# Patient Record
Sex: FEMALE | Race: WHITE | NOT HISPANIC OR LATINO | Employment: OTHER | URBAN - METROPOLITAN AREA
[De-identification: names, ages, dates, MRNs, and addresses within clinical notes are randomized per-mention and may not be internally consistent; named-entity substitution may affect disease eponyms.]

---

## 2017-02-01 ENCOUNTER — GENERIC CONVERSION - ENCOUNTER (OUTPATIENT)
Dept: OTHER | Facility: OTHER | Age: 72
End: 2017-02-01

## 2017-03-22 ENCOUNTER — ALLSCRIPTS OFFICE VISIT (OUTPATIENT)
Dept: OTHER | Facility: OTHER | Age: 72
End: 2017-03-22

## 2017-03-22 DIAGNOSIS — E11.29 TYPE 2 DIABETES MELLITUS WITH OTHER DIABETIC KIDNEY COMPLICATION (HCC): ICD-10-CM

## 2017-03-22 DIAGNOSIS — D50.9 IRON DEFICIENCY ANEMIA: ICD-10-CM

## 2017-03-22 DIAGNOSIS — I10 ESSENTIAL (PRIMARY) HYPERTENSION: ICD-10-CM

## 2017-03-22 DIAGNOSIS — Z11.59 ENCOUNTER FOR SCREENING FOR OTHER VIRAL DISEASES: ICD-10-CM

## 2017-04-27 ENCOUNTER — GENERIC CONVERSION - ENCOUNTER (OUTPATIENT)
Dept: OTHER | Facility: OTHER | Age: 72
End: 2017-04-27

## 2017-06-26 ENCOUNTER — ALLSCRIPTS OFFICE VISIT (OUTPATIENT)
Dept: OTHER | Facility: OTHER | Age: 72
End: 2017-06-26

## 2017-06-26 DIAGNOSIS — Z11.59 ENCOUNTER FOR SCREENING FOR OTHER VIRAL DISEASES: ICD-10-CM

## 2017-06-26 DIAGNOSIS — E11.29 TYPE 2 DIABETES MELLITUS WITH OTHER DIABETIC KIDNEY COMPLICATION (HCC): ICD-10-CM

## 2017-07-17 ENCOUNTER — GENERIC CONVERSION - ENCOUNTER (OUTPATIENT)
Dept: OTHER | Facility: OTHER | Age: 72
End: 2017-07-17

## 2017-09-29 ENCOUNTER — ALLSCRIPTS OFFICE VISIT (OUTPATIENT)
Dept: OTHER | Facility: OTHER | Age: 72
End: 2017-09-29

## 2017-09-29 DIAGNOSIS — D50.9 IRON DEFICIENCY ANEMIA: ICD-10-CM

## 2017-09-29 DIAGNOSIS — E11.29 TYPE 2 DIABETES MELLITUS WITH OTHER DIABETIC KIDNEY COMPLICATION (HCC): ICD-10-CM

## 2017-09-29 DIAGNOSIS — I10 ESSENTIAL (PRIMARY) HYPERTENSION: ICD-10-CM

## 2017-10-31 ENCOUNTER — GENERIC CONVERSION - ENCOUNTER (OUTPATIENT)
Dept: OTHER | Facility: OTHER | Age: 72
End: 2017-10-31

## 2018-01-03 ENCOUNTER — ALLSCRIPTS OFFICE VISIT (OUTPATIENT)
Dept: OTHER | Facility: OTHER | Age: 73
End: 2018-01-03

## 2018-01-03 DIAGNOSIS — E78.5 HYPERLIPIDEMIA: ICD-10-CM

## 2018-01-03 DIAGNOSIS — Z13.820 ENCOUNTER FOR SCREENING FOR OSTEOPOROSIS: ICD-10-CM

## 2018-01-03 DIAGNOSIS — I25.10 ATHEROSCLEROTIC HEART DISEASE OF NATIVE CORONARY ARTERY WITHOUT ANGINA PECTORIS: ICD-10-CM

## 2018-01-03 DIAGNOSIS — Z12.31 ENCOUNTER FOR SCREENING MAMMOGRAM FOR MALIGNANT NEOPLASM OF BREAST: ICD-10-CM

## 2018-01-03 DIAGNOSIS — D50.9 IRON DEFICIENCY ANEMIA: ICD-10-CM

## 2018-01-03 DIAGNOSIS — E55.9 VITAMIN D DEFICIENCY: ICD-10-CM

## 2018-01-03 DIAGNOSIS — Z12.11 ENCOUNTER FOR SCREENING FOR MALIGNANT NEOPLASM OF COLON: ICD-10-CM

## 2018-01-03 DIAGNOSIS — E11.29 TYPE 2 DIABETES MELLITUS WITH OTHER DIABETIC KIDNEY COMPLICATION (CODE): ICD-10-CM

## 2018-01-10 ENCOUNTER — APPOINTMENT (OUTPATIENT)
Dept: LAB | Facility: CLINIC | Age: 73
End: 2018-01-10
Payer: MEDICARE

## 2018-01-10 ENCOUNTER — TRANSCRIBE ORDERS (OUTPATIENT)
Dept: LAB | Facility: CLINIC | Age: 73
End: 2018-01-10

## 2018-01-10 DIAGNOSIS — E55.9 VITAMIN D DEFICIENCY: ICD-10-CM

## 2018-01-10 DIAGNOSIS — D50.9 IRON DEFICIENCY ANEMIA: ICD-10-CM

## 2018-01-10 DIAGNOSIS — I25.10 ATHEROSCLEROTIC HEART DISEASE OF NATIVE CORONARY ARTERY WITHOUT ANGINA PECTORIS: ICD-10-CM

## 2018-01-10 DIAGNOSIS — E78.5 HYPERLIPIDEMIA: ICD-10-CM

## 2018-01-10 DIAGNOSIS — E11.29 TYPE 2 DIABETES MELLITUS WITH OTHER DIABETIC KIDNEY COMPLICATION (CODE): ICD-10-CM

## 2018-01-10 LAB
25(OH)D3 SERPL-MCNC: 63.2 NG/ML (ref 30–100)
ALBUMIN SERPL BCP-MCNC: 3.3 G/DL (ref 3.5–5)
ALP SERPL-CCNC: 81 U/L (ref 46–116)
ALT SERPL W P-5'-P-CCNC: 15 U/L (ref 12–78)
ANION GAP SERPL CALCULATED.3IONS-SCNC: 5 MMOL/L (ref 4–13)
AST SERPL W P-5'-P-CCNC: 16 U/L (ref 5–45)
BASOPHILS # BLD AUTO: 0.03 THOUSANDS/ΜL (ref 0–0.1)
BASOPHILS NFR BLD AUTO: 0 % (ref 0–1)
BILIRUB SERPL-MCNC: 0.67 MG/DL (ref 0.2–1)
BUN SERPL-MCNC: 29 MG/DL (ref 5–25)
CALCIUM SERPL-MCNC: 9.9 MG/DL (ref 8.3–10.1)
CHLORIDE SERPL-SCNC: 100 MMOL/L (ref 100–108)
CO2 SERPL-SCNC: 34 MMOL/L (ref 21–32)
CREAT SERPL-MCNC: 3.54 MG/DL (ref 0.6–1.3)
EOSINOPHIL # BLD AUTO: 0.36 THOUSAND/ΜL (ref 0–0.61)
EOSINOPHIL NFR BLD AUTO: 5 % (ref 0–6)
ERYTHROCYTE [DISTWIDTH] IN BLOOD BY AUTOMATED COUNT: 15 % (ref 11.6–15.1)
EST. AVERAGE GLUCOSE BLD GHB EST-MCNC: 163 MG/DL
FERRITIN SERPL-MCNC: 1385 NG/ML (ref 8–388)
GFR SERPL CREATININE-BSD FRML MDRD: 12 ML/MIN/1.73SQ M
GLUCOSE P FAST SERPL-MCNC: 131 MG/DL (ref 65–99)
HBA1C MFR BLD: 7.3 % (ref 4.2–6.3)
HCT VFR BLD AUTO: 34.9 % (ref 34.8–46.1)
HGB BLD-MCNC: 11.1 G/DL (ref 11.5–15.4)
IRON SERPL-MCNC: 58 UG/DL (ref 50–170)
LDLC SERPL DIRECT ASSAY-MCNC: 90 MG/DL (ref 0–100)
LYMPHOCYTES # BLD AUTO: 1.51 THOUSANDS/ΜL (ref 0.6–4.47)
LYMPHOCYTES NFR BLD AUTO: 22 % (ref 14–44)
MAGNESIUM SERPL-MCNC: 2.4 MG/DL (ref 1.6–2.6)
MCH RBC QN AUTO: 32.7 PG (ref 26.8–34.3)
MCHC RBC AUTO-ENTMCNC: 31.8 G/DL (ref 31.4–37.4)
MCV RBC AUTO: 103 FL (ref 82–98)
MONOCYTES # BLD AUTO: 0.49 THOUSAND/ΜL (ref 0.17–1.22)
MONOCYTES NFR BLD AUTO: 7 % (ref 4–12)
NEUTROPHILS # BLD AUTO: 4.41 THOUSANDS/ΜL (ref 1.85–7.62)
NEUTS SEG NFR BLD AUTO: 66 % (ref 43–75)
NRBC BLD AUTO-RTO: 0 /100 WBCS
PLATELET # BLD AUTO: 185 THOUSANDS/UL (ref 149–390)
PMV BLD AUTO: 10.7 FL (ref 8.9–12.7)
POTASSIUM SERPL-SCNC: 4.1 MMOL/L (ref 3.5–5.3)
PROT SERPL-MCNC: 7.5 G/DL (ref 6.4–8.2)
RBC # BLD AUTO: 3.39 MILLION/UL (ref 3.81–5.12)
SODIUM SERPL-SCNC: 139 MMOL/L (ref 136–145)
TIBC SERPL-MCNC: 179 UG/DL (ref 250–450)
TRIGL SERPL-MCNC: 244 MG/DL
WBC # BLD AUTO: 6.81 THOUSAND/UL (ref 4.31–10.16)

## 2018-01-10 PROCEDURE — 83540 ASSAY OF IRON: CPT

## 2018-01-10 PROCEDURE — 83721 ASSAY OF BLOOD LIPOPROTEIN: CPT

## 2018-01-10 PROCEDURE — 85025 COMPLETE CBC W/AUTO DIFF WBC: CPT

## 2018-01-10 PROCEDURE — 83550 IRON BINDING TEST: CPT

## 2018-01-10 PROCEDURE — 83036 HEMOGLOBIN GLYCOSYLATED A1C: CPT

## 2018-01-10 PROCEDURE — 82728 ASSAY OF FERRITIN: CPT

## 2018-01-10 PROCEDURE — 83735 ASSAY OF MAGNESIUM: CPT

## 2018-01-10 PROCEDURE — 36415 COLL VENOUS BLD VENIPUNCTURE: CPT

## 2018-01-10 PROCEDURE — 80053 COMPREHEN METABOLIC PANEL: CPT

## 2018-01-10 PROCEDURE — 82306 VITAMIN D 25 HYDROXY: CPT

## 2018-01-10 PROCEDURE — 84478 ASSAY OF TRIGLYCERIDES: CPT

## 2018-01-10 NOTE — RESULT NOTES
Verified Results  (1) HEMOGLOBIN A1C 25Jan2016 11:42AM Viky Dolanmatt Order Number: [id number]      5 7-6 4% impaired fasting glucose  >=6 5% diagnosis of diabetes    Falsely low levels are seen in conditions linked to short RBC life span-  hemolytic anemia, and splenomegaly  Falsely elevated levels are seen in situations where there is an increased production of RBC- receipt of erythropoietin or blood transfusions  Adopted from ADA-Clinical Practice Recommendations     Test Name Result Flag Reference   HEMOGLOBIN A1C 5 9 % H 4 0-5 6   EST  AVG   GLUCOSE 123 mg/dl

## 2018-01-11 ENCOUNTER — GENERIC CONVERSION - ENCOUNTER (OUTPATIENT)
Dept: OTHER | Facility: OTHER | Age: 73
End: 2018-01-11

## 2018-01-12 ENCOUNTER — TRANSCRIBE ORDERS (OUTPATIENT)
Dept: LAB | Facility: CLINIC | Age: 73
End: 2018-01-12

## 2018-01-12 ENCOUNTER — APPOINTMENT (OUTPATIENT)
Dept: LAB | Facility: CLINIC | Age: 73
End: 2018-01-12
Payer: MEDICARE

## 2018-01-12 DIAGNOSIS — Z12.11 SCREEN FOR COLON CANCER: Primary | ICD-10-CM

## 2018-01-12 DIAGNOSIS — Z12.11 SCREEN FOR COLON CANCER: ICD-10-CM

## 2018-01-12 LAB — HEMOCCULT STL QL IA: NEGATIVE

## 2018-01-12 PROCEDURE — G0328 FECAL BLOOD SCRN IMMUNOASSAY: HCPCS

## 2018-01-12 NOTE — RESULT NOTES
Verified Results  * MAMMO SCREENING BILATERAL W CAD 41Eoq2422 02:44PM Thao Altamirano Order Number: [id number]    - Patient Instructions: To schedule this appointment, please contact Central Scheduling at [phone]  Do not wear any perfume, powder, lotion or deodorant on breast or underarm area  Please bring your doctors order, referral (if needed) and insurance information with you on the day of the test  Failure to bring this information may result in this test being rescheduled  Arrive 15 minutes prior to your appointment time to register  On the day of your test, please bring any prior mammogram or breast studies with you that were not performed at a [facility]  Failure to bring prior exams may result in your test needing to be rescheduled  Test Name Result Flag Reference   MAMMO SCREENING BILATERAL W CAD (Report)     Patient History:   Patient is postmenopausal    Family history of breast cancer in mother  Reason for exam: screening (asymptomatic)  Mammo Screening Bilateral W CAD: December 2, 2016 - Check In #:    [de-identified]   Bilateral MLO, CC, and XCCL view(s) were taken  Technologist: CANELO Washington (R)(M)   Prior study comparison: April 2, 2014, bilateral ultrasound    performed at [address] [place] [address]  April 2, 2014,    bilateral digital diagnostic mammogram performed at [address] [place] [address]  April 2, 2014, bilateral digital    screening mammogram performed at [address] [place] [address]  There are scattered fibroglandular densities  No new dominant    soft tissue mass, architectural distortion or suspicious    calcifications are noted  The skin and nipple structures are    within normal limits  Benign appearing calcifications are noted  Stable nodular densities present bilaterally  No mammographic evidence of malignancy  No    significant changes when compared with prior studies       ASSESSMENT: BiRad:2 - Benign     Recommendation:   Routine screening mammogram of both breasts in 1 year  A    reminder letter will be scheduled  Analyzed by CAD     8-10% of cancers will be missed on mammography  Management of a    palpable abnormality must be based on clinical grounds  Patients   will be notified of their results via letter from our facility  Accredited by [employer] of [employer] and FDA       Transcription Location: CANELO Costa 98: [id number]     Risk Value(s):   Tyrer-Cuzick 10 Year: 6 110%, Tyrer-Cuzick Lifetime: 9 587%,    Myriad Table: 1 5%, GUILLE 5 Year: 3 0%, NCI Lifetime: 8 7%   Signed by:   Eddi Damon MD   12/2/16

## 2018-01-12 NOTE — RESULT NOTES
Verified Results  (1) THIN PREP PAP WITH IMAGING 51BDS1343 11:10AM Amaya Allen     Test Name Result Flag Reference   LAB AP CASE REPORT (Report)     Gynecologic Cytology Report            Case: [id number]                  Authorizing Provider: Irving Schaefer PA-C   Collected:      01/28/2016 1110        First Screen:     DELMA Jean    Received:      02/01/2016 1110        Specimen:  LIQUID-BASED PAP, SCREENING, Cervix   LAB AP GYN PRIMARY INTERPRETATION      Negative for intraepithelial lesion or malignancy   LAB AP GYN SPECIMEN ADEQUACY      Satisfactory for evaluation  Partially obscuring inflammation  LAB AP GYN NOTE      This specimen was analyzed by the Thin Prep Imaging System  Due to   technical Imaging issues or the physical properties of the slide specimen,   comprehensive manual rescreening by a Cytotechnologist, and/or Pathologist   was indicated  LAB AP GYN ADDITIONAL INFORMATION (Report)     [employer]'s FDA approved ,  and ThinPrep Imaging System are   utilized with strict adherence to the 's instruction manual to   prepare gynecologic and non-gynecologic cytology specimens for the   production of ThinPrep slides as well as for gynecologic ThinPrep imaging  These processes have been validated by our laboratory and/or by the     The Pap test is not a diagnostic procedure and should not be used as the   sole means to detect cervical cancer  It is only a screening procedure to   aid in the detection of cervical cancer and its precursors  Both   false-negative and false-positive results have been experienced  Your   patient's test result should be interpreted in this context together with   the history and clinical findings     LAB AP LMP 12/1/2005

## 2018-01-13 ENCOUNTER — GENERIC CONVERSION - ENCOUNTER (OUTPATIENT)
Dept: OTHER | Facility: OTHER | Age: 73
End: 2018-01-13

## 2018-01-13 VITALS
WEIGHT: 172 LBS | RESPIRATION RATE: 16 BRPM | HEIGHT: 64 IN | DIASTOLIC BLOOD PRESSURE: 62 MMHG | SYSTOLIC BLOOD PRESSURE: 126 MMHG | TEMPERATURE: 98 F | BODY MASS INDEX: 29.37 KG/M2 | HEART RATE: 64 BPM

## 2018-01-13 VITALS
DIASTOLIC BLOOD PRESSURE: 58 MMHG | RESPIRATION RATE: 18 BRPM | SYSTOLIC BLOOD PRESSURE: 142 MMHG | HEART RATE: 70 BPM | TEMPERATURE: 98.1 F

## 2018-01-14 VITALS
HEIGHT: 64 IN | DIASTOLIC BLOOD PRESSURE: 62 MMHG | HEART RATE: 66 BPM | BODY MASS INDEX: 30.22 KG/M2 | RESPIRATION RATE: 18 BRPM | TEMPERATURE: 97.4 F | SYSTOLIC BLOOD PRESSURE: 138 MMHG | WEIGHT: 177 LBS

## 2018-01-23 VITALS
HEART RATE: 68 BPM | RESPIRATION RATE: 18 BRPM | DIASTOLIC BLOOD PRESSURE: 80 MMHG | TEMPERATURE: 97.2 F | WEIGHT: 176 LBS | SYSTOLIC BLOOD PRESSURE: 150 MMHG | HEIGHT: 64 IN | BODY MASS INDEX: 30.05 KG/M2

## 2018-01-23 NOTE — MISCELLANEOUS
Assessment   1  Acute renal failure (584 9) (N17 9)1   2  Congestive heart failure (428 0) (I50 9)1   3  Iron deficiency anemia (280 9) (D50 9)1   4  Fracture, tibial plateau (627 31) (K32 070U)4   5  Gait disturbance (781 2) (R26 9)1   6  Type 2 diabetes mellitus with renal manifestations, controlled (250 40) (E11 29)1   7  Secondary hyperparathyroidism (588 81) (N25 81)1   8  Chronic kidney disease, stage 4 (severe) (585 4) (N18 4)1      1 Amended By: Kathi Stallings; Mar 09 2016 9:21 AM EST    Discussion/Summary  Discussion Summary:   Stable, but unable to move even less due to anasarca and now off the diuretics  Sugars ok  Suspect pt is moving towards dialysis near future  Will speak with pt's nephrologist 1  Spoke with nephro and will see her next week, but wanted her to restart the demadex 20mg bid and check labs next week  Will continue current sugar med until with get the acute renal issues addressed  RTC six weeks for RTN  2        1 Amended By: Kathi Stallings; Mar 09 2016 9:25 AM EST   2 Amended By: Kathi Stallings; Mar 09 2016 9:31 AM EST    Chief Complaint  Chief Complaint Free Text Note Form: [facility] FU - Kidney Failure  Christo Smith Pt is feeling better, but has increased fluid retention in abdomen and legs  [facility] d/c torsemide  1        1 Amended By: Ventura Khanna; Mar 09 2016 8:53 AM EST    History of Present Illness  TCM Communication [facility]: The patient is being contacted for follow-up after hospitalization and PT [place] F/U ON 03/09/2016  Hospital records were not available  [place] [facility] [place] date of admission: 03/02/2016, date of discharge: 03/07/2016  Diagnosis: KIDNEY FAILURE  She was discharged to home  She scheduled a follow up appointment     Communication performed and completed by   HPI: WF seen wth her  for f/u admission to [facility] for fluid overload due to worsening CKD4 complicated by ongoing sever anemia requiring transfusions and UTI  Minimal records available and most have not been transcribed per [facility]  Pt's meds significantly changed and pt d/c'd to home  Since d/c, only minimal improvement  Continues with increase fluid in her entire body and SOB persists, but no worse  Activity level is deminished  Sugars doing ok despite stopping most meds  No CP or SOB  1        1 Amended By: [facility]; Mar 09 2016 9:12 AM EST    Review of Systems  Complete-Female:   Constitutional:1  feeling poorly1  and feeling tired1 , but no fever1  and no chills1   Cardiovascular:1  lower extremity edema1 , but no chest pain1 , no intermittent leg claudication1  and no palpitations1   Respiratory:1  shortness of breath1  and shortness of breath during exertion1 , but no cough1 , no orthopnea1 , no wheezing1  and no PND1   Gastrointestinal:1  bloated and uncomfortable  1 , but no abdominal pain1 , no nausea1 , no constipation1  and no diarrhea1   Genitourinary:1  no dysuria1   Musculoskeletal:1  limb pain1   Psychiatric:1  no anxiety1  and no depression1         1 Amended By: [facility]; Mar 09 2016 9:15 AM EST    Active Problems   1  Abnormal weight loss (783 21) (R63 4)  2  Acute renal failure (584 9) (N17 9)  3  Allergic rhinitis (477 9) (J30 9)  4  Asymptomatic menopausal state (V49 81) (Z78 0)  5  Cardiovascular arteriosclerosis (429 2,440 9) (I25 10)  6  Carotid artery stenosis (433 10) (I65 29)  7  Cataract (366 9) (H26 9)  8  Clostridium difficile colitis (008 45) (A04 7)  9  Congestive heart failure (428 0) (I50 9)  10  Dehydration (276 51) (E86 0)  11  Diabetes mellitus with foot ulcer (250 80,707 15) (E11 621,L97 509)  12  Diarrhea (787 91) (R19 7)  13  Diastolic dysfunction (100 9) (I51 9)  14  DM type 2, not at goal (250 00) (E11 9)  15  Edema (782 3) (R60 9)  16  Encounter for screening colonoscopy (V76 51) (Z12 11)  17  Encounter for screening mammogram for malignant neoplasm of breast (V76 12)    (Z12 31)  18   Esophageal reflux (530 81) (K21 9)  19  Fracture, tibial plateau (130 65) (T60 310J)  20  Gait disturbance (781 2) (R26 9)  21  Hyperlipidemia (272 4) (E78 5)  22  Hypertension (401 9) (I10)  23  Hypokalemia (276 8) (E87 6)  24  Iron deficiency anemia (280 9) (D50 9)  25  Malnutrition (263 9) (E46)  26  Nephritis and nephropathy, not specified as acute or chronic, with other specified    pathological lesion in kidney, in diseases classified elsewhere (583 81) (N05 8)  27  Osteopetrosis (756 52) (Q78 2)  28  Overactive bladder (596 51) (N32 81)  29  Pain in ankle joint (719 47) (M25 579)  30  Pap smear for cervical cancer screening (V76 2) (Z12 4)  31  Peripheral arterial disease (443 9) (I73 9)  32  Postmenopausal osteoporosis (733 01) (M81 0)  33  Pre-operative examination for internal medicine (V72 83) (Z01 818)  34  Right knee pain (719 46) (M25 561)  35  Screening for depression (V79 0) (Z13 89)  36  Screening for genitourinary condition (V81 6) (Z13 89)  37  Screening for malignant neoplasm of cervix (V76 2) (Z12 4)  38  Screening for neurological condition (V80 09) (Z13 89)  39  Screening for osteoporosis (V82 81) (Z13 820)  40  Toenail avulsion (893 0) (S91 209A)  41  Type 2 diabetes mellitus with renal manifestations, controlled (250 40) (E11 29)  42  Vitamin D deficiency (268 9) (E55 9)    Past Medical History   1  History of Cellulitis of left leg (682 6) (L03 116)  2  History of contact dermatitis (V13 3) (Z87 2)  3  History of hypoglycemia (V12 29) (Z86 39)  4  History of visual disturbance (V12 49) (Z86 69)  5  History of Hypothermia (991 6) (T68 XXXA)  6  History of Metatarsalgia (726 70) (M77 40)  7  History of Near syncope (780 2) (R55)  8  Old myocardial infarction (412) (I25 2)    Surgical History   1  History of Bypass Graft (Non-Vein)  2  History of CABG  3  History of Cataract Surgery  4  History of Cholecystectomy  5  History of Hernia Repair Inguinal Sliding  6   History of Neurological Surgery Carotid Endarterectomy  7  History of Surgery Foot Amputation Metatarsal And Toe  8  History of Surgery Right Foot Amputation PIP Fifth Toe  9  History of Tonsillectomy With Adenoidectomy  Surgical History Reviewed: The surgical history was reviewed and updated today  1        1 Amended By: Juancarlos Patel; Mar 09 2016 9:21 AM EST    Family History   1  Family history of Breast Cancer (V16 3)  2  Family history of Cancer  3  Family history of Heart Disease (V17 49)   4  Family history of Coronary Artery Disease (V17 49)   5  Family history of Cerebral Artery Aneurysm  6  Family history of Coronary Artery Disease (V17 49)   7  Family history of Liver Cancer  Family History Reviewed: The family history was reviewed and updated today  1        1 Amended By: Juancarlos Patel; Mar 09 2016 9:21 AM EST    Social History    · Denied: History of Alcohol   · Former smoker (V15 82) (L51 928)   · Former smoker (V15 82) (S82 689)   · Marital History - Currently    · Occupation: Retired    Social History Reviewed: The social history was reviewed and updated today  1  The social history was reviewed and is unchanged  1        1 Amended By: Juancarlos Patel; Mar 09 2016 9:21 AM EST    Current Meds  1  1   2  AmLODIPine Besylate 5 MG Oral Tablet Recorded1   3  Aspirin 81 MG Oral Tablet; TAKE 1 TABLET DAILY; Therapy: 76EFE2985 to (Evaluate:51Brn1940) Recorded  4  Calcitriol 0 25 MCG Oral Capsule Recorded1   5  Carvedilol 12 5 MG Oral Tablet; TAKE 1 TABLET TWICE DAILY,  WITH MORNING AND   EVENING MEAL; Therapy: 25OME9681 to (Evaluate:63Mcp3459)  Requested for: 11Aug2015; Last   Rx:11Aug2015 Ordered  6  Ferrous Sulfate 325 (65 Fe) MG Oral Tablet; TAKE 1 TABLET TWICE DAILY; Therapy: 17IGF5298 to (Izaiah Landaverde)  Requested for: 09Apr2014; Last   Rx:09Apr2014 Ordered  7  1   8  GNP Magnesium Oxide 250 MG Oral Tablet; USE AS DIRECTED Recorded  9  Isosorbide Dinitrate 20 MG Oral Tablet; 1 tab po bid;    Therapy: 19QKQ2845 to Recorded  10  Nateglinide 60 MG Oral Tablet Recorded1   11  1   12  1   13  Ocuvite Oral Tablet; TAKE 1 TABLET DAILY; Therapy: 85EKM4036 to Recorded  14  Pantoprazole Sodium 40 MG Oral Tablet Delayed Release; take 1 tablet by mouth once    daily; Therapy: 04UKS0759 to (Evaluate:26Mar2016)  Requested for: 72ZNA3539; Last    Rx:27Nog3764 Ordered  15  Pravastatin Sodium 40 MG Oral Tablet Recorded1   16  Torsemide 20 MG Oral Tablet; TAKE 1 TABLET BID AS DIRECTED; Therapy: 48SKE7607 to Recorded;2 1   17  1   18  TraMADol HCl - 50 MG Oral Tablet; TAKE 1 TO 2 TABLETS EVERY 6 HOURS AS NEEDED    FOR PAIN;    Therapy: 23QKI6488 to (Last Rx:19Jan2016) Ordered  19  Vitamin D (Ergocalciferol) 80255 UNIT Oral Capsule; TAKE 1 CAPSULE BY MOUTH    ONCE WEEKLY   ;    Therapy: 60HKL1990 to (Last Rx:10Ggg2503)  Requested for: 88OXQ0357 Ordered  20  ZyrTEC Allergy 10 MG Oral Tablet; TAKE 1 TABLET DAILY AS NEEDED; Therapy: 42MXD0088 to Recorded  Medication List Reviewed: The medication list was reviewed and updated today  1        1 Amended By: Merry Watson; Mar 09 2016 9:16 AM EST   2 Amended By: Merry Watson; Mar 09 2016 9:29 AM EST    Allergies   1  No Known Drug Allergies   2  Seasonal    Physical Exam    Constitutional1    General appearance: No acute distress, well appearing and well nourished  1    Eyes1    Conjunctiva and lids: No swelling, erythema or discharge  1    Pupils and irises: Equal, round and reactive to light  1    Ears, Nose, Mouth, and Throat1    Oropharynx: Normal with no erythema, edema, exudate or lesions  1    Pulmonary1    Respiratory effort: No increased work of breathing or signs of respiratory distress  1    Auscultation of lungs: Clear to auscultation  1    Cardiovascular1    Auscultation of heart: Normal rate and rhythm, normal S1 and S2, without murmurs  1    Examination of extremities for edema and/or varicosities: Abnormal  1  B/l edema left greater than the right 2/41      Carotid pulses: Normal 1    Abdomen1    Abdomen: Abnormal  1  Soft, but protuberant with ?ascites and dullness  BS wnl  1   Musculoskeletal1    Gait and station: Abnormal  1  Unable to wt bear on right leg and is WC bound  1   Neurologic1    Cranial nerves: Cranial nerves 2-12 intact  1    Psychiatric1    Orientation to person, place, and time: Normal 1    Mood and affect: Normal 1          1 Amended By: Carla George; Mar 09 2016 9:18 AM EST    Health Management  Encounter for screening mammogram for malignant neoplasm of breast   Digital Bilateral Screening Mammogram With CAD; every 1 year; Last 02Apr2014; Next Due:  19ZXE8027; Overdue  Screening for osteoporosis   Dexa Scan; every 2 years; Last 02Apr2014; Next Due: 89Hbr2272; Near Due  Health Maintenance   Dexa Scan; every 2 years; Last 02Apr2014; Next Due: 74Jyx2934; Near Due  Digital Bilateral Screening Mammogram With CAD; every 1 year; Last 02Apr2014; Next Due:  39USP3846; Overdue    Future Appointments    Date/Time Provider Specialty Site   03/09/2016 08:30 AM DENISE Baig  Internal Medicine [address] [place] [address]   05/16/2016 01:00 PM Aime Wen, Nurse Schedule  [facility]     Signatures   Electronically signed by :  Colletta Moselle, ; Mar  7 2016  3:42PM EST                       (Author)    Electronically signed by : DENISE Erickson ; Mar  7 2016  3:50PM EST                       (Author)    Electronically signed by : DENISE Erickson ; Mar  9 2016  9:31AM EST                       (Author)

## 2018-02-14 DIAGNOSIS — K21.9 GASTROESOPHAGEAL REFLUX DISEASE WITHOUT ESOPHAGITIS: Primary | ICD-10-CM

## 2018-02-14 RX ORDER — PANTOPRAZOLE SODIUM 40 MG/1
TABLET, DELAYED RELEASE ORAL
Qty: 30 TABLET | Refills: 0 | Status: SHIPPED | OUTPATIENT
Start: 2018-02-14 | End: 2018-03-16 | Stop reason: SDUPTHER

## 2018-02-26 ENCOUNTER — TELEPHONE (OUTPATIENT)
Dept: INTERNAL MEDICINE CLINIC | Facility: CLINIC | Age: 73
End: 2018-02-26

## 2018-02-26 NOTE — TELEPHONE ENCOUNTER
Pt needs refill on her pravastatin 40 mg takes 1 Qd    [facility] [address]    [phone];  Please advise

## 2018-02-26 NOTE — RESULT NOTES
Verified Results  (1) OCCULT BLOOD, FECAL IMMUNOCHEMICAL TEST 12Jan2018 12:11PM Fletcher Duong     Test Name Result Flag Reference   OCCULT BLD, FECAL IMMUNOLOGICAL Negative  Negative   Performed by Fecal Immunochemical Test

## 2018-02-26 NOTE — RESULT NOTES
Verified Results  (1) CBC/PLT/DIFF 67PVQ1173 09:59AM [facility] Order Number: [id number]     Test Name Result Flag Reference   WBC COUNT 6 81 Thousand/uL  4 31-10 16   RBC COUNT 3 39 Million/uL L 3 81-5 12   HEMOGLOBIN 11 1 g/dL L 11 5-15 4   HEMATOCRIT 34 9 %  34 8-46  1    fL H 82-98   MCH 32 7 pg  26 8-34 3   MCHC 31 8 g/dL  31 4-37 4   RDW 15 0 %  11 6-15 1   MPV 10 7 fL  8 9-12 7   PLATELET COUNT 608 Thousands/uL  149-390   nRBC AUTOMATED 0 /100 WBCs     NEUTROPHILS RELATIVE PERCENT 66 %  43-75   LYMPHOCYTES RELATIVE PERCENT 22 %  14-44   MONOCYTES RELATIVE PERCENT 7 %  4-12   EOSINOPHILS RELATIVE PERCENT 5 %  0-6   BASOPHILS RELATIVE PERCENT 0 %  0-1   NEUTROPHILS ABSOLUTE COUNT 4 41 Thousands/? ??L  1 85-7 62   LYMPHOCYTES ABSOLUTE COUNT 1 51 Thousands/? ??L  0 60-4 47   MONOCYTES ABSOLUTE COUNT 0 49 Thousand/? ??L  0 17-1 22   EOSINOPHILS ABSOLUTE COUNT 0 36 Thousand/? ??L  0 00-0 61   BASOPHILS ABSOLUTE COUNT 0 03 Thousands/? ??L  0 00-0 10     (1) FERRITIN 95YVZ7242 09:59AM [facility] Order Number: [id number]     Test Name Result Flag Reference   FERRITIN 9454 ng/mL H 8-388     (1) IRON [id number] 09:59AM [facility] Order Number: [id number]     Test Name Result Flag Reference   IRON 58 ug/dL     Patients treated with metal-binding drugs (ie  Deferoxamine) may have depressed iron values       (1) TIBC 45PLC8940 09:59AM [facility] Order Number: [id number]     Test Name Result Flag Reference   TOTAL IRON BINDING CAPACITY 179 ug/dL L 250-450     (1) COMPREHENSIVE METABOLIC PANEL 05PIV1455 36:11QC [facility] Order Number: [id number]     Test Name Result Flag Reference   SODIUM 139 mmol/L  136-145   POTASSIUM 4 1 mmol/L  3 5-5 3   CHLORIDE 100 mmol/L  100-108   CARBON DIOXIDE 34 mmol/L H 21-32   ANION GAP (CALC) 5 mmol/L  4-13   BLOOD UREA NITROGEN 29 mg/dL H 5-25   CREATININE 3 54 mg/dL H 0 60-1 30 Standardized to IDMS reference method   CALCIUM 9 9 mg/dL  8 3-10 1   BILI, TOTAL 0 67 mg/dL  0 20-1 00   ALK PHOSPHATAS 81 U/L     ALT (SGPT) 15 U/L  12-78   Specimen collection should occur prior to Sulfasalazine and/or Sulfapyridine administration due to the potential for falsely depressed results  AST(SGOT) 16 U/L  5-45   Specimen collection should occur prior to Sulfasalazine administration due to the potential for falsely depressed results  ALBUMIN 3 3 g/dL L 3 5-5 0   TOTAL PROTEIN 7 5 g/dL  6 4-8 2   eGFR 12 ml/min/1 73sq m     [facility] Disease Education Program recommendations are as follows:  GFR calculation is accurate only with a steady state creatinine  Chronic Kidney disease less than 60 ml/min/1 73 sq  meters  Kidney failure less than 15 ml/min/1 73 sq  meters  GLUCOSE FASTING 131 mg/dL H 65-99   Specimen collection should occur prior to Sulfasalazine administration due to the potential for falsely depressed results  Specimen collection should occur prior to Sulfapyridine administration due to the potential for falsely elevated results  (1) HEMOGLOBIN A1C 92HMK9510 09:59AM [facility] Order Number: [id number]     Test Name Result Flag Reference   HEMOGLOBIN A1C 7 3 % H 4 2-6 3   EST  AVG   GLUCOSE 163 mg/dl       (1) LDL,DIRECT 24OUR4294 09:59AM Shayna Arn Order Number: [id number]     Test Name Result Flag Reference   LDL, DIRECT 90 mg/dl  0-100   LDL Cholesterol:        Optimal          <100 mg/dl        Near Optimal     100-129 mg/dl        Above Optimal          Borderline High   130-159 mg/dl          High              160-189 mg/dl          Very High        >189 mg/dl     (1) TRIGLYCERIDE [id number] 09:59AM Shayna Arn Order Number: [id number]     Test Name Result Flag Reference   TRIGLYCERIDES 244 mg/dL H <=150   Specimen collection should occur prior to N-Acetylcysteine or Metamizole administration due to the potential for falsely depressed results  Triglyceride:        Normal <150 mg/dl   Borderline High 150-199 mg/dl   High 200-499 mg/dl   Very High >499 mg/dl     (1) VITAMIN D 25-HYDROXY 85LXA2448 09:59AM [facility] Order Number: [id number]     Test Name Result Flag Reference   VIT D 25-HYDROX 63 2 ng/mL  30 0-100 0   This assay is a certified procedure of the CDC Vitamin D Standardization Certification Program (VDSCP)     Deficiency <20ng/ml   Insufficiency 20-30ng/ml   Sufficient  ng/ml     *Patients undergoing fluorescein dye angiography may retain small amounts of fluorescein in the body for 48-72 hours post procedure  Samples containing fluorescein can produce falsely elevated Vitamin D values  If the patient had this procedure, a specimen should be resubmitted post fluorescein clearance       (1) MAGNESIUM [id number] 09:59AM [facility] Order Number: [id number]     Test Name Result Flag Reference   MAGNESIUM 2 4 mg/dL  1 6-2 6

## 2018-02-27 DIAGNOSIS — E78.5 HYPERLIPIDEMIA, UNSPECIFIED HYPERLIPIDEMIA TYPE: Primary | ICD-10-CM

## 2018-02-27 RX ORDER — PRAVASTATIN SODIUM 40 MG
1 TABLET ORAL DAILY
COMMUNITY
End: 2018-02-27 | Stop reason: SDUPTHER

## 2018-02-27 RX ORDER — PRAVASTATIN SODIUM 40 MG
40 TABLET ORAL DAILY
Qty: 30 TABLET | Refills: 5 | Status: SHIPPED | OUTPATIENT
Start: 2018-02-27 | End: 2018-04-03

## 2018-03-07 NOTE — PROGRESS NOTES
History of Present Illness    Revaccination    Spoke with regarding vaccine out of temperature range  Pt called (attempt 1): [phone] dw  left message to call office  Active Problems    1  Allergic rhinitis (477 9) (J30 9)   2  Arteriosclerosis of carotid artery (433 10) (I65 29)   3  Asymptomatic menopausal state (V49 81) (Z78 0)   4  Cardiovascular arteriosclerosis (429 2,440 9) (I25 10)   5  Cataract (366 9) (H26 9)   6  Congestive heart failure (428 0) (I50 9)   7  Dermatitis (692 9) (L30 9)   8  Diabetes mellitus with foot ulcer (250 80,707 15) (E11 621,L97 509)   9  Diastolic dysfunction (178 6) (I51 9)   10  DM type 2, not at goal (250 00) (E11 9)   11  Edema (782 3) (R60 9)   12  Encounter for screening colonoscopy (V76 51) (Z12 11)   13  Encounter for screening mammogram for malignant neoplasm of breast (V76 12)    (Z12 31)   14  End-stage renal disease on hemodialysis (585 6,V45 11) (N18 6,Z99 2)   15  Fracture, tibial plateau (533 05) (V21 973Y)   16  Gait disturbance (781 2) (R26 9)   17  GERD without esophagitis (530 81) (K21 9)   18  Hyperkalemia (276 7) (E87 5)   19  Hyperlipidemia (272 4) (E78 5)   20  Hypertension (401 9) (I10)   21  Hypokalemia (276 8) (E87 6)   22  Hypoxia (799 02) (R09 02)   23  Iron deficiency anemia (280 9) (D50 9)   24  Need for hepatitis C screening test (V73 89) (Z11 59)   25  Need for pneumococcal vaccine (V03 82) (Z23)   26  Nephritis and nephropathy, not specified as acute or chronic, with other specified    pathological lesion in kidney, in diseases classified elsewhere (583 81) (N05 8)   27  Osteopetrosis (756 52) (Q78 2)   28  Overactive bladder (596 51) (N32 81)   29  Pap smear for cervical cancer screening (V76 2) (Z12 4)   30  Peripheral arterial disease (443 9) (I73 9)   31  Postmenopausal osteoporosis (733 01) (M81 0)   32  Pre-operative examination for internal medicine (V72 83) (Z01 818)   33  Right knee pain (719 46) (M25 561)   34   Screening for depression (V79 0) (Z13 89)   35  Screening for genitourinary condition (V81 6) (Z13 89)   36  Screening for malignant neoplasm of cervix (V76 2) (Z12 4)   37  Screening for neurological condition (V80 09) (Z13 89)   38  Screening for osteoporosis (V82 81) (Z13 820)   39  Secondary hyperparathyroidism (588 81) (N25 81)   40  Syncope (780 2) (R55)   41  Type 2 diabetes mellitus with renal manifestations, controlled (250 40) (E11 29)   42  Vitamin D deficiency (268 9) (E55 9)    Immunizations  Influenza --- Naeem Francover: [id number] High: 26-Oct-2015; Series3: Oct 2016   PCV --- Series1: 22-Mar-2017   Tdap --- Series1: 26-Mar-2014     Current Meds   1  Aspirin 81 MG TABS; TAKE 1 TABLET DAILY   2  Carvedilol 12 5 MG Oral Tablet; swallow 1 tablet twice a day FOR CHF   3  Diphenoxylate-Atropine 2 5-0 025 MG Oral Tablet; take 1 tablet by mouth twice a day if   needed   4  Fluticasone Propionate 50 MCG/ACT Nasal Suspension; use 2 sprays in each nostril   once daily   5  Nateglinide 120 MG Oral Tablet; TAKE (1) TABLET 2 TIMES DAILY BEFORE MEALS   6  Ocuvite Oral Tablet; TAKE 1 TABLET DAILY   7  OneTouch Ultra Blue In Vitro Strip; test twice daily   8  Pantoprazole Sodium 40 MG Oral Tablet Delayed Release; take 1 tablet by mouth once   daily   9  Pravastatin Sodium 40 MG Oral Tablet   10  Renvela 800 MG Oral Tablet; 1 po tid   11  TraMADol HCl - 50 MG Oral Tablet; take 1 to 2 tablets by mouth every 6 hours if needed    for pain   12  Vitamin D (Ergocalciferol) 83119 UNIT Oral Capsule; TAKE 1 CAPSULE BY MOUTH    EVERY WEEK   13  ZyrTEC Allergy 10 MG Oral Tablet; TAKE 1 TABLET DAILY AS NEEDED    Allergies    1  No Known Drug Allergies    2  Seasonal    Future Appointments    Date/Time Provider Specialty Site   06/26/2017 01:00 PM DENISE Dang  Internal Medicine [address] [facility]     Signatures   Electronically signed by : Mil Montanez OM;  Apr 27 2017 11:35AM EST                       (Author)

## 2018-03-16 DIAGNOSIS — K21.9 GASTROESOPHAGEAL REFLUX DISEASE WITHOUT ESOPHAGITIS: ICD-10-CM

## 2018-03-16 RX ORDER — PANTOPRAZOLE SODIUM 40 MG/1
TABLET, DELAYED RELEASE ORAL
Qty: 30 TABLET | Refills: 0 | Status: SHIPPED | OUTPATIENT
Start: 2018-03-16 | End: 2018-04-15 | Stop reason: SDUPTHER

## 2018-03-30 DIAGNOSIS — E55.9 VITAMIN D DEFICIENCY: ICD-10-CM

## 2018-03-30 DIAGNOSIS — Z88.9 HISTORY OF SEASONAL ALLERGIES: ICD-10-CM

## 2018-03-30 DIAGNOSIS — I25.10 CORONARY ARTERY DISEASE WITHOUT ANGINA PECTORIS, UNSPECIFIED VESSEL OR LESION TYPE, UNSPECIFIED WHETHER NATIVE OR TRANSPLANTED HEART: Primary | ICD-10-CM

## 2018-03-31 RX ORDER — PRAVASTATIN SODIUM 80 MG/1
TABLET ORAL
Qty: 90 TABLET | Refills: 3 | Status: SHIPPED | OUTPATIENT
Start: 2018-03-31 | End: 2019-03-26 | Stop reason: SDUPTHER

## 2018-03-31 RX ORDER — ERGOCALCIFEROL 1.25 MG/1
CAPSULE ORAL
Qty: 4 CAPSULE | Refills: 5 | Status: SHIPPED | OUTPATIENT
Start: 2018-03-31 | End: 2018-04-03

## 2018-03-31 RX ORDER — CARVEDILOL 12.5 MG/1
TABLET ORAL
Qty: 60 TABLET | Refills: 0 | Status: SHIPPED | OUTPATIENT
Start: 2018-03-31 | End: 2018-04-15 | Stop reason: SDUPTHER

## 2018-03-31 RX ORDER — FLUTICASONE PROPIONATE 50 MCG
SPRAY, SUSPENSION (ML) NASAL
Qty: 16 G | Refills: 5 | Status: SHIPPED | OUTPATIENT
Start: 2018-03-31 | End: 2018-08-28 | Stop reason: SDUPTHER

## 2018-04-03 PROBLEM — E11.9 ENCOUNTER FOR DIABETIC FOOT EXAM (HCC): Status: ACTIVE | Noted: 2017-06-26

## 2018-04-03 RX ORDER — FERRIC CITRATE 210 MG/1
TABLET, COATED ORAL
COMMUNITY
Start: 2017-09-29 | End: 2019-04-08

## 2018-04-03 RX ORDER — ERGOCALCIFEROL 1.25 MG/1
1 CAPSULE ORAL WEEKLY
COMMUNITY
Start: 2016-02-01 | End: 2018-07-06 | Stop reason: SDUPTHER

## 2018-04-03 RX ORDER — AMLODIPINE BESYLATE 5 MG/1
TABLET ORAL
COMMUNITY
End: 2018-07-06

## 2018-04-03 RX ORDER — TRAMADOL HYDROCHLORIDE 50 MG/1
50 TABLET ORAL
COMMUNITY
Start: 2015-07-23 | End: 2018-04-04 | Stop reason: ALTCHOICE

## 2018-04-03 RX ORDER — AMINO ACIDS/MV,IRON,MIN
1 TABLET ORAL DAILY
COMMUNITY
Start: 2014-03-26 | End: 2019-04-08

## 2018-04-03 RX ORDER — NATEGLINIDE 120 MG/1
TABLET ORAL
COMMUNITY
Start: 2015-07-22 | End: 2018-05-02 | Stop reason: SDUPTHER

## 2018-04-04 ENCOUNTER — OFFICE VISIT (OUTPATIENT)
Dept: INTERNAL MEDICINE CLINIC | Facility: CLINIC | Age: 73
End: 2018-04-04
Payer: MEDICARE

## 2018-04-04 VITALS
TEMPERATURE: 97.6 F | HEART RATE: 67 BPM | OXYGEN SATURATION: 96 % | WEIGHT: 180 LBS | DIASTOLIC BLOOD PRESSURE: 62 MMHG | SYSTOLIC BLOOD PRESSURE: 120 MMHG | BODY MASS INDEX: 30.73 KG/M2 | RESPIRATION RATE: 18 BRPM | HEIGHT: 64 IN

## 2018-04-04 DIAGNOSIS — Z99.2 CONTROLLED TYPE 2 DIABETES MELLITUS WITH CHRONIC KIDNEY DISEASE ON CHRONIC DIALYSIS, WITH LONG-TERM CURRENT USE OF INSULIN (HCC): Primary | ICD-10-CM

## 2018-04-04 DIAGNOSIS — Z99.2 END-STAGE RENAL DISEASE ON HEMODIALYSIS (HCC): ICD-10-CM

## 2018-04-04 DIAGNOSIS — E78.2 MIXED HYPERLIPIDEMIA: ICD-10-CM

## 2018-04-04 DIAGNOSIS — I25.10 CARDIOVASCULAR ARTERIOSCLEROSIS: ICD-10-CM

## 2018-04-04 DIAGNOSIS — N18.6 END-STAGE RENAL DISEASE ON HEMODIALYSIS (HCC): ICD-10-CM

## 2018-04-04 DIAGNOSIS — N25.81 SECONDARY HYPERPARATHYROIDISM (HCC): ICD-10-CM

## 2018-04-04 DIAGNOSIS — E11.22 CONTROLLED TYPE 2 DIABETES MELLITUS WITH CHRONIC KIDNEY DISEASE ON CHRONIC DIALYSIS, WITH LONG-TERM CURRENT USE OF INSULIN (HCC): Primary | ICD-10-CM

## 2018-04-04 DIAGNOSIS — I10 ESSENTIAL HYPERTENSION: ICD-10-CM

## 2018-04-04 DIAGNOSIS — M81.0 POSTMENOPAUSAL OSTEOPOROSIS: ICD-10-CM

## 2018-04-04 DIAGNOSIS — K21.9 GERD WITHOUT ESOPHAGITIS: ICD-10-CM

## 2018-04-04 DIAGNOSIS — Z13.820 SCREENING FOR OSTEOPOROSIS: ICD-10-CM

## 2018-04-04 DIAGNOSIS — D63.1 ANEMIA OF CHRONIC RENAL FAILURE, UNSPECIFIED CKD STAGE: ICD-10-CM

## 2018-04-04 DIAGNOSIS — Z79.4 CONTROLLED TYPE 2 DIABETES MELLITUS WITH CHRONIC KIDNEY DISEASE ON CHRONIC DIALYSIS, WITH LONG-TERM CURRENT USE OF INSULIN (HCC): Primary | ICD-10-CM

## 2018-04-04 DIAGNOSIS — N18.6 CONTROLLED TYPE 2 DIABETES MELLITUS WITH CHRONIC KIDNEY DISEASE ON CHRONIC DIALYSIS, WITH LONG-TERM CURRENT USE OF INSULIN (HCC): Primary | ICD-10-CM

## 2018-04-04 DIAGNOSIS — Z12.39 SCREENING FOR BREAST CANCER: ICD-10-CM

## 2018-04-04 DIAGNOSIS — N18.9 ANEMIA OF CHRONIC RENAL FAILURE, UNSPECIFIED CKD STAGE: ICD-10-CM

## 2018-04-04 DIAGNOSIS — Z00.00 MEDICARE ANNUAL WELLNESS VISIT, SUBSEQUENT: ICD-10-CM

## 2018-04-04 DIAGNOSIS — E55.9 VITAMIN D DEFICIENCY: ICD-10-CM

## 2018-04-04 PROCEDURE — G0439 PPPS, SUBSEQ VISIT: HCPCS | Performed by: INTERNAL MEDICINE

## 2018-04-04 PROCEDURE — 99214 OFFICE O/P EST MOD 30 MIN: CPT | Performed by: INTERNAL MEDICINE

## 2018-04-04 NOTE — PROGRESS NOTES
HPI:  Solis Guzmán is a 71 y o  female here for her Subsequent Wellness Visit  Patient Active Problem List   Diagnosis    Allergic rhinitis    Anemia of chronic kidney failure    Arteriosclerosis of carotid artery    Cardiovascular arteriosclerosis    Cataract    Congestive heart failure ([place] [address] )    Diabetes mellitus with foot ulcer ([place] [address] )    Diastolic dysfunction    Encounter for diabetic foot exam ([place] [address] )    End-stage renal disease on hemodialysis ([place] [address] )    GERD without esophagitis    Hyperlipidemia    Hypertension    Iron deficiency anemia    Leg fracture, right    Overactive bladder    Peripheral arterial disease (HCC)    Postmenopausal osteoporosis    Secondary hyperparathyroidism ([place] [address] )    Type 2 diabetes mellitus with renal manifestations, controlled ([place] [address] )    Vitamin D deficiency     Past Medical History:   Diagnosis Date    Abnormal weight loss     last assessed [id number]    Anemia     Chronic kidney disease     Clostridium difficile colitis     last assessed 23EPV5768    Depression     resolved [id number]    Diabetes mellitus ([place] [address] )     Fracture, tibial plateau     last assessed 98FVQ6324    GERD (gastroesophageal reflux disease)     Hyperkalemia     last assessed 19Fpk3990    Hypoglycemia     Sugars now up   Will increase the metformin;  last assessed 16Lwe9390    Hypokalemia     last assessed 73Ary6292    Hypothermia     last assessed 35LIR2185    Hypoxia     last assessed 04Oii2936    Metatarsalgia     last assessed 02FNW5133    Old myocardial infarction     Osteoporosis     Syncope     last assessed 69Vtf3483    Visual disturbance     last assessed 45Slf7780     Past Surgical History:   Procedure Laterality Date    BYPASS GRAFT Right     H/O bypass graft (non-vein), Right Leg    CAROTID ENDARTERECTOMY      Neurological Surgery Cartoid Endartectomy    CATARACT EXTRACTION      CHOLECYSTECTOMY      questionable    CORONARY ARTERY BYPASS GRAFT      FOOT AMPUTATION Bilateral     H/O surgery foot amputation Metatarsal and toe;  Bilat    HERNIA REPAIR      Inguinal sliding    TONSILLECTOMY AND ADENOIDECTOMY      VENOUS THROMBECTOMY      H/O Arteriobenous surgery Thrombectomy of AV Fistula Percantaneous;  last assessed [id number]     Family History   Problem Relation Age of Onset    Breast cancer Mother     Cancer Mother     Heart disease Mother     Coronary artery disease Father     Aneurysm Sister      Cerebral Artery    Coronary artery disease Sister     Liver cancer Brother      History   Smoking Status    Former Smoker   Smokeless Tobacco    Never Used     History   Alcohol Use No      History   Drug Use No     /62 (BP Location: Right arm, Patient Position: Sitting, Cuff Size: Adult)   Pulse 67   Temp 97 6 °F (36 4 °C) (Tympanic)   Resp 18   Ht 5' 4" (1 626 m)   Wt 81 6 kg (180 lb)   SpO2 96%   BMI 30 90 kg/m²       Current Outpatient Prescriptions   Medication Sig Dispense Refill    aspirin 81 MG tablet Take 1 tablet by mouth daily      carvedilol (COREG) 12 5 mg tablet take 1 tablet by mouth twice a day for CHF 60 tablet 0    ergocalciferol (VITAMIN D2) 50,000 units Take 1 capsule by mouth once a week      Ferric Citrate (AURYXIA) 1  MG(Fe) TABS Take by mouth      fluticasone (FLONASE) 50 mcg/act nasal spray USE 2 SPRAYS IN EACH NOSTRIL ONCE DAILY 16 g 5    glucose blood (ONE TOUCH ULTRA TEST) test strip by In Vitro route 2 (two) times a day      Multiple Vitamins-Minerals (OCUVITE EXTRA) TABS Take 1 tablet by mouth daily      nateglinide (STARLIX) 120 mg tablet Take by mouth      pantoprazole (PROTONIX) 40 mg tablet swallow 1 tablet once daily 30 tablet 0    pravastatin (PRAVACHOL) 80 mg tablet take 1 tablet by mouth daily 90 tablet 3    amLODIPine (NORVASC) 5 mg tablet daily  No current facility-administered medications for this visit        Allergies   Allergen Reactions    Pollen Extract      Immunization History Administered Date(s) Administered    Influenza 10/26/2015, 10/01/2016, 09/16/2017    Influenza Split High Dose Preservative Free IM 10/08/2014, 10/26/2015, 10/01/2016, 09/16/2017    Pneumococcal Conjugate 13-Valent 03/22/2017    Tdap 03/26/2014       Patient Care Team:  Brandy Thrasher, DO as PCP - General      Medicare Screening Tests and Risk Assessments:  AWV Clinical     ISAR:   Previous hospitalizations?:  No       Once in a Lifetime Medicare Screening:   EKG performed?:  No    AAA screening performed? (if performed, please add date to Health Maintenance):  No       Medicare Screening Tests and Risk Assessment:   AAA Risk Assessment    None Indicated:  Yes    Osteoporosis Risk Assessment     Female:  Yes   :  Yes    HIV Risk Assessment    None indicated:  Yes        Drug and Alcohol Use:   Tobacco use    Cigarettes:  former smoker    Smokeless:  never used smokeless tobacco    Tobacco use duration    Tobacco Cessation Readiness    Alcohol use    Alcohol use:  never    Alcohol Treatment Readiness   Illicit Drug Use    Drug use:  never        Diet & Exercise:   Diet   What is your diet?:  Low Cholesterol, Regular, Limited junk food   How many servings a day of the following:   Fruits and Vegetables:  1-2 Meat:  1-2   Whole Grains:  1 Simple Carbs:  1   Dairy:  1 Soda:  0, 1   Coffee:  0 Tea:  1   Exercise    Do you currently exercise?:  unable to exercise       Cognitive Impairment Screening:   Depression screening preformed:  No    Cognitive Impairment Screening    Do you have difficulty learning or retaining new information?:  No Do you have difficulty handling new tasks?:  No   Do you have difficulty with reasoning?:  No Do you have difficulty with spatial ability and orientation?:  No   Do you have difficulty with language?:  No Do you have difficulty with behavior?:  No       Functional Ability/Level of Safety:   Hearing    Hearing difficulties:  No Bilateral:  normal   Left:  normal Right: normal   Hearing aid:  No    Hearing Impairment Assessment    Hearing status:  No impairment   Do your family members ever complain that you turn on the radio or T V  too loudly?:  No Do you find that other people have to repeat themselves when talking to you?:  No   Do you have difficulty hearing while talking on the phone?:  No Has anyone ever told you that you are speaking too loudly when talking with them?:  No   Do you have trouble hearing the doorbell or phone ringing?:  No Do you have difficulty hearing such that you feel frustrated talking to people?:  No   Do you feel sad because you cannot hear well?:  No Do you feel inconvienced due to your hearing problem?:  No   Do you think you would be a happier person if you could hear better?:  No Would you be willing to go for a hearing aid fitting if suggested?:  No   Current Activities    Status:  limited ADL's   Help needed with the folllowing:    Using the phone:  No Transportation:  Yes   Shopping:  Yes Preparing Meals:  No   Doing Housework:  Yes Doing Laundry:  No   Managing Medications:  No Managing Money:  No   ADL    Feeding:  Independant   Oral hygiene and Facial grooming:  Independant   Bathing:  Independant   Upper Body Dressing:  Independant   Lower Body Dressing:  Independant   Toileting:  Independant   Bed Mobility:  Additional time   Fall Risk   Have you fallen in the last 12 months?:  No Are you unsteady on your feet?:  Yes    Are you taking any medications that may cause fatigue or dizziness?:  No    Do you rush to the bathroom potentially risking a fall?:  No   Injury History       Home Safety:   Are there hazards in your environment?:  No   If you fell, would you need help to get back up from the ground?:  No Do you have problems or concerns getting in/out of a bed, chair, tub, or toilet?:  No   Do you feel unsteady when walking?:  Yes Is your activity limited by pain?:  No   Do you have handrails and grab-bars in the home?:  Yes Are emergency numbers kept by the phone and regularly updated?:  Yes   Are you and/or family members aware of the dangers of smoking in bed?:  Yes Are firearms stored securely?:  Yes   Do you have working smoke alarms and fire extinguisher?:  Yes Do all household members know how to use them?:  Yes   Have you left the stove on unsupervised?:  No    Home Safety Risk Factors   Unfamilar with surroundings:  No Uneven floors:  No   Stairs or handrail saftey risk:  No Loose rugs:  No   Household clutter:  No Poor household lighting:  No   No grab bars in bathroom:  No Further evaluation needed:  No       Advanced Directives:   Advanced Directives    Living Will:  No Durable POA for healthcare:  No   Advanced directive:  No    Patient's End of Life Decisions        Urinary Incontinence:   Do you have urinary incontinence?:  No        Glaucoma:            Provider Screening    No data filed        No exam data present  Reviewed Updated [facility] Prior Wellness Visits:   Last Medicare wellness visit information was reviewed, patient interviewed , no change since last AWV N/A  Last Medicare wellness visit information was reviewed, patient interviewed and updates made to the record today yes    Assessment and Plan:  1  Controlled type 2 diabetes mellitus with chronic kidney disease on chronic dialysis, with long-term current use of insulin ([facility])  HEMOGLOBIN A1C W/ EAG ESTIMATION   2  Secondary hyperparathyroidism ([facility] [address] )     3  GERD without esophagitis     4  Cardiovascular arteriosclerosis     5  Essential hypertension     6  Postmenopausal osteoporosis     7  Anemia of chronic renal failure, unspecified CKD stage     8  Mixed hyperlipidemia     9  Vitamin D deficiency     10  End-stage renal disease on hemodialysis ([facility] [address] )     11  Medicare annual wellness visit, subsequent     12  Screening for breast cancer  Mammo screening bilateral w 3d & cad   13   Screening for osteoporosis  DXA bone density spine hip and pelvis       Health [facility] Due   Topic Date Due    COLONOSCOPY  1945    OPHTHALMOLOGY EXAM  06/25/1956    Depression Screening PHQ-9  06/25/1958    PNEUMOCOCCAL POLYSACCHARIDE VACCINE AGE 72 AND OVER  06/25/2011    GLAUCOMA SCREENING 67+ YR  06/25/2013    DXA SCAN  04/02/2016    URINE MICROALBUMIN  11/03/2016    MAMMOGRAM  12/02/2017     A/P: Doing ok  Denies depression and feels safe at home  Has ramps and rails  No falls  Doesn't drive, but only uses her seatbelt periodically  Diverse diet  No living will and was given information  No referrals or DME needed at this time  RTC one year for medicare wellness

## 2018-04-04 NOTE — PATIENT INSTRUCTIONS
Type 2 Diabetes in Adults   AMBULATORY CARE:   Type 2 diabetes  is a disease that affects how your body uses glucose (sugar)  Normally, when the blood sugar level increases, the pancreas makes more insulin  Insulin helps move sugar out of the blood so it can be used for energy  Type 2 diabetes develops because either the body cannot make enough insulin, or it cannot use the insulin correctly  After many years, your pancreas may stop making insulin  Common symptoms include the following:   · More hunger or thirst than usual     · Frequent urination     · Weight loss without trying     · Blurred vision  Call 911 if you have any of the following:   · You have any of the following signs of a stroke:      ¨ Numbness or drooping on one side of your face     ¨ Weakness in an arm or leg    ¨ Confusion or difficulty speaking    ¨ Dizziness, a severe headache, or vision loss    · You have any of the following signs of a heart attack:      ¨ Squeezing, pressure, or pain in your chest that lasts longer than 5 minutes or returns    ¨ Discomfort or pain in your back, neck, jaw, stomach, or arm     ¨ Trouble breathing    ¨ Nausea or vomiting    ¨ Lightheadedness or a sudden cold sweat, especially with chest pain or trouble breathing  Seek care immediately if:   · You have severe abdominal pain, or the pain spreads to your back  You may also be vomiting  · You have trouble staying awake or focusing  · You are shaking or sweating  · You have blurred or double vision  · Your breath has a fruity, sweet smell  · Your breathing is deep and labored, or rapid and shallow  · Your heartbeat is fast and weak  Contact your healthcare provider if:   · You are vomiting or have diarrhea  · You have an upset stomach and cannot eat the foods on your meal plan  · You feel weak or more tired than usual      · You feel dizzy, have headaches, or are easily irritated  · Your skin is red, warm, dry, or swollen  · You have a wound that does not heal      · You have numbness in your arms or legs  · You have trouble coping with your illness, or you feel anxious or depressed  · You have questions or concerns about your condition or care  Treatment for type 2 diabetes  includes keeping your blood sugar at a normal level  You must eat the right foods, and exercise regularly  You may need medicine if you cannot control your blood sugar level with nutrition and exercise  You may also need medicine to prevent heart disease, a complication of type 2 diabetes  You may  need any of the following:  · Hypoglycemic medicines or insulin  may be given to decrease the amount of sugar in your blood  · Blood pressure medicine  may be given to lower your blood pressure  Your blood pressure should be less than 140/90  · Cholesterol lowering medicine  may be given to prevent heart disease  · Antiplatelets , such as aspirin, help prevent blood clots  Take your antiplatelet medicine exactly as directed  These medicines make it more likely for you to bleed or bruise  If you are told to take aspirin, do not take acetaminophen or ibuprofen instead  · Take your medicine as directed  Contact your healthcare provider if you think your medicine is not helping or if you have side effects  Tell him or her if you are allergic to any medicine  Keep a list of the medicines, vitamins, and herbs you take  Include the amounts, and when and why you take them  Bring the list or the pill bottles to follow-up visits  Carry your medicine list with you in case of an emergency  Check your blood sugar level: You will be taught how to check a small drop of blood in a glucose monitor  You will need to check your blood sugar level at least 3 times each day if you are on insulin  Ask your healthcare provider when and how often to check during the day  If you check your blood sugar level before a meal , it should be between 80 and 130 mg/dL   If you check your blood sugar level 1 to 2 hours after a meal , it should be less than 180 mg/dL  Ask your healthcare provider if these are good goals for you  Write down your results, and show them to your healthcare provider  He may use the results to make changes to your medicine, food, and exercise schedules  If your blood sugar level is too low: Your blood sugar level is too low if it goes below 70 mg/dL  If the level is too low, eat or drink 15 grams of fast-acting carbohydrate  These are found naturally in fruits  Fast-acting carbohydrates will raise your blood sugar level quickly  Examples of 15 grams of fast-acting carbohydrate are 4 ounces (½ cup) of fruit juice or 4 ounces of regular soda  Other examples are 2 tablespoons of raisins or 3 to 4 glucose tablets  Check your blood sugar level 15 minutes later  If the level is still low (less than 100 mg/dL), eat another 15 grams of carbohydrate  When the level returns to 100 mg/dL, eat a snack or meal that contains carbohydrates  This will help prevent another drop in blood sugar  Always carefully follow your healthcare provider's instructions on how to treat low blood sugar levels  Check your feet each day for sores:  Wear shoes and socks that fit correctly  Do not trim your toenails  Ask your healthcare provider for more information about foot care  Follow your meal plan:  A dietitian will help you make a meal plan to keep your blood sugar level steady  Do not skip meals  Your blood sugar level may drop too low if you have taken diabetes medicine and do not eat  · Keep track of carbohydrates (sugar and starchy foods)  Your blood sugar level can get too high if you eat too many carbohydrates  Your dietitian will help you plan meals and snacks that have the right amount of carbohydrates  · Eat low-fat foods , such as skinless chicken and low-fat milk  · Eat less sodium (salt)    Limit high-sodium foods, such as soy sauce, potato chips, and soup  Do not add salt to food you cook  Limit your use of table salt  You should have less than 2,300 mg of sodium per day  · Eat high-fiber foods , such as vegetables, whole grain breads, and beans  · Limit alcohol  Alcohol affects your blood sugar level and can make it harder to manage your diabetes  Limit alcohol to 1 drink a day if you are a woman  Limit alcohol to 2 drinks a day if you are a man  A drink of alcohol is 12 ounces of beer, 5 ounces of wine, or 1½ ounces of liquor  Maintain a healthy weight:  Ask your healthcare provider how much you should weigh  A healthy weight can help you control your diabetes  Ask your provider to help you create a weight loss plan if you are overweight  Together you can set manageable weight loss goals  Exercise as directed:  Exercise can help keep your blood sugar level steady, decrease your risk of heart disease, and help you lose weight  Stretch before and after you exercise  Exercise for at least 150 minutes every week  Spread this amount of exercise over at least 3 days a week  Do not skip exercise more than 2 days in a row  Include muscle strengthening activities 2 to 3 days each week  Older adults should include balance training 2 to 3 times each week  Activities that help increase balance include yoga and kevin chi  Work with your healthcare provider to create an exercise plan  · Check your blood sugar level before and after exercise  Healthcare providers may tell you to change the amount of insulin you take or food you eat  If your blood sugar level is high, check your blood or urine for ketones before you exercise  Do not exercise if your blood sugar level is high and you have ketones  · If your blood sugar level is less than 100 mg/dL, have a carbohydrate snack before you exercise  Examples are 4 to 6 crackers, ½ banana, 8 ounces (1 cup) of milk, or 4 ounces (½ cup) of juice   Drink water or liquids that do not contain sugar before, during, and after exercise  Ask your dietitian or healthcare provider which liquids you should drink when you exercise  · Do not sit for longer than 30 minutes  If you cannot walk around, at least stand up  This will help you stay active and keep your blood circulating  Do not smoke:  Nicotine and other chemicals in cigarettes and cigars can cause lung damage and make it more difficult to manage your diabetes  Ask your healthcare provider for information if you currently smoke and need help to quit  Do not use e-cigarettes or smokeless tobacco in place of cigarettes or to help you quit  They still contain nicotine  Check your blood pressure as directed:  Ask your healthcare provider what your blood pressure should be  Most adults with diabetes and high blood pressure should have a systolic blood pressure (first number) less than 140  Your diastolic blood pressure (second number) should be less than 90  Wear medical alert identification:  Wear medical alert jewelry or carry a card that says you have diabetes  Ask your healthcare provider where to get these items  Ask about vaccines: You have a higher risk for serious illness if you get the flu, pneumonia, or hepatitis  Ask your healthcare provider if you should get a flu, pneumonia, or hepatitis B vaccine, and when to get the vaccine  Follow up with your healthcare provider as directed: You may need to return to have your A1c checked every 3 months  You will need to return at least once each year to have your feet checked  You will need an eye exam once a year to check for retinopathy  You will also need urine tests every year to check for kidney problems  You may need tests to monitor for heart disease such as an EKG, stress test, blood pressure monitoring, and blood tests  Write down your questions so you remember to ask them during your visits     © 2017 [facility] [employer] Information is for End User's use only and may not be sold, redistributed or otherwise used for commercial purposes  All illustrations and images included in CareNotes® are the copyrighted property of [employer]  or [address]  The above information is an  only  It is not intended as medical advice for individual conditions or treatments  Talk to your doctor, nurse or pharmacist before following any medical regimen to see if it is safe and effective for you
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
## 2018-04-15 DIAGNOSIS — I25.10 CORONARY ARTERY DISEASE WITHOUT ANGINA PECTORIS, UNSPECIFIED VESSEL OR LESION TYPE, UNSPECIFIED WHETHER NATIVE OR TRANSPLANTED HEART: ICD-10-CM

## 2018-04-15 DIAGNOSIS — K21.9 GASTROESOPHAGEAL REFLUX DISEASE WITHOUT ESOPHAGITIS: ICD-10-CM

## 2018-04-16 RX ORDER — PANTOPRAZOLE SODIUM 40 MG/1
TABLET, DELAYED RELEASE ORAL
Qty: 30 TABLET | Refills: 0 | Status: SHIPPED | OUTPATIENT
Start: 2018-04-16 | End: 2018-05-15 | Stop reason: SDUPTHER

## 2018-04-16 RX ORDER — CARVEDILOL 12.5 MG/1
TABLET ORAL
Qty: 60 TABLET | Refills: 0 | Status: SHIPPED | OUTPATIENT
Start: 2018-04-16 | End: 2018-05-15 | Stop reason: SDUPTHER

## 2018-04-18 LAB
EST. AVERAGE GLUCOSE BLD GHB EST-MCNC: 156 MG/DL
HBA1C MFR BLD HPLC: 7.1 % (ref 4–6.2)

## 2018-04-19 LAB — HBA1C MFR BLD HPLC: 7.1 %

## 2018-05-02 DIAGNOSIS — E11.8 TYPE 2 DIABETES MELLITUS WITH COMPLICATION, UNSPECIFIED WHETHER LONG TERM INSULIN USE: Primary | ICD-10-CM

## 2018-05-02 RX ORDER — NATEGLINIDE 120 MG/1
TABLET ORAL
Qty: 120 TABLET | Refills: 5 | Status: SHIPPED | OUTPATIENT
Start: 2018-05-02 | End: 2018-12-05 | Stop reason: SDUPTHER

## 2018-05-08 LAB — HBA1C MFR BLD HPLC: 7.3 %

## 2018-05-15 DIAGNOSIS — K21.9 GASTROESOPHAGEAL REFLUX DISEASE WITHOUT ESOPHAGITIS: ICD-10-CM

## 2018-05-15 DIAGNOSIS — I25.10 CORONARY ARTERY DISEASE WITHOUT ANGINA PECTORIS, UNSPECIFIED VESSEL OR LESION TYPE, UNSPECIFIED WHETHER NATIVE OR TRANSPLANTED HEART: ICD-10-CM

## 2018-05-15 RX ORDER — CARVEDILOL 12.5 MG/1
TABLET ORAL
Qty: 60 TABLET | Refills: 0 | Status: SHIPPED | OUTPATIENT
Start: 2018-05-15 | End: 2018-06-14 | Stop reason: SDUPTHER

## 2018-05-15 RX ORDER — PANTOPRAZOLE SODIUM 40 MG/1
TABLET, DELAYED RELEASE ORAL
Qty: 30 TABLET | Refills: 0 | Status: SHIPPED | OUTPATIENT
Start: 2018-05-15 | End: 2018-06-14 | Stop reason: SDUPTHER

## 2018-06-14 DIAGNOSIS — I25.10 CORONARY ARTERY DISEASE WITHOUT ANGINA PECTORIS, UNSPECIFIED VESSEL OR LESION TYPE, UNSPECIFIED WHETHER NATIVE OR TRANSPLANTED HEART: ICD-10-CM

## 2018-06-14 DIAGNOSIS — K21.9 GASTROESOPHAGEAL REFLUX DISEASE WITHOUT ESOPHAGITIS: ICD-10-CM

## 2018-06-14 RX ORDER — PANTOPRAZOLE SODIUM 40 MG/1
TABLET, DELAYED RELEASE ORAL
Qty: 30 TABLET | Refills: 0 | Status: SHIPPED | OUTPATIENT
Start: 2018-06-14 | End: 2018-07-14 | Stop reason: SDUPTHER

## 2018-06-14 RX ORDER — CARVEDILOL 12.5 MG/1
TABLET ORAL
Qty: 60 TABLET | Refills: 0 | Status: SHIPPED | OUTPATIENT
Start: 2018-06-14 | End: 2018-07-14 | Stop reason: SDUPTHER

## 2018-06-28 LAB — HBA1C MFR BLD HPLC: 7.3 %

## 2018-07-06 ENCOUNTER — OFFICE VISIT (OUTPATIENT)
Dept: INTERNAL MEDICINE CLINIC | Facility: CLINIC | Age: 73
End: 2018-07-06
Payer: MEDICARE

## 2018-07-06 VITALS
HEART RATE: 71 BPM | RESPIRATION RATE: 18 BRPM | BODY MASS INDEX: 30.05 KG/M2 | SYSTOLIC BLOOD PRESSURE: 128 MMHG | WEIGHT: 176 LBS | HEIGHT: 64 IN | OXYGEN SATURATION: 99 % | DIASTOLIC BLOOD PRESSURE: 62 MMHG | TEMPERATURE: 98.3 F

## 2018-07-06 DIAGNOSIS — Z99.2 CONTROLLED TYPE 2 DIABETES MELLITUS WITH CHRONIC KIDNEY DISEASE ON CHRONIC DIALYSIS, WITH LONG-TERM CURRENT USE OF INSULIN (HCC): ICD-10-CM

## 2018-07-06 DIAGNOSIS — Z12.39 SCREENING FOR BREAST CANCER: ICD-10-CM

## 2018-07-06 DIAGNOSIS — Z13.820 SCREENING FOR OSTEOPOROSIS: ICD-10-CM

## 2018-07-06 DIAGNOSIS — Z12.11 SCREEN FOR COLON CANCER: ICD-10-CM

## 2018-07-06 DIAGNOSIS — E55.9 VITAMIN D DEFICIENCY: ICD-10-CM

## 2018-07-06 DIAGNOSIS — Z79.4 CONTROLLED TYPE 2 DIABETES MELLITUS WITH CHRONIC KIDNEY DISEASE ON CHRONIC DIALYSIS, WITH LONG-TERM CURRENT USE OF INSULIN (HCC): ICD-10-CM

## 2018-07-06 DIAGNOSIS — D50.9 IRON DEFICIENCY ANEMIA, UNSPECIFIED IRON DEFICIENCY ANEMIA TYPE: ICD-10-CM

## 2018-07-06 DIAGNOSIS — N18.9 ANEMIA OF CHRONIC RENAL FAILURE, UNSPECIFIED CKD STAGE: ICD-10-CM

## 2018-07-06 DIAGNOSIS — N18.6 CONTROLLED TYPE 2 DIABETES MELLITUS WITH CHRONIC KIDNEY DISEASE ON CHRONIC DIALYSIS, WITH LONG-TERM CURRENT USE OF INSULIN (HCC): ICD-10-CM

## 2018-07-06 DIAGNOSIS — K59.00 CONSTIPATION, UNSPECIFIED CONSTIPATION TYPE: Primary | ICD-10-CM

## 2018-07-06 DIAGNOSIS — N18.6 END-STAGE RENAL DISEASE ON HEMODIALYSIS (HCC): ICD-10-CM

## 2018-07-06 DIAGNOSIS — I25.10 CARDIOVASCULAR ARTERIOSCLEROSIS: ICD-10-CM

## 2018-07-06 DIAGNOSIS — Z99.2 END-STAGE RENAL DISEASE ON HEMODIALYSIS (HCC): ICD-10-CM

## 2018-07-06 DIAGNOSIS — Z23 ENCOUNTER FOR VACCINATION: ICD-10-CM

## 2018-07-06 DIAGNOSIS — D63.1 ANEMIA OF CHRONIC RENAL FAILURE, UNSPECIFIED CKD STAGE: ICD-10-CM

## 2018-07-06 DIAGNOSIS — E78.2 MIXED HYPERLIPIDEMIA: ICD-10-CM

## 2018-07-06 DIAGNOSIS — I10 ESSENTIAL HYPERTENSION: ICD-10-CM

## 2018-07-06 DIAGNOSIS — E11.22 CONTROLLED TYPE 2 DIABETES MELLITUS WITH CHRONIC KIDNEY DISEASE ON CHRONIC DIALYSIS, WITH LONG-TERM CURRENT USE OF INSULIN (HCC): ICD-10-CM

## 2018-07-06 DIAGNOSIS — R26.9 GAIT ABNORMALITY: ICD-10-CM

## 2018-07-06 PROBLEM — E11.9 ENCOUNTER FOR DIABETIC FOOT EXAM (HCC): Status: RESOLVED | Noted: 2017-06-26 | Resolved: 2018-07-06

## 2018-07-06 PROCEDURE — 99214 OFFICE O/P EST MOD 30 MIN: CPT | Performed by: INTERNAL MEDICINE

## 2018-07-06 PROCEDURE — 90732 PPSV23 VACC 2 YRS+ SUBQ/IM: CPT

## 2018-07-06 PROCEDURE — G0009 ADMIN PNEUMOCOCCAL VACCINE: HCPCS

## 2018-07-06 RX ORDER — DIPHENOXYLATE HYDROCHLORIDE AND ATROPINE SULFATE 2.5; .025 MG/1; MG/1
1 TABLET ORAL 4 TIMES DAILY PRN
COMMUNITY
End: 2018-07-06 | Stop reason: SDUPTHER

## 2018-07-06 RX ORDER — ERGOCALCIFEROL 1.25 MG/1
50000 CAPSULE ORAL WEEKLY
Qty: 12 CAPSULE | Refills: 3 | Status: SHIPPED | OUTPATIENT
Start: 2018-07-06 | End: 2019-05-30 | Stop reason: SDUPTHER

## 2018-07-06 RX ORDER — WHEELCHAIR
EACH MISCELLANEOUS DAILY
Qty: 1 EACH | Refills: 0 | Status: SHIPPED | OUTPATIENT
Start: 2018-07-06

## 2018-07-06 RX ORDER — DIPHENOXYLATE HYDROCHLORIDE AND ATROPINE SULFATE 2.5; .025 MG/1; MG/1
1 TABLET ORAL 4 TIMES DAILY PRN
Qty: 120 TABLET | Refills: 0 | Status: SHIPPED | OUTPATIENT
Start: 2018-07-06 | End: 2019-01-15 | Stop reason: SDUPTHER

## 2018-07-06 NOTE — PROGRESS NOTES
Assessment/Plan:    No problem-specific Assessment & Plan notes found for this encounter  Diagnoses and all orders for this visit:    Constipation, unspecified constipation type  -     diphenoxylate-atropine (LOMOTIL) 2 5-0 025 mg per tablet; Take 1 tablet by mouth 4 (four) times a day as needed for diarrhea    Controlled type 2 diabetes mellitus with chronic kidney disease on chronic dialysis, with long-term current use of insulin (HCC)  -     Hemoglobin A1C; Future    Vitamin D deficiency  -     ergocalciferol (VITAMIN D2) 50,000 units; Take 1 capsule (50,000 Units total) by mouth once a week    Cardiovascular arteriosclerosis    Essential hypertension    Anemia of chronic renal failure, unspecified CKD stage  -     Hemoglobin and hematocrit, blood; Future    End-stage renal disease on hemodialysis (HCC)    Mixed hyperlipidemia  -     Lipid Panel with Direct LDL reflex; Future    Iron deficiency anemia, unspecified iron deficiency anemia type  -     Ferritin; Future  -     Iron; Future  -     TIBC; Future    Gait abnormality  -     Misc  Devices [facility]) MISC; by Does not apply route daily    Screen for colon cancer  -     Occult Bloood,Fecal Immunochemical; Future    Screening for breast cancer  -     Mammo screening bilateral w 3d & cad; Future    Screening for osteoporosis  -     DXA bone density spine hip and pelvis; Future    Encounter for vaccination  -     PNEUMOCOCCAL POLYSACCHARIDE VACCINE 23-VALENT =>1YO SQ IM    Other orders  -     Discontinue: diphenoxylate-atropine (LOMOTIL) 2 5-0 025 mg per tablet; Take 1 tablet by mouth 4 (four) times a day as needed for diarrhea      A/P: Doing well and sugars are actually good for her  Will order labs  Order mammo and dexa  Declines colonoscopy, but will do the FIT  Update her second pneumonia vaccine  Keep f/u with nephro and DPM  Continue current treatment including HD  Small skin lesion on the right forearm is improving and will monitor for now   Will call for an update in two weeks  Will see if pt can get her wheelchair fixed or replaced  RTC three months for routine  Subjective:      Patient ID: Bart White is a 68 y o  female  WF RTC with her  for f/u dm, htn, etc  Doing well and no new issues  Remains as active as possible and no falls, but wheelchair brakes have been malfunctioning  Sugars less then 140 in the AM and in the PM, around 175-200  Attending HD TIW w/o any recent issues  Just seen by the DPM and no foot ulcers or other concerns  Due for labs, mammo, dexa, vaccines, and colon cancer screen  The following portions of the patient's history were reviewed and updated as appropriate:   She  has a past medical history of Abnormal weight loss; Anemia; Chronic kidney disease; Clostridium difficile colitis; Depression; Diabetes mellitus ([address] ); Fracture, tibial plateau; GERD (gastroesophageal reflux disease); Hyperkalemia; Hypertension; Hypoglycemia; Hypokalemia; Hypothermia; Hypoxia; Metatarsalgia; Old myocardial infarction; Osteoporosis; Syncope; and Visual disturbance    She   Patient Active Problem List    Diagnosis Date Noted    End-stage renal disease on hemodialysis ([facility] [address] ) 04/06/2016    Secondary hyperparathyroidism ([facility] [address] ) 03/09/2016    Vitamin D deficiency 02/01/2016    Anemia of chronic kidney failure 04/08/2015    Leg fracture, right 60/16/2544    Diastolic dysfunction 14/98/9137    Postmenopausal osteoporosis 04/09/2014    Type 2 diabetes mellitus with renal manifestations, controlled ([address] ) 04/09/2014    Allergic rhinitis 03/26/2014    Arteriosclerosis of carotid artery 03/26/2014    Cardiovascular arteriosclerosis 03/26/2014    Cataract 03/26/2014    Congestive heart failure ([address] ) 03/26/2014    Diabetes mellitus with foot ulcer ([facility] [address] ) 03/26/2014    GERD without esophagitis 03/26/2014    Hyperlipidemia 03/26/2014    Hypertension 03/26/2014    Iron deficiency anemia 03/26/2014    Overactive bladder 03/26/2014    Peripheral arterial disease ([facility] [address] ) 03/26/2014     She  has a past surgical history that includes Venous thrombectomy; Bypass Graft (Right); Coronary artery bypass graft; Cataract extraction; Cholecystectomy; Hernia repair; Carotid endarterectomy; Foot Amputation (Bilateral); and Tonsillectomy and adenoidectomy  Her family history includes Aneurysm in her sister; Breast cancer in her mother; Cancer in her mother; Coronary artery disease in her father and sister; Heart disease in her mother; Liver cancer in her brother  She  reports that she has quit smoking  She has never used smokeless tobacco  She reports that she does not drink alcohol or use drugs  Current Outpatient Prescriptions   Medication Sig Dispense Refill    aspirin 81 MG tablet Take 1 tablet by mouth daily      carvedilol (COREG) 12 5 mg tablet swallow 1 tablet twice a day for CHF 60 tablet 0    diphenoxylate-atropine (LOMOTIL) 2 5-0 025 mg per tablet Take 1 tablet by mouth 4 (four) times a day as needed for diarrhea 120 tablet 0    ergocalciferol (VITAMIN D2) 50,000 units Take 1 capsule (50,000 Units total) by mouth once a week 12 capsule 3    Ferric Citrate (AURYXIA) 1  MG(Fe) TABS Take by mouth      fluticasone (FLONASE) 50 mcg/act nasal spray USE 2 SPRAYS IN EACH NOSTRIL ONCE DAILY 16 g 5    glucose blood (ONE TOUCH ULTRA TEST) test strip by In Vitro route 2 (two) times a day      Multiple Vitamins-Minerals (OCUVITE EXTRA) TABS Take 1 tablet by mouth daily      nateglinide (STARLIX) 120 mg tablet TAKE 1 TABLET BY MOUTH DAILY AT BREAKFAST, 1 TABLET AT LUNCH AND 2 TABLETS AT SUPPER 120 tablet 5    pantoprazole (PROTONIX) 40 mg tablet take 1 tablet by mouth once daily 30 tablet 0    pravastatin (PRAVACHOL) 80 mg tablet take 1 tablet by mouth daily 90 tablet 3    Misc  Devices [facility]) MISC by Does not apply route daily 1 each 0     No current facility-administered medications for this visit        Current Outpatient Prescriptions on File Prior to Visit   Medication Sig    aspirin 81 MG tablet Take 1 tablet by mouth daily    carvedilol (COREG) 12 5 mg tablet swallow 1 tablet twice a day for CHF    Ferric Citrate (AURYXIA) 1  MG(Fe) TABS Take by mouth    fluticasone (FLONASE) 50 mcg/act nasal spray USE 2 SPRAYS IN EACH NOSTRIL ONCE DAILY    glucose blood (ONE TOUCH ULTRA TEST) test strip by In Vitro route 2 (two) times a day    Multiple Vitamins-Minerals (OCUVITE EXTRA) TABS Take 1 tablet by mouth daily    nateglinide (STARLIX) 120 mg tablet TAKE 1 TABLET BY MOUTH DAILY AT BREAKFAST, 1 TABLET AT LUNCH AND 2 TABLETS AT SUPPER    pantoprazole (PROTONIX) 40 mg tablet take 1 tablet by mouth once daily    pravastatin (PRAVACHOL) 80 mg tablet take 1 tablet by mouth daily    [DISCONTINUED] ergocalciferol (VITAMIN D2) 50,000 units Take 1 capsule by mouth once a week    [DISCONTINUED] amLODIPine (NORVASC) 5 mg tablet daily  No current facility-administered medications on file prior to visit  She is allergic to pollen extract       Review of Systems   Constitutional: Negative for activity change, chills, diaphoresis, fatigue and fever  HENT: Negative  Eyes: Negative for visual disturbance  Respiratory: Negative for cough, chest tightness, shortness of breath and wheezing  Cardiovascular: Negative for chest pain, palpitations and leg swelling  Gastrointestinal: Negative for abdominal pain, constipation, diarrhea, nausea and vomiting  Endocrine: Negative for cold intolerance and heat intolerance  Genitourinary: Negative for difficulty urinating, dysuria and frequency  Musculoskeletal: Positive for arthralgias and gait problem  Negative for myalgias  Neurological: Negative for dizziness, tremors, syncope, light-headedness, numbness and headaches  Psychiatric/Behavioral: Negative for confusion and dysphoric mood  The patient is not nervous/anxious            Objective:      /62 (BP Location: Right arm, Patient Position: Sitting, Cuff Size: Adult)   Pulse 71   Temp 98 3 °F (36 8 °C) (Tympanic)   Resp 18   Ht 5' 4" (1 626 m)   Wt 79 8 kg (176 lb)   SpO2 99%   BMI 30 21 kg/m²          Physical Exam   Constitutional: She is oriented to person, place, and time  She appears well-developed and well-nourished  No distress  HENT:   Head: Normocephalic and atraumatic  Mouth/Throat: Oropharynx is clear and moist    Eyes: Conjunctivae and EOM are normal  Pupils are equal, round, and reactive to light  Neck: Normal range of motion  Neck supple  No JVD present  Cardiovascular: Normal rate, regular rhythm and normal heart sounds  No murmur heard  Pulmonary/Chest: Effort normal  No respiratory distress  She has no wheezes  Musculoskeletal: She exhibits deformity (right leg with chronic non healed fracture  )  She exhibits no edema  Neurological: She is alert and oriented to person, place, and time  Psychiatric: She has a normal mood and affect  Her behavior is normal  Judgment and thought content normal    Nursing note and vitals reviewed

## 2018-07-06 NOTE — PATIENT INSTRUCTIONS
Type 2 Diabetes in Adults   AMBULATORY CARE:   Type 2 diabetes  is a disease that affects how your body uses glucose (sugar)  Normally, when the blood sugar level increases, the pancreas makes more insulin  Insulin helps move sugar out of the blood so it can be used for energy  Type 2 diabetes develops because either the body cannot make enough insulin, or it cannot use the insulin correctly  After many years, your pancreas may stop making insulin  Common symptoms include the following:   · More hunger or thirst than usual     · Frequent urination     · Weight loss without trying     · Blurred vision  Call 911 if you have any of the following:   · You have any of the following signs of a stroke:      ¨ Numbness or drooping on one side of your face     ¨ Weakness in an arm or leg    ¨ Confusion or difficulty speaking    ¨ Dizziness, a severe headache, or vision loss    · You have any of the following signs of a heart attack:      ¨ Squeezing, pressure, or pain in your chest that lasts longer than 5 minutes or returns    ¨ Discomfort or pain in your back, neck, jaw, stomach, or arm     ¨ Trouble breathing    ¨ Nausea or vomiting    ¨ Lightheadedness or a sudden cold sweat, especially with chest pain or trouble breathing  Seek care immediately if:   · You have severe abdominal pain, or the pain spreads to your back  You may also be vomiting  · You have trouble staying awake or focusing  · You are shaking or sweating  · You have blurred or double vision  · Your breath has a fruity, sweet smell  · Your breathing is deep and labored, or rapid and shallow  · Your heartbeat is fast and weak  Contact your healthcare provider if:   · You are vomiting or have diarrhea  · You have an upset stomach and cannot eat the foods on your meal plan  · You feel weak or more tired than usual      · You feel dizzy, have headaches, or are easily irritated  · Your skin is red, warm, dry, or swollen  · You have a wound that does not heal      · You have numbness in your arms or legs  · You have trouble coping with your illness, or you feel anxious or depressed  · You have questions or concerns about your condition or care  Treatment for type 2 diabetes  includes keeping your blood sugar at a normal level  You must eat the right foods, and exercise regularly  You may need medicine if you cannot control your blood sugar level with nutrition and exercise  You may also need medicine to prevent heart disease, a complication of type 2 diabetes  You may  need any of the following:  · Hypoglycemic medicines or insulin  may be given to decrease the amount of sugar in your blood  · Blood pressure medicine  may be given to lower your blood pressure  Your blood pressure should be less than 140/90  · Cholesterol lowering medicine  may be given to prevent heart disease  · Antiplatelets , such as aspirin, help prevent blood clots  Take your antiplatelet medicine exactly as directed  These medicines make it more likely for you to bleed or bruise  If you are told to take aspirin, do not take acetaminophen or ibuprofen instead  · Take your medicine as directed  Contact your healthcare provider if you think your medicine is not helping or if you have side effects  Tell him or her if you are allergic to any medicine  Keep a list of the medicines, vitamins, and herbs you take  Include the amounts, and when and why you take them  Bring the list or the pill bottles to follow-up visits  Carry your medicine list with you in case of an emergency  Check your blood sugar level: You will be taught how to check a small drop of blood in a glucose monitor  You will need to check your blood sugar level at least 3 times each day if you are on insulin  Ask your healthcare provider when and how often to check during the day  If you check your blood sugar level before a meal , it should be between 80 and 130 mg/dL   If you check your blood sugar level 1 to 2 hours after a meal , it should be less than 180 mg/dL  Ask your healthcare provider if these are good goals for you  Write down your results, and show them to your healthcare provider  He may use the results to make changes to your medicine, food, and exercise schedules  If your blood sugar level is too low: Your blood sugar level is too low if it goes below 70 mg/dL  If the level is too low, eat or drink 15 grams of fast-acting carbohydrate  These are found naturally in fruits  Fast-acting carbohydrates will raise your blood sugar level quickly  Examples of 15 grams of fast-acting carbohydrate are 4 ounces (½ cup) of fruit juice or 4 ounces of regular soda  Other examples are 2 tablespoons of raisins or 3 to 4 glucose tablets  Check your blood sugar level 15 minutes later  If the level is still low (less than 100 mg/dL), eat another 15 grams of carbohydrate  When the level returns to 100 mg/dL, eat a snack or meal that contains carbohydrates  This will help prevent another drop in blood sugar  Always carefully follow your healthcare provider's instructions on how to treat low blood sugar levels  Check your feet each day for sores:  Wear shoes and socks that fit correctly  Do not trim your toenails  Ask your healthcare provider for more information about foot care  Follow your meal plan:  A dietitian will help you make a meal plan to keep your blood sugar level steady  Do not skip meals  Your blood sugar level may drop too low if you have taken diabetes medicine and do not eat  · Keep track of carbohydrates (sugar and starchy foods)  Your blood sugar level can get too high if you eat too many carbohydrates  Your dietitian will help you plan meals and snacks that have the right amount of carbohydrates  · Eat low-fat foods , such as skinless chicken and low-fat milk  · Eat less sodium (salt)    Limit high-sodium foods, such as soy sauce, potato chips, and soup  Do not add salt to food you cook  Limit your use of table salt  You should have less than 2,300 mg of sodium per day  · Eat high-fiber foods , such as vegetables, whole grain breads, and beans  · Limit alcohol  Alcohol affects your blood sugar level and can make it harder to manage your diabetes  Limit alcohol to 1 drink a day if you are a woman  Limit alcohol to 2 drinks a day if you are a man  A drink of alcohol is 12 ounces of beer, 5 ounces of wine, or 1½ ounces of liquor  Maintain a healthy weight:  Ask your healthcare provider how much you should weigh  A healthy weight can help you control your diabetes  Ask your provider to help you create a weight loss plan if you are overweight  Together you can set manageable weight loss goals  Exercise as directed:  Exercise can help keep your blood sugar level steady, decrease your risk of heart disease, and help you lose weight  Stretch before and after you exercise  Exercise for at least 150 minutes every week  Spread this amount of exercise over at least 3 days a week  Do not skip exercise more than 2 days in a row  Include muscle strengthening activities 2 to 3 days each week  Older adults should include balance training 2 to 3 times each week  Activities that help increase balance include yoga and kevin chi  Work with your healthcare provider to create an exercise plan  · Check your blood sugar level before and after exercise  Healthcare providers may tell you to change the amount of insulin you take or food you eat  If your blood sugar level is high, check your blood or urine for ketones before you exercise  Do not exercise if your blood sugar level is high and you have ketones  · If your blood sugar level is less than 100 mg/dL, have a carbohydrate snack before you exercise  Examples are 4 to 6 crackers, ½ banana, 8 ounces (1 cup) of milk, or 4 ounces (½ cup) of juice   Drink water or liquids that do not contain sugar before, during, and after exercise  Ask your dietitian or healthcare provider which liquids you should drink when you exercise  · Do not sit for longer than 30 minutes  If you cannot walk around, at least stand up  This will help you stay active and keep your blood circulating  Do not smoke:  Nicotine and other chemicals in cigarettes and cigars can cause lung damage and make it more difficult to manage your diabetes  Ask your healthcare provider for information if you currently smoke and need help to quit  Do not use e-cigarettes or smokeless tobacco in place of cigarettes or to help you quit  They still contain nicotine  Check your blood pressure as directed:  Ask your healthcare provider what your blood pressure should be  Most adults with diabetes and high blood pressure should have a systolic blood pressure (first number) less than 140  Your diastolic blood pressure (second number) should be less than 90  Wear medical alert identification:  Wear medical alert jewelry or carry a card that says you have diabetes  Ask your healthcare provider where to get these items  Ask about vaccines: You have a higher risk for serious illness if you get the flu, pneumonia, or hepatitis  Ask your healthcare provider if you should get a flu, pneumonia, or hepatitis B vaccine, and when to get the vaccine  Follow up with your healthcare provider as directed: You may need to return to have your A1c checked every 3 months  You will need to return at least once each year to have your feet checked  You will need an eye exam once a year to check for retinopathy  You will also need urine tests every year to check for kidney problems  You may need tests to monitor for heart disease such as an EKG, stress test, blood pressure monitoring, and blood tests  Write down your questions so you remember to ask them during your visits     © 2017 [employer] Information is for End User's use only and may not be sold, redistributed or otherwise used for commercial purposes  All illustrations and images included in CareNotes® are the copyrighted property of [employer]  or [employer]  The above information is an  only  It is not intended as medical advice for individual conditions or treatments  Talk to your doctor, nurse or pharmacist before following any medical regimen to see if it is safe and effective for you

## 2018-07-11 LAB — HBA1C MFR BLD HPLC: 6.7 %

## 2018-07-13 ENCOUNTER — APPOINTMENT (OUTPATIENT)
Dept: LAB | Facility: CLINIC | Age: 73
End: 2018-07-13
Payer: MEDICARE

## 2018-07-13 DIAGNOSIS — Z12.11 SCREEN FOR COLON CANCER: ICD-10-CM

## 2018-07-13 LAB — HEMOCCULT STL QL IA: NEGATIVE

## 2018-07-13 PROCEDURE — G0328 FECAL BLOOD SCRN IMMUNOASSAY: HCPCS

## 2018-07-14 DIAGNOSIS — I25.10 CORONARY ARTERY DISEASE WITHOUT ANGINA PECTORIS, UNSPECIFIED VESSEL OR LESION TYPE, UNSPECIFIED WHETHER NATIVE OR TRANSPLANTED HEART: ICD-10-CM

## 2018-07-14 DIAGNOSIS — K21.9 GASTROESOPHAGEAL REFLUX DISEASE WITHOUT ESOPHAGITIS: ICD-10-CM

## 2018-07-15 RX ORDER — CARVEDILOL 12.5 MG/1
TABLET ORAL
Qty: 60 TABLET | Refills: 0 | Status: SHIPPED | OUTPATIENT
Start: 2018-07-15 | End: 2018-08-13 | Stop reason: SDUPTHER

## 2018-07-15 RX ORDER — PANTOPRAZOLE SODIUM 40 MG/1
TABLET, DELAYED RELEASE ORAL
Qty: 30 TABLET | Refills: 0 | Status: SHIPPED | OUTPATIENT
Start: 2018-07-15 | End: 2018-08-13 | Stop reason: SDUPTHER

## 2018-07-20 ENCOUNTER — TELEPHONE (OUTPATIENT)
Dept: INTERNAL MEDICINE CLINIC | Facility: CLINIC | Age: 73
End: 2018-07-20

## 2018-07-20 NOTE — TELEPHONE ENCOUNTER
----- Message from Jerome Martinez DO sent at 7/6/2018  1:25 PM EDT -----  Call pt in two weeks and get update on her right forearm lesion

## 2018-07-23 ENCOUNTER — HOSPITAL ENCOUNTER (OUTPATIENT)
Dept: BONE DENSITY | Facility: HOSPITAL | Age: 73
Discharge: HOME | End: 2018-07-23
Payer: MEDICARE

## 2018-07-23 ENCOUNTER — HOSPITAL ENCOUNTER (OUTPATIENT)
Dept: MAMMOGRAPHY | Facility: HOSPITAL | Age: 73
Discharge: HOME | End: 2018-07-23
Payer: MEDICARE

## 2018-07-23 DIAGNOSIS — Z12.39 SCREENING FOR BREAST CANCER: ICD-10-CM

## 2018-07-23 DIAGNOSIS — Z13.820 SCREENING FOR OSTEOPOROSIS: ICD-10-CM

## 2018-07-23 PROCEDURE — 77067 SCR MAMMO BI INCL CAD: CPT

## 2018-07-23 PROCEDURE — 77080 DXA BONE DENSITY AXIAL: CPT

## 2018-07-23 PROCEDURE — 77063 BREAST TOMOSYNTHESIS BI: CPT

## 2018-08-01 DIAGNOSIS — D64.9 ANEMIA, UNSPECIFIED TYPE: Primary | ICD-10-CM

## 2018-08-13 DIAGNOSIS — I25.10 CORONARY ARTERY DISEASE WITHOUT ANGINA PECTORIS, UNSPECIFIED VESSEL OR LESION TYPE, UNSPECIFIED WHETHER NATIVE OR TRANSPLANTED HEART: ICD-10-CM

## 2018-08-13 DIAGNOSIS — K21.9 GASTROESOPHAGEAL REFLUX DISEASE WITHOUT ESOPHAGITIS: ICD-10-CM

## 2018-08-13 RX ORDER — PANTOPRAZOLE SODIUM 40 MG/1
TABLET, DELAYED RELEASE ORAL
Qty: 30 TABLET | Refills: 0 | Status: SHIPPED | OUTPATIENT
Start: 2018-08-13 | End: 2018-09-19 | Stop reason: SDUPTHER

## 2018-08-13 RX ORDER — CARVEDILOL 12.5 MG/1
TABLET ORAL
Qty: 60 TABLET | Refills: 0 | Status: SHIPPED | OUTPATIENT
Start: 2018-08-13 | End: 2018-09-12 | Stop reason: SDUPTHER

## 2018-08-28 DIAGNOSIS — Z88.9 HISTORY OF SEASONAL ALLERGIES: ICD-10-CM

## 2018-08-28 RX ORDER — FLUTICASONE PROPIONATE 50 MCG
SPRAY, SUSPENSION (ML) NASAL
Qty: 16 G | Refills: 5 | Status: SHIPPED | OUTPATIENT
Start: 2018-08-28 | End: 2019-03-26 | Stop reason: SDUPTHER

## 2018-09-12 DIAGNOSIS — I25.10 CORONARY ARTERY DISEASE WITHOUT ANGINA PECTORIS, UNSPECIFIED VESSEL OR LESION TYPE, UNSPECIFIED WHETHER NATIVE OR TRANSPLANTED HEART: ICD-10-CM

## 2018-09-12 RX ORDER — CARVEDILOL 12.5 MG/1
TABLET ORAL
Qty: 60 TABLET | Refills: 0 | Status: SHIPPED | OUTPATIENT
Start: 2018-09-12 | End: 2018-10-12 | Stop reason: SDUPTHER

## 2018-09-19 DIAGNOSIS — K21.9 GASTROESOPHAGEAL REFLUX DISEASE WITHOUT ESOPHAGITIS: ICD-10-CM

## 2018-09-20 RX ORDER — PANTOPRAZOLE SODIUM 40 MG/1
TABLET, DELAYED RELEASE ORAL
Qty: 30 TABLET | Refills: 0 | Status: SHIPPED | OUTPATIENT
Start: 2018-09-20 | End: 2018-10-18 | Stop reason: SDUPTHER

## 2018-09-21 ENCOUNTER — CLINICAL SUPPORT (OUTPATIENT)
Dept: INTERNAL MEDICINE CLINIC | Facility: CLINIC | Age: 73
End: 2018-09-21
Payer: MEDICARE

## 2018-09-21 DIAGNOSIS — M81.0 OSTEOPOROSIS, UNSPECIFIED OSTEOPOROSIS TYPE, UNSPECIFIED PATHOLOGICAL FRACTURE PRESENCE: Primary | ICD-10-CM

## 2018-09-21 PROCEDURE — 96372 THER/PROPH/DIAG INJ SC/IM: CPT | Performed by: INTERNAL MEDICINE

## 2018-10-08 ENCOUNTER — OFFICE VISIT (OUTPATIENT)
Dept: INTERNAL MEDICINE CLINIC | Facility: CLINIC | Age: 73
End: 2018-10-08
Payer: MEDICARE

## 2018-10-08 VITALS
HEIGHT: 64 IN | DIASTOLIC BLOOD PRESSURE: 68 MMHG | HEART RATE: 78 BPM | BODY MASS INDEX: 28.51 KG/M2 | TEMPERATURE: 98 F | WEIGHT: 167 LBS | SYSTOLIC BLOOD PRESSURE: 122 MMHG | RESPIRATION RATE: 18 BRPM

## 2018-10-08 DIAGNOSIS — Z99.2 END-STAGE RENAL DISEASE ON HEMODIALYSIS (HCC): ICD-10-CM

## 2018-10-08 DIAGNOSIS — N18.9 ANEMIA OF CHRONIC RENAL FAILURE, UNSPECIFIED CKD STAGE: ICD-10-CM

## 2018-10-08 DIAGNOSIS — M81.0 AGE-RELATED OSTEOPOROSIS WITHOUT CURRENT PATHOLOGICAL FRACTURE: ICD-10-CM

## 2018-10-08 DIAGNOSIS — K21.9 GERD WITHOUT ESOPHAGITIS: ICD-10-CM

## 2018-10-08 DIAGNOSIS — N25.81 SECONDARY HYPERPARATHYROIDISM (HCC): ICD-10-CM

## 2018-10-08 DIAGNOSIS — E11.9 ENCOUNTER FOR DIABETIC FOOT EXAM (HCC): ICD-10-CM

## 2018-10-08 DIAGNOSIS — E11.621 TYPE 2 DIABETES MELLITUS WITH FOOT ULCER, UNSPECIFIED WHETHER LONG TERM INSULIN USE (HCC): ICD-10-CM

## 2018-10-08 DIAGNOSIS — E11.22 CONTROLLED TYPE 2 DIABETES MELLITUS WITH CHRONIC KIDNEY DISEASE ON CHRONIC DIALYSIS, WITH LONG-TERM CURRENT USE OF INSULIN (HCC): Primary | ICD-10-CM

## 2018-10-08 DIAGNOSIS — Z99.2 CONTROLLED TYPE 2 DIABETES MELLITUS WITH CHRONIC KIDNEY DISEASE ON CHRONIC DIALYSIS, WITH LONG-TERM CURRENT USE OF INSULIN (HCC): Primary | ICD-10-CM

## 2018-10-08 DIAGNOSIS — E78.2 MIXED HYPERLIPIDEMIA: ICD-10-CM

## 2018-10-08 DIAGNOSIS — E55.9 VITAMIN D DEFICIENCY: ICD-10-CM

## 2018-10-08 DIAGNOSIS — L97.509 TYPE 2 DIABETES MELLITUS WITH FOOT ULCER, UNSPECIFIED WHETHER LONG TERM INSULIN USE (HCC): ICD-10-CM

## 2018-10-08 DIAGNOSIS — N18.6 CONTROLLED TYPE 2 DIABETES MELLITUS WITH CHRONIC KIDNEY DISEASE ON CHRONIC DIALYSIS, WITH LONG-TERM CURRENT USE OF INSULIN (HCC): Primary | ICD-10-CM

## 2018-10-08 DIAGNOSIS — Z79.4 CONTROLLED TYPE 2 DIABETES MELLITUS WITH CHRONIC KIDNEY DISEASE ON CHRONIC DIALYSIS, WITH LONG-TERM CURRENT USE OF INSULIN (HCC): Primary | ICD-10-CM

## 2018-10-08 DIAGNOSIS — I25.10 CARDIOVASCULAR ARTERIOSCLEROSIS: ICD-10-CM

## 2018-10-08 DIAGNOSIS — N18.6 END-STAGE RENAL DISEASE ON HEMODIALYSIS (HCC): ICD-10-CM

## 2018-10-08 DIAGNOSIS — I10 ESSENTIAL HYPERTENSION: ICD-10-CM

## 2018-10-08 DIAGNOSIS — D63.1 ANEMIA OF CHRONIC RENAL FAILURE, UNSPECIFIED CKD STAGE: ICD-10-CM

## 2018-10-08 PROCEDURE — 99214 OFFICE O/P EST MOD 30 MIN: CPT | Performed by: INTERNAL MEDICINE

## 2018-10-08 NOTE — PROGRESS NOTES
Assessment/Plan:    No problem-specific Assessment & Plan notes found for this encounter  Diagnoses and all orders for this visit:    Controlled type 2 diabetes mellitus with chronic kidney disease on chronic dialysis, with long-term current use of insulin ([facility] )  -     Comprehensive metabolic panel; Future  -     Hemoglobin A1C; Future    Secondary hyperparathyroidism (HCC)    GERD without esophagitis    Cardiovascular arteriosclerosis    Essential hypertension    Age-related osteoporosis without current pathological fracture    Anemia of chronic renal failure, unspecified CKD stage  -     Hemoglobin and hematocrit, blood; Future    End-stage renal disease on hemodialysis (HCC)    Mixed hyperlipidemia  -     Lipid Panel with Direct LDL reflex; Future    Vitamin D deficiency    Encounter for diabetic foot exam ([place] [address] )    Type 2 diabetes mellitus with foot ulcer, unspecified whether long term insulin use ([place] [address] )      A/P: Doing ok  Overall decline, noted  Toe wound w/o s/s of infection and deferred wound clinic or DPM  Will monitor closely  Check labs  Refusing more aggressive diabetic treatment due to past hypoglycemic events, especially at night  Continue current treatment and keep up with HD TIW  Will call in several days for up date on the toe  RTC three months otherwise  Subjective:      Patient ID: Chase Client is a 68 y o  female  WF RTC with her  for f/u dm, ESRD on HD, etc  Doing ok and no new issues  Remains as active as possible and mainly confined to the chair due to the right leg deformity  No falls  ROS positive for right great toe lesion after stubbing it several weeks ago  Reports a large scab just fell off it  No pain  NO d/c  No fever or chills  Sugars less than 150 and HS around 200  No angina, edema, palpitations, SOB, orthopnea, or PND  Due for labs and had her flu vaccine at [facility]           The following portions of the patient's history were reviewed and updated as appropriate:   She  has a past medical history of Abnormal weight loss; Anemia; Chronic kidney disease; Clostridium difficile colitis; Depression; Diabetes mellitus ([place] [address] ); Fracture, tibial plateau; GERD (gastroesophageal reflux disease); Hyperkalemia; Hypertension; Hypoglycemia; Hypokalemia; Hypothermia; Hypoxia; Metatarsalgia; Old myocardial infarction; Osteoporosis; Syncope; and Visual disturbance  She   Patient Active Problem List    Diagnosis Date Noted    End-stage renal disease on hemodialysis ([place] [address] ) 04/06/2016    Secondary hyperparathyroidism ([place] [address] ) 03/09/2016    Vitamin D deficiency 02/01/2016    Anemia of chronic kidney failure 04/08/2015    Leg fracture, right 77/30/2232    Diastolic dysfunction 86/36/7032    Age-related osteoporosis without current pathological fracture 04/09/2014    Type 2 diabetes mellitus with renal manifestations, controlled ([place] [address] ) 04/09/2014    Allergic rhinitis 03/26/2014    Arteriosclerosis of carotid artery 03/26/2014    Cardiovascular arteriosclerosis 03/26/2014    Cataract 03/26/2014    Congestive heart failure ([place] [address] ) 03/26/2014    Diabetes mellitus with foot ulcer ([place] [address] ) 03/26/2014    GERD without esophagitis 03/26/2014    Hyperlipidemia 03/26/2014    Hypertension 03/26/2014    Iron deficiency anemia 03/26/2014    Overactive bladder 03/26/2014    Peripheral arterial disease ([place] [address] ) 03/26/2014     She  has a past surgical history that includes Venous thrombectomy; Bypass Graft (Right); Coronary artery bypass graft; Cataract extraction; Cholecystectomy; Hernia repair; Carotid endarterectomy; Foot Amputation (Bilateral); and Tonsillectomy and adenoidectomy  Her family history includes Aneurysm in her sister; Breast cancer in her mother; Cancer in her mother; Coronary artery disease in her father and sister; Heart disease in her mother; Liver cancer in her brother  She  reports that she has quit smoking   She has never used smokeless tobacco  She reports that she does not drink alcohol or use drugs  Current Outpatient Prescriptions   Medication Sig Dispense Refill    aspirin 81 MG tablet Take 1 tablet by mouth daily      carvedilol (COREG) 12 5 mg tablet take 1 tablet by mouth twice a day 60 tablet 0    diphenoxylate-atropine (LOMOTIL) 2 5-0 025 mg per tablet Take 1 tablet by mouth 4 (four) times a day as needed for diarrhea 120 tablet 0    ergocalciferol (VITAMIN D2) 50,000 units Take 1 capsule (50,000 Units total) by mouth once a week 12 capsule 3    Ferric Citrate (AURYXIA) 1  MG(Fe) TABS Take by mouth      fluticasone (FLONASE) 50 mcg/act nasal spray instill 2 sprays into each nostril once daily 16 g 5    glucose blood (ONE TOUCH ULTRA TEST) test strip by In Vitro route 2 (two) times a day      Misc  Devices [facility]) MISC by Does not apply route daily 1 each 0    Multiple Vitamins-Minerals (OCUVITE EXTRA) TABS Take 1 tablet by mouth daily      nateglinide (STARLIX) 120 mg tablet TAKE 1 TABLET BY MOUTH DAILY AT BREAKFAST, 1 TABLET AT LUNCH AND 2 TABLETS AT SUPPER 120 tablet 5    pantoprazole (PROTONIX) 40 mg tablet take 1 tablet by mouth once daily 30 tablet 0    pravastatin (PRAVACHOL) 80 mg tablet take 1 tablet by mouth daily 90 tablet 3     No current facility-administered medications for this visit        Current Outpatient Prescriptions on File Prior to Visit   Medication Sig    aspirin 81 MG tablet Take 1 tablet by mouth daily    carvedilol (COREG) 12 5 mg tablet take 1 tablet by mouth twice a day    diphenoxylate-atropine (LOMOTIL) 2 5-0 025 mg per tablet Take 1 tablet by mouth 4 (four) times a day as needed for diarrhea    ergocalciferol (VITAMIN D2) 50,000 units Take 1 capsule (50,000 Units total) by mouth once a week    Ferric Citrate (AURYXIA) 1  MG(Fe) TABS Take by mouth    fluticasone (FLONASE) 50 mcg/act nasal spray instill 2 sprays into each nostril once daily    glucose blood (ONE TOUCH ULTRA TEST) test strip by In Vitro route 2 (two) times a day    Misc  Devices [facility]) MISC by Does not apply route daily    Multiple Vitamins-Minerals (OCUVITE EXTRA) TABS Take 1 tablet by mouth daily    nateglinide (STARLIX) 120 mg tablet TAKE 1 TABLET BY MOUTH DAILY AT BREAKFAST, 1 TABLET AT LUNCH AND 2 TABLETS AT SUPPER    pantoprazole (PROTONIX) 40 mg tablet take 1 tablet by mouth once daily    pravastatin (PRAVACHOL) 80 mg tablet take 1 tablet by mouth daily     No current facility-administered medications on file prior to visit  She is allergic to pollen extract       Review of Systems   Constitutional: Negative for activity change, chills, diaphoresis, fatigue and fever  HENT: Negative  Eyes: Negative for visual disturbance  Respiratory: Negative for cough, chest tightness, shortness of breath and wheezing  Cardiovascular: Negative for chest pain, palpitations and leg swelling  Gastrointestinal: Negative for abdominal pain, constipation, diarrhea, nausea and vomiting  Endocrine: Negative for cold intolerance and heat intolerance  Genitourinary: Negative for difficulty urinating, dysuria and frequency  Musculoskeletal: Negative for arthralgias, gait problem and myalgias  Skin: Positive for wound  Neurological: Negative for dizziness, tremors, seizures, syncope, weakness, light-headedness and headaches  Psychiatric/Behavioral: Negative for confusion and dysphoric mood  The patient is not nervous/anxious  Objective:      /68   Pulse 78   Temp 98 °F (36 7 °C) (Tympanic)   Resp 18   Ht 5' 4" (1 626 m)   Wt 75 8 kg (167 lb)   BMI 28 67 kg/m²          Physical Exam   Constitutional: She is oriented to person, place, and time  She appears well-developed and well-nourished  No distress  HENT:   Head: Normocephalic and atraumatic  Mouth/Throat: Oropharynx is clear and moist    Eyes: Pupils are equal, round, and reactive to light  Conjunctivae and EOM are normal    Neck: Neck supple   No JVD present  Cardiovascular: Normal rate, regular rhythm and normal heart sounds  Pulses are no weak pulses  No murmur heard  Pulses:       Dorsalis pedis pulses are 1+ on the right side, and 1+ on the left side  Posterior tibial pulses are 1+ on the right side, and 1+ on the left side  Pulmonary/Chest: Effort normal and breath sounds normal  No respiratory distress  She has no wheezes  Abdominal: Soft  Bowel sounds are normal  She exhibits no distension  There is no tenderness  Musculoskeletal: She exhibits no edema  Feet:   Right Foot:   Skin Integrity: Negative for ulcer, skin breakdown, erythema, warmth, callus or dry skin  Left Foot:   Skin Integrity: Negative for ulcer, skin breakdown, erythema, warmth, callus or dry skin  Neurological: She is alert and oriented to person, place, and time  Skin:   Right dorsal great toe with a small scabbed lesion  No erythema, swelling, or increase temp  No d/c, induration, or fluctuation  Psychiatric: She has a normal mood and affect  Her behavior is normal  Judgment and thought content normal    Nursing note and vitals reviewed  Patient's shoes and socks removed  Right Foot/Ankle   Right Foot Inspection  Skin Exam: skin normal and skin intact no dry skin, no warmth, no callus, no erythema, no maceration, no abnormal color, no pre-ulcer, no ulcer and no callus                          Toe Exam: ROM and strength within normal limits and right toe deformity (scabbed lesion w/o infection ont the dorsum of the great toe  )no swelling, no tenderness and erythema    Vascular  Capillary refills: < 3 seconds  The right DP pulse is 1+  The right PT pulse is 1+       Left Foot/Ankle  Left Foot Inspection  Skin Exam: skin normal and skin intactno dry skin, no warmth, no erythema, no maceration, normal color, no pre-ulcer, no ulcer and no callus                         Toe Exam: ROM and strength within normal limits and left toe deformity (great toe amputation  )no swelling, no tenderness and no erythema                     Vascular  Capillary refills: < 3 seconds  The left DP pulse is 1+  The left PT pulse is 1+  Assign Risk Category:  Deformity present; Loss of protective sensation;  No weak pulses       Risk: 2

## 2018-10-12 DIAGNOSIS — I25.10 CORONARY ARTERY DISEASE WITHOUT ANGINA PECTORIS, UNSPECIFIED VESSEL OR LESION TYPE, UNSPECIFIED WHETHER NATIVE OR TRANSPLANTED HEART: ICD-10-CM

## 2018-10-12 LAB — HBA1C MFR BLD HPLC: 6.8 %

## 2018-10-12 RX ORDER — CARVEDILOL 12.5 MG/1
TABLET ORAL
Qty: 60 TABLET | Refills: 0 | Status: SHIPPED | OUTPATIENT
Start: 2018-10-12 | End: 2018-11-11 | Stop reason: SDUPTHER

## 2018-10-15 ENCOUNTER — TELEPHONE (OUTPATIENT)
Dept: INTERNAL MEDICINE CLINIC | Facility: CLINIC | Age: 73
End: 2018-10-15

## 2018-10-15 LAB
LEFT EYE DIABETIC RETINOPATHY: NORMAL
RIGHT EYE DIABETIC RETINOPATHY: NORMAL

## 2018-10-15 NOTE — TELEPHONE ENCOUNTER
----- Message from Killian Kuhn DO sent at 10/8/2018  1:07 PM EDT -----  Call pt in one week and get update on the foot ulcer

## 2018-10-15 NOTE — TELEPHONE ENCOUNTER
Spoke with her , he said she is doing ok and the ulcer is healing up pretty good, if anything changes they will call the office

## 2018-10-18 DIAGNOSIS — K21.9 GASTROESOPHAGEAL REFLUX DISEASE WITHOUT ESOPHAGITIS: ICD-10-CM

## 2018-10-19 RX ORDER — PANTOPRAZOLE SODIUM 40 MG/1
TABLET, DELAYED RELEASE ORAL
Qty: 30 TABLET | Refills: 0 | Status: SHIPPED | OUTPATIENT
Start: 2018-10-19 | End: 2018-11-17 | Stop reason: SDUPTHER

## 2018-11-11 DIAGNOSIS — I25.10 CORONARY ARTERY DISEASE WITHOUT ANGINA PECTORIS, UNSPECIFIED VESSEL OR LESION TYPE, UNSPECIFIED WHETHER NATIVE OR TRANSPLANTED HEART: ICD-10-CM

## 2018-11-11 RX ORDER — CARVEDILOL 12.5 MG/1
TABLET ORAL
Qty: 60 TABLET | Refills: 0 | Status: SHIPPED | OUTPATIENT
Start: 2018-11-11 | End: 2018-12-11 | Stop reason: SDUPTHER

## 2018-11-17 DIAGNOSIS — K21.9 GASTROESOPHAGEAL REFLUX DISEASE WITHOUT ESOPHAGITIS: ICD-10-CM

## 2018-11-18 RX ORDER — PANTOPRAZOLE SODIUM 40 MG/1
TABLET, DELAYED RELEASE ORAL
Qty: 30 TABLET | Refills: 0 | Status: SHIPPED | OUTPATIENT
Start: 2018-11-18 | End: 2018-12-18 | Stop reason: SDUPTHER

## 2018-12-05 DIAGNOSIS — E11.8 TYPE 2 DIABETES MELLITUS WITH COMPLICATION, UNSPECIFIED WHETHER LONG TERM INSULIN USE: ICD-10-CM

## 2018-12-05 RX ORDER — NATEGLINIDE 120 MG/1
TABLET ORAL
Qty: 120 TABLET | Refills: 5 | Status: SHIPPED | OUTPATIENT
Start: 2018-12-05 | End: 2019-02-05 | Stop reason: HOSPADM

## 2018-12-11 DIAGNOSIS — I25.10 CORONARY ARTERY DISEASE WITHOUT ANGINA PECTORIS, UNSPECIFIED VESSEL OR LESION TYPE, UNSPECIFIED WHETHER NATIVE OR TRANSPLANTED HEART: ICD-10-CM

## 2018-12-11 RX ORDER — CARVEDILOL 12.5 MG/1
TABLET ORAL
Qty: 60 TABLET | Refills: 3 | Status: SHIPPED | OUTPATIENT
Start: 2018-12-11 | End: 2019-03-26 | Stop reason: SDUPTHER

## 2018-12-18 DIAGNOSIS — K21.9 GASTROESOPHAGEAL REFLUX DISEASE WITHOUT ESOPHAGITIS: ICD-10-CM

## 2018-12-18 RX ORDER — PANTOPRAZOLE SODIUM 40 MG/1
TABLET, DELAYED RELEASE ORAL
Qty: 30 TABLET | Refills: 0 | Status: SHIPPED | OUTPATIENT
Start: 2018-12-18 | End: 2019-01-19 | Stop reason: SDUPTHER

## 2018-12-27 PROBLEM — Z89.412 ACQUIRED ABSENCE OF LEFT GREAT TOE (HCC): Status: ACTIVE | Noted: 2018-12-27

## 2019-01-07 ENCOUNTER — OFFICE VISIT (OUTPATIENT)
Dept: INTERNAL MEDICINE CLINIC | Facility: CLINIC | Age: 74
End: 2019-01-07
Payer: MEDICARE

## 2019-01-07 VITALS
RESPIRATION RATE: 18 BRPM | BODY MASS INDEX: 28.51 KG/M2 | SYSTOLIC BLOOD PRESSURE: 132 MMHG | TEMPERATURE: 98.4 F | DIASTOLIC BLOOD PRESSURE: 66 MMHG | HEART RATE: 80 BPM | HEIGHT: 64 IN | WEIGHT: 167 LBS

## 2019-01-07 DIAGNOSIS — N18.6 CONTROLLED TYPE 2 DIABETES MELLITUS WITH CHRONIC KIDNEY DISEASE ON CHRONIC DIALYSIS, WITH LONG-TERM CURRENT USE OF INSULIN (HCC): Primary | ICD-10-CM

## 2019-01-07 DIAGNOSIS — Z99.2 CONTROLLED TYPE 2 DIABETES MELLITUS WITH CHRONIC KIDNEY DISEASE ON CHRONIC DIALYSIS, WITH LONG-TERM CURRENT USE OF INSULIN (HCC): Primary | ICD-10-CM

## 2019-01-07 DIAGNOSIS — F03.90 DEMENTIA WITHOUT BEHAVIORAL DISTURBANCE, UNSPECIFIED DEMENTIA TYPE (HCC): ICD-10-CM

## 2019-01-07 DIAGNOSIS — N25.81 SECONDARY HYPERPARATHYROIDISM (HCC): ICD-10-CM

## 2019-01-07 DIAGNOSIS — E11.22 CONTROLLED TYPE 2 DIABETES MELLITUS WITH CHRONIC KIDNEY DISEASE ON CHRONIC DIALYSIS, WITH LONG-TERM CURRENT USE OF INSULIN (HCC): Primary | ICD-10-CM

## 2019-01-07 DIAGNOSIS — Z99.2 END-STAGE RENAL DISEASE ON HEMODIALYSIS (HCC): ICD-10-CM

## 2019-01-07 DIAGNOSIS — E78.2 MIXED HYPERLIPIDEMIA: ICD-10-CM

## 2019-01-07 DIAGNOSIS — M81.0 AGE-RELATED OSTEOPOROSIS WITHOUT CURRENT PATHOLOGICAL FRACTURE: ICD-10-CM

## 2019-01-07 DIAGNOSIS — N18.6 END-STAGE RENAL DISEASE ON HEMODIALYSIS (HCC): ICD-10-CM

## 2019-01-07 DIAGNOSIS — N18.9 ANEMIA OF CHRONIC RENAL FAILURE, UNSPECIFIED CKD STAGE: ICD-10-CM

## 2019-01-07 DIAGNOSIS — D63.1 ANEMIA OF CHRONIC RENAL FAILURE, UNSPECIFIED CKD STAGE: ICD-10-CM

## 2019-01-07 DIAGNOSIS — I10 ESSENTIAL HYPERTENSION: ICD-10-CM

## 2019-01-07 DIAGNOSIS — I25.10 CARDIOVASCULAR ARTERIOSCLEROSIS: ICD-10-CM

## 2019-01-07 DIAGNOSIS — Z79.4 CONTROLLED TYPE 2 DIABETES MELLITUS WITH CHRONIC KIDNEY DISEASE ON CHRONIC DIALYSIS, WITH LONG-TERM CURRENT USE OF INSULIN (HCC): Primary | ICD-10-CM

## 2019-01-07 DIAGNOSIS — E55.9 VITAMIN D DEFICIENCY: ICD-10-CM

## 2019-01-07 LAB — SL AMB POCT HEMOGLOBIN AIC: 6.6 (ref ?–6.5)

## 2019-01-07 PROCEDURE — 83036 HEMOGLOBIN GLYCOSYLATED A1C: CPT | Performed by: INTERNAL MEDICINE

## 2019-01-07 PROCEDURE — 99214 OFFICE O/P EST MOD 30 MIN: CPT | Performed by: INTERNAL MEDICINE

## 2019-01-07 RX ORDER — DONEPEZIL HYDROCHLORIDE 5 MG/1
5 TABLET, FILM COATED ORAL
Qty: 90 TABLET | Refills: 1 | Status: SHIPPED | OUTPATIENT
Start: 2019-01-07 | End: 2019-02-13

## 2019-01-07 RX ORDER — LOSARTAN POTASSIUM 25 MG/1
TABLET ORAL
COMMUNITY
End: 2019-02-13

## 2019-01-07 RX ORDER — AMLODIPINE BESYLATE 5 MG/1
TABLET ORAL
COMMUNITY
End: 2019-02-13

## 2019-01-07 RX ORDER — FERROUS SULFATE 325(65) MG
TABLET ORAL
COMMUNITY
End: 2019-02-13

## 2019-01-07 RX ORDER — ISOSORBIDE DINITRATE 20 MG/1
TABLET ORAL
COMMUNITY
End: 2019-02-13

## 2019-01-07 RX ORDER — TORSEMIDE 20 MG/1
20 TABLET ORAL
Status: ON HOLD | COMMUNITY
End: 2019-01-31

## 2019-01-07 RX ORDER — ACETAMINOPHEN 325 MG/1
2 TABLET ORAL
COMMUNITY
End: 2021-01-01 | Stop reason: HOSPADM

## 2019-01-15 ENCOUNTER — TELEPHONE (OUTPATIENT)
Dept: INTERNAL MEDICINE CLINIC | Facility: CLINIC | Age: 74
End: 2019-01-15

## 2019-01-15 DIAGNOSIS — K59.00 CONSTIPATION, UNSPECIFIED CONSTIPATION TYPE: ICD-10-CM

## 2019-01-15 DIAGNOSIS — K59.1 FUNCTIONAL DIARRHEA: Primary | ICD-10-CM

## 2019-01-15 RX ORDER — DIPHENOXYLATE HYDROCHLORIDE AND ATROPINE SULFATE 2.5; .025 MG/1; MG/1
1 TABLET ORAL 4 TIMES DAILY PRN
Qty: 120 TABLET | Refills: 0 | Status: SHIPPED | OUTPATIENT
Start: 2019-01-15 | End: 2019-06-10

## 2019-01-15 NOTE — TELEPHONE ENCOUNTER
Agree to d/c the med  I see lomotil in her list  Did they pick it up or need more? If so, put it in

## 2019-01-15 NOTE — TELEPHONE ENCOUNTER
Pt started on Aricept on 1/7/19, she took for 5 days and has been having diarrhea and loss of appetite,  stopped med on Saturday  He said a script for Lomotil was suppose to be called in for her, nothing noted in chart

## 2019-01-19 DIAGNOSIS — K21.9 GASTROESOPHAGEAL REFLUX DISEASE WITHOUT ESOPHAGITIS: ICD-10-CM

## 2019-01-20 RX ORDER — PANTOPRAZOLE SODIUM 40 MG/1
TABLET, DELAYED RELEASE ORAL
Qty: 30 TABLET | Refills: 0 | Status: SHIPPED | OUTPATIENT
Start: 2019-01-20 | End: 2019-03-11 | Stop reason: SDUPTHER

## 2019-01-22 LAB — HBA1C MFR BLD HPLC: 6.5 %

## 2019-01-30 ENCOUNTER — HOSPITAL ENCOUNTER (EMERGENCY)
Facility: HOSPITAL | Age: 74
End: 2019-01-31
Attending: EMERGENCY MEDICINE
Payer: MEDICARE

## 2019-01-30 ENCOUNTER — APPOINTMENT (EMERGENCY)
Dept: RADIOLOGY | Facility: HOSPITAL | Age: 74
End: 2019-01-30
Payer: MEDICARE

## 2019-01-30 DIAGNOSIS — I50.9 CONGESTIVE HEART DISEASE (HCC): Primary | ICD-10-CM

## 2019-01-30 DIAGNOSIS — E16.2 HYPOGLYCEMIA: ICD-10-CM

## 2019-01-30 LAB
ALBUMIN SERPL BCP-MCNC: 4 G/DL (ref 3.5–5.7)
ALP SERPL-CCNC: 48 U/L (ref 55–165)
ALT SERPL W P-5'-P-CCNC: 15 U/L (ref 7–52)
ANION GAP SERPL CALCULATED.3IONS-SCNC: 11 MMOL/L (ref 4–13)
APTT PPP: 34 SECONDS (ref 26–38)
AST SERPL W P-5'-P-CCNC: 26 U/L (ref 13–39)
ATRIAL RATE: 91 BPM
BASOPHILS # BLD AUTO: 0 THOUSANDS/ΜL (ref 0–0.1)
BASOPHILS NFR BLD AUTO: 1 % (ref 0–2)
BILIRUB SERPL-MCNC: 0.3 MG/DL (ref 0.2–1)
BNP SERPL-MCNC: 1635 PG/ML (ref 1–100)
BUN SERPL-MCNC: 38 MG/DL (ref 7–25)
CALCIUM SERPL-MCNC: 10.5 MG/DL (ref 8.6–10.5)
CHLORIDE SERPL-SCNC: 94 MMOL/L (ref 98–107)
CK SERPL-CCNC: 76 U/L (ref 30–192)
CO2 SERPL-SCNC: 32 MMOL/L (ref 21–31)
CREAT SERPL-MCNC: 4.9 MG/DL (ref 0.6–1.2)
EOSINOPHIL # BLD AUTO: 0.1 THOUSAND/ΜL (ref 0–0.61)
EOSINOPHIL NFR BLD AUTO: 1 % (ref 0–5)
ERYTHROCYTE [DISTWIDTH] IN BLOOD BY AUTOMATED COUNT: 15.4 % (ref 11.5–14.5)
FLUAV AG SPEC QL IA: NEGATIVE
FLUBV AG SPEC QL IA: NEGATIVE
GFR SERPL CREATININE-BSD FRML MDRD: 8 ML/MIN/1.73SQ M
GLUCOSE SERPL-MCNC: 51 MG/DL (ref 65–99)
GLUCOSE SERPL-MCNC: 93 MG/DL (ref 65–140)
HCT VFR BLD AUTO: 35.6 % (ref 34.8–46.1)
HGB BLD-MCNC: 12 G/DL (ref 12–16)
INR PPP: 0.97 (ref 0.9–1.5)
LYMPHOCYTES # BLD AUTO: 0.4 THOUSANDS/ΜL (ref 0.6–4.47)
LYMPHOCYTES NFR BLD AUTO: 7 % (ref 21–51)
MCH RBC QN AUTO: 32.6 PG (ref 26–34)
MCHC RBC AUTO-ENTMCNC: 33.7 G/DL (ref 31–37)
MCV RBC AUTO: 97 FL (ref 81–99)
MONOCYTES # BLD AUTO: 0.8 THOUSAND/ΜL (ref 0.17–1.22)
MONOCYTES NFR BLD AUTO: 14 % (ref 2–12)
NEUTROPHILS # BLD AUTO: 4.4 THOUSANDS/ΜL (ref 1.4–6.5)
NEUTS SEG NFR BLD AUTO: 77 % (ref 42–75)
NRBC BLD AUTO-RTO: 0 /100 WBCS
P AXIS: 56 DEGREES
PLATELET # BLD AUTO: 139 THOUSANDS/UL (ref 149–390)
PMV BLD AUTO: 8.9 FL (ref 8.6–11.7)
POTASSIUM SERPL-SCNC: 3.7 MMOL/L (ref 3.5–5.5)
PR INTERVAL: 212 MS
PROT SERPL-MCNC: 7.7 G/DL (ref 6.4–8.9)
PROTHROMBIN TIME: 11.2 SECONDS (ref 10.2–13)
QRS AXIS: 113 DEGREES
QRSD INTERVAL: 152 MS
QT INTERVAL: 410 MS
QTC INTERVAL: 504 MS
RBC # BLD AUTO: 3.68 MILLION/UL (ref 3.9–5.2)
SODIUM SERPL-SCNC: 137 MMOL/L (ref 134–143)
T WAVE AXIS: -52 DEGREES
TROPONIN I SERPL-MCNC: 0.08 NG/ML
VENTRICULAR RATE: 91 BPM
WBC # BLD AUTO: 5.7 THOUSAND/UL (ref 4.8–10.8)

## 2019-01-30 PROCEDURE — 36415 COLL VENOUS BLD VENIPUNCTURE: CPT | Performed by: EMERGENCY MEDICINE

## 2019-01-30 PROCEDURE — 82550 ASSAY OF CK (CPK): CPT | Performed by: EMERGENCY MEDICINE

## 2019-01-30 PROCEDURE — 85610 PROTHROMBIN TIME: CPT | Performed by: EMERGENCY MEDICINE

## 2019-01-30 PROCEDURE — 84484 ASSAY OF TROPONIN QUANT: CPT | Performed by: EMERGENCY MEDICINE

## 2019-01-30 PROCEDURE — 83880 ASSAY OF NATRIURETIC PEPTIDE: CPT | Performed by: EMERGENCY MEDICINE

## 2019-01-30 PROCEDURE — 82948 REAGENT STRIP/BLOOD GLUCOSE: CPT

## 2019-01-30 PROCEDURE — 93010 ELECTROCARDIOGRAM REPORT: CPT | Performed by: INTERNAL MEDICINE

## 2019-01-30 PROCEDURE — 87631 RESP VIRUS 3-5 TARGETS: CPT | Performed by: EMERGENCY MEDICINE

## 2019-01-30 PROCEDURE — 85730 THROMBOPLASTIN TIME PARTIAL: CPT | Performed by: EMERGENCY MEDICINE

## 2019-01-30 PROCEDURE — 80053 COMPREHEN METABOLIC PANEL: CPT | Performed by: EMERGENCY MEDICINE

## 2019-01-30 PROCEDURE — 93005 ELECTROCARDIOGRAM TRACING: CPT

## 2019-01-30 PROCEDURE — 85025 COMPLETE CBC W/AUTO DIFF WBC: CPT | Performed by: EMERGENCY MEDICINE

## 2019-01-30 PROCEDURE — 71046 X-RAY EXAM CHEST 2 VIEWS: CPT

## 2019-01-30 PROCEDURE — 96374 THER/PROPH/DIAG INJ IV PUSH: CPT

## 2019-01-30 PROCEDURE — 99285 EMERGENCY DEPT VISIT HI MDM: CPT

## 2019-01-30 RX ORDER — DEXTROSE MONOHYDRATE 25 G/50ML
25 INJECTION, SOLUTION INTRAVENOUS ONCE
Status: COMPLETED | OUTPATIENT
Start: 2019-01-30 | End: 2019-01-30

## 2019-01-30 RX ADMIN — DEXTROSE MONOHYDRATE 25 ML: 25 INJECTION, SOLUTION INTRAVENOUS at 23:08

## 2019-01-31 ENCOUNTER — APPOINTMENT (EMERGENCY)
Dept: RADIOLOGY | Facility: HOSPITAL | Age: 74
End: 2019-01-31
Attending: EMERGENCY MEDICINE
Payer: MEDICARE

## 2019-01-31 ENCOUNTER — HOSPITAL ENCOUNTER (INPATIENT)
Facility: HOSPITAL | Age: 74
LOS: 5 days | Discharge: HOME HEALTH | DRG: 682 | End: 2019-02-05
Attending: INTERNAL MEDICINE | Admitting: INTERNAL MEDICINE
Payer: MEDICARE

## 2019-01-31 ENCOUNTER — APPOINTMENT (EMERGENCY)
Dept: CT IMAGING | Facility: HOSPITAL | Age: 74
End: 2019-01-31
Payer: MEDICARE

## 2019-01-31 VITALS
RESPIRATION RATE: 20 BRPM | WEIGHT: 165 LBS | HEART RATE: 77 BPM | TEMPERATURE: 99.4 F | BODY MASS INDEX: 28.17 KG/M2 | SYSTOLIC BLOOD PRESSURE: 173 MMHG | OXYGEN SATURATION: 96 % | HEIGHT: 64 IN | DIASTOLIC BLOOD PRESSURE: 75 MMHG

## 2019-01-31 DIAGNOSIS — R77.8 ELEVATED TROPONIN I LEVEL: ICD-10-CM

## 2019-01-31 DIAGNOSIS — I25.10 CARDIOVASCULAR ARTERIOSCLEROSIS: ICD-10-CM

## 2019-01-31 DIAGNOSIS — J40 BRONCHITIS: ICD-10-CM

## 2019-01-31 DIAGNOSIS — R53.1 ASTHENIA: ICD-10-CM

## 2019-01-31 DIAGNOSIS — N18.6 END-STAGE RENAL DISEASE ON HEMODIALYSIS (HCC): Primary | ICD-10-CM

## 2019-01-31 DIAGNOSIS — Z99.2 END-STAGE RENAL DISEASE ON HEMODIALYSIS (HCC): Primary | ICD-10-CM

## 2019-01-31 DIAGNOSIS — I50.9 CONGESTIVE HEART FAILURE (HCC): ICD-10-CM

## 2019-01-31 PROBLEM — E16.2 HYPOGLYCEMIA: Status: ACTIVE | Noted: 2019-01-31

## 2019-01-31 PROBLEM — I21.4 NSTEMI (NON-ST ELEVATED MYOCARDIAL INFARCTION) (HCC): Status: ACTIVE | Noted: 2019-01-31

## 2019-01-31 LAB
ATRIAL RATE: 91 BPM
CK SERPL-CCNC: 73 U/L (ref 30–192)
FLUAV AG SPEC QL: DETECTED
FLUBV AG SPEC QL: NOT DETECTED
GLUCOSE SERPL-MCNC: 112 MG/DL (ref 65–140)
GLUCOSE SERPL-MCNC: 76 MG/DL (ref 65–140)
P AXIS: 66 DEGREES
PLATELET # BLD AUTO: 120 THOUSANDS/UL (ref 149–390)
PMV BLD AUTO: 10.5 FL (ref 8.9–12.7)
PR INTERVAL: 220 MS
PROCALCITONIN SERPL-MCNC: 0.18 NG/ML
QRS AXIS: 109 DEGREES
QRSD INTERVAL: 154 MS
QT INTERVAL: 392 MS
QTC INTERVAL: 482 MS
RSV B RNA SPEC QL NAA+PROBE: NOT DETECTED
T WAVE AXIS: -64 DEGREES
TROPONIN I SERPL-MCNC: 0.1 NG/ML
TROPONIN I SERPL-MCNC: 0.11 NG/ML
VENTRICULAR RATE: 91 BPM

## 2019-01-31 PROCEDURE — 84484 ASSAY OF TROPONIN QUANT: CPT | Performed by: EMERGENCY MEDICINE

## 2019-01-31 PROCEDURE — 94760 N-INVAS EAR/PLS OXIMETRY 1: CPT

## 2019-01-31 PROCEDURE — 87081 CULTURE SCREEN ONLY: CPT | Performed by: INTERNAL MEDICINE

## 2019-01-31 PROCEDURE — 1124F ACP DISCUSS-NO DSCNMKR DOCD: CPT | Performed by: INTERNAL MEDICINE

## 2019-01-31 PROCEDURE — 99223 1ST HOSP IP/OBS HIGH 75: CPT | Performed by: PHYSICIAN ASSISTANT

## 2019-01-31 PROCEDURE — 94640 AIRWAY INHALATION TREATMENT: CPT

## 2019-01-31 PROCEDURE — 84145 PROCALCITONIN (PCT): CPT | Performed by: PHYSICIAN ASSISTANT

## 2019-01-31 PROCEDURE — 71275 CT ANGIOGRAPHY CHEST: CPT

## 2019-01-31 PROCEDURE — 71045 X-RAY EXAM CHEST 1 VIEW: CPT

## 2019-01-31 PROCEDURE — 36415 COLL VENOUS BLD VENIPUNCTURE: CPT | Performed by: EMERGENCY MEDICINE

## 2019-01-31 PROCEDURE — 93010 ELECTROCARDIOGRAM REPORT: CPT | Performed by: INTERNAL MEDICINE

## 2019-01-31 PROCEDURE — 99221 1ST HOSP IP/OBS SF/LOW 40: CPT | Performed by: INTERNAL MEDICINE

## 2019-01-31 PROCEDURE — 82550 ASSAY OF CK (CPK): CPT | Performed by: EMERGENCY MEDICINE

## 2019-01-31 PROCEDURE — 82948 REAGENT STRIP/BLOOD GLUCOSE: CPT

## 2019-01-31 PROCEDURE — 85049 AUTOMATED PLATELET COUNT: CPT | Performed by: PHYSICIAN ASSISTANT

## 2019-01-31 RX ORDER — LOSARTAN POTASSIUM 25 MG/1
25 TABLET ORAL DAILY
Status: DISCONTINUED | OUTPATIENT
Start: 2019-02-01 | End: 2019-02-05 | Stop reason: HOSPADM

## 2019-01-31 RX ORDER — PANTOPRAZOLE SODIUM 40 MG/1
40 TABLET, DELAYED RELEASE ORAL
Status: DISCONTINUED | OUTPATIENT
Start: 2019-02-01 | End: 2019-02-05 | Stop reason: HOSPADM

## 2019-01-31 RX ORDER — GUAIFENESIN 600 MG
600 TABLET, EXTENDED RELEASE 12 HR ORAL EVERY 12 HOURS
Status: DISCONTINUED | OUTPATIENT
Start: 2019-01-31 | End: 2019-02-05 | Stop reason: HOSPADM

## 2019-01-31 RX ORDER — ASPIRIN 81 MG/1
81 TABLET, CHEWABLE ORAL DAILY
Status: DISCONTINUED | OUTPATIENT
Start: 2019-02-01 | End: 2019-02-05 | Stop reason: HOSPADM

## 2019-01-31 RX ORDER — LEVALBUTEROL 1.25 MG/.5ML
1.25 SOLUTION, CONCENTRATE RESPIRATORY (INHALATION)
Status: DISCONTINUED | OUTPATIENT
Start: 2019-02-01 | End: 2019-02-03

## 2019-01-31 RX ORDER — AMLODIPINE BESYLATE 5 MG/1
5 TABLET ORAL DAILY
Status: DISCONTINUED | OUTPATIENT
Start: 2019-02-01 | End: 2019-02-05 | Stop reason: HOSPADM

## 2019-01-31 RX ORDER — LEVALBUTEROL INHALATION SOLUTION 0.63 MG/3ML
0.63 SOLUTION RESPIRATORY (INHALATION) EVERY 6 HOURS
Status: DISCONTINUED | OUTPATIENT
Start: 2019-01-31 | End: 2019-01-31

## 2019-01-31 RX ORDER — PRAVASTATIN SODIUM 80 MG/1
80 TABLET ORAL
Status: DISCONTINUED | OUTPATIENT
Start: 2019-01-31 | End: 2019-02-05 | Stop reason: HOSPADM

## 2019-01-31 RX ORDER — HEPARIN SODIUM 5000 [USP'U]/ML
5000 INJECTION, SOLUTION INTRAVENOUS; SUBCUTANEOUS EVERY 8 HOURS SCHEDULED
Status: DISCONTINUED | OUTPATIENT
Start: 2019-01-31 | End: 2019-02-05 | Stop reason: HOSPADM

## 2019-01-31 RX ORDER — FERROUS SULFATE 325(65) MG
325 TABLET ORAL 2 TIMES DAILY WITH MEALS
Status: DISCONTINUED | OUTPATIENT
Start: 2019-01-31 | End: 2019-02-05 | Stop reason: HOSPADM

## 2019-01-31 RX ORDER — DONEPEZIL HYDROCHLORIDE 5 MG/1
5 TABLET, FILM COATED ORAL
Status: DISCONTINUED | OUTPATIENT
Start: 2019-01-31 | End: 2019-02-05 | Stop reason: HOSPADM

## 2019-01-31 RX ORDER — CARVEDILOL 12.5 MG/1
12.5 TABLET ORAL 2 TIMES DAILY
Status: DISCONTINUED | OUTPATIENT
Start: 2019-01-31 | End: 2019-02-05 | Stop reason: HOSPADM

## 2019-01-31 RX ADMIN — DONEPEZIL HYDROCHLORIDE 5 MG: 5 TABLET ORAL at 21:40

## 2019-01-31 RX ADMIN — CARVEDILOL 12.5 MG: 12.5 TABLET, FILM COATED ORAL at 17:37

## 2019-01-31 RX ADMIN — FERROUS SULFATE TAB 325 MG (65 MG ELEMENTAL FE) 325 MG: 325 (65 FE) TAB at 17:37

## 2019-01-31 RX ADMIN — IODIXANOL 85 ML: 320 INJECTION, SOLUTION INTRAVASCULAR at 01:42

## 2019-01-31 RX ADMIN — PRAVASTATIN SODIUM 80 MG: 80 TABLET ORAL at 17:37

## 2019-01-31 RX ADMIN — HEPARIN SODIUM 5000 UNITS: 5000 INJECTION INTRAVENOUS; SUBCUTANEOUS at 21:41

## 2019-01-31 RX ADMIN — CALCIUM ACETATE 1334 MG: 667 CAPSULE ORAL at 17:37

## 2019-01-31 RX ADMIN — LEVALBUTEROL HYDROCHLORIDE 0.63 MG: 0.63 SOLUTION RESPIRATORY (INHALATION) at 18:47

## 2019-01-31 RX ADMIN — GUAIFENESIN 600 MG: 600 TABLET, EXTENDED RELEASE ORAL at 21:40

## 2019-01-31 RX ADMIN — IPRATROPIUM BROMIDE 0.5 MG: 0.5 SOLUTION RESPIRATORY (INHALATION) at 18:44

## 2019-01-31 NOTE — EMTALA/ACUTE CARE TRANSFER
[address] [facility] [place]  [address] [id number]  [phone]  Dept: [phone]      [address] TRANSFER CONSENT    NAME Usha Singh                                         1945                              MRN [id number]    I have been informed of my rights regarding examination, treatment, and transfer   by Dr Diane Peterson DO    Benefits: Specialized equipment and/or services available at the receiving facility (Include comment)________________________    Risks: Potential for delay in receiving treatment, Potential deterioration of medical condition, Loss of IV, Increased discomfort during transfer      Transfer Request   I acknowledge that my medical condition has been evaluated and explained to me by the emergency department physician or other qualified medical person and/or my attending physician who has recommended and offered to me further medical examination and treatment  I understand the Hospital's obligation with respect to the treatment and stabilization of my emergency medical condition  I nevertheless request to be transferred  I release the Hospital, the doctor, and any other persons caring for me from all responsibility or liability for any injury or ill effects that may result from my transfer and agree to accept all responsibility for the consequences of my choice to transfer, rather than receive stabilizing treatment at the Hospital  I understand that because the transfer is my request, my insurance may not provide reimbursement for the services  The Hospital will assist and direct me and my family in how to make arrangements for transfer, but the hospital is not liable for any fees charged by the transport service  In spite of this understanding, I refuse to consent to further medical examination and treatment which has been offered to me, and request transfer to     I authorize the performance of emergency medical procedures and treatments upon me in both transit and upon arrival at the receiving facility  Additionally, I authorize the release of any and all medical records to the receiving facility and request they be transported with me, if possible  I authorize the performance of emergency medical procedures and treatments upon me in both transit and upon arrival at the receiving facility  Additionally, I authorize the release of any and all medical records to the receiving facility and request they be transported with me, if possible  I understand that the safest mode of transportation during a medical emergency is an ambulance and that the Hospital advocates the use of this mode of transport  Risks of traveling to the receiving facility by car, including absence of medical control, life sustaining equipment, such as oxygen, and medical personnel has been explained to me and I fully understand them  (JUANCHO CORRECT BOX BELOW)  [  ]  I consent to the stated transfer and to be transported by ambulance/helicopter  [  ]  I consent to the stated transfer, but refuse transportation by ambulance and accept full responsibility for my transportation by car  I understand the risks of non-ambulance transfers and I exonerate the Hospital and its staff from any deterioration in my condition that results from this refusal     X___________________________________________    DATE  19  TIME________  Signature of patient or legally responsible individual signing on patient behalf           RELATIONSHIP TO PATIENT_________________________          Provider [facility] 1945                              MRN [id number]    A medical screening exam was performed on the above named patient  Based on the examination:    Condition Necessitating Transfer The primary encounter diagnosis was Congestive heart disease ([facility] [address] )   A diagnosis of Hypoglycemia was also pertinent to this visit  Patient Condition: The patient has been stabilized such that within reasonable medical probability, no material deterioration of the patient condition or the condition of the unborn child(gumaro) is likely to result from the transfer    Reason for Transfer: No bed available at level of patient's needs    Transfer Requirements: Facility     · Space available and qualified personnel available for treatment as acknowledged by    · Agreed to accept transfer and to provide appropriate medical treatment as acknowledged by       Megan Cleveland  · Appropriate medical records of the examination and treatment of the patient are provided at the time of transfer   [address] [facility], [address] _______  · Transfer will be performed by qualified personnel from    and appropriate transfer equipment as required, including the use of necessary and appropriate life support measures  Provider Certification: I have examined the patient and explained the following risks and benefits of being transferred/refusing transfer to the patient/family:  General risk, such as traffic hazards, adverse weather conditions, rough terrain or turbulence, possible failure of equipment (including vehicle or aircraft), or consequences of actions of persons outside the control of the transport personnel      Based on these reasonable risks and benefits to the patient and/or the unborn child(gumaro), and based upon the information available at the time of the patients examination, I certify that the medical benefits reasonably to be expected from the provision of appropriate medical treatments at another medical facility outweigh the increasing risks, if any, to the individuals medical condition, and in the case of labor to the unborn child, from effecting the transfer      X____________________________________________ DATE 01/31/19        TIME_______      ORIGINAL - SEND TO MEDICAL RECORDS   COPY - SEND WITH PATIENT DURING TRANSFER

## 2019-01-31 NOTE — ASSESSMENT & PLAN NOTE
Lab Results   Component Value Date    HGBA1C 6 5 01/22/2019       Recent Labs      01/30/19   2340  01/31/19   1607   POCGLU  93  76       Blood Sugar Average: Last 72 hrs:  (P) 76     Suspect brittle dm  Hold oral agent start ssi and poc bs

## 2019-01-31 NOTE — ASSESSMENT & PLAN NOTE
Lab Results   Component Value Date    HGBA1C 6 5 01/22/2019       Recent Labs      01/30/19   2340   POCGLU  93       Blood Sugar Average: Last 72 hrs:

## 2019-01-31 NOTE — ED NOTES
1341: Attempted to give nurse to nurse report to [phone]  Was then transferred to an alternate extension  No answer received when call was transferred        Dheeraj Jim, RN  01/31/19 9596

## 2019-01-31 NOTE — ED PROVIDER NOTES
History  Chief Complaint   Patient presents with    Weakness - Generalized    Cough     67-year-old female presents to the ED with generalized weakness, cough x2 days  Patient also had a few episodes of diarrhea as per   History of end-stage renal disease last dialyzed on Monday  As per  patient could not get out of bed today due to weakness  Patient denies any chest pain, fevers, chills, vomiting, abdominal pain, extremity edema, urinary symptoms  History provided by:  Spouse and patient   used: No    Cough   Cough characteristics:  Dry  Sputum characteristics:  Nondescript  Duration:  2 days  Progression:  Worsening  Chronicity:  New  Context: sick contacts (At dialysis center)    Relieved by:  Nothing  Associated symptoms: shortness of breath    Associated symptoms: no chest pain, no chills, no diaphoresis, no fever, no headaches, no myalgias, no rhinorrhea, no sinus congestion and no wheezing        Prior to Admission Medications   Prescriptions Last Dose Informant Patient Reported? Taking? Ferric Citrate (AURYXIA) 1  MG(Fe) TABS   Yes Yes   Sig: Take by mouth   Misc  Devices [facility]) MISC   No Yes   Sig: by Does not apply route daily   Multiple Vitamins-Minerals (OCUVITE EXTRA) TABS   Yes Yes   Sig: Take 1 tablet by mouth daily   acetaminophen (TYLENOL) 325 mg tablet   Yes Yes   Si tablets   amLODIPine (NORVASC) 5 mg tablet Not Taking at Unknown time  Yes No   Sig: daily     aspirin 81 MG tablet   Yes Yes   Sig: Take 1 tablet by mouth daily   calcium acetate (PHOSLO) 667 mg capsule   Yes Yes   Sig: Take 1,334 mg by mouth 3 (three) times a day with meals   carvedilol (COREG) 12 5 mg tablet   No Yes   Sig: take 1 tablet by mouth twice a day   diphenoxylate-atropine (LOMOTIL) 2 5-0 025 mg per tablet Not Taking at Unknown time  No No   Sig: Take 1 tablet by mouth 4 (four) times a day as needed for diarrhea   Patient not taking: Reported on 2019 donepezil (ARICEPT) 5 mg tablet Not Taking at Unknown time  No No   Sig: Take 1 tablet (5 mg total) by mouth daily at bedtime   Patient not taking: Reported on 2019    ergocalciferol (VITAMIN D2) 50,000 units   No Yes   Sig: Take 1 capsule (50,000 Units total) by mouth once a week   ferrous sulfate 325 (65 Fe) mg tablet   Yes Yes   Si times a day  fluticasone (FLONASE) 50 mcg/act nasal spray   No Yes   Sig: instill 2 sprays into each nostril once daily   glucose blood (ONE TOUCH ULTRA TEST) test strip   Yes Yes   Sig: by In Vitro route 2 (two) times a day   isosorbide dinitrate (ISORDIL) 20 mg tablet   Yes Yes   Si times a day  losartan (COZAAR) 25 mg tablet Not Taking at Unknown time  Yes No   Sig: Daily   nateglinide (STARLIX) 120 mg tablet   No Yes   Sig:  TAKE 1 TABLET BY MOUTH DAILY AT BREAKFAST, THEN 1 TABLET AT LUNCH, AND 2 TABLETS AT SUPPER   pantoprazole (PROTONIX) 40 mg tablet   No Yes   Sig: take 1 tablet by mouth once daily   pravastatin (PRAVACHOL) 80 mg tablet   No Yes   Sig: take 1 tablet by mouth daily   torsemide (DEMADEX) 20 mg tablet Not Taking at Unknown time  Yes No   Sig: Take 20 mg by mouth      Facility-Administered Medications: None       Past Medical History:   Diagnosis Date    Abnormal weight loss     last assessed [id number]    Anemia     Chronic kidney disease     Clostridium difficile colitis     last assessed [id number]    Depression     resolved [id number]    Diabetes mellitus ([facility] [address] )     Fracture, tibial plateau     last assessed [id number]    GERD (gastroesophageal reflux disease)     Hyperkalemia     last assessed [id number]    Hypertension     Hypoglycemia     Sugars now up   Will increase the metformin;  last assessed [id number]    Hypokalemia     last assessed [id number]    Hypothermia     last assessed [id number]    Hypoxia     last assessed [id number]    Metatarsalgia     last assessed [id number]    Old myocardial infarction     Osteoporosis     Syncope     last assessed [id number]    Visual disturbance     last assessed 26Mar2014       Past Surgical History:   Procedure Laterality Date    BYPASS GRAFT Right     H/O bypass graft (non-vein), Right Leg    CAROTID ENDARTERECTOMY      Neurological Surgery Cartoid Endartectomy    CATARACT EXTRACTION      CHOLECYSTECTOMY      questionable    CORONARY ARTERY BYPASS GRAFT      FOOT AMPUTATION Bilateral     H/O surgery foot amputation Metatarsal and toe;  Bilat    HERNIA REPAIR      Inguinal sliding    TONSILLECTOMY AND ADENOIDECTOMY      VENOUS THROMBECTOMY      H/O Arteriobenous surgery Thrombectomy of AV Fistula Percantaneous;  last assessed [id number]       Family History   Problem Relation Age of Onset    Breast cancer Mother     Cancer Mother     Heart disease Mother     Coronary artery disease Father     Aneurysm Sister         Cerebral Artery    Coronary artery disease Sister     Liver cancer Brother      I have reviewed and agree with the history as documented  Social History   Substance Use Topics    Smoking status: Former Smoker    Smokeless tobacco: Never Used    Alcohol use No        Review of Systems   Constitutional: Negative for chills, diaphoresis and fever  HENT: Negative for rhinorrhea  Eyes: Negative for visual disturbance  Respiratory: Positive for cough and shortness of breath  Negative for wheezing  Cardiovascular: Negative for chest pain and leg swelling  Gastrointestinal: Positive for diarrhea  Negative for abdominal pain and vomiting  Genitourinary: Negative for dysuria  Musculoskeletal: Negative for myalgias  Neurological: Negative for dizziness, syncope and headaches  Psychiatric/Behavioral: Negative for confusion  Physical Exam  Physical Exam   Constitutional: She is oriented to person, place, and time  She appears well-developed and well-nourished  No distress  HENT:   Head: Normocephalic and atraumatic     Right Ear: External ear normal  Left Ear: External ear normal    Mouth/Throat: Oropharynx is clear and moist    Eyes: Pupils are equal, round, and reactive to light  Conjunctivae and EOM are normal    Neck: Normal range of motion  No JVD present  No tracheal deviation present  Cardiovascular: Normal rate, regular rhythm, normal heart sounds and intact distal pulses  No murmur heard  Left upper extremity AV fistula with thrill   Pulmonary/Chest: Effort normal  No stridor  She has rales (at the bases)  Abdominal: Soft  She exhibits no distension  There is no tenderness  There is no guarding  Musculoskeletal:   No obvious deformities on exposed limbs identified  Patient is ambulatory  Neurological: She is alert and oriented to person, place, and time  No cranial nerve deficit  Strength is 5/5 x4 extremities  Sensation light touch intact in all 4 extremities  No dysdiadochokinesis bilaterally   Skin: Skin is warm  Capillary refill takes less than 2 seconds  No erythema  No pallor  Psychiatric: She has a normal mood and affect  Nursing note and vitals reviewed        Vital Signs  ED Triage Vitals   Temperature Pulse Respirations Blood Pressure SpO2   01/30/19 1931 01/30/19 1931 01/30/19 2145 01/30/19 1931 01/30/19 1931   99 4 °F (37 4 °C) 87 21 (!) 201/81 95 %      Temp Source Heart Rate Source Patient Position - Orthostatic VS BP Location FiO2 (%)   01/30/19 1931 01/30/19 1931 01/30/19 1931 01/30/19 1931 --   Temporal Monitor Lying Left arm       Pain Score       01/30/19 1931       No Pain           Vitals:    01/30/19 2115 01/30/19 2145 01/30/19 2204 01/31/19 1300   BP:  (!) 184/79 (!) 171/73 (!) 173/75   Pulse: 88 90 86 77   Patient Position - Orthostatic VS:    Lying       Visual Acuity  Visual Acuity      Most Recent Value   L Pupil Size (mm)  4   R Pupil Size (mm)  4          ED Medications  Medications   dextrose 50 % IV solution 25 mL (25 mL Intravenous Given 1/30/19 2308)   iodixanol (VISIPAQUE) 320 MG/ML injection 100 mL (85 mL Intravenous Given 1/31/19 0142)       Diagnostic Studies  Results Reviewed     Procedure Component Value Units Date/Time    INFLUENZA A/B AND RSV, PCR [[id number]]  (Abnormal) Collected:  01/30/19 2123    Lab Status:  Final result Specimen:  Nasopharyngeal from Nasopharyngeal Swab Updated:  01/31/19 1600     INFLU A PCR Detected (A)     INFLU B PCR Not Detected     RSV PCR Not Detected    Troponin I [[id number]]  (Abnormal) Collected:  01/31/19 0958    Lab Status:  Final result Specimen:  Blood from Arm, Right Updated:  01/31/19 1031     Troponin I 0 11 (H) ng/mL     Troponin I [[id number]]  (Abnormal) Collected:  01/31/19 0039    Lab Status:  Final result Specimen:  Blood from Arm, Right Updated:  01/31/19 0104     Troponin I 0 10 (H) ng/mL     CK (with reflex to MB) [[id number]]  (Normal) Collected:  01/31/19 0039    Lab Status:  Final result Specimen:  Blood from Arm, Right Updated:  01/31/19 0103     Total CK 73 U/L     Fingerstick Glucose (POCT) [[id number]]  (Normal) Collected:  01/30/19 2340    Lab Status:  Final result Updated:  01/30/19 2341     POC Glucose 93 mg/dl     B-Type Natriuretic Peptide [facility] ONLY) [[id number]]  (Abnormal) Collected:  01/30/19 2142    Lab Status:  Final result Specimen:  Blood from Arm, Right Updated:  01/30/19 2221     BNP 1,635 (H) pg/mL     Troponin I [[id number]]  (Abnormal) Collected:  01/30/19 2106    Lab Status:  Final result Specimen:  Blood from Arm, Right Updated:  01/30/19 2142     Troponin I 0 08 (H) ng/mL     Comprehensive metabolic panel [[id number]]  (Abnormal) Collected:  01/30/19 2106    Lab Status:  Final result Specimen:  Blood from Arm, Right Updated:  01/30/19 2142     Sodium 137 mmol/L      Potassium 3 7 mmol/L      Chloride 94 (L) mmol/L      CO2 32 (H) mmol/L      ANION GAP 11 mmol/L      BUN 38 (H) mg/dL      Creatinine 4 90 (H) mg/dL      Glucose 51 (L) mg/dL      Calcium 10 5 mg/dL      AST 26 U/L      ALT 15 U/L      Alkaline Phosphatase 48 (L) U/L      Total Protein 7 7 g/dL      Albumin 4 0 g/dL      Total Bilirubin 0 30 mg/dL      eGFR 8 ml/min/1 73sq m     Narrative:         National Kidney Disease Education Program recommendations are as follows:  GFR calculation is accurate only with a steady state creatinine  Chronic Kidney disease less than 60 ml/min/1 73 sq  meters  Kidney failure less than 15 ml/min/1 73 sq  meters      CK Total with Reflex CKMB [[id number]]  (Normal) Collected:  01/30/19 2106    Lab Status:  Final result Specimen:  Blood from Arm, Right Updated:  01/30/19 2142     Total CK 76 U/L     Rapid Influenza Screen with Reflex PCR [[id number]]  (Normal) Collected:  01/30/19 2123    Lab Status:  Final result Specimen:  Nasopharyngeal from Nasopharyngeal Swab Updated:  01/30/19 2135     Rapid Influenza A Ag Negative     Rapid Influenza B Ag Negative    Protime-INR [[id number]]  (Normal) Collected:  01/30/19 2106    Lab Status:  Final result Specimen:  Blood from Arm, Right Updated:  01/30/19 2135     Protime 11 2 seconds      INR 0 97    APTT [[id number]]  (Normal) Collected:  01/30/19 2106    Lab Status:  Final result Specimen:  Blood from Arm, Right Updated:  01/30/19 2135     PTT 34 seconds     CBC and differential [[id number]]  (Abnormal) Collected:  01/30/19 2106    Lab Status:  Final result Specimen:  Blood from Arm, Right Updated:  01/30/19 2121     WBC 5 70 Thousand/uL      RBC 3 68 (L) Million/uL      Hemoglobin 12 0 g/dL      Hematocrit 35 6 %      MCV 97 fL      MCH 32 6 pg      MCHC 33 7 g/dL      RDW 15 4 (H) %      MPV 8 9 fL      Platelets 525 (L) Thousands/uL      nRBC 0 /100 WBCs      Neutrophils Relative 77 (H) %      Lymphocytes Relative 7 (L) %      Monocytes Relative 14 (H) %      Eosinophils Relative 1 %      Basophils Relative 1 %      Neutrophils Absolute 4 40 Thousands/µL      Lymphocytes Absolute 0 40 (L) Thousands/µL      Monocytes Absolute 0 80 Thousand/µL      Eosinophils Absolute 0 10 Thousand/µL      Basophils Absolute 0 00 Thousands/µL                  XR chest 1 view portable   Final Result by Mirza Hernandez MD (01/31 0919)   1  No plain radiographic evidence of significant thoracic trauma  2   Mild cardiomegaly and pulmonary vascular congestion without overt interstitial pulmonary edema  Workstation performed: [id number]         CTA ED chest PE study   Final Result by Kamila Burrell MD (01/31 0918)      1  Motion limited examination  No evidence of pulmonary embolism to the proximal segmental level  2   Coronary artery calcifications  Status post median sternotomy  Enlarged left atrium  3   Patulous upper esophagus and small hiatal hernia, correlate for gastroesophageal reflux  Workstation performed: [id number]         XR chest 2 views   Final Result by Juan Pablo Suazo MD (01/30 2121)      Vascular congestion  Cardiomegaly  Workstation performed: [id number]                    Procedures  Procedures       Phone Contacts  ED Phone Contact    ED Course                               MDM  Number of Diagnoses or Management Options  Congestive heart disease ([facility] [address] ): Hypoglycemia:   Diagnosis management comments: Patient found to be hypoglycemic to 51  Patient is on a long-acting oral hypoglycemic medication   also states that the patient's p o  Intake has been diminished for the past 2 days  Suspect this combination is contributing to her hypoglycemia  Given an amp of D50  Recheck of the glucose greater than 100      10:00 p m :  Denies any chest pain  Right bundle branch block noted  New RVH compared to EKG in 2015  Troponin was elevated to 0 08  Likely secondary to renal failure, less likely ACS  Will trend troponin  BNP is elevated to 1600  Chest x-ray with pulmonary vascular congestion  Patient denies any history of CHF  CTA of the chest to rule out PE due to new RVH and shortness of breath  2:30 a m :  CTA is negative for PE  Glucose remains stable    Patient will need transfer to outside facility due to no inpatient beds at [facility]  Transfer process initiated  4:00 am: Pt accepted under Dr Jason Henderson service for elevated trop, sob, new chf, hypoglycemia, discussed with Deven PRYOR  Pt awaiting transfer  7:00 am: Signed out to Dr Babb pending transfer  Amount and/or Complexity of Data Reviewed  Clinical lab tests: ordered and reviewed  Tests in the radiology section of CPT®: ordered and reviewed  Tests in the medicine section of CPT®: reviewed and ordered  Discussion of test results with the performing providers: yes  Decide to obtain previous medical records or to obtain history from someone other than the patient: yes  Obtain history from someone other than the patient: yes  Review and summarize past medical records: yes  Discuss the patient with other providers: yes  Independent visualization of images, tracings, or specimens: yes    Risk of Complications, Morbidity, and/or Mortality  Presenting problems: moderate  Diagnostic procedures: moderate  Management options: moderate    Patient Progress  Patient progress: stable      Disposition  Final diagnoses:   Congestive heart disease ([address] )   Hypoglycemia     Time reflects when diagnosis was documented in both MDM as applicable and the Disposition within this note     Time User Action Codes Description Comment    1/31/2019  3:21 AM Lexie So Add [I50 9] Congestive heart disease ([address] )     1/31/2019  3:21 AM Lexie So Add [E16 2] Hypoglycemia       ED Disposition     ED Disposition Condition Date/Time Comment    Transfer to Another Facility  Thu Jan 31, 2019  3:22 AM Pradeep Khanna should be transferred out to [address] MD Documentation      Most Recent Value   Patient Condition  The patient has been stabilized such that within reasonable medical probability, no material deterioration of the patient condition or the condition of the unborn child(gumaro) is likely to result from the transfer   Reason for Transfer  No bed available at level of patient's needs   Benefits of Transfer  Specialized equipment and/or services available at the receiving facility (Include comment)________________________   Risks of Transfer  Potential for delay in receiving treatment, Potential deterioration of medical condition, Loss of IV, Increased discomfort during transfer   Accepting Physician  Rosie Greene   Provider Certification  General risk, such as traffic hazards, adverse weather conditions, rough terrain or turbulence, possible failure of equipment (including vehicle or aircraft), or consequences of actions of persons outside the control of the transport personnel      Follow-up Information    None         Discharge Medication List as of 1/31/2019  1:38 PM      CONTINUE these medications which have NOT CHANGED    Details   acetaminophen (TYLENOL) 325 mg tablet 2 tablets, Historical Med      amLODIPine (NORVASC) 5 mg tablet daily  , Historical Med      aspirin 81 MG tablet Take 1 tablet by mouth daily, Starting Wed 3/26/2014, Historical Med      calcium acetate (PHOSLO) 667 mg capsule Take 1,334 mg by mouth 3 (three) times a day with meals, Historical Med      carvedilol (COREG) 12 5 mg tablet take 1 tablet by mouth twice a day, Normal      diphenoxylate-atropine (LOMOTIL) 2 5-0 025 mg per tablet Take 1 tablet by mouth 4 (four) times a day as needed for diarrhea, Starting Tue 1/15/2019, Normal      donepezil (ARICEPT) 5 mg tablet Take 1 tablet (5 mg total) by mouth daily at bedtime, Starting Mon 1/7/2019, Normal      ergocalciferol (VITAMIN D2) 50,000 units Take 1 capsule (50,000 Units total) by mouth once a week, Starting Fri 7/6/2018, Normal      Ferric Citrate (AURYXIA) 1  MG(Fe) TABS Take by mouth, Starting Fri 9/29/2017, Historical Med      ferrous sulfate 325 (65 Fe) mg tablet 2 times a day , Historical Med      fluticasone (FLONASE) 50 mcg/act nasal spray instill 2 sprays into each nostril once daily, Normal      glucose blood (ONE TOUCH ULTRA TEST) test strip by In Vitro route 2 (two) times a day, Starting Mon 8/22/2016, Historical Med      isosorbide dinitrate (ISORDIL) 20 mg tablet 2 times a day , Historical Med      losartan (COZAAR) 25 mg tablet Daily, Historical Med      Misc  Devices [facility]) MISC by Does not apply route daily, Starting Fri 7/6/2018, Normal      Multiple Vitamins-Minerals (OCUVITE EXTRA) TABS Take 1 tablet by mouth daily, Starting Wed 3/26/2014, Historical Med      nateglinide (STARLIX) 120 mg tablet  TAKE 1 TABLET BY MOUTH DAILY AT BREAKFAST, THEN 1 TABLET AT LUNCH, AND 2 TABLETS AT SUPPER, Normal      pantoprazole (PROTONIX) 40 mg tablet take 1 tablet by mouth once daily, Normal      pravastatin (PRAVACHOL) 80 mg tablet take 1 tablet by mouth daily, Normal      torsemide (DEMADEX) 20 mg tablet Take 20 mg by mouth, Historical Med           No discharge procedures on file      ED Provider  Electronically Signed by           Pankaj Rashid DO  01/31/19 3173

## 2019-01-31 NOTE — ASSESSMENT & PLAN NOTE
Pt has episodic hypoglycemia and somewhat labile BS by review of emr  Hold starlix, start SSI and diabetic diet

## 2019-01-31 NOTE — CONSULTS
Consultation - Nephrology   Evan Hayes 68 y o  female MRN: [id number]  Unit/Bed#: E4 -01 Encounter: [id number]      ASSESSMENT & PLAN:    78-year-old female with past medical history of end-stage kidney disease on hemod[facility] to [address] [facility] for further evaluation and concern for congestive heart failure    1  End-stage kidney disease on hemodialysis Tuesday Thursday Saturday  2  Volume overload  3  Anemia of chronic kidney disease  4  CKD bone mineral disease  5  Mild troponin increase  6  Missed dialysis treatment    -no urgent need for renal replacement therapy  -electrolytes, acid-base status, volume status are currently acceptable  -chest x-ray shows mild call cardiomegaly no overt interstitial pulmonary edema   -concern for pulmonary embolism CT angio done which showed coronary artery calcifications but negative for PE  -troponin mildly elevated but patient is on dialysis and has not increased  -at this time further evaluation/workup per primary  -will plan on hemodialysis treatment tomorrow  -will obtain records from [facility]  -hemoglobin currently acceptable        HISTORY OF PRESENT ILLNESS:  Requesting Physician: Whit Burciaga DO  Reason for Consult:  End-stage kidney disease    Evan Hayes is a 68y o  year old female who was admitted to [facility] after presenting with transferred  A renal consultation is requested today for assistance in the management of end-stage kidney disease      Patient was transferred from [address] [facility] with generalized weakness and a cough for 2 days patient had extreme weakness and could not go to her dialysis treatment a, she had a chest x-ray that showed mild vascular congestion currently not on any oxygen troponin level 0 11 transferred here for further evaluation    Otherwise she is resting comfortably denies any chest pain no shortness of breath currently weakness is stable cough still persists    PAST MEDICAL HISTORY:  Past Medical History:   Diagnosis Date    Abnormal weight loss     last assessed [id number]    Anemia     Chronic kidney disease     Clostridium difficile colitis     last assessed [id number]    Depression     resolved [id number]    Diabetes mellitus ([address] )     Fracture, tibial plateau     last assessed 61PLP7963    GERD (gastroesophageal reflux disease)     Hyperkalemia     last assessed [id number]    Hypertension     Hypoglycemia     Sugars now up   Will increase the metformin;  last assessed 32Wdd2119    Hypokalemia     last assessed 65Sxx3550    Hypothermia     last assessed 84YFM5305    Hypoxia     last assessed 36Fwz6693    Metatarsalgia     last assessed 09Zcv5692    Old myocardial infarction     Osteoporosis     Syncope     last assessed 09Ahx5103    Visual disturbance     last assessed 58Wrf9507       PAST SURGICAL HISTORY:  Past Surgical History:   Procedure Laterality Date    BYPASS GRAFT Right     H/O bypass graft (non-vein), Right Leg    CAROTID ENDARTERECTOMY      Neurological Surgery Cartoid Endartectomy    CATARACT EXTRACTION      CHOLECYSTECTOMY      questionable    CORONARY ARTERY BYPASS GRAFT      FOOT AMPUTATION Bilateral     H/O surgery foot amputation Metatarsal and toe;  Bilat    HERNIA REPAIR      Inguinal sliding    TONSILLECTOMY AND ADENOIDECTOMY      VENOUS THROMBECTOMY      H/O Arteriobenous surgery Thrombectomy of AV Fistula Percantaneous;  last assessed [id number]       ALLERGIES:  Allergies   Allergen Reactions    Pollen Extract        SOCIAL HISTORY:  History   Alcohol Use No     History   Drug Use No     History   Smoking Status    Former Smoker   Smokeless Tobacco    Never Used       FAMILY HISTORY:  Family History   Problem Relation Age of Onset    Breast cancer Mother     Cancer Mother     Heart disease Mother     Coronary artery disease Father     Aneurysm Sister         Cerebral Artery    Coronary artery disease Sister     Liver cancer Brother        MEDICATIONS:  No current facility-administered medications for this encounter  REVIEW OF SYSTEMS:  All the systems were reviewed and were negative except as documented on the HPI  PHYSICAL EXAM:  Current Weight:    First Weight:    Vitals:    01/31/19 1448   BP: 165/65   BP Location: Right arm   Pulse: 80   Resp: 18   Temp: 98 5 °F (36 9 °C)   TempSrc: Tympanic   SpO2: 97%     No intake or output data in the 24 hours ending 01/31/19 1536    General: conscious, cooperative, in not acute distress  Eyes: conjunctivae pink, anicteric sclerae  ENT: lips and mucous membranes moist  Neck: supple, no JVD  Chest: clear breath sounds bilateral, no crackles, ronchus or wheezings  CVS: distinct S1 & S2, normal rate, regular rhythm  Abdomen: soft, non-tender, non-distended, normoactive bowel sounds  Extremities: no edema of both legs  Skin: no rash  Neuro: awake, alert, oriented        Invasive Devices:      Lab Results:     Results from last 7 days  Lab Units 01/30/19  2106   WBC Thousand/uL 5 70   HEMOGLOBIN g/dL 12 0   HEMATOCRIT % 35 6   PLATELETS Thousands/uL 139*   POTASSIUM mmol/L 3 7   CHLORIDE mmol/L 94*   CO2 mmol/L 32*   BUN mg/dL 38*   CREATININE mg/dL 4 90*   CALCIUM mg/dL 10 5   ALK PHOS U/L 48*   ALT U/L 15   AST U/L 26       Other Studies:   1  No plain radiographic evidence of significant thoracic trauma  2   Mild cardiomegaly and pulmonary vascular congestion without overt interstitial pulmonary edema

## 2019-01-31 NOTE — ASSESSMENT & PLAN NOTE
Acute infectious bronchitis w/no hx of copd/asthma  cxr concerning for mild vascular congestion however none demonstated on CTA PE study  No PE demonstrated  Start xopenex/atrovent, mucinex  Check procalcitonin  Rapid flu negative, will monitor off abx at this time
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
## 2019-01-31 NOTE — ASSESSMENT & PLAN NOTE
Mild stable elevated troponin I of 0 10 in setting of remote cabg, esrd, and bronchitis  No cp   ekgs w/ widened qrs and diffuse st depression and stable on repeat  No historic for evaluation  Will kindly ask cardiology to evaluate for further mgmt such as reinstating pharmacotherapy    Check lipid panel

## 2019-02-01 ENCOUNTER — APPOINTMENT (INPATIENT)
Dept: DIALYSIS | Facility: HOSPITAL | Age: 74
DRG: 682 | End: 2019-02-01
Payer: MEDICARE

## 2019-02-01 PROBLEM — N18.6 END STAGE KIDNEY DISEASE (HCC): Status: ACTIVE | Noted: 2019-02-01

## 2019-02-01 LAB
ANION GAP SERPL CALCULATED.3IONS-SCNC: 13 MMOL/L (ref 4–13)
BUN SERPL-MCNC: 57 MG/DL (ref 5–25)
CALCIUM SERPL-MCNC: 9.1 MG/DL (ref 8.3–10.1)
CHLORIDE SERPL-SCNC: 98 MMOL/L (ref 100–108)
CO2 SERPL-SCNC: 26 MMOL/L (ref 21–32)
CREAT SERPL-MCNC: 6.79 MG/DL (ref 0.6–1.3)
GFR SERPL CREATININE-BSD FRML MDRD: 6 ML/MIN/1.73SQ M
GLUCOSE SERPL-MCNC: 137 MG/DL (ref 65–140)
GLUCOSE SERPL-MCNC: 84 MG/DL (ref 65–140)
GLUCOSE SERPL-MCNC: 86 MG/DL (ref 65–140)
GLUCOSE SERPL-MCNC: 94 MG/DL (ref 65–140)
GLUCOSE SERPL-MCNC: 99 MG/DL (ref 65–140)
POTASSIUM SERPL-SCNC: 3.4 MMOL/L (ref 3.5–5.3)
SODIUM SERPL-SCNC: 137 MMOL/L (ref 136–145)

## 2019-02-01 PROCEDURE — 99232 SBSQ HOSP IP/OBS MODERATE 35: CPT | Performed by: INTERNAL MEDICINE

## 2019-02-01 PROCEDURE — 80048 BASIC METABOLIC PNL TOTAL CA: CPT | Performed by: PHYSICIAN ASSISTANT

## 2019-02-01 PROCEDURE — 90935 HEMODIALYSIS ONE EVALUATION: CPT | Performed by: INTERNAL MEDICINE

## 2019-02-01 PROCEDURE — 94760 N-INVAS EAR/PLS OXIMETRY 1: CPT

## 2019-02-01 PROCEDURE — 94640 AIRWAY INHALATION TREATMENT: CPT

## 2019-02-01 PROCEDURE — 82948 REAGENT STRIP/BLOOD GLUCOSE: CPT

## 2019-02-01 PROCEDURE — 99221 1ST HOSP IP/OBS SF/LOW 40: CPT | Performed by: INTERNAL MEDICINE

## 2019-02-01 RX ORDER — ONDANSETRON 2 MG/ML
4 INJECTION INTRAMUSCULAR; INTRAVENOUS EVERY 4 HOURS PRN
Status: DISCONTINUED | OUTPATIENT
Start: 2019-02-01 | End: 2019-02-05 | Stop reason: HOSPADM

## 2019-02-01 RX ORDER — FLUOXETINE 10 MG/1
10 CAPSULE ORAL DAILY
COMMUNITY
End: 2019-02-13

## 2019-02-01 RX ADMIN — CALCIUM ACETATE 1334 MG: 667 CAPSULE ORAL at 17:09

## 2019-02-01 RX ADMIN — IPRATROPIUM BROMIDE 0.5 MG: 0.5 SOLUTION RESPIRATORY (INHALATION) at 13:14

## 2019-02-01 RX ADMIN — FERROUS SULFATE TAB 325 MG (65 MG ELEMENTAL FE) 325 MG: 325 (65 FE) TAB at 08:36

## 2019-02-01 RX ADMIN — HEPARIN SODIUM 5000 UNITS: 5000 INJECTION INTRAVENOUS; SUBCUTANEOUS at 21:43

## 2019-02-01 RX ADMIN — LEVALBUTEROL 1.25 MG: 1.25 SOLUTION, CONCENTRATE RESPIRATORY (INHALATION) at 13:14

## 2019-02-01 RX ADMIN — CALCIUM ACETATE 1334 MG: 667 CAPSULE ORAL at 12:47

## 2019-02-01 RX ADMIN — IPRATROPIUM BROMIDE 0.5 MG: 0.5 SOLUTION RESPIRATORY (INHALATION) at 07:35

## 2019-02-01 RX ADMIN — DONEPEZIL HYDROCHLORIDE 5 MG: 5 TABLET ORAL at 21:43

## 2019-02-01 RX ADMIN — GUAIFENESIN 600 MG: 600 TABLET, EXTENDED RELEASE ORAL at 21:43

## 2019-02-01 RX ADMIN — GUAIFENESIN 600 MG: 600 TABLET, EXTENDED RELEASE ORAL at 08:36

## 2019-02-01 RX ADMIN — CARVEDILOL 12.5 MG: 12.5 TABLET, FILM COATED ORAL at 17:09

## 2019-02-01 RX ADMIN — FERROUS SULFATE TAB 325 MG (65 MG ELEMENTAL FE) 325 MG: 325 (65 FE) TAB at 17:09

## 2019-02-01 RX ADMIN — HEPARIN SODIUM 5000 UNITS: 5000 INJECTION INTRAVENOUS; SUBCUTANEOUS at 14:40

## 2019-02-01 RX ADMIN — LEVALBUTEROL 1.25 MG: 1.25 SOLUTION, CONCENTRATE RESPIRATORY (INHALATION) at 07:35

## 2019-02-01 RX ADMIN — CALCIUM ACETATE 1334 MG: 667 CAPSULE ORAL at 08:36

## 2019-02-01 RX ADMIN — LEVALBUTEROL 1.25 MG: 1.25 SOLUTION, CONCENTRATE RESPIRATORY (INHALATION) at 19:12

## 2019-02-01 RX ADMIN — IPRATROPIUM BROMIDE 0.5 MG: 0.5 SOLUTION RESPIRATORY (INHALATION) at 19:12

## 2019-02-01 RX ADMIN — LEVALBUTEROL 1.25 MG: 1.25 SOLUTION, CONCENTRATE RESPIRATORY (INHALATION) at 01:10

## 2019-02-01 RX ADMIN — HEPARIN SODIUM 5000 UNITS: 5000 INJECTION INTRAVENOUS; SUBCUTANEOUS at 06:16

## 2019-02-01 RX ADMIN — PRAVASTATIN SODIUM 80 MG: 80 TABLET ORAL at 17:09

## 2019-02-01 RX ADMIN — PANTOPRAZOLE SODIUM 40 MG: 40 TABLET, DELAYED RELEASE ORAL at 06:16

## 2019-02-01 RX ADMIN — ASPIRIN 81 MG 81 MG: 81 TABLET ORAL at 12:47

## 2019-02-01 RX ADMIN — IPRATROPIUM BROMIDE 0.5 MG: 0.5 SOLUTION RESPIRATORY (INHALATION) at 01:10

## 2019-02-01 NOTE — PLAN OF CARE
DISCHARGE PLANNING     Discharge to home or other facility with appropriate resources Progressing        DISCHARGE PLANNING - CARE MANAGEMENT     Discharge to post-acute care or home with appropriate resources Progressing        GENITOURINARY - ADULT     Maintains or returns to baseline urinary function Progressing        HEMATOLOGIC - ADULT     Maintains hematologic stability Progressing        INFECTION - ADULT     Absence or prevention of progression during hospitalization Progressing        Knowledge Deficit     Patient/family/caregiver demonstrates understanding of disease process, treatment plan, medications, and discharge instructions Progressing        METABOLIC, FLUID AND ELECTROLYTES - ADULT     Electrolytes maintained within normal limits Progressing     Fluid balance maintained Progressing        Nutrition/Hydration-ADULT     Nutrient/Hydration intake appropriate for improving, restoring or maintaining nutritional needs Progressing        Potential for Falls     Patient will remain free of falls Progressing        Prexisting or High Potential for Compromised Skin Integrity     Skin integrity is maintained or improved Progressing        SAFETY ADULT     Patient will remain free of falls Progressing

## 2019-02-01 NOTE — CONSULTS
Consult - Cardiology   Saranya Laird 68 y o  female MRN: [id number]  Unit/Bed#: E4 -01 Encounter: [id number]        Reason For Consult:    Cardiovascular arterial sclerosis  Elevated troponin               Assessment and Plan:     Coronary artery disease s/p remote CABG (10+ years ago likely at [facility]?)   Will attempt to locate records of cardiac testing  Previously seen at [facility] (Dr Farzaneh Do) but no notes within the last 3-4 years   Denies any chest pain today     Troponin 0 08 --> 0 11   BNP 1635   ECG with ST depression in Anterior Leads and V2       Troponin likely chronically elevated secondary to renal disease   Can consider echo to start to assess structural disease   Continue aspirin, Coreg, statin   Repeat lipid panel pending      Hypertension   Well controlled on Coreg and losartan    Hyperlipidemia   Ongoing statin therapy   Repeat lipid panel pending    End-stage renal disease on dialysis   Nephrology is involved in the case   Patient actively receiving dialysis at the time of consult    Acute bronchitis  Influenza type A  Dementia  Diabetes mellitus type 2      Will continue to follow patient      History Of Present Illness: This is a 28-year-old female with a past medical history including end-stage renal disease on dialysis (Tuesday, Thursday, Saturday), essential hypertension, dyslipidemia, MI (2002), coronary artery disease (remote CABG 10+ years ago), and dementia  The patient herself has rather poor historian and there is no family at bedside at the moment to corroborate her history  At the moment she is unsure of who her primary cardiologist is or the last time she saw him or her  She states she believes she has undergone a stress test in the past but was unable to provide details  According to prior notes from her H&P she has been experiencing a plethora of symptoms over the last few days including productive cough, subjective fever, fatigue, and weakness    She has reportedly gained a few lb (3-4) within the last few days  She had an episode at home where she had mechanical fall getting up from the commode secondary to weakness at which time EMS was notified and patient came to the hospital   She recently arrived at [facility] emergency room but was subsequently transferred to [facility] after she was found to have an elevated troponin level  She denies to me any explicit chest pain since admission or within the last week or so  She does endorse some coughing but denies any acute shortness of breath  She is reporting some chronic dizziness unchanged from baseline  Again she seems to be a poor historian  Prior records from [facility] indicate that her primary cardiologist and 2014 was Dr Mavis Jacinto  At this time she had already had her CABG surgery and was reportedly doing well and stable condition  Has Echo record from 2016 showing mild LVH with normal LV systolic function with no regional wall motion abnormalities  She had an MI and CABG in the past but at this point where unsure of the details of these incidents and procedures  She notes it was at [facility]  Past Medical History:        Past Medical History:   Diagnosis Date    Abnormal weight loss     last assessed 17Oct2014    Anemia     Chronic kidney disease     Clostridium difficile colitis     last assessed [id number]    Depression     resolved [id number]    Diabetes mellitus ([facility] [address] [address] )     Fracture, tibial plateau     last assessed [id number]    GERD (gastroesophageal reflux disease)     Hyperkalemia     last assessed [id number]    Hypertension     Hypoglycemia     Sugars now up   Will increase the metformin;  last assessed [id number]    Hypokalemia     last assessed 10Grn2110    Hypothermia     last assessed [id number]    Hypoxia     last assessed [id number]    Metatarsalgia     last assessed [id number]    Old myocardial infarction     Osteoporosis     Syncope     last assessed [id number]    Visual disturbance     last assessed 26Mar2014    Past Surgical History:   Procedure Laterality Date    BYPASS GRAFT Right     H/O bypass graft (non-vein), Right Leg    CAROTID ENDARTERECTOMY      Neurological Surgery Cartoid Endartectomy    CATARACT EXTRACTION      CHOLECYSTECTOMY      questionable    CORONARY ARTERY BYPASS GRAFT      FOOT AMPUTATION Bilateral     H/O surgery foot amputation Metatarsal and toe;  Bilat    HERNIA REPAIR      Inguinal sliding    TONSILLECTOMY AND ADENOIDECTOMY      VENOUS THROMBECTOMY      H/O Arteriobenous surgery Thrombectomy of AV Fistula Percantaneous;  last assessed [id number]        Allergy:        Allergies   Allergen Reactions    Pollen Extract        Medications:       Prior to Admission medications    Medication Sig Start Date End Date Taking? Authorizing Provider   acetaminophen (TYLENOL) 325 mg tablet 2 tablets    Historical Provider, MD   amLODIPine (NORVASC) 5 mg tablet daily  Historical Provider, MD   aspirin 81 MG tablet Take 1 tablet by mouth daily 3/26/14   Historical Provider, MD   calcium acetate (PHOSLO) 667 mg capsule Take 1,334 mg by mouth 3 (three) times a day with meals    Historical Provider, MD   carvedilol (COREG) 12 5 mg tablet take 1 tablet by mouth twice a day 12/11/18   Marlin Donnelly DO   diphenoxylate-atropine (LOMOTIL) 2 5-0 025 mg per tablet Take 1 tablet by mouth 4 (four) times a day as needed for diarrhea  Patient not taking: Reported on 1/30/2019  1/15/19   Marlin Donnelly DO   donepezil (ARICEPT) 5 mg tablet Take 1 tablet (5 mg total) by mouth daily at bedtime  Patient not taking: Reported on 1/30/2019 1/7/19   Marlin Donnelly DO   ergocalciferol (VITAMIN D2) 50,000 units Take 1 capsule (50,000 Units total) by mouth once a week 7/6/18   Marlin Donnelly DO   Ferric Citrate (AURYXIA) 1  MG(Fe) TABS Take by mouth 9/29/17   Historical Provider, MD   ferrous sulfate 325 (65 Fe) mg tablet 2 times a day  Historical Provider, MD   fluticasone Tod Cogan) 50 mcg/act nasal spray instill 2 sprays into each nostril once daily 8/28/18   Clotakshata Cj, DO   glucose blood (ONE TOUCH ULTRA TEST) test strip by In Vitro route 2 (two) times a day 8/22/16   Historical Provider, MD   isosorbide dinitrate (ISORDIL) 20 mg tablet 2 times a day  Historical Provider, MD   losartan (COZAAR) 25 mg tablet Daily    Historical Provider, MD   Misc  Devices [facility]) MISC by Does not apply route daily 7/6/18   Clotilda Corona, DO   Multiple Vitamins-Minerals (OCUVITE EXTRA) TABS Take 1 tablet by mouth daily 3/26/14   Historical Provider, MD   nateglinide (STARLIX) 120 mg tablet  TAKE 1 TABLET BY MOUTH DAILY AT BREAKFAST, THEN 1 TABLET AT LUNCH, AND 2 TABLETS AT SUPPER 12/5/18   Clotilda Corona, DO   pantoprazole (PROTONIX) 40 mg tablet take 1 tablet by mouth once daily 1/20/19   Clotilda Corona, DO   pravastatin (PRAVACHOL) 80 mg tablet take 1 tablet by mouth daily 3/31/18   Clotilda Corona, DO       Family History:     Family History   Problem Relation Age of Onset    Breast cancer Mother     Cancer Mother     Heart disease Mother     Coronary artery disease Father     Aneurysm Sister         Cerebral Artery    Coronary artery disease Sister     Liver cancer Brother         Social History:       Social History     Social History    Marital status: /Civil Union     Spouse name: N/A    Number of children: N/A    Years of education: N/A     Occupational History    retired       Social History Main Topics    Smoking status: Former Smoker    Smokeless tobacco: Never Used    Alcohol use No    Drug use: No    Sexual activity: Not on file     Other Topics Concern    Not on file     Social History Narrative    No narrative on file       ROS:  Symptoms per HPI  Remainder review of systems is negative    Exam:  General:  Oriented x1  Poor historian    Actively getting dialysis at this time    Head: Normocephalic, atraumatic  Eyes:  EOMI  Pupils - equal, round, reactive to accomodation  No icterus  Normal Conjunctiva  Heart:  RRR, No: murmer, rub or gallop   Respiratory effort / Chest Inspection: unlabored  Lungs:  Difficult exam   Mild rhonchi  Abdomen:  Soft and nondistended  Nontender to palpation  Lower Limbs:  No gross edema  Pulses[de-identified]  RLE - DP: 1-2+                 LLE - DP: 1-2+    DATA:      EC19    Sinus rhythm with 1st degree A-V block  Right bundle branch block  Rightward axis  Diffuse Nonspecific ST-t wave changes --> ST depression in anterior leads in lead 2  Abnormal ECG  When compared with ECG of 2019 20:51,  No significant change was found                           Echocardiogram:  Pending           Ischemic Testing:  Pending         Weights: Wt Readings from Last 3 Encounters:   19 74 8 kg (165 lb)   19 75 8 kg (167 lb)   10/08/18 75 8 kg (167 lb)   , There is no height or weight on file to calculate BMI           Lab Studies:      Results from last 7 days  Lab Units 19  0958 19  0039 19  2106   CK TOTAL U/L  --  73 76   TROPONIN I ng/mL 0 11* 0 10* 0 08*            Results from last 7 days  Lab Units 19  1719 19  2106   WBC Thousand/uL  --  5 70   HEMOGLOBIN g/dL  --  12 0   HEMATOCRIT %  --  35 6   PLATELETS Thousands/uL 120* 139*   ,   Results from last 7 days  Lab Units 19  2106   POTASSIUM mmol/L 3 7   CHLORIDE mmol/L 94*   CO2 mmol/L 32*   BUN mg/dL 38*   CREATININE mg/dL 4 90*   CALCIUM mg/dL 10 5   ALK PHOS U/L 48*   ALT U/L 15   AST U/L 26
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
## 2019-02-01 NOTE — HEMODIALYSIS
Pt received 3 5hr dialysis with EV fistula that worked well  Pt confused and disoriented to date and place before and after treatment but cooperative and pleasant  Dr Adin Holter spoke with patient and is aware  Target lowered during tx due to drop in sbp <100  Able to remove 0 8 liters net

## 2019-02-01 NOTE — PROGRESS NOTES
Progress note - Nephrology   Kristy Goins 68 y o  female MRN: [id number]  Unit/Bed#: E4 -01 Encounter: [id number]      ASSESSMENT & PLAN:    71-year-old female with past medical history of end-stage kidney disease [facility] for [facility] evaluation and concern for congestive heart failure    1  End-stage kidney disease on hemodialysis Tuesday Thursday Saturday  2  Volume overload  3  Anemia of chronic kidney disease  4  CKD bone mineral disease  5  Mild troponin increase  6  Missed dialysis treatment  7   Flu positive     -patient seen on dialysis today at approximately 10:00 a m   -2 kilos g under dry weight will attempt 1 kilos a total  -4 K dialysis bath  -troponin slightly elevated considering further cardiac workup  -concern for pulmonary embolism CT angio done which showed coronary artery calcifications but negative for PE  -troponin mildly elevated but patient is on dialysis and has not increased  -at this time further evaluation/workup per primary  -will plan on hemodialysis treatment tomorrow  -hemoglobin currently acceptable at 12 yesterday  -encephalopathy workup per primary      SUBJECTIVE:    Patient seen today tolerating hemodialysis well using AV fistula    MEDICATIONS:    Current Facility-Administered Medications:     amLODIPine (NORVASC) tablet 5 mg, 5 mg, Oral, Daily, Taya Travis PA-C    aspirin chewable tablet 81 mg, 81 mg, Oral, Daily, Taya Travis PA-C    calcium acetate (PHOSLO) capsule 1,334 mg, 1,334 mg, Oral, TID With Meals, Taya Travis PA-C, 1,334 mg at 02/01/19 0836    carvedilol (COREG) tablet 12 5 mg, 12 5 mg, Oral, BID, Taya Travis PA-C, 12 5 mg at 01/31/19 1737    donepezil (ARICEPT) tablet 5 mg, 5 mg, Oral, HS, Taya Travis PA-C, 5 mg at 01/31/19 2140    ferrous sulfate tablet 325 mg, 325 mg, Oral, BID With Meals, Taya Travis PA-C, 325 mg at 02/01/19 1939    guaiFENesin (MUCINEX) 12 hr tablet 600 mg, 600 mg, Oral, Q12H, Taya Travis PA-C, 600 mg at 02/01/19 0836    heparin (porcine) subcutaneous injection 5,000 Units, 5,000 Units, Subcutaneous, Q8H [address], 5,000 Units at 02/01/19 0616 **AND** Platelet count, , , Once, Taya Travis PA-C    insulin lispro (HumaLOG) 100 units/mL subcutaneous injection 1-5 Units, 1-5 Units, Subcutaneous, 4x Daily (AC & HS) **AND** Fingerstick Glucose (POCT), , , 4x Daily AC and at bedtime, Taya Travis PA-C    ipratropium (ATROVENT) 0 02 % inhalation solution 0 5 mg, 0 5 mg, Nebulization, Q6H, Hong Dutton, DO, 0 5 mg at 02/01/19 0735    levalbuterol (XOPENEX) inhalation solution 1 25 mg, 1 25 mg, Nebulization, Q6H, Hong Dutton, DO, 1 25 mg at 02/01/19 0735    losartan (COZAAR) tablet 25 mg, 25 mg, Oral, Daily, Taya Travis PA-C    ondansetron (ZOFRAN) injection 4 mg, 4 mg, Intravenous, Q4H PRN, Annett Read Spatzer, CRNP    pantoprazole (PROTONIX) EC tablet 40 mg, 40 mg, Oral, Early Morning, Taya Travis PA-C, 40 mg at 02/01/19 1546    pravastatin (PRAVACHOL) tablet 80 mg, 80 mg, Oral, After Dinner, Serina Nuñez PA-C, 80 mg at 01/31/19 1737    REVIEW OF SYSTEMS:  All the systems were reviewed and were negative except as documented on the HPI        PHYSICAL EXAM:  Current Weight:    First Weight:    Vitals:    02/01/19 0930 02/01/19 1000 02/01/19 1015 02/01/19 1030   BP: 126/59 (!) 105/46 (!) 110/43 (!) 98/47   BP Location:       Pulse: 80 76 72 75   Resp:       Temp:       TempSrc:       SpO2:           Intake/Output Summary (Last 24 hours) at 02/01/19 1059  Last data filed at 02/01/19 1030   Gross per 24 hour   Intake              490 ml   Output                0 ml   Net              490 ml       General: conscious, cooperative, in not acute distress  Eyes: conjunctivae pink, anicteric sclerae  ENT: lips and mucous membranes moist  Neck: supple, no JVD  Chest: clear breath sounds bilateral, no crackles, ronchus or wheezings  CVS: distinct S1 & S2, normal rate, regular rhythm  Abdomen: soft, non-tender, non-distended, normoactive bowel sounds  Extremities: no edema of both legs  Skin: no rash  Neuro: awake, alert, oriented        Invasive Devices:      Lab Results:     Results from last 7 days  Lab Units 02/01/19  0855 01/31/19  1719 01/30/19  2106   WBC Thousand/uL  --   --  5 70   HEMOGLOBIN g/dL  --   --  12 0   HEMATOCRIT %  --   --  35 6   PLATELETS Thousands/uL  --  120* 139*   POTASSIUM mmol/L 3 4*  --  3 7   CHLORIDE mmol/L 98*  --  94*   CO2 mmol/L 26  --  32*   BUN mg/dL 57*  --  38*   CREATININE mg/dL 6 79*  --  4 90*   CALCIUM mg/dL 9 1  --  10 5   ALK PHOS U/L  --   --  48*   ALT U/L  --   --  15   AST U/L  --   --  26       Other Studies:   1  No plain radiographic evidence of significant thoracic trauma  2   Mild cardiomegaly and pulmonary vascular congestion without overt interstitial pulmonary edema

## 2019-02-02 ENCOUNTER — APPOINTMENT (INPATIENT)
Dept: DIALYSIS | Facility: HOSPITAL | Age: 74
DRG: 682 | End: 2019-02-02
Attending: INTERNAL MEDICINE
Payer: MEDICARE

## 2019-02-02 LAB
ANION GAP SERPL CALCULATED.3IONS-SCNC: 8 MMOL/L (ref 4–13)
BUN SERPL-MCNC: 29 MG/DL (ref 5–25)
CALCIUM SERPL-MCNC: 9.5 MG/DL (ref 8.3–10.1)
CHLORIDE SERPL-SCNC: 97 MMOL/L (ref 100–108)
CO2 SERPL-SCNC: 30 MMOL/L (ref 21–32)
CREAT SERPL-MCNC: 4.55 MG/DL (ref 0.6–1.3)
ERYTHROCYTE [DISTWIDTH] IN BLOOD BY AUTOMATED COUNT: 14.3 % (ref 11.6–15.1)
GFR SERPL CREATININE-BSD FRML MDRD: 9 ML/MIN/1.73SQ M
GLUCOSE SERPL-MCNC: 130 MG/DL (ref 65–140)
GLUCOSE SERPL-MCNC: 165 MG/DL (ref 65–140)
GLUCOSE SERPL-MCNC: 168 MG/DL (ref 65–140)
GLUCOSE SERPL-MCNC: 94 MG/DL (ref 65–140)
GLUCOSE SERPL-MCNC: 99 MG/DL (ref 65–140)
HCT VFR BLD AUTO: 29.4 % (ref 34.8–46.1)
HGB BLD-MCNC: 9.7 G/DL (ref 11.5–15.4)
MCH RBC QN AUTO: 32.8 PG (ref 26.8–34.3)
MCHC RBC AUTO-ENTMCNC: 33 G/DL (ref 31.4–37.4)
MCV RBC AUTO: 99 FL (ref 82–98)
MRSA NOSE QL CULT: NORMAL
PLATELET # BLD AUTO: 119 THOUSANDS/UL (ref 149–390)
PMV BLD AUTO: 10.6 FL (ref 8.9–12.7)
POTASSIUM SERPL-SCNC: 4 MMOL/L (ref 3.5–5.3)
RBC # BLD AUTO: 2.96 MILLION/UL (ref 3.81–5.12)
SODIUM SERPL-SCNC: 135 MMOL/L (ref 136–145)
WBC # BLD AUTO: 4.97 THOUSAND/UL (ref 4.31–10.16)

## 2019-02-02 PROCEDURE — 85027 COMPLETE CBC AUTOMATED: CPT | Performed by: INTERNAL MEDICINE

## 2019-02-02 PROCEDURE — G8978 MOBILITY CURRENT STATUS: HCPCS

## 2019-02-02 PROCEDURE — 80048 BASIC METABOLIC PNL TOTAL CA: CPT | Performed by: INTERNAL MEDICINE

## 2019-02-02 PROCEDURE — 82948 REAGENT STRIP/BLOOD GLUCOSE: CPT

## 2019-02-02 PROCEDURE — 99232 SBSQ HOSP IP/OBS MODERATE 35: CPT | Performed by: INTERNAL MEDICINE

## 2019-02-02 PROCEDURE — 5A1D70Z PERFORMANCE OF URINARY FILTRATION, INTERMITTENT, LESS THAN 6 HOURS PER DAY: ICD-10-PCS | Performed by: INTERNAL MEDICINE

## 2019-02-02 PROCEDURE — 99232 SBSQ HOSP IP/OBS MODERATE 35: CPT | Performed by: PHYSICIAN ASSISTANT

## 2019-02-02 PROCEDURE — 97112 NEUROMUSCULAR REEDUCATION: CPT

## 2019-02-02 PROCEDURE — 97163 PT EVAL HIGH COMPLEX 45 MIN: CPT

## 2019-02-02 PROCEDURE — 94760 N-INVAS EAR/PLS OXIMETRY 1: CPT

## 2019-02-02 PROCEDURE — G8979 MOBILITY GOAL STATUS: HCPCS

## 2019-02-02 PROCEDURE — 94640 AIRWAY INHALATION TREATMENT: CPT

## 2019-02-02 RX ADMIN — HEPARIN SODIUM 5000 UNITS: 5000 INJECTION INTRAVENOUS; SUBCUTANEOUS at 05:50

## 2019-02-02 RX ADMIN — LOSARTAN POTASSIUM 25 MG: 25 TABLET, FILM COATED ORAL at 08:49

## 2019-02-02 RX ADMIN — LEVALBUTEROL 1.25 MG: 1.25 SOLUTION, CONCENTRATE RESPIRATORY (INHALATION) at 01:23

## 2019-02-02 RX ADMIN — LEVALBUTEROL 1.25 MG: 1.25 SOLUTION, CONCENTRATE RESPIRATORY (INHALATION) at 19:19

## 2019-02-02 RX ADMIN — CALCIUM ACETATE 1334 MG: 667 CAPSULE ORAL at 08:49

## 2019-02-02 RX ADMIN — AMLODIPINE BESYLATE 5 MG: 5 TABLET ORAL at 08:49

## 2019-02-02 RX ADMIN — IPRATROPIUM BROMIDE 0.5 MG: 0.5 SOLUTION RESPIRATORY (INHALATION) at 01:23

## 2019-02-02 RX ADMIN — GUAIFENESIN 600 MG: 600 TABLET, EXTENDED RELEASE ORAL at 21:34

## 2019-02-02 RX ADMIN — CARVEDILOL 12.5 MG: 12.5 TABLET, FILM COATED ORAL at 08:49

## 2019-02-02 RX ADMIN — CALCIUM ACETATE 1334 MG: 667 CAPSULE ORAL at 12:58

## 2019-02-02 RX ADMIN — LEVALBUTEROL 1.25 MG: 1.25 SOLUTION, CONCENTRATE RESPIRATORY (INHALATION) at 07:21

## 2019-02-02 RX ADMIN — HEPARIN SODIUM 5000 UNITS: 5000 INJECTION INTRAVENOUS; SUBCUTANEOUS at 14:04

## 2019-02-02 RX ADMIN — IPRATROPIUM BROMIDE 0.5 MG: 0.5 SOLUTION RESPIRATORY (INHALATION) at 07:21

## 2019-02-02 RX ADMIN — HEPARIN SODIUM 5000 UNITS: 5000 INJECTION INTRAVENOUS; SUBCUTANEOUS at 21:34

## 2019-02-02 RX ADMIN — LEVALBUTEROL 1.25 MG: 1.25 SOLUTION, CONCENTRATE RESPIRATORY (INHALATION) at 13:50

## 2019-02-02 RX ADMIN — FERROUS SULFATE TAB 325 MG (65 MG ELEMENTAL FE) 325 MG: 325 (65 FE) TAB at 08:49

## 2019-02-02 RX ADMIN — ASPIRIN 81 MG 81 MG: 81 TABLET ORAL at 08:49

## 2019-02-02 RX ADMIN — GUAIFENESIN 600 MG: 600 TABLET, EXTENDED RELEASE ORAL at 08:49

## 2019-02-02 RX ADMIN — DONEPEZIL HYDROCHLORIDE 5 MG: 5 TABLET ORAL at 21:34

## 2019-02-02 RX ADMIN — PANTOPRAZOLE SODIUM 40 MG: 40 TABLET, DELAYED RELEASE ORAL at 05:50

## 2019-02-02 RX ADMIN — IPRATROPIUM BROMIDE 0.5 MG: 0.5 SOLUTION RESPIRATORY (INHALATION) at 19:19

## 2019-02-02 RX ADMIN — IPRATROPIUM BROMIDE 0.5 MG: 0.5 SOLUTION RESPIRATORY (INHALATION) at 13:50

## 2019-02-02 NOTE — PROGRESS NOTES
NEPHROLOGY PROGRESS NOTE   Kristy Goins 68 y o  female MRN: [id number]  Unit/Bed#: E4 -01 Encounter: [id number]  Reason for Consult: ESRD    ASSESSMENT/PLAN:  1  End-stage renal disease: On maintenance hemodialysis every Tuesday Thursday Saturday at [address] [place] [address]  -status post dialysis yesterday secondary to volume overload after missed dialysis treatment  She was actually 2 kg under her dry weight  -patient for hemodialysis today as regularly scheduled  -will UF as blood pressure allows  -dry weight most likely will be changed prior to discharge    2  Access: The fistula with positive bruit and thrill    3  Anemia chronic kidney:  Hemoglobin stable at 12 0 and above goal  -no need for BOOGIE during this hospitalization at this time    4  Bone mineral disease of Chronic Kidney Disease:  Continues on calcium acetate for hyperphosphatemia as well as renal diet  -continue to monitor PTH and phosphorus as outpatient    5  Hypertension:  BP acceptable  -will trend with UF  -continues on amlodipine 5 mg daily, carvedilol 12 5 mg twice a day, losartan 25 mg daily  -avoid hypotension    6  Bronchitis/influenza A positive:  Patient started on Xopenex and Atrovent per primary team      SUBJECTIVE:  Patient seen and examined  Denies chest pain or shortness of Breath    Feels better overall    OBJECTIVE:  Current Weight:    Vitals:    02/02/19 0123 02/02/19 0724 02/02/19 0900 02/02/19 1353   BP:  150/70 139/75    BP Location:  Right arm Right arm    Pulse:  68 81    Resp:  18     Temp:  (!) 97 4 °F (36 3 °C)     TempSrc:  Tympanic     SpO2: 97% 92%  91%       Intake/Output Summary (Last 24 hours) at 02/02/19 1416  Last data filed at 02/02/19 1353   Gross per 24 hour   Intake              660 ml   Output                0 ml   Net              660 ml     General:  No acute distress, out of bed, cooperative  Skin:  Warm and dry without rash  HEENT:  Mucous membranes moist without x-ray  Neck:  Supple, without JVD noted  Chest:  Essentially clear on auscultation without any crackles or rhonchi, however few expiratory wheezes noted  Heart:  Regular rate and rhythm without rub  Abdomen:  Soft, nontender, nondistended, active bowel sounds  Extremities:  No significant edema bilaterally noted  Neuro:  Awake alert and oriented  Psych:  Appropriate affect    Medications:    Current Facility-Administered Medications:     amLODIPine (NORVASC) tablet 5 mg, 5 mg, Oral, Daily, Taya Travis PA-C, 5 mg at 02/02/19 0849    aspirin chewable tablet 81 mg, 81 mg, Oral, Daily, ROBERT Vallejo-PAIGE, 81 mg at 02/02/19 0849    calcium acetate (PHOSLO) capsule 1,334 mg, 1,334 mg, Oral, TID With Meals, Taya Travis PA-C, 1,334 mg at 02/02/19 1258    carvedilol (COREG) tablet 12 5 mg, 12 5 mg, Oral, BID, Taya Travis PA-C, 12 5 mg at 02/02/19 0849    donepezil (ARICEPT) tablet 5 mg, 5 mg, Oral, HS, Taya Travis PA-C, 5 mg at 02/01/19 2143    ferrous sulfate tablet 325 mg, 325 mg, Oral, BID With Meals, Taya Travis PA-C, 325 mg at 02/02/19 0849    guaiFENesin (MUCINEX) 12 hr tablet 600 mg, 600 mg, Oral, Q12H, Taya Travis PA-C, 600 mg at 02/02/19 0849    heparin (porcine) subcutaneous injection 5,000 Units, 5,000 Units, Subcutaneous, Q8H [address], 5,000 Units at 02/02/19 1404 **AND** Platelet count, , , Once, Taya Travis PA-C    insulin lispro (HumaLOG) 100 units/mL subcutaneous injection 1-5 Units, 1-5 Units, Subcutaneous, 4x Daily (AC & HS) **AND** Fingerstick Glucose (POCT), , , 4x Daily AC and at bedtime, Taya Travis PA-C    ipratropium (ATROVENT) 0 02 % inhalation solution 0 5 mg, 0 5 mg, Nebulization, Q6H, Hong Nato, DO, 0 5 mg at 02/02/19 1350    levalbuterol (XOPENEX) inhalation solution 1 25 mg, 1 25 mg, Nebulization, Q6H, Hong Dutton, DO, 1 25 mg at 02/02/19 1350    losartan (COZAAR) tablet 25 mg, 25 mg, Oral, Daily, Taya Travis PA-C, 25 mg at 02/02/19 7827   ondansetron (ZOFRAN) injection 4 mg, 4 mg, Intravenous, Q4H PRN, Colonel Robinsons Spatzer, CRNP    pantoprazole (PROTONIX) EC tablet 40 mg, 40 mg, Oral, Early Morning, Taya Travis PA-C, 40 mg at 02/02/19 0550    pravastatin (PRAVACHOL) tablet 80 mg, 80 mg, Oral, After Glendell Soulier Delabre, PA-C, 80 mg at 02/01/19 1709    Laboratory Results:    Results from last 7 days  Lab Units 02/01/19  0855 01/31/19  1719 01/30/19  2106   WBC Thousand/uL  --   --  5 70   HEMOGLOBIN g/dL  --   --  12 0   HEMATOCRIT %  --   --  35 6   PLATELETS Thousands/uL  --  120* 139*   POTASSIUM mmol/L 3 4*  --  3 7   CHLORIDE mmol/L 98*  --  94*   CO2 mmol/L 26  --  32*   BUN mg/dL 57*  --  38*   CREATININE mg/dL 6 79*  --  4 90*   CALCIUM mg/dL 9 1  --  10 5

## 2019-02-02 NOTE — PHYSICAL THERAPY NOTE
PT EVALUATION    68 y o     [id number]    CHF (congestive heart failure) ([facility]) [I50 9]  ESRD (end stage renal disease) ([facility] [address] ) [N18 6]  Hypoglycemia [E16 2]    Past Medical History:   Diagnosis Date    Abnormal weight loss     last assessed [id number]    Anemia     Chronic kidney disease     Clostridium difficile colitis     last assessed [id number]    Depression     resolved [id number]    Diabetes mellitus ([facility] [address] )     Fracture, tibial plateau     last assessed [id number]    GERD (gastroesophageal reflux disease)     Hyperkalemia     last assessed [id number]    Hypertension     Hypoglycemia     Sugars now up  Will increase the metformin;  last assessed [id number]    Hypokalemia     last assessed [id number]    Hypothermia     last assessed [id number]    Hypoxia     last assessed [id number]    Metatarsalgia     last assessed [id number]    Old myocardial infarction     Osteoporosis     Syncope     last assessed [id number]    Visual disturbance     last assessed [id number]         Past Surgical History:   Procedure Laterality Date    BYPASS GRAFT Right     H/O bypass graft (non-vein), Right Leg    CAROTID ENDARTERECTOMY      Neurological Surgery Cartoid Endartectomy    CATARACT EXTRACTION      CHOLECYSTECTOMY      questionable    CORONARY ARTERY BYPASS GRAFT      FOOT AMPUTATION Bilateral     H/O surgery foot amputation Metatarsal and toe;  Bilat    HERNIA REPAIR      Inguinal sliding    TONSILLECTOMY AND ADENOIDECTOMY      VENOUS THROMBECTOMY      H/O Arteriobenous surgery Thrombectomy of AV Fistula Percantaneous;  last assessed [id number]      02/02/19 1135   Note Type   Note type Eval/Treat   Pain Assessment   Pain Assessment No/denies pain   Home Living   Type of Home House   Home Layout Multi-level; Able to live on main level with bedroom/bathroom; Ramped entrance  (she and spouse on first, grandson and dtr 2nd floor)   Bathroom Shower/Tub (sponge bathes)   Bathroom Toilet Standard   Bathroom Equipment Commode   Bathroom Accessibility Accessible  (WC does not fit, negotiates by holding onto sink)   [address] [facility],[address]; Wheelchair-manual   Additional Comments Resides with spouse who works  Dtr and grandson on 2nd  States grandson is always around and assists prn  Prior Function   Level of [place] Modified independent with wheelchair   Lives With Family   Receives Help From [address]  (per pt report)   IADLs Needs assistance   Falls in the last 6 months 1 to 4   Comments Pt limited historian  Reports I for trnasfers with use of RW to get into WC  Uses WC for locomotion  Holds onto sink to take few steps into bathroom as WC does not fit nor does RW   + sponge bathes  Denies home services in past    Restrictions/Precautions   Weight Bearing Precautions Per Order No   Other Precautions Cognitive; Chair Alarm; Bed Alarm;Contact/isolation;Droplet precautions; Fall Risk   General   Additional Pertinent History Pt is 67 y/o female admitted with flu, bronchitis, hypoglycemia and elevated troponins  PT consulted  Up and OOB as tolerated  Family/Caregiver Present No   Cognition   Overall Cognitive Status Impaired   Arousal/Participation Alert   Orientation Level Oriented to person   Following Commands Follows one step commands with increased time or repetition   Comments Limited historian  RUE Assessment   RUE Assessment WFL  (grossly at least 3/5)   LUE Assessment   LUE Assessment WFL  (grossly at least 3/5)   RLE Assessment   RLE Assessment X  (knee valgus deformity)   Strength RLE   R Hip Flexion 3-/5   R Knee Extension 3/5   R Ankle Plantar Flexion 3+/5   R Ankle Dorsiflexion 3+/5   LLE Assessment   LLE Assessment WFL  (grossly 4-/5)   Light Touch   RLE Light Touch Grossly intact   LLE Light Touch Grossly intact   Bed Mobility   Rolling L 5  Supervision   Additional items Assist x 1; Increased time required; Bedrails;HOB elevated   Supine to Sit 4  Minimal assistance   Additional items Assist x 1; Increased time required;Verbal cues;LE management   Additional Comments increased time and cues for technique  Transfers   Sit to Stand 3  Moderate assistance   Additional items Assist x 2; Increased time required;Verbal cues   Stand to Sit 3  Moderate assistance   Additional items Assist x 2; Increased time required;Verbal cues   Additional Comments standing tolerance limited to 5 seconds  Performed x 2  Unable to progress with ambulation  Ambulation/Elevation   Gait pattern Not appropriate  (pt unable to progress with ambulation OOB to chair with RW )   Balance   Static Sitting Fair +   Dynamic Sitting Fair -   Static Standing Poor   Dynamic Standing Poor -   Endurance Deficit   Endurance Deficit Yes   Endurance Deficit Description fatigue, weakness  Activity Tolerance   Activity Tolerance Patient limited by fatigue   Medical Staff Made Aware Nurse, Won Evangelista  Nurse Made Aware yes, updated on level of mobility  Assessment   Prognosis Fair   Problem List Decreased strength;Decreased range of motion;Decreased endurance; Impaired balance;Decreased mobility; Decreased cognition; Impaired judgement;Decreased safety awareness;Orthopedic restrictions   Assessment Pt is 69 y/o female admitted with bronchitis, + flu, elevated troponin and NSTEMI type 2 2* flu and fluid overload, hypoglycemia  PT consulted  Up and OOB as tolerated  Prior to admission pt reports she was able to perform mobility unassisted with transfers OOB to [facility] via use of RW  Modified I with use of WC in home with ramp entry  Admits to being home alone while family working, later stating grandson is available prn  Pt limited historian  Hx of fall leading to admission 2* weakness  Currently presents with decline from baseline mobility in that requiring Pooja for bed mobility with increased time  ModA of 2 for transfers sit to stand with limited standing tolerance and inability to progress with transfers using RW despite 2 attempts  RLE with valgus deformity and weakness with instability noted in standing  Will continue to require ongoing PT in order to progress and achieve mobility goals for safe d/c to home  May benefit form STR at this time given impairments in strength, balance, activity tolerance, mobility and functional limitations  Barriers to Discharge Decreased caregiver support   Barriers to Discharge Comments reports alone days while family working? Goals   Patient Goals get stronger and go home   STG Expiration Date 02/12/19   Short Term Goal #1 10 days: 1)  Pt will perform bed mobility with Anand demonstrating appropriate technique 100% of the time in order to improve function  2)  Perform all transfers with Anand demonstrating safe and appropriate technique 100% of the time in order to improve ability to negotiate safely in home environment  3) modified independent with WC for distances of 50 ft in order to negotiate safely in her home  4)  Improve overall strength and balance 1/2 grade in order to optimize ability to perform functional tasks and reduce fall risk  5) Increase activity tolerance to 45 minutes in order to improve endurance to functional tasks  6) PT for ongoing patient and family/caregiver education, DME needs and d/c planning in order to promote highest level of function in least restrictive environment  Treatment Day 1   Plan   Treatment/Interventions Functional transfer training;LE strengthening/ROM; Elevations; Therapeutic exercise; Endurance training;Patient/family training;Equipment eval/education; Bed mobility;Gait training; Compensatory technique education;Continued evaluation;Spoke to nursing   PT Frequency Other (Comment)  (4-5x/wk)   Recommendation   Recommendation Short-term skilled PT   PT - OK to Discharge (yes to rhb, NO to home)   Modified Emmons Scale   Modified Emmons Scale 4   Barthel Index   Feeding 5   Bathing 0   Grooming Score 5   Dressing Score 5   Bladder Score 5   Bowels Score 5   Toilet Use Score 5   Transfers (Bed/Chair) Score 5   Mobility (Level Surface) Score 0   Stairs Score 0   Barthel Index Score 35     History: co - morbidities, fall risk, use of assistive device, assist for adl's, cognition, multiple lines  Exam: impairments in locomotion, musculoskeletal, balance,posture, joint integrity,cardiac, pulmonary, cognition   Clinical: unstable/unpredictable  Complexity:high Rao Edwards, PT     Time In: 11:20  Time Out: 11:35  Total Time:    15 minutes    S:  "I want to do it"  ' I gotta try"  O:  Performed sit to stand x 3 with modA of 2  Standing tolerance with RW support  x 15 seconds and dependent for pericare  Amb 3'x1 with RW bed to chair for transfers with modA of 2  Remained seated OOB in chair  Alarm in place  A:  Worked on progression of transfers and mobility  Able to finally progress with taking few steps OOB to chair with use of RW   R knee instability noted  Increasd risk for falls given impairments in strength, balance, activity tolerance, mobilty and cognition  Cont to rec STR in order to optimize outcomes    P:  PT per Chay Pearson, PT

## 2019-02-02 NOTE — PLAN OF CARE
Problem: PHYSICAL THERAPY ADULT  Goal: Performs mobility at highest level of function for planned discharge setting  See evaluation for individualized goals  Treatment/Interventions: Functional transfer training, LE strengthening/ROM, Elevations, Therapeutic exercise, Endurance training, Patient/family training, Equipment eval/education, Bed mobility, Gait training, Compensatory technique education, Continued evaluation, Spoke to nursing          See flowsheet documentation for full assessment, interventions and recommendations  Outcome: Progressing  Prognosis: Fair  Problem List: Decreased strength, Decreased range of motion, Decreased endurance, Impaired balance, Decreased mobility, Decreased cognition, Impaired judgement, Decreased safety awareness, Orthopedic restrictions  Assessment: Pt is 67 y/o female admitted with bronchitis, + flu, elevated troponin and NSTEMI type 2 2* flu and fluid overload, hypoglycemia  PT consulted  Up and OOB as tolerated  Prior to admission pt reports she was able to perform mobility unassisted with transfers OOB to [address] via use of RW  Modified I with use of WC in home with ramp entry  Admits to being home alone while family working, later stating grandson is available prn  Pt limited historian  Hx of fall leading to admission 2* weakness  Currently presents with decline from baseline mobility in that requiring Pooja for bed mobility with increased time  ModA of 2 for transfers sit to stand with limited standing tolerance and inability to progress with transfers using RW despite 2 attempts  RLE with valgus deformity and weakness with instability noted in standing  Will continue to require ongoing PT in order to progress and achieve mobility goals for safe d/c to home  May benefit form STR at this time given impairments in strength, balance, activity tolerance, mobility and functional limitations    Barriers to Discharge: Decreased caregiver support  Barriers to Discharge Comments: reports alone days while family working? Recommendation: Short-term skilled PT     PT - OK to Discharge:  (yes to rhb, NO to home)    See flowsheet documentation for full assessment

## 2019-02-02 NOTE — PROGRESS NOTES
Progress Note - Kristy Goins 1945, 68 y o  female MRN: [id number]    Unit/Bed#: E4 -01 Encounter: [id number]    Primary Care Provider: Mortimer Simple, DO   Date and time admitted to hospital: 1/31/2019  2:45 PM        * Bronchitis   Assessment & Plan    Acute infectious bronchitis w/no hx of copd/asthma  cxr concerning for mild vascular congestion however none demonstated on CTA PE study  No PE demonstrated  Start xopenex/atrovent, mucinex  procalcitonin wnl  Influenza A positive  Uncertain timeline for duration of s/sx  End stage kidney disease ([facility] [address] )   Assessment & Plan    Continue HD TTHS     Elevated troponin I level   Assessment & Plan    Nonspecific  Pt w/o cp  And this is likely 2* renal clearance from ESRD  Continue asa/bb/statin for hx of CAD     Type 2 diabetes mellitus with renal manifestations, controlled [facility])   Assessment & Plan    Lab Results   Component Value Date    HGBA1C 6 5 01/22/2019       Recent Labs      02/01/19   1559  02/01/19   2051  02/02/19   0658  02/02/19   1057   POCGLU  99  137  94  130       Blood Sugar Average: Last 72 hrs:  (P) 103 25     Suspect brittle dm  Pt w/borderline hypoglycemia  Hold oral agent start ssi and poc bs     Hypertension   Assessment & Plan    Continue coreg/norvasc/cozaar  No longer on demadex       VTE Pharmacologic Prophylaxis:   Pharmacologic: Heparin  Mechanical VTE Prophylaxis in Place: SCDs in place    Patient Centered Rounds: I have performed bedside rounds with nursing staff today  Discussions with Specialists or Other Care Team Provider:     Education and Discussions with Family / Patient: dispo d/w pt and     Time Spent for Care: 20 minutes  More than 50% of total time spent on counseling and coordination of care as described above      Current Length of Stay: 2 day(s)    Current Patient Status: Inpatient   Certification Statement: The patient will continue to require additional inpatient hospital stay due to bronchitis    Discharge Plan: d/c to str  Pending ot evaluation    Code Status: Level 1 - Full Code      Subjective:   Patient seen and examined  She reports that she is angry that she got influenza  She reports however that her shortness of breath and cough have not been bothering her much today  She has no chest pain and is otherwise comfortable  Objective:     Vitals:   Temp (24hrs), Av 2 °F (36 8 °C), Min:97 4 °F (36 3 °C), Max:98 7 °F (37 1 °C)    Temp:  [97 4 °F (36 3 °C)-98 7 °F (37 1 °C)] 97 4 °F (36 3 °C)  HR:  [68-86] 81  Resp:  [18] 18  BP: (133-160)/(58-75) 139/75  SpO2:  [92 %-99 %] 92 %  There is no height or weight on file to calculate BMI  Input and Output Summary (last 24 hours): Intake/Output Summary (Last 24 hours) at 19 1340  Last data filed at 19 0900   Gross per 24 hour   Intake              480 ml   Output                0 ml   Net              480 ml       Physical Exam:     Physical Exam   Constitutional: She appears well-developed  No distress  HENT:   Head: Normocephalic and atraumatic  Right Ear: External ear normal    Left Ear: External ear normal    Eyes: Conjunctivae are normal    Neck: Normal range of motion  Cardiovascular: Normal rate, regular rhythm and normal heart sounds  Exam reveals no gallop and no friction rub  No murmur heard  Pulmonary/Chest: No respiratory distress  She has wheezes (Coarse mid-end expiratory rhonchi in Lower lungs b/l)  She has no rales  Abdominal: Soft  She exhibits no distension  There is no tenderness  There is no rebound and no guarding  Musculoskeletal: She exhibits no edema  Neurological: She is alert  No cranial nerve deficit  Skin: Skin is warm and dry  She is not diaphoretic  Psychiatric: She has a normal mood and affect  Vitals reviewed      (  Be Sure to Include Physical Exam: Delete this entire line when you have entered your exam)    Additional Data:     Labs:      Results from last 7 days  Lab Units 01/31/19  1719 01/30/19  2106   WBC Thousand/uL  --  5 70   HEMOGLOBIN g/dL  --  12 0   HEMATOCRIT %  --  35 6   PLATELETS Thousands/uL 120* 139*   NEUTROS PCT %  --  77*   LYMPHS PCT %  --  7*   MONOS PCT %  --  14*   EOS PCT %  --  1       Results from last 7 days  Lab Units 02/01/19  0855 01/30/19  2106   SODIUM mmol/L 137 137   POTASSIUM mmol/L 3 4* 3 7   CHLORIDE mmol/L 98* 94*   CO2 mmol/L 26 32*   BUN mg/dL 57* 38*   CREATININE mg/dL 6 79* 4 90*   ANION GAP mmol/L 13 11   CALCIUM mg/dL 9 1 10 5   ALBUMIN g/dL  --  4 0   TOTAL BILIRUBIN mg/dL  --  0 30   ALK PHOS U/L  --  48*   ALT U/L  --  15   AST U/L  --  26   GLUCOSE RANDOM mg/dL 86 51*       Results from last 7 days  Lab Units 01/30/19  2106   INR  0 97       Results from last 7 days  Lab Units 02/02/19  1057 02/02/19  0658 02/01/19  2051 02/01/19  1559 02/01/19  1042 02/01/19  0723 01/31/19  2102 01/31/19  1607 01/30/19  2340   POC GLUCOSE mg/dl 130 94 137 99 94 84 112 76 93           Results from last 7 days  Lab Units 01/31/19  1719   PROCALCITONIN ng/ml 0 18           * I Have Reviewed All Lab Data Listed Above  * Additional Pertinent Lab Tests Reviewed:  All Labs Within Last 24 Hours Reviewed    Imaging:    Imaging Reports Reviewed Today Include:   Imaging Personally Reviewed by Myself Includes:      Recent Cultures (last 7 days):       Results from last 7 days  Lab Units 01/30/19  2123   INFLUENZA B PCR  Not Detected   RSV PCR  Not Detected       Last 24 Hours Medication List:     Current Facility-Administered Medications:  amLODIPine 5 mg Oral Daily Taya Travis PA-C   aspirin 81 mg Oral Daily Taya Travsi PA-C   calcium acetate 1,334 mg Oral TID With Meals Taya Travis PA-C   carvedilol 12 5 mg Oral BID Taya Travis PA-C   donepezil 5 mg Oral HS Taya Travis PA-C   ferrous sulfate 325 mg Oral BID With Meals Taya Travis PA-C   guaiFENesin 600 mg Oral Q12H Taya Travis PA-C   heparin (porcine) 5,000 Units Subcutaneous Q8H [facility] Taya Travis PA-C   insulin lispro 1-5 Units Subcutaneous 4x Daily (AC & HS) Taya Travis PA-C   ipratropium 0 5 mg Nebulization Q6H Hongrosamaria Dutton, DO   levalbuterol 1 25 mg Nebulization Q6H Hong Dutton, DO   losartan 25 mg Oral Daily Taya Travis PA-C   ondansetron 4 mg Intravenous Q4H PRN Modesto Splinter Spatzer, CRNP   pantoprazole 40 mg Oral Early Morning Taya Travis PA-C   pravastatin 80 mg Oral After Rajendra Moyer PA-C        Today, Patient Was Seen By: Bulmaro Healy PA-C    ** Please Note: Dictation voice to text software may have been used in the creation of this document   **

## 2019-02-02 NOTE — ASSESSMENT & PLAN NOTE
Acute infectious bronchitis w/no hx of copd/asthma  cxr concerning for mild vascular congestion however none demonstated on CTA PE study  No PE demonstrated  Start xopenex/atrovent, mucinex  procalcitonin wnl  Influenza A positive  Uncertain timeline for duration of s/sx

## 2019-02-02 NOTE — ASSESSMENT & PLAN NOTE
Nonspecific  Pt w/o cp    And this is likely 2* renal clearance from ESRD  Continue asa/bb/statin for hx of CAD

## 2019-02-02 NOTE — PHYSICIAN ADVISOR
Current patient class: Inpatient  The patient is currently on Hospital Day: 2 at [facility]      The patient was admitted to the hospital at [phone] on 1/31/19 for the following diagnosis:  CHF (congestive heart failure) ([facility]) [I50 9]  ESRD (end stage renal disease) ([facility] [address] ) [N18 6]  Hypoglycemia [E16 2]       There is documentation in the medical record of an expected length of stay of at least 2 midnights  The patient is therefore expected to satisfy the 2 midnight benchmark and given the 2 midnight presumption is appropriate for INPATIENT ADMISSION  Given this expectation of a satisfying stay, CMS instructs us that the patient is most often appropriate for inpatient admission under part A provided medical necessity is documented in the chart  After review of the relevant documentation, labs, vital signs and test results, the patient is appropriate for INPATIENT ADMISSION  Admission to the hospital as an inpatient is a complex decision making process which requires the practitioner to consider the patients presenting complaint, history and physical examination and all relevant testing  With this in mind, in this case, the patient was deemed appropriate for INPATIENT ADMISSION  After review of the documentation and testing available at the time of the admission I concur with this clinical determination of medical necessity  Rationale is as follows: The patient is a 68 yrs old Female who presented to the ED at 1/31/2019  2:45 PM with a chief complaint of bronchitis  Given the need for further hospitalization, and along with the documentation of medical necessity present in the chart, the patient is appropriate for inpatient admission  The patient is expected to satisfy the 2 midnight benchmark, and will require further acute medical care  The patient does have comorbid conditions which increases the risk for significant adverse outcome   Given this the patient is appropriate for inpatient admission  The patients vitals on arrival were ED Triage Vitals   Temperature Pulse Respirations Blood Pressure SpO2   01/31/19 1448 01/31/19 1448 01/31/19 1448 01/31/19 1448 01/31/19 1448   98 5 °F (36 9 °C) 80 18 165/65 97 %      Temp Source Heart Rate Source Patient Position - Orthostatic VS BP Location FiO2 (%)   01/31/19 1448 01/31/19 1448 01/31/19 1448 01/31/19 1448 --   Tympanic Monitor Lying Right arm       Pain Score       01/31/19 1500       No Pain           Past Medical History:   Diagnosis Date    Abnormal weight loss     last assessed [id number]    Anemia     Chronic kidney disease     Clostridium difficile colitis     last assessed [id number]    Depression     resolved [id number]    Diabetes mellitus ([facility] [address] )     Fracture, tibial plateau     last assessed [id number]    GERD (gastroesophageal reflux disease)     Hyperkalemia     last assessed [id number]    Hypertension     Hypoglycemia     Sugars now up   Will increase the metformin;  last assessed [id number]    Hypokalemia     last assessed [id number]    Hypothermia     last assessed [id number]    Hypoxia     last assessed [id number]    Metatarsalgia     last assessed [id number]    Old myocardial infarction     Osteoporosis     Syncope     last assessed [id number]    Visual disturbance     last assessed [id number]     Past Surgical History:   Procedure Laterality Date    BYPASS GRAFT Right     H/O bypass graft (non-vein), Right Leg    CAROTID ENDARTERECTOMY      Neurological Surgery Cartoid Endartectomy    CATARACT EXTRACTION      CHOLECYSTECTOMY      questionable    CORONARY ARTERY BYPASS GRAFT      FOOT AMPUTATION Bilateral     H/O surgery foot amputation Metatarsal and toe;  Bilat    HERNIA REPAIR      Inguinal sliding    TONSILLECTOMY AND ADENOIDECTOMY      VENOUS THROMBECTOMY      H/O Arteriobenous surgery Thrombectomy of AV Fistula Percantaneous;  last assessed [id number]           Consults have been placed to:   IP CONSULT TO NEPHROLOGY  IP CONSULT TO CARDIOLOGY    Vitals:    02/01/19 1316 02/01/19 1500 02/01/19 1900 02/01/19 1912   BP:  133/66 142/61    BP Location:  Right arm Right arm    Pulse:  86 74    Resp:  18 18    Temp:  98 7 °F (37 1 °C) 98 2 °F (36 8 °C)    TempSrc:  Tympanic Tympanic    SpO2: 95% 92% 94% 96%       Most recent labs:    Recent Labs      01/30/19   2106  01/31/19   0039  01/31/19   0958  01/31/19   1719  02/01/19   0855   WBC  5 70   --    --    --    --    HGB  12 0   --    --    --    --    HCT  35 6   --    --    --    --    PLT  139*   --    --   120*   --    K  3 7   --    --    --   3 4*   CALCIUM  10 5   --    --    --   9 1   BUN  38*   --    --    --   57*   CREATININE  4 90*   --    --    --   6 79*   INR  0 97   --    --    --    --    TROPONINI  0 08*  0 10*  0 11*   --    --    CKTOTAL  76  73   --    --    --    AST  26   --    --    --    --    ALT  15   --    --    --    --    ALKPHOS  48*   --    --    --    --        Scheduled Meds:  Current Facility-Administered Medications:  amLODIPine 5 mg Oral Daily Taya Travis PA-C   aspirin 81 mg Oral Daily Taya Travis PA-C   calcium acetate 1,334 mg Oral TID With Meals Taya Travis PA-C   carvedilol 12 5 mg Oral BID Taya Travis PA-C   donepezil 5 mg Oral HS Taya Travis PA-C   ferrous sulfate 325 mg Oral BID With Meals Taya Travis PA-C   guaiFENesin 600 mg Oral Q12H Taya Travis PA-C   heparin (porcine) 5,000 Units Subcutaneous Q8H [facility] Taya Travis PA-C   insulin lispro 1-5 Units Subcutaneous 4x Daily (AC & HS) Taya Travis PA-C   ipratropium 0 5 mg Nebulization Q6H Hong Dutton,    levalbuterol 1 25 mg Nebulization Q6H Hong Dutton,    losartan 25 mg Oral Daily Taya Travis PA-C   ondansetron 4 mg Intravenous Q4H PRN Eustacio Learned Spatzer, CRNP   pantoprazole 40 mg Oral Early Morning Taya Travis PA-C   pravastatin 80 mg Oral After [employer], CHRISTIANA     Continuous Infusions:   PRN Meds: ondansetron    Surgical procedures (if appropriate):

## 2019-02-02 NOTE — ASSESSMENT & PLAN NOTE
Lab Results   Component Value Date    HGBA1C 6 5 01/22/2019       Recent Labs      02/01/19   1559  02/01/19 2051  02/02/19   0658  02/02/19   1057   POCGLU  99  137  94  130       Blood Sugar Average: Last 72 hrs:  (P) 103 25     Suspect brittle dm  Pt w/borderline hypoglycemia    Hold oral agent start ssi and poc bs

## 2019-02-02 NOTE — SOCIAL WORK
Cm met with pt at bedside to introduce role and to perform the following SW assessment and discuss STR recommendation  Pt lives in a 2 story home with grandson, bedroom on the 1st floor, with a ramp to enter  Prior to admission, pt was independent with ADLs  Pt states she mostly uses and w/c but has r/w in home as well  Pt is agreeable to STR, she has hx in [facility] and does not wish to return there  Cm left STR list with pt in room  Pt gets dialyzed at [facility] T/TH/SAT, cm will confirm chair time  Cm will reach out to grandson and see what he recommends, per pt  PCP is Dr Kingsley العراقي    Preferred pharmacy is [facility]     Cm following and will reach out to grandson/spouse

## 2019-02-03 PROBLEM — D64.9 ANEMIA: Status: ACTIVE | Noted: 2019-02-03

## 2019-02-03 PROBLEM — D69.6 THROMBOCYTOPENIA (HCC): Status: ACTIVE | Noted: 2019-02-03

## 2019-02-03 LAB
GLUCOSE SERPL-MCNC: 133 MG/DL (ref 65–140)
GLUCOSE SERPL-MCNC: 169 MG/DL (ref 65–140)
GLUCOSE SERPL-MCNC: 172 MG/DL (ref 65–140)
GLUCOSE SERPL-MCNC: 79 MG/DL (ref 65–140)

## 2019-02-03 PROCEDURE — 94760 N-INVAS EAR/PLS OXIMETRY 1: CPT

## 2019-02-03 PROCEDURE — 94640 AIRWAY INHALATION TREATMENT: CPT

## 2019-02-03 PROCEDURE — 82948 REAGENT STRIP/BLOOD GLUCOSE: CPT

## 2019-02-03 PROCEDURE — 99232 SBSQ HOSP IP/OBS MODERATE 35: CPT | Performed by: INTERNAL MEDICINE

## 2019-02-03 PROCEDURE — 99232 SBSQ HOSP IP/OBS MODERATE 35: CPT | Performed by: PHYSICIAN ASSISTANT

## 2019-02-03 RX ORDER — LEVALBUTEROL 1.25 MG/.5ML
1.25 SOLUTION, CONCENTRATE RESPIRATORY (INHALATION) EVERY 8 HOURS PRN
Status: DISCONTINUED | OUTPATIENT
Start: 2019-02-03 | End: 2019-02-05 | Stop reason: HOSPADM

## 2019-02-03 RX ADMIN — CARVEDILOL 12.5 MG: 12.5 TABLET, FILM COATED ORAL at 08:56

## 2019-02-03 RX ADMIN — HEPARIN SODIUM 5000 UNITS: 5000 INJECTION INTRAVENOUS; SUBCUTANEOUS at 12:55

## 2019-02-03 RX ADMIN — IPRATROPIUM BROMIDE 0.5 MG: 0.5 SOLUTION RESPIRATORY (INHALATION) at 07:15

## 2019-02-03 RX ADMIN — ASPIRIN 81 MG 81 MG: 81 TABLET ORAL at 08:56

## 2019-02-03 RX ADMIN — GUAIFENESIN 600 MG: 600 TABLET, EXTENDED RELEASE ORAL at 21:00

## 2019-02-03 RX ADMIN — HEPARIN SODIUM 5000 UNITS: 5000 INJECTION INTRAVENOUS; SUBCUTANEOUS at 21:52

## 2019-02-03 RX ADMIN — CALCIUM ACETATE 1334 MG: 667 CAPSULE ORAL at 16:24

## 2019-02-03 RX ADMIN — GUAIFENESIN 600 MG: 600 TABLET, EXTENDED RELEASE ORAL at 08:55

## 2019-02-03 RX ADMIN — FERROUS SULFATE TAB 325 MG (65 MG ELEMENTAL FE) 325 MG: 325 (65 FE) TAB at 08:55

## 2019-02-03 RX ADMIN — LOSARTAN POTASSIUM 25 MG: 25 TABLET, FILM COATED ORAL at 08:55

## 2019-02-03 RX ADMIN — CALCIUM ACETATE 1334 MG: 667 CAPSULE ORAL at 08:55

## 2019-02-03 RX ADMIN — CALCIUM ACETATE 1334 MG: 667 CAPSULE ORAL at 12:52

## 2019-02-03 RX ADMIN — AMLODIPINE BESYLATE 5 MG: 5 TABLET ORAL at 08:56

## 2019-02-03 RX ADMIN — PRAVASTATIN SODIUM 80 MG: 80 TABLET ORAL at 18:25

## 2019-02-03 RX ADMIN — LEVALBUTEROL 1.25 MG: 1.25 SOLUTION, CONCENTRATE RESPIRATORY (INHALATION) at 07:15

## 2019-02-03 RX ADMIN — FERROUS SULFATE TAB 325 MG (65 MG ELEMENTAL FE) 325 MG: 325 (65 FE) TAB at 16:25

## 2019-02-03 RX ADMIN — HEPARIN SODIUM 5000 UNITS: 5000 INJECTION INTRAVENOUS; SUBCUTANEOUS at 05:22

## 2019-02-03 RX ADMIN — PANTOPRAZOLE SODIUM 40 MG: 40 TABLET, DELAYED RELEASE ORAL at 05:22

## 2019-02-03 RX ADMIN — CARVEDILOL 12.5 MG: 12.5 TABLET, FILM COATED ORAL at 18:25

## 2019-02-03 RX ADMIN — INSULIN LISPRO 1 UNITS: 100 INJECTION, SOLUTION INTRAVENOUS; SUBCUTANEOUS at 22:00

## 2019-02-03 RX ADMIN — DONEPEZIL HYDROCHLORIDE 5 MG: 5 TABLET ORAL at 21:00

## 2019-02-03 NOTE — ASSESSMENT & PLAN NOTE
Lab Results   Component Value Date    HGBA1C 6 5 01/22/2019       Recent Labs      02/02/19   1631  02/02/19   2101  02/03/19   0759  02/03/19   1201   POCGLU  168*  99  79  133       Blood Sugar Average: Last 72 hrs:  (P) 108 75     Suspect brittle dm  Pt w/borderline hypoglycemia    Hold oral agent start ssi and poc bs

## 2019-02-03 NOTE — ASSESSMENT & PLAN NOTE
Poa   Likely 2* viral illness    Maintain on heparin as has not dropped >25% and no rash at injection sites  Continue to monitor

## 2019-02-03 NOTE — PROGRESS NOTES
Progress Note - Lisseth Londono 1945, 68 y o  female MRN: [id number]    Unit/Bed#: E4 -01 Encounter: [id number]    Primary Care Provider: Eliza Higgins DO   Date and time admitted to hospital: 1/31/2019  2:45 PM        * Bronchitis   Assessment & Plan    Acute infectious bronchitis w/no hx of copd/asthma  cxr concerning for mild vascular congestion however none demonstated on CTA PE study  No PE demonstrated  D/c atrovent  Continue xopenex but switch to tid prn, mucinex  procalcitonin wnl  Influenza A positive  Uncertain timeline for duration of s/sx and afebrile  No need for tamiflu  Awaiting placement to rehab     Thrombocytopenia [facility])   Assessment & Plan    Poa  Likely 2* viral illness  Maintain on heparin as has not dropped >25% and no rash at injection sites  Continue to monitor     Anemia   Assessment & Plan    Mild  Baseline anemia around 11  No s/sx of acute gib  Likely 2* anemia of chronic disease  Continue to monitor     Elevated troponin I level   Assessment & Plan    Nonspecific  Pt w/o cp  And this is likely 2* renal clearance from ESRD  Continue asa/bb/statin for hx of CAD     Type 2 diabetes mellitus with renal manifestations, controlled [facility])   Assessment & Plan    Lab Results   Component Value Date    HGBA1C 6 5 01/22/2019       Recent Labs      02/02/19   1631  02/02/19   2101  02/03/19   0759  02/03/19   1201   POCGLU  168*  99  79  133       Blood Sugar Average: Last 72 hrs:  (P) 108 75     Suspect brittle dm  Pt w/borderline hypoglycemia  Hold oral agent start ssi and poc bs     Hypertension   Assessment & Plan    Continue coreg/norvasc/cozaar  No longer on demadex     Cardiovascular arteriosclerosis   Assessment & Plan    Remote hx of CAD S/p CABG x 3  Continue bb, statin, asa    Per  no longer on isordil         VTE Pharmacologic Prophylaxis:   Pharmacologic: Heparin  Mechanical VTE Prophylaxis in Place: Yes    Patient Centered Rounds: I have performed bedside rounds with nursing staff today  Discussions with Specialists or Other Care Team Provider:    Education and Discussions with Family / Patient: dispo    Time Spent for Care: 20 minutes  More than 50% of total time spent on counseling and coordination of care as described above  Current Length of Stay: 3 day(s)    Current Patient Status: Inpatient   Certification Statement: The patient will continue to require additional inpatient hospital stay due to bronchitis    Discharge Plan: awaiting rehab    Code Status: Level 1 - Full Code      Subjective:   Patient seen and examined  No events overnight per nurse  Patient reports she has no complaints of short of breath or cough  She has no chest pain  She has no abdominal pain nausea vomiting  Objective:     Vitals:   Temp (24hrs), Av 9 °F (36 6 °C), Min:97 4 °F (36 3 °C), Max:98 7 °F (37 1 °C)    Temp:  [97 4 °F (36 3 °C)-98 7 °F (37 1 °C)] 97 4 °F (36 3 °C)  HR:  [64-83] 75  Resp:  [16-18] 18  BP: ()/(45-99) 135/45  SpO2:  [90 %-96 %] 91 %  Body mass index is 26 72 kg/m²  Input and Output Summary (last 24 hours): Intake/Output Summary (Last 24 hours) at 19 1220  Last data filed at 19 2100   Gross per 24 hour   Intake              920 ml   Output             1500 ml   Net             -580 ml       Physical Exam:     Physical Exam   Constitutional: She appears well-developed  No distress  Appears disheveled, stated age, no apparent distress on room air   HENT:   Head: Normocephalic and atraumatic  Right Ear: External ear normal    Left Ear: External ear normal    Eyes: Conjunctivae are normal    Neck: Normal range of motion  Cardiovascular: Normal rate, regular rhythm and normal heart sounds  Exam reveals no gallop and no friction rub  No murmur heard  Pulmonary/Chest: No respiratory distress  She has no wheezes  She has no rales  Decreased breath sounds however no michael r/w/r   Abdominal: Soft   She exhibits no distension  There is no tenderness  There is no rebound and no guarding  Musculoskeletal: She exhibits no edema  Neurological: She is alert  Skin: Skin is warm and dry  She is not diaphoretic  Psychiatric: She has a normal mood and affect  Vitals reviewed  (  Be Sure to Include Physical Exam: Delete this entire line when you have entered your exam)    Additional Data:     Labs:      Results from last 7 days  Lab Units 02/02/19  1648  01/30/19  2106   WBC Thousand/uL 4 97  --  5 70   HEMOGLOBIN g/dL 9 7*  --  12 0   HEMATOCRIT % 29 4*  --  35 6   PLATELETS Thousands/uL 119*  < > 139*   NEUTROS PCT %  --   --  77*   LYMPHS PCT %  --   --  7*   MONOS PCT %  --   --  14*   EOS PCT %  --   --  1   < > = values in this interval not displayed  Results from last 7 days  Lab Units 02/02/19  1648  01/30/19  2106   SODIUM mmol/L 135*  < > 137   POTASSIUM mmol/L 4 0  < > 3 7   CHLORIDE mmol/L 97*  < > 94*   CO2 mmol/L 30  < > 32*   BUN mg/dL 29*  < > 38*   CREATININE mg/dL 4 55*  < > 4 90*   ANION GAP mmol/L 8  < > 11   CALCIUM mg/dL 9 5  < > 10 5   ALBUMIN g/dL  --   --  4 0   TOTAL BILIRUBIN mg/dL  --   --  0 30   ALK PHOS U/L  --   --  48*   ALT U/L  --   --  15   AST U/L  --   --  26   GLUCOSE RANDOM mg/dL 165*  < > 51*   < > = values in this interval not displayed  Results from last 7 days  Lab Units 01/30/19  2106   INR  0 97       Results from last 7 days  Lab Units 02/03/19  1201 02/03/19  0759 02/02/19  2101 02/02/19  1631 02/02/19  1057 02/02/19  0658 02/01/19  2051 02/01/19  1559 02/01/19  1042 02/01/19  0723 01/31/19  2102 01/31/19  1607   POC GLUCOSE mg/dl 133 79 99 168* 130 94 137 99 94 84 112 76           Results from last 7 days  Lab Units 01/31/19  1719   PROCALCITONIN ng/ml 0 18           * I Have Reviewed All Lab Data Listed Above  * Additional Pertinent Lab Tests Reviewed:  All Labs Within Last 24 Hours Reviewed    Imaging:    Imaging Reports Reviewed Today Include:   Imaging Personally Reviewed by Myself Includes:      Recent Cultures (last 7 days):       Results from last 7 days  Lab Units 01/30/19 2123   INFLUENZA B PCR  Not Detected   RSV PCR  Not Detected       Last 24 Hours Medication List:     Current Facility-Administered Medications:  amLODIPine 5 mg Oral Daily Taya Travis PA-C   aspirin 81 mg Oral Daily Taya Travis PA-C   calcium acetate 1,334 mg Oral TID With Meals Taya Travis PA-C   carvedilol 12 5 mg Oral BID Taya Travis PA-C   donepezil 5 mg Oral HS Taya Travis PA-C   ferrous sulfate 325 mg Oral BID With Meals Taya Travis PA-C   guaiFENesin 600 mg Oral Q12H Taya Travis PA-C   heparin (porcine) 5,000 Units Subcutaneous Q8H [address] Taya Travis PA-C   insulin lispro 1-5 Units Subcutaneous 4x Daily (AC & HS) Taya Travis PA-C   levalbuterol 1 25 mg Nebulization Q8H PRN Taya Travis PA-C   losartan 25 mg Oral Daily Taya Travis PA-C   ondansetron 4 mg Intravenous Q4H PRN Eustacio Learned Spatzer, CRNP   pantoprazole 40 mg Oral Early Morning Taya Travis PA-C   pravastatin 80 mg Oral After Jael Spicer PA-C        Today, Patient Was Seen By: Perla Villarreal PA-C    ** Please Note: Dictation voice to text software may have been used in the creation of this document   **

## 2019-02-03 NOTE — ASSESSMENT & PLAN NOTE
Mild   Baseline anemia around 11  No s/sx of acute gib  Likely 2* anemia of chronic disease    Continue to monitor

## 2019-02-03 NOTE — ASSESSMENT & PLAN NOTE
Acute infectious bronchitis w/no hx of copd/asthma  cxr concerning for mild vascular congestion however none demonstated on CTA PE study  No PE demonstrated  Start xopenex/atrovent, mucinex  procalcitonin wnl  Influenza A positive  Uncertain timeline for duration of s/sx and afebrile    No need for tamiflu  Awaiting placement to rehab

## 2019-02-03 NOTE — PROGRESS NOTES
NEPHROLOGY PROGRESS NOTE   James Espitia 68 y o  female MRN: [id number]  Unit/Bed#: E4 -01 Encounter: [id number]  Reason for Consult: ESRD    ASSESSMENT/PLAN:  1  End-stage renal disease: On maintenance hemodialysis every Tuesday Thursday Saturday at [address] [place] [address]  -status post dialysis yesterday without issues  -she also underwent dialysis Friday secondary to volume overload after missed dialysis treatment    -monitor dry weight for blood pressure did decrease to less than 110 yesterday-may have reached euvolemic state  -will UF as blood pressure allows     2  Access: The fistula with positive bruit and thrill     3  Anemia chronic kidney:  Hemoglobin 9 7 (12 on admission)  -need to check outpatient records for aaliyah use  -trend for now  -may need formal workup if continues to drop     4  Bone mineral disease of Chronic Kidney Disease:  Continues on calcium acetate for hyperphosphatemia as well as renal diet  -continue to monitor PTH and phosphorus as outpatient     5  Hypertension:  BP acceptable  -will trend with UF  -continues on amlodipine 5 mg daily, carvedilol 12 5 mg twice a day, losartan 25 mg daily  -avoid hypotension     6  Bronchitis/Influenza A positive:  Patient on Xopenex and Atrovent per primary team    7  Volume overload:  Examining fairly euvolemic status post two dialysis treatments      SUBJECTIVE:  Patient seen and examined  Denies chest pain or shortness of Breath  States dialysis went well yesterday without issues    Continues to cough especially with taking deep breaths    OBJECTIVE:  Current Weight: Weight - Scale: 70 6 kg (155 lb 10 3 oz) (Pt unable to stand )  Vitals:    02/02/19 2300 02/03/19 0535 02/03/19 0718 02/03/19 0755   BP: 137/64   (!) 135/45   BP Location: Right arm   Right arm   Pulse: 75   75   Resp: 18   18   Temp: 97 7 °F (36 5 °C)   (!) 97 4 °F (36 3 °C)   TempSrc: Tympanic   Tympanic   SpO2: 90%  92% 91%   Weight:  70 6 kg (155 lb 10 3 oz)         Intake/Output Summary (Last 24 hours) at 02/03/19 1036  Last data filed at 02/02/19 2100   Gross per 24 hour   Intake              920 ml   Output             1500 ml   Net             -580 ml     General:  No acute distress, cooperative, out of bed  Skin:  Warm and dry, no rash noted  HEENT:  Mucous membranes moist  Neck:  Supple without JVD  Chest:  Essentially clear on auscultation without crackles, wheezes, rhonchi, coughs with deep breath  Heart:  Regular rate and rhythm without rub  Abdomen:  Soft, nontender, nondistended, active bowel sounds  Extremities:  No edema noted bilaterally  Neuro:  Awake, oriented, and alert  Psych:  Appropriate affect    Medications:    Current Facility-Administered Medications:     amLODIPine (NORVASC) tablet 5 mg, 5 mg, Oral, Daily, Taya Travis PA-C, 5 mg at 02/03/19 0856    aspirin chewable tablet 81 mg, 81 mg, Oral, Daily, Taya Travis PA-C, 81 mg at 02/03/19 0856    calcium acetate (PHOSLO) capsule 1,334 mg, 1,334 mg, Oral, TID With Meals, Taya Travis PA-C, 1,334 mg at 02/03/19 0855    carvedilol (COREG) tablet 12 5 mg, 12 5 mg, Oral, BID, Taya Travis PA-C, 12 5 mg at 02/03/19 0856    donepezil (ARICEPT) tablet 5 mg, 5 mg, Oral, HS, Taya Travis PA-C, 5 mg at 02/02/19 2134    ferrous sulfate tablet 325 mg, 325 mg, Oral, BID With Meals, Taya Travis PA-C, 325 mg at 02/03/19 0855    guaiFENesin (MUCINEX) 12 hr tablet 600 mg, 600 mg, Oral, Q12H, Taya Travis PA-C, 600 mg at 02/03/19 0855    heparin (porcine) subcutaneous injection 5,000 Units, 5,000 Units, Subcutaneous, Q8H [facility], 5,000 Units at 02/03/19 0522 **AND** Platelet count, , , Once, Taya Travis PA-C    insulin lispro (HumaLOG) 100 units/mL subcutaneous injection 1-5 Units, 1-5 Units, Subcutaneous, 4x Daily (AC & HS) **AND** Fingerstick Glucose (POCT), , , 4x Daily AC and at bedtime, Taya Travis PA-C    ipratropium (ATROVENT) 0 02 % inhalation solution 0 5 mg, 0 5 mg, Nebulization, Q6H, Hong Dutton DO, 0 5 mg at 02/03/19 0715    levalbuterol Zoëleah Aldana) inhalation solution 1 25 mg, 1 25 mg, Nebulization, Q6H, Hong Dutton DO, 1 25 mg at 02/03/19 0715    losartan (COZAAR) tablet 25 mg, 25 mg, Oral, Daily, Taya Travis PA-C, 25 mg at 02/03/19 0855    ondansetron (ZOFRAN) injection 4 mg, 4 mg, Intravenous, Q4H PRN, Areta Locks Spatzer, CRNP    pantoprazole (PROTONIX) EC tablet 40 mg, 40 mg, Oral, Early Morning, Taya Travis PA-C, 40 mg at 02/03/19 0522    pravastatin (PRAVACHOL) tablet 80 mg, 80 mg, Oral, After Kennyth Drop, CHRISTIANA, Stopped at 02/02/19 1708    Laboratory Results:    Results from last 7 days  Lab Units 02/02/19  1648 02/01/19  0855 01/31/19  1719 01/30/19  2106   WBC Thousand/uL 4 97  --   --  5 70   HEMOGLOBIN g/dL 9 7*  --   --  12 0   HEMATOCRIT % 29 4*  --   --  35 6   PLATELETS Thousands/uL 119*  --  120* 139*   POTASSIUM mmol/L 4 0 3 4*  --  3 7   CHLORIDE mmol/L 97* 98*  --  94*   CO2 mmol/L 30 26  --  32*   BUN mg/dL 29* 57*  --  38*   CREATININE mg/dL 4 55* 6 79*  --  4 90*   CALCIUM mg/dL 9 5 9 1  --  10 5

## 2019-02-04 LAB
GLUCOSE SERPL-MCNC: 111 MG/DL (ref 65–140)
GLUCOSE SERPL-MCNC: 188 MG/DL (ref 65–140)
GLUCOSE SERPL-MCNC: 199 MG/DL (ref 65–140)
GLUCOSE SERPL-MCNC: 220 MG/DL (ref 65–140)

## 2019-02-04 PROCEDURE — G8988 SELF CARE GOAL STATUS: HCPCS

## 2019-02-04 PROCEDURE — 82948 REAGENT STRIP/BLOOD GLUCOSE: CPT

## 2019-02-04 PROCEDURE — 97166 OT EVAL MOD COMPLEX 45 MIN: CPT

## 2019-02-04 PROCEDURE — 99232 SBSQ HOSP IP/OBS MODERATE 35: CPT | Performed by: INTERNAL MEDICINE

## 2019-02-04 PROCEDURE — G8987 SELF CARE CURRENT STATUS: HCPCS

## 2019-02-04 PROCEDURE — 99232 SBSQ HOSP IP/OBS MODERATE 35: CPT | Performed by: PHYSICIAN ASSISTANT

## 2019-02-04 PROCEDURE — 97530 THERAPEUTIC ACTIVITIES: CPT

## 2019-02-04 RX ADMIN — GUAIFENESIN 600 MG: 600 TABLET, EXTENDED RELEASE ORAL at 21:39

## 2019-02-04 RX ADMIN — INSULIN LISPRO 1 UNITS: 100 INJECTION, SOLUTION INTRAVENOUS; SUBCUTANEOUS at 17:01

## 2019-02-04 RX ADMIN — INSULIN LISPRO 1 UNITS: 100 INJECTION, SOLUTION INTRAVENOUS; SUBCUTANEOUS at 21:39

## 2019-02-04 RX ADMIN — FERROUS SULFATE TAB 325 MG (65 MG ELEMENTAL FE) 325 MG: 325 (65 FE) TAB at 08:52

## 2019-02-04 RX ADMIN — GUAIFENESIN 600 MG: 600 TABLET, EXTENDED RELEASE ORAL at 08:52

## 2019-02-04 RX ADMIN — CARVEDILOL 12.5 MG: 12.5 TABLET, FILM COATED ORAL at 17:01

## 2019-02-04 RX ADMIN — INSULIN LISPRO 1 UNITS: 100 INJECTION, SOLUTION INTRAVENOUS; SUBCUTANEOUS at 12:20

## 2019-02-04 RX ADMIN — CALCIUM ACETATE 1334 MG: 667 CAPSULE ORAL at 08:50

## 2019-02-04 RX ADMIN — FERROUS SULFATE TAB 325 MG (65 MG ELEMENTAL FE) 325 MG: 325 (65 FE) TAB at 17:01

## 2019-02-04 RX ADMIN — HEPARIN SODIUM 5000 UNITS: 5000 INJECTION INTRAVENOUS; SUBCUTANEOUS at 05:22

## 2019-02-04 RX ADMIN — CALCIUM ACETATE 1334 MG: 667 CAPSULE ORAL at 13:15

## 2019-02-04 RX ADMIN — AMLODIPINE BESYLATE 5 MG: 5 TABLET ORAL at 08:50

## 2019-02-04 RX ADMIN — HEPARIN SODIUM 5000 UNITS: 5000 INJECTION INTRAVENOUS; SUBCUTANEOUS at 21:39

## 2019-02-04 RX ADMIN — PRAVASTATIN SODIUM 80 MG: 80 TABLET ORAL at 17:01

## 2019-02-04 RX ADMIN — DONEPEZIL HYDROCHLORIDE 5 MG: 5 TABLET ORAL at 21:39

## 2019-02-04 RX ADMIN — ASPIRIN 81 MG 81 MG: 81 TABLET ORAL at 08:52

## 2019-02-04 RX ADMIN — HEPARIN SODIUM 5000 UNITS: 5000 INJECTION INTRAVENOUS; SUBCUTANEOUS at 13:15

## 2019-02-04 RX ADMIN — CALCIUM ACETATE 1334 MG: 667 CAPSULE ORAL at 17:01

## 2019-02-04 RX ADMIN — LOSARTAN POTASSIUM 25 MG: 25 TABLET, FILM COATED ORAL at 08:51

## 2019-02-04 RX ADMIN — PANTOPRAZOLE SODIUM 40 MG: 40 TABLET, DELAYED RELEASE ORAL at 05:22

## 2019-02-04 RX ADMIN — CARVEDILOL 12.5 MG: 12.5 TABLET, FILM COATED ORAL at 08:52

## 2019-02-04 NOTE — ASSESSMENT & PLAN NOTE
Nonspecific  Pt w/o cp  And this is likely 2* renal clearance from ESRD  Pt was transferred for this and cardiac evaluation    Agree w/cards no further intervention recommended at this time  Continue asa/bb/statin for hx of CAD

## 2019-02-04 NOTE — ASSESSMENT & PLAN NOTE
Lab Results   Component Value Date    HGBA1C 6 5 01/22/2019       Recent Labs      02/03/19   1641  02/03/19   2154  02/04/19   0701  02/04/19   1116   POCGLU  169*  172*  111  220*       Blood Sugar Average: Last 72 hrs:  (P) 069 [id number]     Suspect brittle dm  Pt w/borderline hypoglycemia    Hold oral agent start ssi and poc bs

## 2019-02-04 NOTE — PROGRESS NOTES
Progress Note - Nel Salazar 1945, 68 y o  female MRN: [id number]    Unit/Bed#: E4 -01 Encounter: [id number]    Primary Care Provider: Jose Carlos Cannon DO   Date and time admitted to hospital: 1/31/2019  2:45 PM        * Bronchitis   Assessment & Plan    Acute infectious bronchitis w/no hx of copd/asthma  cxr concerning for mild vascular congestion however none demonstated on CTA PE study  No PE demonstrated  Start xopenex/atrovent, mucinex  procalcitonin wnl  Influenza A positive  Uncertain timeline for duration of s/sx and afebrile  No need for tamiflu  Awaiting placement to rehab  Pt is uncertain today if she would want rehab but her  is concerned he cannot assist with her ADLs and is to discuss this with her further tonight  Thrombocytopenia ([facility] [address] )   Assessment & Plan    Poa  Likely 2* viral illness  Maintain on heparin as has not dropped >25% and no rash at injection sites  Continue to monitor     Anemia   Assessment & Plan    Mild  Baseline anemia around 11  No s/sx of acute gib  Likely 2* anemia of chronic disease  Continue to monitor     End stage kidney disease ([facility] [address] )   Assessment & Plan    Continue HD TTHS     Hypoglycemia   Assessment & Plan    Pt has episodic hypoglycemia and somewhat labile BS by review of emr  Hold starlix, start SSI and diabetic diet     Elevated troponin I level   Assessment & Plan    Nonspecific  Pt w/o cp  And this is likely 2* renal clearance from ESRD  Pt was transferred for this and cardiac evaluation    Agree w/cards no further intervention recommended at this time  Continue asa/bb/statin for hx of CAD     Type 2 diabetes mellitus with renal manifestations, controlled [facility])   Assessment & Plan    Lab Results   Component Value Date    HGBA1C 6 5 01/22/2019       Recent Labs      02/03/19   1641  02/03/19   2154  02/04/19   0701  02/04/19   1116   POCGLU  169*  172*  111  220*       Blood Sugar Average: Last 72 hrs:  (P) 932 [id number] Suspect brittle dm  Pt w/borderline hypoglycemia  Hold oral agent start ssi and poc bs     Cardiovascular arteriosclerosis   Assessment & Plan    Remote hx of CAD S/p CABG x 3  Continue bb, statin, asa  Per  no longer on isordil         VTE Pharmacologic Prophylaxis:   Pharmacologic: Heparin  Mechanical VTE Prophylaxis in Place: Yes    Patient Centered Rounds: I have performed bedside rounds with nursing staff today  Discussions with Specialists or Other Care Team Provider:     Education and Discussions with Family / Patient: Sofya Shi with     Time Spent for Care: 20 minutes  More than 50% of total time spent on counseling and coordination of care as described above  Current Length of Stay: 4 day(s)    Current Patient Status: Inpatient   Certification Statement: The patient will continue to require additional inpatient hospital stay due to influenza w/bronchitis awaiting placement    Discharge Plan:     Code Status: Level 1 - Full Code      Subjective:   Pt seen and examined  No events overnight per nursing  Pt was uncertain if she wanted to go to rehab earlier per CM but has been agreeable to this otherwise  No cough no sob  No LE edema or cp  Objective:     Vitals:   Temp (24hrs), Av 6 °F (36 4 °C), Min:96 9 °F (36 1 °C), Max:98 2 °F (36 8 °C)    Temp:  [96 9 °F (36 1 °C)-98 2 °F (36 8 °C)] 98 2 °F (36 8 °C)  HR:  [58-70] 70  Resp:  [18] 18  BP: (118-136)/(50-62) 120/52  SpO2:  [85 %-96 %] 93 %  Body mass index is 26 72 kg/m²  Input and Output Summary (last 24 hours): Intake/Output Summary (Last 24 hours) at 19 1302  Last data filed at 19 3820   Gross per 24 hour   Intake              360 ml   Output              710 ml   Net             -350 ml       Physical Exam:     Physical Exam   Constitutional: She appears well-developed  No distress  HENT:   Head: Normocephalic and atraumatic     Right Ear: External ear normal    Left Ear: External ear normal  Eyes: Conjunctivae are normal    Neck: Normal range of motion  Cardiovascular: Normal rate, regular rhythm and normal heart sounds  Exam reveals no gallop and no friction rub  No murmur heard  Pulmonary/Chest: She has no wheezes  She has rales  Respiratory excursion somewhat tight and coarse rhonchi in mid/lower lungs noted    On ra, no conversational dyspnea   Abdominal: Soft  She exhibits no distension  There is no tenderness  There is no rebound and no guarding  Musculoskeletal: She exhibits no edema  Neurological: She is alert  Skin: Skin is warm and dry  She is not diaphoretic  Psychiatric: She has a normal mood and affect  Vitals reviewed  (  Be Sure to Include Physical Exam: Delete this entire line when you have entered your exam)    Additional Data:     Labs:      Results from last 7 days  Lab Units 02/02/19  1648 01/30/19  2106   WBC Thousand/uL 4 97  --  5 70   HEMOGLOBIN g/dL 9 7*  --  12 0   HEMATOCRIT % 29 4*  --  35 6   PLATELETS Thousands/uL 119*  < > 139*   NEUTROS PCT %  --   --  77*   LYMPHS PCT %  --   --  7*   MONOS PCT %  --   --  14*   EOS PCT %  --   --  1   < > = values in this interval not displayed  Results from last 7 days  Lab Units 02/02/19  1648  01/30/19  2106   SODIUM mmol/L 135*  < > 137   POTASSIUM mmol/L 4 0  < > 3 7   CHLORIDE mmol/L 97*  < > 94*   CO2 mmol/L 30  < > 32*   BUN mg/dL 29*  < > 38*   CREATININE mg/dL 4 55*  < > 4 90*   ANION GAP mmol/L 8  < > 11   CALCIUM mg/dL 9 5  < > 10 5   ALBUMIN g/dL  --   --  4 0   TOTAL BILIRUBIN mg/dL  --   --  0 30   ALK PHOS U/L  --   --  48*   ALT U/L  --   --  15   AST U/L  --   --  26   GLUCOSE RANDOM mg/dL 165*  < > 51*   < > = values in this interval not displayed      Results from last 7 days  Lab Units 01/30/19  2106   INR  0 97       Results from last 7 days  Lab Units 02/04/19  1116 02/04/19  0701 02/03/19  2154 02/03/19  1641 02/03/19  1201 02/03/19  0759 02/02/19  2101 02/02/19  1631 02/02/19  1052 02/02/19  0658 02/01/19  2051 02/01/19  1559   POC GLUCOSE mg/dl 220* 111 172* 169* 133 79 99 168* 130 94 137 99           Results from last 7 days  Lab Units 01/31/19  1719   PROCALCITONIN ng/ml 0 18           * I Have Reviewed All Lab Data Listed Above  * Additional Pertinent Lab Tests Reviewed: All Labs Within Last 24 Hours Reviewed    Imaging:    Imaging Reports Reviewed Today Include:   Imaging Personally Reviewed by Myself Includes:      Recent Cultures (last 7 days):       Results from last 7 days  Lab Units 01/30/19  2123   INFLUENZA B PCR  Not Detected   RSV PCR  Not Detected       Last 24 Hours Medication List:     Current Facility-Administered Medications:  amLODIPine 5 mg Oral Daily Taya Travis PA-C   aspirin 81 mg Oral Daily Taya Travis PA-C   calcium acetate 1,334 mg Oral TID With Meals Taya Travis PA-C   carvedilol 12 5 mg Oral BID Taya Travis PA-C   donepezil 5 mg Oral HS Taya Travis PA-C   ferrous sulfate 325 mg Oral BID With Meals Taya Travis PA-C   guaiFENesin 600 mg Oral Q12H Taya Travis PA-C   heparin (porcine) 5,000 Units Subcutaneous Q8H [address] Taya Travis PA-C   insulin lispro 1-5 Units Subcutaneous 4x Daily (AC & HS) Taya Travis PA-C   levalbuterol 1 25 mg Nebulization Q8H PRN Taya Travis PA-C   losartan 25 mg Oral Daily Taya Travis PA-C   ondansetron 4 mg Intravenous Q4H PRN [employer] Spatzer, CRNP   pantoprazole 40 mg Oral Early Morning Taya Travis PA-C   pravastatin 80 mg Oral After Amador Prado PA-C        Today, Patient Was Seen By: Ana Bonilla PA-C    ** Please Note: Dictation voice to text software may have been used in the creation of this document   **

## 2019-02-04 NOTE — PHYSICAL THERAPY NOTE
Physical Therapy Treatment Note       02/04/19 1558   Pain Assessment   Pain Assessment No/denies pain   Pain Score No Pain   Restrictions/Precautions   Other Precautions Cognitive; Chair Alarm; Bed Alarm;Droplet precautions; Fall Risk   General   Chart Reviewed Yes   Family/Caregiver Present Yes   Cognition   Overall Cognitive Status Impaired   Arousal/Participation Alert; Responsive; Cooperative   Attention Within functional limits   Orientation Level Oriented to person   Memory Decreased recall of precautions;Decreased recall of recent events;Decreased short term memory   Following Commands Follows one step commands with increased time or repetition   Subjective   Subjective Pt offers no c/o pain  PT agreeable to PT  Pt's errolad reports he assists pt in and out of w/c at home  Bed Mobility   Supine to Sit 4  Minimal assistance   Additional items Assist x 1;HOB elevated; Bedrails; Increased time required;Verbal cues   Transfers   Sit to Stand 3  Moderate assistance   Additional items Assist x 1; Increased time required;Verbal cues  (and min A of another)   Stand to Sit 3  Moderate assistance   Additional items Assist x 1; Armrests; Increased time required;Verbal cues  (and min assist of another  )   Stand pivot 3  Moderate assistance   Additional items Assist x 1; Increased time required;Verbal cues  (with min assist of another)   Balance   Static Sitting Fair +   Static Standing Poor -   Dynamic Standing Poor -   Endurance Deficit   Endurance Deficit Yes   Endurance Deficit Description fatigue, weakness   Activity Tolerance   Activity Tolerance Patient limited by fatigue   Medical Staff Made Aware PTNancy   Exercises   Hip Flexion Supine;AROM; Bilateral  (3 reps  )   Hip Abduction Supine;AROM; Bilateral  (3 reps  )   Hip Adduction Sitting;5 reps;AROM; Bilateral  (3 reps  )   Ankle Pumps Supine;10 reps;AROM; Bilateral   Assessment   Prognosis Fair   Problem List Decreased strength;Decreased range of motion;Decreased endurance; Impaired balance;Decreased mobility; Decreased safety awareness;Decreased cognition   Assessment Pt seen for progression of mobility  Pt demonstrates impaired ability to perform bed mobility requiring assistance and modifications of elevation of head of bed, use of bed rails and increased time and cues  Pt performs sit to stand and stand to sit transfers with mod assist x1 and min assist of another for guidance of hips, pt performs stand pivot transfer with mod assist x1 and min of another for guidance of hips around to chair  Pt was able to bear weight through b/l le's  PT demonstrates deficits in mobility, strength, activity tolerance  PT appears to be near baseline level of mobility and would benefit from continued inpt PT inorder to maximize functional mobility, strength, balance, endurance inorder to achieve baseline functional  Mobility for safe d/c to home with assist and supervision of   IF family unable to provide current level of assist then recommend STR  Goals   Patient Goals " 's gooa is to be able to bring his wife home at d/c "     STG Expiration Date 02/12/19   Treatment Day 2   Plan   Treatment/Interventions Functional transfer training;LE strengthening/ROM; Therapeutic exercise; Endurance training;Patient/family training;Equipment eval/education; Bed mobility;OT; Family   Progress Progressing toward goals   PT Frequency (4-5x/week)   Recommendation   Recommendation Home PT; Home with family support  (family A & supervision if unable to provide A STR)   PT - OK to Discharge Yes  (to home when goals met)        02/04/19 1996   Pain Assessment   Pain Assessment No/denies pain   Pain Score No Pain   Restrictions/Precautions   Other Precautions Cognitive; Chair Alarm; Bed Alarm;Droplet precautions; Fall Risk   General   Chart Reviewed Yes   Family/Caregiver Present Yes   Cognition   Overall Cognitive Status Impaired   Arousal/Participation Alert; Responsive; Cooperative   Attention Within functional limits   Orientation Level Oriented to person   Memory Decreased recall of precautions;Decreased recall of recent events;Decreased short term memory   Following Commands Follows one step commands with increased time or repetition   Subjective   Subjective Pt offers no c/o pain  PT agreeable to PT  Pt's ryan reports he assists pt in and out of w/c at home  Bed Mobility   Supine to Sit 4  Minimal assistance   Additional items Assist x 1;HOB elevated; Bedrails; Increased time required;Verbal cues   Transfers   Sit to Stand 3  Moderate assistance   Additional items Assist x 1; Increased time required;Verbal cues  (and min A of another)   Stand to Sit 3  Moderate assistance   Additional items Assist x 1; Armrests; Increased time required;Verbal cues  (and min assist of another  )   Stand pivot 3  Moderate assistance   Additional items Assist x 1; Increased time required;Verbal cues  (with min assist of another)   Balance   Static Sitting Fair +   Static Standing Poor -   Dynamic Standing Poor -   Endurance Deficit   Endurance Deficit Yes   Endurance Deficit Description fatigue, weakness   Activity Tolerance   Activity Tolerance Patient limited by fatigue   Medical Staff Made Aware PTNancy   Exercises   Hip Flexion Supine;AROM; Bilateral  (3 reps  )   Hip Abduction Supine;AROM; Bilateral  (3 reps  )   Hip Adduction Sitting;5 reps;AROM; Bilateral  (3 reps  )   Ankle Pumps Supine;10 reps;AROM; Bilateral   Assessment   Prognosis Fair   Problem List Decreased strength;Decreased range of motion;Decreased endurance; Impaired balance;Decreased mobility; Decreased safety awareness;Decreased cognition   Assessment Pt seen for progression of mobility  Pt demonstrates impaired ability to perform bed mobility requiring assistance and modifications of elevation of head of bed, use of bed rails and increased time and cues    Pt performs sit to stand and stand to sit transfers with mod assist x1 and min assist of another for guidance of hips, pt performs stand pivot transfer with mod assist x1 and min of another for guidance of hips around to chair  Pt was able to bear weight through b/l le's  PT demonstrates deficits in mobility, strength, activity tolerance  PT appears to be near baseline level of mobility and would benefit from continued inpt PT inorder to maximize functional mobility, strength, balance, endurance inorder to achieve baseline functional  Mobility for safe d/c to home with assist and supervision of   IF family unable to provide current level of assist then recommend STR  Goals   Patient Goals " 's melvaoa is to be able to bring his wife home at d/c "     STG Expiration Date 02/12/19   Treatment Day 2   Plan   Treatment/Interventions Functional transfer training;LE strengthening/ROM; Therapeutic exercise; Endurance training;Patient/family training;Equipment eval/education; Bed mobility;OT; Family   Progress Progressing toward goals   PT Frequency (4-5x/week)   Recommendation   Recommendation Home PT; Home with family support  (family A & supervision if unable to provide A STR)   PT - OK to Discharge Yes  (to home when goals met)       [place] Moment, PTA

## 2019-02-04 NOTE — OCCUPATIONAL THERAPY NOTE
[address] [facility]     Patient Name: Evan Hayes  NSQMF'P Date: 2/4/2019  Problem List  Patient Active Problem List   Diagnosis    Allergic rhinitis    Anemia of chronic kidney failure    Arteriosclerosis of carotid artery    Cardiovascular arteriosclerosis    Cataract    Congestive heart failure ([place] [address] )    Diabetes mellitus with foot ulcer ([place] [address] )    Diastolic dysfunction    End-stage renal disease on hemodialysis ([place] [address] )    GERD without esophagitis    Hyperlipidemia    Hypertension    Iron deficiency anemia    Leg fracture, right    Overactive bladder    Peripheral arterial disease ([place] [address] )    Age-related osteoporosis without current pathological fracture    Secondary hyperparathyroidism ([place] [address] )    Type 2 diabetes mellitus with renal manifestations, controlled ([place] [address] )    Vitamin D deficiency    Acquired absence of left great toe (HCC)    Elevated troponin I level    Hypoglycemia    Bronchitis    End stage kidney disease (HCC)    Anemia    Thrombocytopenia (HCC)     Past Medical History  Past Medical History:   Diagnosis Date    Abnormal weight loss     last assessed [id number]    Anemia     Chronic kidney disease     Clostridium difficile colitis     last assessed [id number]    Depression     resolved [id number]    Diabetes mellitus ([place] [address] )     Fracture, tibial plateau     last assessed [id number]    GERD (gastroesophageal reflux disease)     Hyperkalemia     last assessed [id number]    Hypertension     Hypoglycemia     Sugars now up   Will increase the metformin;  last assessed [id number]    Hypokalemia     last assessed [id number]    Hypothermia     last assessed [id number]    Hypoxia     last assessed [id number]    Metatarsalgia     last assessed [id number]    Old myocardial infarction     Osteoporosis     Syncope     last assessed [id number]    Visual disturbance     last assessed [id number]     Past Surgical History  Past Surgical History:   Procedure Laterality Date    BYPASS GRAFT Right     H/O bypass graft (non-vein), Right Leg    CAROTID ENDARTERECTOMY      Neurological Surgery Cartoid Endartectomy    CATARACT EXTRACTION      CHOLECYSTECTOMY      questionable    CORONARY ARTERY BYPASS GRAFT      FOOT AMPUTATION Bilateral     H/O surgery foot amputation Metatarsal and toe;  Bilat    HERNIA REPAIR      Inguinal sliding    TONSILLECTOMY AND ADENOIDECTOMY      VENOUS THROMBECTOMY      H/O Arteriobenous surgery Thrombectomy of AV Fistula Percantaneous;  last assessed [id number]           02/04/19 2126   Note Type   Note type Eval/Treat   Restrictions/Precautions   Weight Bearing Precautions Per Order No   Other Precautions Cognitive; Chair Alarm; Bed Alarm;Droplet precautions; Fall Risk   Pain Assessment   Pain Assessment No/denies pain   Pain Score No Pain   Home Living   Type of [address] [place] [address] Multi-level;Ramped entrance; Able to live on main level with bedroom/bathroom   Bathroom Shower/Tub (sponge bathes)   Bathroom Toilet Standard   Bathroom Equipment Commode   Bathroom Accessibility (w/c does not fit into bathroom )   Home Equipment Walker; Wheelchair-manual   Additional Comments pt lives w/ spouse and reports spouse is mainly home and if not son or grandson are here to assist him as needed   Prior Function   Level of [place] Needs assistance with ADLs and functional mobility;Modified independent with wheelchair  (assist w/ LB ADLS)   Lives With [employer] Help From Family   ADL Assistance Independent  (UB ADLs, assist LB ADLs)   IADLs Needs assistance   Falls in the last 6 months 1 to 4   Vocational Retired   Comments pt is poor historian;  present and reports he completes SPT for pt to different surfaces such as w/c and BSC; pt MOD I w/c mobility   Lifestyle   Autonomy per pt independent w/ UB ADLs, assist LB ADLs, assist w/ SPT to w/c and BSC, MOD I w/c mobility, spouse and grandkids complete IADLS   Reciprocal Relationships spouse, children and [employer]   Service to Others reports worked at a local [employer]   [facility] 5  [address] [facility] 5  [address] [facility] 4  [address] 3  Moderate Assistance   [address] 4  [address] 3  Teresa Rodriguez 74  2  Maximal Assistance   Bed Mobility   Supine to Sit 4  Minimal assistance   Additional items Assist x 1;HOB elevated; Bedrails; Increased time required;Verbal cues   Additional Comments increased time to complete   Transfers   Sit to Stand 3  Moderate assistance   Additional items Assist x 1  (w/ min assist of 2nd )   Stand to Sit 3  Moderate assistance   Additional items Assist x 1; Increased time required;Verbal cues  (min assist of 2nd)   Stand pivot 3  Moderate assistance   Additional items Assist x 1; Increased time required;Verbal cues  (min assist of 2nd for guiding hips)   Additional Comments VCs for safety and positioning   Balance   Static Sitting Fair +   Dynamic Sitting Fair   Static Standing Poor -   Dynamic Standing Poor   Activity Tolerance   Activity Tolerance Patient limited by fatigue   Nurse Made Aware appropriate to see per RN, saye   RUE Assessment   RUE Assessment WFL  (4-/5)   LUE Assessment   LUE Assessment WFL  (4-/5)   Hand Function   Gross Motor Coordination Functional   Fine Motor Coordination Impaired   Sensation   Light Touch No apparent deficits   Proprioception   Proprioception No apparent deficits   Vision-Basic Assessment   Current Vision Wears glasses all the time   Vision - Complex Assessment   Ocular Range of Motion [facility]   Acuity Able to read clock/calendar on wall without difficulty   Cognition   Overall Cognitive Status Impaired   Arousal/Participation Alert; Responsive   Attention Within functional limits   Orientation Level Oriented to person;Oriented to place; Disoriented to situation;Disoriented to time  (reported was springtime)   Memory Decreased recall of recent events;Decreased recall of precautions;Decreased short term memory   Following Commands Follows one step commands with increased time or repetition   Comments pt w/ impaired STM, impaired insight and safety awareness   Assessment   Limitation Decreased ADL status; Decreased UE strength;Decreased cognition;Decreased Safe judgement during ADL;Decreased endurance;Decreased self-care trans;Decreased high-level ADLs; Decreased sensation   Prognosis Fair;Good   Assessment Pt is a 68 y o  female seen for OT evaluation s/p admit to [facility] on 1/31/2019 w/ Bronchitis  Pt on droplet for influenza  Pt transferred [facility] to [facility]  Comorbidities affecting pt's functional performance at time of assessment include: HTN, ESRD on dialysis, DM II, anemia  Personal factors affecting pt at time of IE include: poor historian  Prior to admission, pt was living w/ family and provides support/assistance and reports independent w/ UB ADLs, assist LB ADLs, assist for SPT from spouse or grandkids, MOD I w/c mobility  Upon evaluation: Pt requires MOD assist x1 SPT w/ min assist of 2nd, MOD assist x1 and MIN assist of 2nd sit>stand, MOD-MAX assist LB ADLs, MIN assist bed mobility, MAX assist toileting, MIN assist UB ADLs 2* the following deficits impacting occupational performance: decreased strength and endurance, impaired balance, impaired functional reach, impaired balance, decreased activity tolerance, impaired cognition (STM, insight, safety awareness)  Pt to benefit from continued skilled OT tx while in the hospital to address deficits as defined above and maximize level of functional independence w ADL's and functional mobility   Occupational Performance areas to address include: grooming, bathing/shower, toilet hygiene, dressing, functional mobility, clothing management and meal prep, formal cognitive assessment, home safety education  From OT standpoint, recommendation at time of d/c would be home w/ HOME OT and family support unless family unable to provide current level of assist then STR  Pt spouse prefers to take her home  Goals   Patient Goals "to go home"   LTG Time Frame 10-14   Long Term Goal please see below goals   Plan   Treatment Interventions ADL retraining;UE strengthening/ROM; Functional transfer training; Endurance training;Cognitive reorientation;Patient/family training;Equipment evaluation/education; Compensatory technique education; Energy conservation; Activityengagement   Goal Expiration Date 02/18/19   OT Frequency 3-5x/wk   Recommendation   OT Discharge Recommendation Home OT  (vs  STR)   OT - OK to Discharge (when medically stable)   Barthel Index   Feeding 5   Bathing 0   Grooming Score 5   Dressing Score 5   Bladder Score 5   Bowels Score 5   Toilet Use Score 5   Transfers (Bed/Chair) Score 5   Mobility (Level Surface) Score 0   Stairs Score 0   Barthel Index Score 35   Modified Karolina Scale   Modified [facility] Scale 4     Occupational Therapy Goals to be met in 10-14 days:  1) Pt will improve activity tolerance to G for min 30 min txment sessions to enhance ADLs  2) Pt will complete UB ADLs/self care w/ mod I and min assist LB ADLs   3) Pt will complete toileting w/ min assist w/ G hygiene/thoroughness using DME PRN  4) Pt will improve functional transfers on/off all surfaces using DME PRN w/ F balance/safety including toileting w/ min assist  5) Pt will engage in ongoing cognitive assessment w/ G participation to A w/ safe d/c planning/recommendations  6) Pt will demonstrate G carryover of pt/caregiver education and training as appropriate w/ mod I  w/ G tolerance  7) Pt will demonstrate 100% carryover of E C  techniques w/ mod I t/o fx'l I/ADL/leisure tasks w/o cues s/p skilled education  8) Pt will demonstrate improved bed mobility to supervision  9) Pt will demonstrate improved b/l UE strength by 1 MMT grade to enhance ADLS and functional transfers       Documentation completed by: Nadeem Chew MS, OTR/L

## 2019-02-04 NOTE — PLAN OF CARE
Problem: OCCUPATIONAL THERAPY ADULT  Goal: Performs self-care activities at highest level of function for planned discharge setting  See evaluation for individualized goals  Treatment Interventions: ADL retraining, UE strengthening/ROM, Functional transfer training, Endurance training, Cognitive reorientation, Patient/family training, Equipment evaluation/education, Compensatory technique education, Energy conservation, Activityengagement          See flowsheet documentation for full assessment, interventions and recommendations  Limitation: Decreased ADL status, Decreased UE strength, Decreased cognition, Decreased Safe judgement during ADL, Decreased endurance, Decreased self-care trans, Decreased high-level ADLs, Decreased sensation  Prognosis: Fair, Good  Assessment: Pt is a 68 y o  female seen for OT evaluation s/p admit to [facility] on 1/31/2019 w/ Bronchitis  Pt on droplet for influenza  Pt transferred from [address] [place] [address] to [facility]  Comorbidities affecting pt's functional performance at time of assessment include: HTN, ESRD on dialysis, DM II, anemia  Personal factors affecting pt at time of IE include: poor historian  Prior to admission, pt was living w/ family and provides support/assistance and reports independent w/ UB ADLs, assist LB ADLs, assist for SPT from spouse or grandkids, MOD I w/c mobility  Upon evaluation: Pt requires MOD assist x1 SPT w/ min assist of 2nd, MOD assist x1 and MIN assist of 2nd sit>stand, MOD-MAX assist LB ADLs, MIN assist bed mobility, MAX assist toileting, MIN assist UB ADLs 2* the following deficits impacting occupational performance: decreased strength and endurance, impaired balance, impaired functional reach, impaired balance, decreased activity tolerance, impaired cognition (STM, insight, safety awareness)   Pt to benefit from continued skilled OT tx while in the hospital to address deficits as defined above and maximize level of functional independence w ADL's and functional mobility  Occupational Performance areas to address include: grooming, bathing/shower, toilet hygiene, dressing, functional mobility, clothing management and meal prep, formal cognitive assessment, home safety education  From OT standpoint, recommendation at time of d/c would be home w/ HOME OT and family support unless family unable to provide current level of assist then STR  Pt spouse prefers to take her home        OT Discharge Recommendation: Home OT (vs  STR)  OT - OK to Discharge:  (when medically stable)      Comments: Sophia Reeves, MS, OTR/L

## 2019-02-04 NOTE — UTILIZATION REVIEW
Continued Stay Review    Date: 2/4/2019  Vital Signs: /52 (BP Location: Right arm)   Pulse 70   Temp 98 2 °F (36 8 °C) (Temporal)   Resp 18   Wt 70 6 kg (155 lb 10 3 oz) Comment: Pt unable to stand    SpO2 93%   BMI 26 72 kg/m²   Assessment/Plan: bronchitis influenza a positive   Awaiting placement  Thrombocytopenia - monitor  The patient will continue to require additional inpatient hospital stay due to influenza w/bronchitis awaiting placement  Medications:   Scheduled Meds:   Current Facility-Administered Medications:  amLODIPine 5 mg Oral Daily Taya Travis PA-C   aspirin 81 mg Oral Daily Taya Travis PA-C   calcium acetate 1,334 mg Oral TID With Meals Taya Travis PA-C   carvedilol 12 5 mg Oral BID Taya Travis PA-C   donepezil 5 mg Oral HS Taya Travis PA-C   ferrous sulfate 325 mg Oral BID With Meals Taya Travis PA-C   guaiFENesin 600 mg Oral Q12H Taya Travis PA-C   heparin (porcine) 5,000 Units Subcutaneous Q8H [address] Taya Travis PA-C   insulin lispro 1-5 Units Subcutaneous 4x Daily (AC & HS) Taya Travis PA-C   levalbuterol 1 25 mg Nebulization Q8H PRN Taya Travis PA-C   losartan 25 mg Oral Daily Taya Travis PA-C   ondansetron 4 mg Intravenous Q4H PRN Eliseo Ridgel Spatzer, CRNP   pantoprazole 40 mg Oral Early Morning Taya Travis PA-C   pravastatin 80 mg Oral After Socrates Travis PA-C     Continuous Infusions:    PRN Meds: levalbuterol    ondansetron  Pertinent Labs/Diagnostic Results: bs 111-220  Age/Sex: 68 y o  female   Discharge Plan: [facility]

## 2019-02-04 NOTE — SOCIAL WORK
ARVIND m/w the pt and her family to discuss STR  [facility] has accepted the pt however pt's family would like something closer to home  Family prefers [facility]  ARVIND reached out to both SNFs via [facility]  SW awaits a response

## 2019-02-04 NOTE — PROGRESS NOTES
NEPHROLOGY PROGRESS NOTE   Elder Lady 68 y o  female MRN: [id number]  Unit/Bed#: E4 -01 Encounter: [id number]  Reason for Consult:  ESRD on HD    ASSESSMENT/PLAN:  1  ESRD on HD:  TTS at [facility] in [facility] pass    -continue with hemodialysis as scheduled tomorrow   -currently under dry weight, will need to continue to adjust     Access:  Upper extremity AV fistula  + bruit, + thrill  2  Hypertension:  Labile blood pressures  Continue to monitor with UF on HD    -continue Norvasc 5 mg p o  daily and losartan 25 mg p o  Daily p r n     3  Acute on chronic CHF/Volume status:  Examined euvolemic  Remains on room air  4  Anemia an ESRD:  Last hemoglobin was 9 7 (12 upon admission)  - Will check OP records for BOOGIE  -continue on oral iron supplementation p r n     5  MBD and ESRD:   - continue to monitor PTH/phosphorus as outpatient  - continue on renal diet   -continue with Renal binders  6   Influenza:  Care per primary care team     Disposition:  Okay to discharge from renal when medically stable  SUBJECTIVE:  The patient states she is doing well today  She is currently being bathed by nursing students  She denies any chest pain  She states that she has a nonproductive cough  She denies any nausea, vomiting, or diarrhea  She is eating and drinking well      OBJECTIVE:  Current Weight: Weight - Scale: 70 6 kg (155 lb 10 3 oz) (Pt unable to stand )  Vitals:    02/03/19 2310 02/04/19 0016 02/04/19 0300 02/04/19 0740   BP:    120/52   BP Location:    Right arm   Pulse:    70   Resp:    18   Temp:    98 2 °F (36 8 °C)   TempSrc:    Temporal   SpO2: 92% 96% 91% 93%   Weight:           Intake/Output Summary (Last 24 hours) at 02/04/19 1011  Last data filed at 02/04/19 0634   Gross per 24 hour   Intake              360 ml   Output              710 ml   Net             -350 ml     General: No apparent distress  Skin: warm, dry, intact, no rash  HEENT: Moist mucous membranes, sclera anicteric, normocephalic  Neck: No apparent JVD appreciated  Chest: Lung sounds clear B/L, on RA, upper airway secretions p r n    Heart: Regular rate and rhythm, No murmer  Abdomen: Soft, round, NT, +BS  Extremities: No B/L LE edema present  Neuro: Alert and oriented  Psych: Appropriate mood and affect    Medications:    Current Facility-Administered Medications:     amLODIPine (NORVASC) tablet 5 mg, 5 mg, Oral, Daily, Taya Travis PA-C, 5 mg at 02/04/19 0850    aspirin chewable tablet 81 mg, 81 mg, Oral, Daily, Taya Travis PA-C, 81 mg at 02/04/19 0852    calcium acetate (PHOSLO) capsule 1,334 mg, 1,334 mg, Oral, TID With Meals, Taya Travis PA-C, 1,334 mg at 02/04/19 0850    carvedilol (COREG) tablet 12 5 mg, 12 5 mg, Oral, BID, Taya Travis PA-C, 12 5 mg at 02/04/19 0852    donepezil (ARICEPT) tablet 5 mg, 5 mg, Oral, HS, Taya Travis PA-C, 5 mg at 02/03/19 2100    ferrous sulfate tablet 325 mg, 325 mg, Oral, BID With Meals, Taya Travis PA-C, 325 mg at 02/04/19 0852    guaiFENesin (MUCINEX) 12 hr tablet 600 mg, 600 mg, Oral, Q12H, Taya Travis PA-C, 600 mg at 02/04/19 0852    heparin (porcine) subcutaneous injection 5,000 Units, 5,000 Units, Subcutaneous, Q8H [facility], 5,000 Units at 02/04/19 0522 **AND** Platelet count, , , Once, Taya Travis PA-C    insulin lispro (HumaLOG) 100 units/mL subcutaneous injection 1-5 Units, 1-5 Units, Subcutaneous, 4x Daily (AC & HS), 1 Units at 02/03/19 2200 **AND** Fingerstick Glucose (POCT), , , 4x Daily AC and at bedtime, Taya Travis PA-C    levalbuterol (XOPENEX) inhalation solution 1 25 mg, 1 25 mg, Nebulization, Q8H PRN, Taya Travis PA-C    losartan (COZAAR) tablet 25 mg, 25 mg, Oral, Daily, Taya Travis PA-C, 25 mg at 02/04/19 0851    ondansetron (ZOFRAN) injection 4 mg, 4 mg, Intravenous, Q4H PRN, Annett Read Spatzer, CRNP    pantoprazole (PROTONIX) EC tablet 40 mg, 40 mg, Oral, Early Morning, Taya WALKER CHRISTIANA Travis, 40 mg at 02/04/19 0522    pravastatin (PRAVACHOL) tablet 80 mg, 80 mg, Oral, After Malloryelmer Brown PA-C, 80 mg at 02/03/19 1825    Laboratory Results:    Results from last 7 days  Lab Units 02/02/19  1648 02/01/19  0855 01/31/19  1719 01/30/19  2106   WBC Thousand/uL 4 97  --   --  5 70   HEMOGLOBIN g/dL 9 7*  --   --  12 0   HEMATOCRIT % 29 4*  --   --  35 6   PLATELETS Thousands/uL 119*  --  120* 139*   POTASSIUM mmol/L 4 0 3 4*  --  3 7   CHLORIDE mmol/L 97* 98*  --  94*   CO2 mmol/L 30 26  --  32*   BUN mg/dL 29* 57*  --  38*   CREATININE mg/dL 4 55* 6 79*  --  4 90*   CALCIUM mg/dL 9 5 9 1  --  10 5

## 2019-02-04 NOTE — PLAN OF CARE
Problem: PHYSICAL THERAPY ADULT  Goal: Performs mobility at highest level of function for planned discharge setting  See evaluation for individualized goals  Treatment/Interventions: Functional transfer training, LE strengthening/ROM, Elevations, Therapeutic exercise, Endurance training, Patient/family training, Equipment eval/education, Bed mobility, Gait training, Compensatory technique education, Continued evaluation, Spoke to nursing          See flowsheet documentation for full assessment, interventions and recommendations  Outcome: Progressing  Prognosis: Fair  Problem List: Decreased strength, Decreased range of motion, Decreased endurance, Impaired balance, Decreased mobility, Decreased safety awareness, Decreased cognition  Assessment: Pt seen for progression of mobility  Pt demonstrates impaired ability to perform bed mobility requiring assistance and modifications of elevation of head of bed, use of bed rails and increased time and cues  Pt performs sit to stand and stand to sit transfers with mod assist x1 and min assist of another for guidance of hips, pt performs stand pivot transfer with mod assist x1 and min of another for guidance of hips around to chair  Pt was able to bear weight through b/l le's  PT demonstrates deficits in mobility, strength, activity tolerance  PT appears to be near baseline level of mobility and would benefit from continued inpt PT inorder to maximize functional mobility, strength, balance, endurance inorder to achieve baseline functional  Mobility for safe d/c to home with assist and supervision of   IF family unable to provide current level of assist then recommend STR  Barriers to Discharge: Decreased caregiver support  Barriers to Discharge Comments: reports alone days while family working?   Recommendation: Home PT, Home with family support (family A & supervision if unable to provide A STR)     PT - OK to Discharge: Yes (to home when goals met)    See flowsheet documentation for full assessment

## 2019-02-04 NOTE — ASSESSMENT & PLAN NOTE
Acute infectious bronchitis w/no hx of copd/asthma  cxr concerning for mild vascular congestion however none demonstated on CTA PE study  No PE demonstrated  Start xopenex/atrovent, mucinex  procalcitonin wnl  Influenza A positive  Uncertain timeline for duration of s/sx and afebrile  No need for tamiflu  Awaiting placement to rehab  Pt is uncertain today if she would want rehab but her  is concerned he cannot assist with her ADLs and is to discuss this with her further tonight

## 2019-02-05 ENCOUNTER — APPOINTMENT (INPATIENT)
Dept: DIALYSIS | Facility: HOSPITAL | Age: 74
DRG: 682 | End: 2019-02-05
Payer: MEDICARE

## 2019-02-05 VITALS
DIASTOLIC BLOOD PRESSURE: 50 MMHG | OXYGEN SATURATION: 91 % | SYSTOLIC BLOOD PRESSURE: 127 MMHG | RESPIRATION RATE: 20 BRPM | TEMPERATURE: 96.3 F | WEIGHT: 155.65 LBS | BODY MASS INDEX: 26.72 KG/M2 | HEART RATE: 84 BPM

## 2019-02-05 LAB
ANION GAP SERPL CALCULATED.3IONS-SCNC: 7 MMOL/L (ref 4–13)
BUN SERPL-MCNC: 48 MG/DL (ref 5–25)
CALCIUM SERPL-MCNC: 10.7 MG/DL (ref 8.3–10.1)
CHLORIDE SERPL-SCNC: 94 MMOL/L (ref 100–108)
CO2 SERPL-SCNC: 29 MMOL/L (ref 21–32)
CREAT SERPL-MCNC: 5.93 MG/DL (ref 0.6–1.3)
GFR SERPL CREATININE-BSD FRML MDRD: 7 ML/MIN/1.73SQ M
GLUCOSE SERPL-MCNC: 156 MG/DL (ref 65–140)
GLUCOSE SERPL-MCNC: 160 MG/DL (ref 65–140)
GLUCOSE SERPL-MCNC: 176 MG/DL (ref 65–140)
GLUCOSE SERPL-MCNC: 177 MG/DL (ref 65–140)
POTASSIUM SERPL-SCNC: 4.6 MMOL/L (ref 3.5–5.3)
SODIUM SERPL-SCNC: 130 MMOL/L (ref 136–145)

## 2019-02-05 PROCEDURE — 82948 REAGENT STRIP/BLOOD GLUCOSE: CPT

## 2019-02-05 PROCEDURE — 80048 BASIC METABOLIC PNL TOTAL CA: CPT | Performed by: INTERNAL MEDICINE

## 2019-02-05 PROCEDURE — 99232 SBSQ HOSP IP/OBS MODERATE 35: CPT | Performed by: INTERNAL MEDICINE

## 2019-02-05 PROCEDURE — 99239 HOSP IP/OBS DSCHRG MGMT >30: CPT | Performed by: PHYSICIAN ASSISTANT

## 2019-02-05 RX ORDER — ALBUTEROL SULFATE 90 UG/1
2 AEROSOL, METERED RESPIRATORY (INHALATION) EVERY 6 HOURS PRN
Qty: 8.5 G | Refills: 0 | Status: SHIPPED | OUTPATIENT
Start: 2019-02-05 | End: 2019-06-10

## 2019-02-05 RX ADMIN — CALCIUM ACETATE 1334 MG: 667 CAPSULE ORAL at 09:58

## 2019-02-05 RX ADMIN — FERROUS SULFATE TAB 325 MG (65 MG ELEMENTAL FE) 325 MG: 325 (65 FE) TAB at 17:52

## 2019-02-05 RX ADMIN — GUAIFENESIN 600 MG: 600 TABLET, EXTENDED RELEASE ORAL at 10:00

## 2019-02-05 RX ADMIN — INSULIN LISPRO 1 UNITS: 100 INJECTION, SOLUTION INTRAVENOUS; SUBCUTANEOUS at 13:25

## 2019-02-05 RX ADMIN — CARVEDILOL 12.5 MG: 12.5 TABLET, FILM COATED ORAL at 09:59

## 2019-02-05 RX ADMIN — INSULIN LISPRO 1 UNITS: 100 INJECTION, SOLUTION INTRAVENOUS; SUBCUTANEOUS at 16:51

## 2019-02-05 RX ADMIN — CALCIUM ACETATE 1334 MG: 667 CAPSULE ORAL at 16:50

## 2019-02-05 RX ADMIN — PRAVASTATIN SODIUM 80 MG: 80 TABLET ORAL at 17:52

## 2019-02-05 RX ADMIN — ASPIRIN 81 MG 81 MG: 81 TABLET ORAL at 09:58

## 2019-02-05 RX ADMIN — CALCIUM ACETATE 1334 MG: 667 CAPSULE ORAL at 13:24

## 2019-02-05 RX ADMIN — FERROUS SULFATE TAB 325 MG (65 MG ELEMENTAL FE) 325 MG: 325 (65 FE) TAB at 09:59

## 2019-02-05 RX ADMIN — INSULIN LISPRO 1 UNITS: 100 INJECTION, SOLUTION INTRAVENOUS; SUBCUTANEOUS at 10:00

## 2019-02-05 RX ADMIN — HEPARIN SODIUM 5000 UNITS: 5000 INJECTION INTRAVENOUS; SUBCUTANEOUS at 13:24

## 2019-02-05 RX ADMIN — PANTOPRAZOLE SODIUM 40 MG: 40 TABLET, DELAYED RELEASE ORAL at 06:25

## 2019-02-05 RX ADMIN — HEPARIN SODIUM 5000 UNITS: 5000 INJECTION INTRAVENOUS; SUBCUTANEOUS at 06:25

## 2019-02-05 NOTE — PROGRESS NOTES
NEPHROLOGY PROGRESS NOTE   Gillermina Sever 68 y o  female MRN: [id number]  Unit/Bed#: E4 -01 Encounter: [id number]      ASSESSMENT & PLAN:  1  ESRD on HD TTS at [facility]  For dialysis today following her op schedule  UF as tolerated    2  Bronchitis 2/2 flu - as per primary team    3  HTN - labile BP, for UF today as tolerated    4  Anemia of CKD - follow H/H    5  Access, left UE AVF    Discussed with Dr Myron Collins from internal medicine team    SUBJECTIVE:  Patient seen and examined, denies CP, SOB improving, feeling tired    For dialysis today    OBJECTIVE:  Current Weight: Weight - Scale: 70 6 kg (155 lb 10 3 oz) (Pt unable to stand )  Vitals:    02/05/19 0723   BP: (!) 174/65   Pulse: 64   Resp: 20   Temp: (!) 96 4 °F (35 8 °C)   SpO2: 93%       Intake/Output Summary (Last 24 hours) at 02/05/19 1120  Last data filed at 02/05/19 7638   Gross per 24 hour   Intake                0 ml   Output              384 ml   Net             -384 ml     General: conscious, cooperative, in not acute distress  Eyes: conjunctivae pink, anicteric sclerae  ENT: lips and mucous membranes moist  Neck: supple, no JVD  Chest: clear breath sounds bilateral, no crackles, ronchus or wheezings, few rales  CVS: distinct S1 & S2, normal rate, regular rhythm  Abdomen: soft, non-tender, non-distended, normoactive bowel sounds  Extremities: no edema of both legs, left upper extremity AV fistula with + thrill and bruit  Skin: no rash  Neuro: awake, alert, oriented    Medications:    Current Facility-Administered Medications:     amLODIPine (NORVASC) tablet 5 mg, 5 mg, Oral, Daily, Taya Travis PA-C, Stopped at 02/05/19 1001    aspirin chewable tablet 81 mg, 81 mg, Oral, Daily, Taya Travis PA-C, 81 mg at 02/05/19 0958    calcium acetate (PHOSLO) capsule 1,334 mg, 1,334 mg, Oral, TID With Meals, Taya Travis PA-C, 1,334 mg at 02/05/19 0958    carvedilol (COREG) tablet 12 5 mg, 12 5 mg, Oral, BID, Taya Travis, CHRISTIANA, 12 5 mg at 02/05/19 0959    donepezil (ARICEPT) tablet 5 mg, 5 mg, Oral, HS, Taya Travis PA-C, 5 mg at 02/04/19 2139    ferrous sulfate tablet 325 mg, 325 mg, Oral, BID With Meals, Perla Villarreal PA-C, 325 mg at 02/05/19 0959    guaiFENesin (MUCINEX) 12 hr tablet 600 mg, 600 mg, Oral, Q12H, Taya Travis PA-C, 600 mg at 02/05/19 1000    heparin (porcine) subcutaneous injection 5,000 Units, 5,000 Units, Subcutaneous, Q8H [address], 5,000 Units at 02/05/19 0625 **AND** Platelet count, , , Once, Taya Travis PA-C    insulin lispro (HumaLOG) 100 units/mL subcutaneous injection 1-5 Units, 1-5 Units, Subcutaneous, 4x Daily (AC & HS), 1 Units at 02/05/19 1000 **AND** Fingerstick Glucose (POCT), , , 4x Daily AC and at bedtime, Taya Travis PA-C    levalbuterol (XOPENEX) inhalation solution 1 25 mg, 1 25 mg, Nebulization, Q8H PRN, Taya Travis PA-C    losartan (COZAAR) tablet 25 mg, 25 mg, Oral, Daily, Taya Travis PA-C, Stopped at 02/05/19 1001    ondansetron (ZOFRAN) injection 4 mg, 4 mg, Intravenous, Q4H PRN, Eustacio Learned Spatzer, CRNP    pantoprazole (PROTONIX) EC tablet 40 mg, 40 mg, Oral, Early Morning, Taya Travis PA-C, 40 mg at 02/05/19 0625    pravastatin (PRAVACHOL) tablet 80 mg, 80 mg, Oral, After Kendall Geeta Travis PA-C, 80 mg at 02/04/19 1701    Invasive Devices:        Lab Results:     Results from last 7 days  Lab Units 02/02/19  1648 02/01/19  0855 01/31/19  1719 01/30/19  2106   WBC Thousand/uL 4 97  --   --  5 70   HEMOGLOBIN g/dL 9 7*  --   --  12 0   HEMATOCRIT % 29 4*  --   --  35 6   PLATELETS Thousands/uL 119*  --  120* 139*   SODIUM mmol/L 135* 137  --  137   POTASSIUM mmol/L 4 0 3 4*  --  3 7   CHLORIDE mmol/L 97* 98*  --  94*   CO2 mmol/L 30 26  --  32*   BUN mg/dL 29* 57*  --  38*   CREATININE mg/dL 4 55* 6 79*  --  4 90*   CALCIUM mg/dL 9 5 9 1  --  10 5   ALK PHOS U/L  --   --   --  48*   ALT U/L  --   --   --  15   AST U/L  --   -- --  26       Portions of the record may have been created with voice recognition software  Occasional wrong word or "sound a like" substitutions may have occurred due to the inherent limitations of voice recognition software  Read the chart carefully and recognize, using context, where substitutions have occurred  If you have any questions, please contact the dictating provider

## 2019-02-05 NOTE — ASSESSMENT & PLAN NOTE
Lab Results   Component Value Date    HGBA1C 6 5 01/22/2019       Recent Labs      02/04/19   1626  02/04/19   2043  02/05/19   0730  02/05/19   1124   POCGLU  188*  199*  160*  177*       Blood Sugar Average: Last 72 hrs:  (P) 149 [id number]     Suspect brittle dm   starlix held during this admission

## 2019-02-05 NOTE — SOCIAL WORK
Per Dr Elmer Fontaine, pt is medically cleared to d/c today after dialysis  PT/OT recommending home PT/OT upon d/c, cm sent referral to [facility] for RN/PT/OT  Cm spoke with spouse Perry Tillman and he said he is able to pick her up tonight after dialysis  Cm following

## 2019-02-05 NOTE — DISCHARGE SUMMARY
Discharge- Evan Hayes 1945, 68 y o  female MRN: [id number]    Unit/Bed#: E4 -01 Encounter: [id number]    Primary Care Provider: Felix Trammell DO   Date and time admitted to hospital: 1/31/2019  2:45 PM      Discharge Summary - [facility]  Internal Medicine    Patient Information: Evan Hayes 68 y o  female MRN: [id number]  Unit/Bed#: E4 -01 Encounter: [id number]    Discharging Physician / Practitioner: Reji Carr PA-C  PCP: Felix rTammell DO  Admission Date: 1/31/2019  Discharge Date: 02/05/19    Disposition:     Home with VNA Services (Reminder: Complete face to face encounter)    Reason for Admission: bronchitis, elevated troponin I      Discharge Diagnoses:     Principal Problem:    Bronchitis  Active Problems:    Cardiovascular arteriosclerosis    Hypertension    Type 2 diabetes mellitus with renal manifestations, controlled (HCC)    Elevated troponin I level    Hypoglycemia    End stage kidney disease ([facility] [address] )    Anemia    Thrombocytopenia ([facility] [address] )  Resolved Problems:    * No resolved hospital problems  *      Consultations During Hospital Stay:  · Nephrology  · Cardiology  ·     Procedures Performed:     · HD    Significant Findings / Test Results:     · Influenza A positive    Incidental Findings:   ·      Test Results Pending at Discharge (will require follow up):   ·      Outpatient Tests Requested:  ·     Complications:      Hospital Course:     Evan Hayes is a 68 y o  female patient who originally presented to the hospital on 1/31/2019  presents with PMH of end-stage renal disease on hemodialysis, CAD status post remote CABG, diabetes, hypertension, hyperlipidemia, CAD coming to the hospital for shortness of breath and fatigue and weakness  She is accompanied by her  and grandson who are at bedside  Patient is a somewhat poor historian and has been demonstrating increasing forgetfulness    She reports that she has been having shortness of breath over the last several days she does have a cough which is intermittently loose and productive of clear white sputum  No sore throat no sinus congestion or myalgias  No chest pain  She does have PND due to coughing and orthopnea which is chronic  no lower extremity edema, however patient's  is concerned that her abdominal girth has increased slightly over the last several days  He notes her weight only went up by 3/4 lb in the last few days  The patient was going to the commode the day prior to admission and was too weak to get up and had a controlled fall which she slid out of her couch to the floor  He is able to get her up and called EMS  At arrival [facility] ER the patient was found have a mild elevated troponin  Initial evaluation with CXR was concern for vascular congestion CT AP study was unremarkable however  The patient is on hemodialysis Tuesday Thursday Saturday with left upper extremity fistula access  Patient had hemodialysis on Monday as the hemodialysis and was closed on Tuesday due to inclement weather  Patient is due for hemodialysis today  We are asked to admit the patient due to concern for volume overload and elevated troponin I and pt was transferred to this facility      * Bronchitis   Assessment & Plan    Acute infectious bronchitis w/no hx of copd/asthma 2* influenza A  cxr concerning for mild vascular congestion however none demonstated on CTA PE study  No PE demonstrated   Uncertain timeline for duration of s/sx and afebrile  No need for tamiflu  D/c home w/home PT/OT w/proair prn sob     Anemia   Assessment & Plan    Mild  Baseline anemia around 11  No s/sx of acute gib  Likely 2* anemia of chronic disease  Continue to monitor     End stage kidney disease ([facility] [address] )   Assessment & Plan    Continue HD TTHS     Elevated troponin I level   Assessment & Plan    Nonspecific  Pt w/o cp  And this is likely 2* renal clearance from ESRD    Pt was transferred for this and cardiac evaluation  Agree w/cards no further intervention recommended at this time  Continue asa/bb/statin for hx of CAD     Type 2 diabetes mellitus with renal manifestations, controlled [facility])   Assessment & Plan    Lab Results   Component Value Date    HGBA1C 6 5 01/22/2019       Recent Labs      02/04/19   1626  02/04/19   2043  02/05/19   0730  02/05/19   1124   POCGLU  188*  199*  160*  177*       Blood Sugar Average: Last 72 hrs:  (P) 149 [id number]     Suspect brittle dm   starlix held during this admission     Hypertension   Assessment & Plan    Continue coreg/norvasc/cozaar  No longer on demadex     Cardiovascular arteriosclerosis   Assessment & Plan    Remote hx of CAD S/p CABG x 3  Continue bb, statin, asa  Per  no longer on isordil           Condition at Discharge: good     Discharge Day Visit / Exam:     Subjective:  Patient seen examined  She has no events overnight  She reports no shortness of breath or chest pain  She is somewhat tired today  No nausea vomiting fevers chills or diarrhea  Vitals: Blood Pressure: 148/63 (02/05/19 1449)  Pulse: 57 (02/05/19 1449)  Temperature: (!) 96 2 °F (35 7 °C) (02/05/19 1118)  Temp Source: Oral (02/05/19 1118)  Respirations: 20 (02/05/19 1118)  Weight - Scale: 70 6 kg (155 lb 10 3 oz) (Pt unable to stand ) (02/03/19 0535)  SpO2: 98 % (02/05/19 1125)  Exam:   Physical Exam   Constitutional: She appears well-developed  No distress  Patient appears stated age, appears ill, NAD   HENT:   Head: Normocephalic and atraumatic  Right Ear: External ear normal    Left Ear: External ear normal    Eyes: Conjunctivae are normal    Neck: Normal range of motion  Cardiovascular: Normal rate, regular rhythm and normal heart sounds  Exam reveals no gallop and no friction rub  No murmur heard  Pulmonary/Chest: She has no wheezes  She has no rales  Left lower lung rhonchi in mid/lower lungs  Pt on ra  No wob   Abdominal: Soft  She exhibits no distension  There is no tenderness  There is no rebound and no guarding  Musculoskeletal: She exhibits no edema  Neurological: She is alert  Skin: Skin is warm and dry  She is not diaphoretic  Psychiatric: She has a normal mood and affect  Vitals reviewed  (  Be Sure to Include Physical Exam: Delete this entire line when you have entered your exam)  Discussion with Family:     Discharge instructions/Information to patient and family:   See after visit summary for information provided to patient and family  Provisions for Follow-Up Care:  See after visit summary for information related to follow-up care and any pertinent home health orders  Planned Readmission: none     Discharge Statement:  I spent 45 minutes discharging the patient  This time was spent on the day of discharge  I had direct contact with the patient on the day of discharge  Greater than 50% of the total time was spent examining patient, answering all patient questions, arranging and discussing plan of care with patient as well as directly providing post-discharge instructions  Additional time then spent on discharge activities  Discharge Medications:  See after visit summary for reconciled discharge medications provided to patient and family        ** Please Note: This note has been constructed using a voice recognition system **

## 2019-02-05 NOTE — ASSESSMENT & PLAN NOTE
Acute infectious bronchitis w/no hx of copd/asthma 2* influenza A  cxr concerning for mild vascular congestion however none demonstated on CTA PE study  No PE demonstrated   Uncertain timeline for duration of s/sx and afebrile    No need for tamiflu  D/c home w/home PT/OT w/proair prn sob

## 2019-02-05 NOTE — DISCHARGE INSTRUCTIONS

## 2019-02-05 NOTE — HEMODIALYSIS
Completed 2 1/2 hours of hemodialysis  BP low in last 30 minutes  Treatment terminated  Given 300 ml NSS extra on reinfusion  /50 after reinfused  Remove 0 5 kg during treatment  Cannulated with 2-15g needles without difficulty  Patient to be discharged this evening

## 2019-02-05 NOTE — PLAN OF CARE
DISCHARGE PLANNING     Discharge to home or other facility with appropriate resources Completed        DISCHARGE PLANNING - CARE MANAGEMENT     Discharge to post-acute care or home with appropriate resources Completed        GENITOURINARY - ADULT     Maintains or returns to baseline urinary function Completed        HEMATOLOGIC - ADULT     Maintains hematologic stability Completed        INFECTION - ADULT     Absence or prevention of progression during hospitalization Completed        Knowledge Deficit     Patient/family/caregiver demonstrates understanding of disease process, treatment plan, medications, and discharge instructions Completed        METABOLIC, FLUID AND ELECTROLYTES - ADULT     Electrolytes maintained within normal limits Completed     Fluid balance maintained Completed        Nutrition/Hydration-ADULT     Nutrient/Hydration intake appropriate for improving, restoring or maintaining nutritional needs Completed        Potential for Falls     Patient will remain free of falls Completed        Prexisting or High Potential for Compromised Skin Integrity     Skin integrity is maintained or improved Completed        SAFETY ADULT     Patient will remain free of falls Completed

## 2019-02-05 NOTE — NURSING NOTE
Pt discharged to home w/ [facility], PT, OT  Pt A&O, able to identify needs  Moderate to max  Assist w/ ADL's  D/C summary explained and provided to spouse and pt w/ 1 signed script given (non-narcotic)  Both receptive to education  All belongings verified upon d/c and returned w/ pt  Pt assisted out of facility via w/c w/ family at side, nurse and PCA  Personal transport provided

## 2019-02-06 ENCOUNTER — TRANSITIONAL CARE MANAGEMENT (OUTPATIENT)
Dept: INTERNAL MEDICINE CLINIC | Facility: CLINIC | Age: 74
End: 2019-02-06

## 2019-02-13 ENCOUNTER — OFFICE VISIT (OUTPATIENT)
Dept: INTERNAL MEDICINE CLINIC | Facility: CLINIC | Age: 74
End: 2019-02-13
Payer: MEDICARE

## 2019-02-13 VITALS
HEIGHT: 64 IN | TEMPERATURE: 97.6 F | SYSTOLIC BLOOD PRESSURE: 122 MMHG | BODY MASS INDEX: 26.8 KG/M2 | OXYGEN SATURATION: 98 % | DIASTOLIC BLOOD PRESSURE: 50 MMHG | WEIGHT: 157 LBS | HEART RATE: 54 BPM | RESPIRATION RATE: 18 BRPM

## 2019-02-13 DIAGNOSIS — Z99.2 END-STAGE RENAL DISEASE ON HEMODIALYSIS (HCC): ICD-10-CM

## 2019-02-13 DIAGNOSIS — R77.8 ELEVATED TROPONIN I LEVEL: ICD-10-CM

## 2019-02-13 DIAGNOSIS — Z99.2 CONTROLLED TYPE 2 DIABETES MELLITUS WITH CHRONIC KIDNEY DISEASE ON CHRONIC DIALYSIS, WITH LONG-TERM CURRENT USE OF INSULIN (HCC): ICD-10-CM

## 2019-02-13 DIAGNOSIS — E87.1 HYPONATREMIA: ICD-10-CM

## 2019-02-13 DIAGNOSIS — I73.9 PERIPHERAL ARTERIAL DISEASE (HCC): ICD-10-CM

## 2019-02-13 DIAGNOSIS — N18.6 CONTROLLED TYPE 2 DIABETES MELLITUS WITH CHRONIC KIDNEY DISEASE ON CHRONIC DIALYSIS, WITH LONG-TERM CURRENT USE OF INSULIN (HCC): ICD-10-CM

## 2019-02-13 DIAGNOSIS — J40 BRONCHITIS: ICD-10-CM

## 2019-02-13 DIAGNOSIS — J10.1 INFLUENZA A: Primary | ICD-10-CM

## 2019-02-13 DIAGNOSIS — D63.1 ANEMIA OF CHRONIC RENAL FAILURE, STAGE 5 (HCC): ICD-10-CM

## 2019-02-13 DIAGNOSIS — E11.621 TYPE 2 DIABETES MELLITUS WITH FOOT ULCER, UNSPECIFIED WHETHER LONG TERM INSULIN USE (HCC): ICD-10-CM

## 2019-02-13 DIAGNOSIS — E11.22 CONTROLLED TYPE 2 DIABETES MELLITUS WITH CHRONIC KIDNEY DISEASE ON CHRONIC DIALYSIS, WITH LONG-TERM CURRENT USE OF INSULIN (HCC): ICD-10-CM

## 2019-02-13 DIAGNOSIS — L97.509 TYPE 2 DIABETES MELLITUS WITH FOOT ULCER, UNSPECIFIED WHETHER LONG TERM INSULIN USE (HCC): ICD-10-CM

## 2019-02-13 DIAGNOSIS — N18.5 ANEMIA OF CHRONIC RENAL FAILURE, STAGE 5 (HCC): ICD-10-CM

## 2019-02-13 DIAGNOSIS — Z89.412 ACQUIRED ABSENCE OF LEFT GREAT TOE (HCC): ICD-10-CM

## 2019-02-13 DIAGNOSIS — N18.6 END-STAGE RENAL DISEASE ON HEMODIALYSIS (HCC): ICD-10-CM

## 2019-02-13 DIAGNOSIS — Z79.4 CONTROLLED TYPE 2 DIABETES MELLITUS WITH CHRONIC KIDNEY DISEASE ON CHRONIC DIALYSIS, WITH LONG-TERM CURRENT USE OF INSULIN (HCC): ICD-10-CM

## 2019-02-13 PROBLEM — D64.9 ANEMIA: Status: RESOLVED | Noted: 2019-02-03 | Resolved: 2019-02-13

## 2019-02-13 PROCEDURE — 99495 TRANSJ CARE MGMT MOD F2F 14D: CPT | Performed by: INTERNAL MEDICINE

## 2019-02-13 RX ORDER — NATEGLINIDE 120 MG/1
120 TABLET ORAL
COMMUNITY
End: 2019-03-26 | Stop reason: SDUPTHER

## 2019-02-13 NOTE — PROGRESS NOTES
Assessment/Plan:     No problem-specific Assessment & Plan notes found for this encounter  Diagnoses and all orders for this visit:    Influenza A    Bronchitis    Elevated troponin I level    Controlled type 2 diabetes mellitus with chronic kidney disease on chronic dialysis, with long-term current use of insulin (HCC)    Peripheral arterial disease (HCC)    Acquired absence of left great toe (HCC)    Type 2 diabetes mellitus with foot ulcer, unspecified whether long term insulin use (HCC)    End-stage renal disease on hemodialysis (HCC)    Hyponatremia  -     Basic metabolic panel; Future    Anemia of chronic renal failure, stage 5 (HCC)  -     Hemoglobin and hematocrit, blood; Future    Other orders  -     nateglinide (STARLIX) 120 mg tablet; Take 120 mg by mouth 3 (three) times a day before meals     A/P: Doing better, but not back to prior baseline  Overall, slow deterioration noted  Wt is down  Will recheck labs due to the worsening anemia and low sodium  Continue with TIW HD  Agree to keep off the nitrates, iron, and SSRI  IF needs iron, ?IV  Continue with PT  Continue current treatment otherwise  RTC as scheduled in two months for routine  Subjective:     Patient ID: Tc Jean Baptiste is a 68 y o  female  WF, with ESRD on HD, presents with her  for f/u recent admission for FTT with weakness  Pt seen in the ER and dx with bronchitis, influenza A, etc  Treated conservatively and improved slowly  Coarse complicated by anemia, hyponatremia, and elevated troponin, but r/o for ischemia  Since d/c, notes slow, but steady improvement  Cough improving, but not gone  NO CP or SOB  PO intake improving  Energy level increasing and PT in house just started  Sugars back to baseline with sugars good except for elevated HS readings which the pt and  want  Was on oral nitrates, iron, and SSRI at time of d/c, but stopped  Had problems in the past with the nitrates and iron  Does feel depressed   No c/o's and back at HD TIW  Review of Systems   Constitutional: Positive for fatigue  Negative for activity change, chills, diaphoresis and fever  HENT: Negative  Respiratory: Positive for cough  Negative for chest tightness, shortness of breath and wheezing  Cardiovascular: Negative for chest pain, palpitations and leg swelling  Gastrointestinal: Negative for abdominal pain, constipation, diarrhea, nausea and vomiting  Genitourinary: Negative for difficulty urinating, dysuria and frequency  Musculoskeletal: Negative for arthralgias, gait problem and myalgias  Neurological: Positive for weakness  Negative for dizziness, tremors, seizures, syncope, light-headedness and headaches  Psychiatric/Behavioral: Negative for confusion and dysphoric mood  The patient is not nervous/anxious  Objective:     Physical Exam   Constitutional: She is oriented to person, place, and time  She appears well-developed and well-nourished  No distress  HENT:   Head: Normocephalic and atraumatic  Mouth/Throat: Oropharynx is clear and moist    Eyes: Pupils are equal, round, and reactive to light  Conjunctivae and EOM are normal    Neck: Neck supple  No JVD present  Cardiovascular: Normal rate, regular rhythm and normal heart sounds  Pulmonary/Chest: Effort normal and breath sounds normal  No respiratory distress  She has no wheezes  She has no rales  Abdominal: Soft  Bowel sounds are normal  She exhibits no distension  There is no tenderness  Musculoskeletal: She exhibits deformity  Neurological: She is alert and oriented to person, place, and time  Psychiatric: She has a normal mood and affect  Her behavior is normal  Judgment and thought content normal    Nursing note and vitals reviewed          Vitals:    02/13/19 1434   BP: 122/50   BP Location: Left arm   Patient Position: Sitting   Cuff Size: Adult   Pulse: (!) 54   Resp: 18   Temp: 97 6 °F (36 4 °C)   TempSrc: Tympanic   SpO2: 98% Weight: 71 2 kg (157 lb)   Height: 5' 4" (1 626 m)       Transitional Care Management Review:  Anibal King is a 68 y o  female here for TCM follow up  During the TCM phone call patient stated:    TCM Call (since 1/13/2019)     Date and time call was made  2/6/2019 10:02 AM    Hospital care reviewed  Records reviewed    Patient was hospitialized at  [address]    Date of Admission  01/31/19    Date of discharge  02/03/19    Diagnosis  Bronchitis    Disposition  Home; Home health services    Were the patients medications reviewed and updated  Yes    Current Symptoms  Weakness    Weakness severity  Mild      TCM Call (since 1/13/2019)     Post hospital issues  Reduced activity; Poor ADL (Activities of Daily Living) performance    Should patient be enrolled in anticoag monitoring? No    Scheduled for follow up? Yes    Referrals needed  N/A    Did you obtain your prescribed medications  Yes    Do you need help managing your prescriptions or medications  Yes    Why type of assitance do you need  Medication Mangement     Is transportation to your appointment needed  Yes    Specify why  due to medical conditions    I have advised the patient to call PCP with any new or worsening symptoms  Svetlana Daniels  care staff    The type of support provided  Physical    Do you have social support  Yes, as much as I need    Are you recieving any outpatient services  No    Are you recieving home care services  Yes    Types of home care services  Home PT; Home health aid    Are you using any community resources  No    Current waiver services  No    Have you fallen in the last 12 months  No    Interperter language line needed  No    Counseling  Patient    Counseling topics  Diagnostic results; instructions for management; Risk factor reduction; Home health agency benefits; patient and family education; Prognosis; Activities of daily living;  Importance of RX compliance          Tanya العراقي DO

## 2019-02-14 ENCOUNTER — TELEPHONE (OUTPATIENT)
Dept: INTERNAL MEDICINE CLINIC | Facility: CLINIC | Age: 74
End: 2019-02-14

## 2019-02-14 NOTE — TELEPHONE ENCOUNTER
They are going to draw labs monthly on  A Tues-and they will send you a copy  They will do the BMP and H&H that you ordered for her-they will do this test next wk      FYI

## 2019-02-19 ENCOUNTER — PATIENT OUTREACH (OUTPATIENT)
Dept: CARDIOLOGY CLINIC | Facility: CLINIC | Age: 74
End: 2019-02-19

## 2019-02-19 NOTE — PROGRESS NOTES
HEART FAILURE CARE COORDINATOR: Call and spoke with patient's  who stated the patient is doing well  She goes to hemodialysis three times weekly and is weighed there  He is engaged in following medical regimen

## 2019-03-11 DIAGNOSIS — K21.9 GASTROESOPHAGEAL REFLUX DISEASE WITHOUT ESOPHAGITIS: ICD-10-CM

## 2019-03-11 RX ORDER — PANTOPRAZOLE SODIUM 40 MG/1
TABLET, DELAYED RELEASE ORAL
Qty: 30 TABLET | Refills: 0 | Status: SHIPPED | OUTPATIENT
Start: 2019-03-11 | End: 2019-04-18 | Stop reason: SDUPTHER

## 2019-03-26 DIAGNOSIS — E11.8 TYPE 2 DIABETES MELLITUS WITH COMPLICATION, UNSPECIFIED WHETHER LONG TERM INSULIN USE: Primary | ICD-10-CM

## 2019-03-26 DIAGNOSIS — I25.10 CORONARY ARTERY DISEASE WITHOUT ANGINA PECTORIS, UNSPECIFIED VESSEL OR LESION TYPE, UNSPECIFIED WHETHER NATIVE OR TRANSPLANTED HEART: ICD-10-CM

## 2019-03-26 DIAGNOSIS — Z88.9 HISTORY OF SEASONAL ALLERGIES: ICD-10-CM

## 2019-03-26 RX ORDER — NATEGLINIDE 120 MG/1
120 TABLET ORAL
Qty: 270 TABLET | Refills: 3 | Status: SHIPPED | OUTPATIENT
Start: 2019-03-26 | End: 2019-09-16 | Stop reason: SDUPTHER

## 2019-03-26 RX ORDER — FLUTICASONE PROPIONATE 50 MCG
SPRAY, SUSPENSION (ML) NASAL
Qty: 16 G | Refills: 5 | Status: SHIPPED | OUTPATIENT
Start: 2019-03-26 | End: 2019-08-17 | Stop reason: SDUPTHER

## 2019-03-26 RX ORDER — CARVEDILOL 12.5 MG/1
TABLET ORAL
Qty: 60 TABLET | Refills: 3 | Status: SHIPPED | OUTPATIENT
Start: 2019-03-26 | End: 2019-05-24

## 2019-03-27 ENCOUNTER — CLINICAL SUPPORT (OUTPATIENT)
Dept: INTERNAL MEDICINE CLINIC | Facility: CLINIC | Age: 74
End: 2019-03-27
Payer: MEDICARE

## 2019-03-27 DIAGNOSIS — M81.0 OSTEOPOROSIS, UNSPECIFIED OSTEOPOROSIS TYPE, UNSPECIFIED PATHOLOGICAL FRACTURE PRESENCE: Primary | ICD-10-CM

## 2019-03-27 PROCEDURE — 96372 THER/PROPH/DIAG INJ SC/IM: CPT

## 2019-03-27 RX ORDER — PRAVASTATIN SODIUM 80 MG/1
TABLET ORAL
Qty: 90 TABLET | Refills: 3 | Status: SHIPPED | OUTPATIENT
Start: 2019-03-27 | End: 2020-03-12

## 2019-04-08 ENCOUNTER — OFFICE VISIT (OUTPATIENT)
Dept: INTERNAL MEDICINE CLINIC | Facility: CLINIC | Age: 74
End: 2019-04-08
Payer: MEDICARE

## 2019-04-08 VITALS
HEIGHT: 64 IN | SYSTOLIC BLOOD PRESSURE: 120 MMHG | WEIGHT: 160 LBS | OXYGEN SATURATION: 98 % | RESPIRATION RATE: 18 BRPM | BODY MASS INDEX: 27.31 KG/M2 | TEMPERATURE: 98.3 F | HEART RATE: 74 BPM | DIASTOLIC BLOOD PRESSURE: 50 MMHG

## 2019-04-08 DIAGNOSIS — K21.9 GERD WITHOUT ESOPHAGITIS: ICD-10-CM

## 2019-04-08 DIAGNOSIS — I50.9 CHRONIC CONGESTIVE HEART FAILURE, UNSPECIFIED HEART FAILURE TYPE (HCC): ICD-10-CM

## 2019-04-08 DIAGNOSIS — Z99.2 CONTROLLED TYPE 2 DIABETES MELLITUS WITH CHRONIC KIDNEY DISEASE ON CHRONIC DIALYSIS, WITH LONG-TERM CURRENT USE OF INSULIN (HCC): Primary | ICD-10-CM

## 2019-04-08 DIAGNOSIS — I25.10 CARDIOVASCULAR ARTERIOSCLEROSIS: ICD-10-CM

## 2019-04-08 DIAGNOSIS — I10 ESSENTIAL HYPERTENSION: ICD-10-CM

## 2019-04-08 DIAGNOSIS — Z79.4 CONTROLLED TYPE 2 DIABETES MELLITUS WITH CHRONIC KIDNEY DISEASE ON CHRONIC DIALYSIS, WITH LONG-TERM CURRENT USE OF INSULIN (HCC): Primary | ICD-10-CM

## 2019-04-08 DIAGNOSIS — D50.9 IRON DEFICIENCY ANEMIA, UNSPECIFIED IRON DEFICIENCY ANEMIA TYPE: ICD-10-CM

## 2019-04-08 DIAGNOSIS — N18.6 CONTROLLED TYPE 2 DIABETES MELLITUS WITH CHRONIC KIDNEY DISEASE ON CHRONIC DIALYSIS, WITH LONG-TERM CURRENT USE OF INSULIN (HCC): Primary | ICD-10-CM

## 2019-04-08 DIAGNOSIS — I73.9 PERIPHERAL ARTERIAL DISEASE (HCC): ICD-10-CM

## 2019-04-08 DIAGNOSIS — E11.22 CONTROLLED TYPE 2 DIABETES MELLITUS WITH CHRONIC KIDNEY DISEASE ON CHRONIC DIALYSIS, WITH LONG-TERM CURRENT USE OF INSULIN (HCC): Primary | ICD-10-CM

## 2019-04-08 DIAGNOSIS — E66.3 OVERWEIGHT (BMI 25.0-29.9): ICD-10-CM

## 2019-04-08 DIAGNOSIS — Z99.2 END-STAGE RENAL DISEASE ON HEMODIALYSIS (HCC): ICD-10-CM

## 2019-04-08 DIAGNOSIS — N18.6 END-STAGE RENAL DISEASE ON HEMODIALYSIS (HCC): ICD-10-CM

## 2019-04-08 DIAGNOSIS — E78.2 MIXED HYPERLIPIDEMIA: ICD-10-CM

## 2019-04-08 PROCEDURE — 99214 OFFICE O/P EST MOD 30 MIN: CPT | Performed by: INTERNAL MEDICINE

## 2019-04-08 RX ORDER — ASCORBIC ACID, THIAMINE MONONITRATE,RIBOFLAVIN, NIACINAMIDE, PYRIDOXINE HYDROCHLORIDE, FOLIC ACID, CYANOCOBALAMIN, BIOTIN, CALCIUM PANTOTHENATE, 100; 1.5; 1.7; 20; 10; 1; 6000; 150000; 5 MG/1; MG/1; MG/1; MG/1; MG/1; MG/1; UG/1; UG/1; MG/1
CAPSULE, LIQUID FILLED ORAL DAILY
COMMUNITY

## 2019-04-08 RX ORDER — AMOXICILLIN 500 MG
CAPSULE ORAL 4 TIMES DAILY
COMMUNITY

## 2019-04-17 LAB — HBA1C MFR BLD HPLC: 6.6 %

## 2019-04-18 DIAGNOSIS — K21.9 GASTROESOPHAGEAL REFLUX DISEASE WITHOUT ESOPHAGITIS: ICD-10-CM

## 2019-04-18 RX ORDER — PANTOPRAZOLE SODIUM 40 MG/1
TABLET, DELAYED RELEASE ORAL
Qty: 30 TABLET | Refills: 0 | Status: SHIPPED | OUTPATIENT
Start: 2019-04-18 | End: 2019-05-11 | Stop reason: SDUPTHER

## 2019-05-09 ENCOUNTER — TELEPHONE (OUTPATIENT)
Dept: INTERNAL MEDICINE CLINIC | Facility: CLINIC | Age: 74
End: 2019-05-09

## 2019-05-10 ENCOUNTER — OFFICE VISIT (OUTPATIENT)
Dept: INTERNAL MEDICINE CLINIC | Facility: CLINIC | Age: 74
End: 2019-05-10
Payer: MEDICARE

## 2019-05-10 VITALS
SYSTOLIC BLOOD PRESSURE: 132 MMHG | HEIGHT: 64 IN | TEMPERATURE: 98.3 F | RESPIRATION RATE: 18 BRPM | BODY MASS INDEX: 27.66 KG/M2 | DIASTOLIC BLOOD PRESSURE: 62 MMHG | OXYGEN SATURATION: 98 % | HEART RATE: 82 BPM | WEIGHT: 162 LBS

## 2019-05-10 DIAGNOSIS — I95.3 HEMODIALYSIS-ASSOCIATED HYPOTENSION: ICD-10-CM

## 2019-05-10 DIAGNOSIS — I10 ESSENTIAL HYPERTENSION: Primary | ICD-10-CM

## 2019-05-10 DIAGNOSIS — R55 NEAR SYNCOPE: ICD-10-CM

## 2019-05-10 DIAGNOSIS — M81.0 AGE-RELATED OSTEOPOROSIS WITHOUT CURRENT PATHOLOGICAL FRACTURE: ICD-10-CM

## 2019-05-10 DIAGNOSIS — Z99.2 END-STAGE RENAL DISEASE ON HEMODIALYSIS (HCC): ICD-10-CM

## 2019-05-10 DIAGNOSIS — I49.9 IRREGULAR HEARTBEAT: ICD-10-CM

## 2019-05-10 DIAGNOSIS — N18.6 END-STAGE RENAL DISEASE ON HEMODIALYSIS (HCC): ICD-10-CM

## 2019-05-10 PROCEDURE — 93000 ELECTROCARDIOGRAM COMPLETE: CPT | Performed by: INTERNAL MEDICINE

## 2019-05-10 PROCEDURE — 99214 OFFICE O/P EST MOD 30 MIN: CPT | Performed by: INTERNAL MEDICINE

## 2019-05-11 DIAGNOSIS — K21.9 GASTROESOPHAGEAL REFLUX DISEASE WITHOUT ESOPHAGITIS: ICD-10-CM

## 2019-05-12 RX ORDER — PANTOPRAZOLE SODIUM 40 MG/1
TABLET, DELAYED RELEASE ORAL
Qty: 30 TABLET | Refills: 0 | Status: SHIPPED | OUTPATIENT
Start: 2019-05-12 | End: 2019-06-09 | Stop reason: SDUPTHER

## 2019-05-14 ENCOUNTER — PROCEDURE VISIT (OUTPATIENT)
Dept: CARDIOLOGY CLINIC | Facility: CLINIC | Age: 74
End: 2019-05-14
Payer: MEDICARE

## 2019-05-14 DIAGNOSIS — I95.3 HEMODIALYSIS-ASSOCIATED HYPOTENSION: ICD-10-CM

## 2019-05-14 DIAGNOSIS — M81.0 AGE-RELATED OSTEOPOROSIS WITHOUT CURRENT PATHOLOGICAL FRACTURE: ICD-10-CM

## 2019-05-14 DIAGNOSIS — R55 NEAR SYNCOPE: ICD-10-CM

## 2019-05-14 DIAGNOSIS — N18.6 END-STAGE RENAL DISEASE ON HEMODIALYSIS (HCC): ICD-10-CM

## 2019-05-14 DIAGNOSIS — I10 ESSENTIAL HYPERTENSION: ICD-10-CM

## 2019-05-14 DIAGNOSIS — I49.9 IRREGULAR HEARTBEAT: ICD-10-CM

## 2019-05-14 DIAGNOSIS — Z99.2 END-STAGE RENAL DISEASE ON HEMODIALYSIS (HCC): ICD-10-CM

## 2019-05-23 ENCOUNTER — TELEPHONE (OUTPATIENT)
Dept: INTERNAL MEDICINE CLINIC | Facility: CLINIC | Age: 74
End: 2019-05-23

## 2019-05-24 ENCOUNTER — OFFICE VISIT (OUTPATIENT)
Dept: INTERNAL MEDICINE CLINIC | Facility: CLINIC | Age: 74
End: 2019-05-24
Payer: MEDICARE

## 2019-05-24 VITALS
OXYGEN SATURATION: 98 % | HEIGHT: 64 IN | DIASTOLIC BLOOD PRESSURE: 78 MMHG | TEMPERATURE: 97.5 F | SYSTOLIC BLOOD PRESSURE: 148 MMHG | HEART RATE: 76 BPM | RESPIRATION RATE: 18 BRPM | BODY MASS INDEX: 27.81 KG/M2

## 2019-05-24 DIAGNOSIS — I49.9 IRREGULAR HEARTBEAT: ICD-10-CM

## 2019-05-24 DIAGNOSIS — I45.5 SINUS PAUSE: ICD-10-CM

## 2019-05-24 DIAGNOSIS — I95.3 HEMODIALYSIS-ASSOCIATED HYPOTENSION: Primary | ICD-10-CM

## 2019-05-24 DIAGNOSIS — R55 NEAR SYNCOPE: ICD-10-CM

## 2019-05-24 DIAGNOSIS — I10 ESSENTIAL HYPERTENSION: ICD-10-CM

## 2019-05-24 PROCEDURE — 99213 OFFICE O/P EST LOW 20 MIN: CPT | Performed by: INTERNAL MEDICINE

## 2019-05-24 PROCEDURE — 93224 XTRNL ECG REC UP TO 48 HRS: CPT | Performed by: INTERNAL MEDICINE

## 2019-05-24 RX ORDER — AMLODIPINE BESYLATE 5 MG/1
5 TABLET ORAL DAILY
Qty: 90 TABLET | Refills: 0 | Status: SHIPPED | OUTPATIENT
Start: 2019-05-24 | End: 2019-06-10 | Stop reason: SDUPTHER

## 2019-05-30 DIAGNOSIS — E55.9 VITAMIN D DEFICIENCY: ICD-10-CM

## 2019-05-30 RX ORDER — ERGOCALCIFEROL 1.25 MG/1
CAPSULE ORAL
Qty: 12 CAPSULE | Refills: 3 | Status: SHIPPED | OUTPATIENT
Start: 2019-05-30 | End: 2020-07-06 | Stop reason: SDUPTHER

## 2019-06-09 DIAGNOSIS — K21.9 GASTROESOPHAGEAL REFLUX DISEASE WITHOUT ESOPHAGITIS: ICD-10-CM

## 2019-06-09 RX ORDER — PANTOPRAZOLE SODIUM 40 MG/1
TABLET, DELAYED RELEASE ORAL
Qty: 30 TABLET | Refills: 0 | Status: SHIPPED | OUTPATIENT
Start: 2019-06-09 | End: 2019-07-10 | Stop reason: SDUPTHER

## 2019-06-10 ENCOUNTER — OFFICE VISIT (OUTPATIENT)
Dept: INTERNAL MEDICINE CLINIC | Facility: CLINIC | Age: 74
End: 2019-06-10
Payer: MEDICARE

## 2019-06-10 VITALS
BODY MASS INDEX: 27.49 KG/M2 | WEIGHT: 161 LBS | SYSTOLIC BLOOD PRESSURE: 134 MMHG | TEMPERATURE: 98.1 F | DIASTOLIC BLOOD PRESSURE: 60 MMHG | HEART RATE: 72 BPM | HEIGHT: 64 IN | RESPIRATION RATE: 18 BRPM

## 2019-06-10 DIAGNOSIS — I10 ESSENTIAL HYPERTENSION: Primary | ICD-10-CM

## 2019-06-10 PROCEDURE — 99213 OFFICE O/P EST LOW 20 MIN: CPT | Performed by: NURSE PRACTITIONER

## 2019-06-10 RX ORDER — AMLODIPINE BESYLATE 5 MG/1
5 TABLET ORAL DAILY
Qty: 90 TABLET | Refills: 0 | Status: SHIPPED | OUTPATIENT
Start: 2019-06-10 | End: 2020-02-04

## 2019-07-10 DIAGNOSIS — K21.9 GASTROESOPHAGEAL REFLUX DISEASE WITHOUT ESOPHAGITIS: ICD-10-CM

## 2019-07-10 RX ORDER — PANTOPRAZOLE SODIUM 40 MG/1
40 TABLET, DELAYED RELEASE ORAL DAILY
Qty: 30 TABLET | Refills: 5 | Status: SHIPPED | OUTPATIENT
Start: 2019-07-10 | End: 2020-01-13 | Stop reason: SDUPTHER

## 2019-07-16 LAB — HCV AB SER-ACNC: NON REACTIVE

## 2019-08-17 DIAGNOSIS — Z88.9 HISTORY OF SEASONAL ALLERGIES: ICD-10-CM

## 2019-08-17 RX ORDER — FLUTICASONE PROPIONATE 50 MCG
SPRAY, SUSPENSION (ML) NASAL
Qty: 16 G | Refills: 5 | Status: SHIPPED | OUTPATIENT
Start: 2019-08-17 | End: 2019-12-27

## 2019-09-16 ENCOUNTER — OFFICE VISIT (OUTPATIENT)
Dept: INTERNAL MEDICINE CLINIC | Facility: CLINIC | Age: 74
End: 2019-09-16
Payer: MEDICARE

## 2019-09-16 VITALS
DIASTOLIC BLOOD PRESSURE: 70 MMHG | TEMPERATURE: 97.8 F | BODY MASS INDEX: 27.49 KG/M2 | WEIGHT: 161 LBS | RESPIRATION RATE: 18 BRPM | HEART RATE: 74 BPM | HEIGHT: 64 IN | SYSTOLIC BLOOD PRESSURE: 128 MMHG

## 2019-09-16 DIAGNOSIS — Z99.2 END-STAGE RENAL DISEASE ON HEMODIALYSIS (HCC): ICD-10-CM

## 2019-09-16 DIAGNOSIS — K21.9 GERD WITHOUT ESOPHAGITIS: ICD-10-CM

## 2019-09-16 DIAGNOSIS — Z00.00 MEDICARE ANNUAL WELLNESS VISIT, SUBSEQUENT: ICD-10-CM

## 2019-09-16 DIAGNOSIS — E78.2 MIXED HYPERLIPIDEMIA: ICD-10-CM

## 2019-09-16 DIAGNOSIS — Z99.2 CONTROLLED TYPE 2 DIABETES MELLITUS WITH CHRONIC KIDNEY DISEASE ON CHRONIC DIALYSIS, WITH LONG-TERM CURRENT USE OF INSULIN (HCC): ICD-10-CM

## 2019-09-16 DIAGNOSIS — I25.10 CARDIOVASCULAR ARTERIOSCLEROSIS: ICD-10-CM

## 2019-09-16 DIAGNOSIS — N18.5 ANEMIA OF CHRONIC RENAL FAILURE, STAGE 5 (HCC): ICD-10-CM

## 2019-09-16 DIAGNOSIS — I50.9 CHRONIC CONGESTIVE HEART FAILURE, UNSPECIFIED HEART FAILURE TYPE (HCC): ICD-10-CM

## 2019-09-16 DIAGNOSIS — E11.8 TYPE 2 DIABETES MELLITUS WITH COMPLICATION, UNSPECIFIED WHETHER LONG TERM INSULIN USE: ICD-10-CM

## 2019-09-16 DIAGNOSIS — N18.6 CONTROLLED TYPE 2 DIABETES MELLITUS WITH CHRONIC KIDNEY DISEASE ON CHRONIC DIALYSIS, WITH LONG-TERM CURRENT USE OF INSULIN (HCC): ICD-10-CM

## 2019-09-16 DIAGNOSIS — E11.22 CONTROLLED TYPE 2 DIABETES MELLITUS WITH CHRONIC KIDNEY DISEASE ON CHRONIC DIALYSIS, WITH LONG-TERM CURRENT USE OF INSULIN (HCC): ICD-10-CM

## 2019-09-16 DIAGNOSIS — N18.6 END-STAGE RENAL DISEASE ON HEMODIALYSIS (HCC): ICD-10-CM

## 2019-09-16 DIAGNOSIS — Z13.31 NEGATIVE DEPRESSION SCREENING: ICD-10-CM

## 2019-09-16 DIAGNOSIS — Z79.4 CONTROLLED TYPE 2 DIABETES MELLITUS WITH CHRONIC KIDNEY DISEASE ON CHRONIC DIALYSIS, WITH LONG-TERM CURRENT USE OF INSULIN (HCC): ICD-10-CM

## 2019-09-16 DIAGNOSIS — Z12.11 SCREENING FOR COLON CANCER: ICD-10-CM

## 2019-09-16 DIAGNOSIS — D63.1 ANEMIA OF CHRONIC RENAL FAILURE, STAGE 5 (HCC): ICD-10-CM

## 2019-09-16 DIAGNOSIS — Z23 ENCOUNTER FOR VACCINATION: Primary | ICD-10-CM

## 2019-09-16 DIAGNOSIS — M81.0 AGE-RELATED OSTEOPOROSIS WITHOUT CURRENT PATHOLOGICAL FRACTURE: ICD-10-CM

## 2019-09-16 DIAGNOSIS — N25.81 SECONDARY HYPERPARATHYROIDISM (HCC): ICD-10-CM

## 2019-09-16 DIAGNOSIS — Z12.11 SCREEN FOR COLON CANCER: ICD-10-CM

## 2019-09-16 DIAGNOSIS — I10 ESSENTIAL HYPERTENSION: ICD-10-CM

## 2019-09-16 PROBLEM — E16.2 HYPOGLYCEMIA: Status: RESOLVED | Noted: 2019-01-31 | Resolved: 2019-09-16

## 2019-09-16 PROCEDURE — G0439 PPPS, SUBSEQ VISIT: HCPCS | Performed by: INTERNAL MEDICINE

## 2019-09-16 PROCEDURE — 99214 OFFICE O/P EST MOD 30 MIN: CPT | Performed by: INTERNAL MEDICINE

## 2019-09-16 PROCEDURE — G0008 ADMIN INFLUENZA VIRUS VAC: HCPCS | Performed by: INTERNAL MEDICINE

## 2019-09-16 PROCEDURE — 90662 IIV NO PRSV INCREASED AG IM: CPT | Performed by: INTERNAL MEDICINE

## 2019-09-16 PROCEDURE — 1124F ACP DISCUSS-NO DSCNMKR DOCD: CPT | Performed by: INTERNAL MEDICINE

## 2019-09-16 RX ORDER — NATEGLINIDE 120 MG/1
120 TABLET ORAL 4 TIMES DAILY
Qty: 120 TABLET | Refills: 5 | Status: SHIPPED | OUTPATIENT
Start: 2019-09-16 | End: 2020-04-20 | Stop reason: SDUPTHER

## 2019-09-16 NOTE — PATIENT INSTRUCTIONS
Type 2 Diabetes in Adults   WHAT YOU NEED TO KNOW:   What is type 2 diabetes? Type 2 diabetes is a disease that affects how your body uses glucose (sugar)  Normally, when the blood sugar level increases, the pancreas makes more insulin  Insulin helps move sugar out of the blood so it can be used for energy  Type 2 diabetes develops because either the body cannot make enough insulin, or it cannot use the insulin correctly  After many years, your pancreas may stop making insulin  What increases my risk for type 2 diabetes? · Obesity    · Physical inactivity    · Older age    · High blood pressure or high cholesterol    · A history of heart disease, gestational diabetes, or polycystic ovary syndrome     · A family member with diabetes    · Being [place] American, , , [place] American, or [facility]  What are the signs and symptoms of type 2 diabetes? You may have high blood sugar levels for a long time before symptoms appear  You may have any of the following:  · More hunger or thirst than usual     · Frequent urination     · Weight loss without trying     · Blurred vision  How is type 2 diabetes diagnosed? You may need tests to check for type 2 diabetes starting at age 39  You may need any of the following:  · An A1c test  shows the average amount of sugar in your blood over the past 2 to 3 months  Your healthcare provider will tell you the A1c level that is right for you  The goal for your A1c is usually below 7%  Your provider can help you make changes if a check shows the A1c is too high  · A fasting plasma glucose test  is when your blood sugar level is tested after you have not eaten for 8 hours  · A 2-hour plasma glucose test  starts with a blood sugar level check after you have not eaten for 8 hours  You are then given a glucose drink  Your blood sugar level is checked after 2 hours       · A random glucose test  may be done any time of day, no matter how long ago you ate   How is type 2 diabetes treated? Type 2 diabetes can be controlled to prevent damage to your heart, blood vessels, and other organs  The goal is to keep your blood sugar at a normal level  You must eat the right foods, and exercise regularly  You may need 1 or more hypoglycemic medicines or insulin if you cannot control your blood sugar level with nutrition and exercise  You may also need medicine to lower your risk for heart disease  An example includes medicine to lower or control your cholesterol  How do I check my blood sugar level? You will be taught how to check a small drop of blood in a glucose monitor  You will need to check your blood sugar level at least 3 times each day if you are on insulin  Ask your healthcare provider when and how often to check during the day  If you check your blood sugar level before a meal , it should be between 80 and 130 mg/dL  If you check your blood sugar level 1 to 2 hours after a meal , it should be less than 180 mg/dL  Ask your healthcare provider if these are good goals for you  Write down your results, and show them to your healthcare provider  Your provider may use the results to make changes to your medicine, food, and exercise schedules  What should I do if my blood sugar level is too low? Your blood sugar level is too low if it goes below 70 mg/dL  If the level is too low, eat or drink 15 grams of fast-acting carbohydrate  These are found naturally in fruits  Fast-acting carbohydrates will raise your blood sugar level quickly  Examples of 15 grams of fast-acting carbohydrate are 4 ounces (½ cup) of fruit juice or 4 ounces of regular soda  Other examples are 2 tablespoons of raisins or 3 to 4 glucose tablets  Check your blood sugar level 15 minutes later  If the level is still low (less than 100 mg/dL), eat another 15 grams of carbohydrate  When the level returns to 100 mg/dL, eat a snack or meal that contains carbohydrates   This will help prevent another drop in blood sugar  Always carefully follow your healthcare provider's instructions on how to treat low blood sugar levels  What do I need to know about nutrition? A dietitian will help you make a meal plan to keep your blood sugar level steady  Do not skip meals  Your blood sugar level may drop too low if you have taken diabetes medicine and do not eat  · Keep track of carbohydrates (sugar and starchy foods)  Your blood sugar level can get too high if you eat too many carbohydrates  Eat fruits, legumes, vegetables, and whole grains  Your dietitian will help you plan meals and snacks that have the right amount of carbohydrates  · Eat low-fat foods , such as skinless chicken and low-fat milk  · Eat less sodium (salt)  Limit high-sodium foods, such as soy sauce, potato chips, and soup  Do not add salt to food you cook  Limit your use of table salt  You should have less than 2,300 mg of sodium per day  · Eat high-fiber foods , such as vegetables, whole-grain breads, and beans  · Limit alcohol  Alcohol affects your blood sugar level and can make it harder to manage your diabetes  Limit alcohol to 1 drink a day if you are a woman  Limit alcohol to 2 drinks a day if you are a man  A drink of alcohol is 12 ounces of beer, 5 ounces of wine, or 1½ ounces of liquor  How much exercise do I need? Exercise can help keep your blood sugar level steady, decrease your risk of heart disease, and help you lose weight  Stretch before and after you exercise  Exercise for at least 150 minutes every week  Spread this amount of exercise over at least 3 days a week  Do not skip exercise more than 2 days in a row  Include muscle strengthening activities 2 to 3 days each week  Older adults should include balance training 2 to 3 times each week  Activities that help increase balance include yoga and kevin chi  Work with your healthcare provider to create an exercise plan    · Check your blood sugar level before and after exercise  Healthcare providers may tell you to change the amount of insulin you take or food you eat  If your blood sugar level is high, check your blood or urine for ketones before you exercise  Do not exercise if your blood sugar level is high and you have ketones  · If your blood sugar level is less than 100 mg/dL, have a carbohydrate snack before you exercise  Examples are 4 to 6 crackers, ½ banana, 8 ounces (1 cup) of milk, or 4 ounces (½ cup) of juice  Drink water or liquids that do not contain sugar before, during, and after exercise  Ask your dietitian or healthcare provider which liquids you should drink when you exercise  · Do not sit for longer than 30 minutes  If you cannot walk around, at least stand up  This will help you stay active and keep your blood circulating  What else can I do to manage type 2 diabetes? · Check your feet each day for sores  Wear shoes and socks that fit correctly  Do not trim your toenails  Ask your healthcare provider for more information about foot care  · Maintain a healthy weight  Ask your healthcare provider how much you should weigh  A healthy weight can help you control your diabetes and prevent heart disease  Ask your provider to help you create a weight loss plan if you are overweight  Together you can set manageable weight loss goals  · Do not smoke  Nicotine and other chemicals in cigarettes and cigars can cause lung damage and make it more difficult to manage your diabetes  Ask your healthcare provider for information if you currently smoke and need help to quit  Do not use e-cigarettes or smokeless tobacco in place of cigarettes or to help you quit  They still contain nicotine  · Check your blood pressure as directed  Ask your healthcare provider what your blood pressure should be  Most adults with diabetes and high blood pressure should have a systolic blood pressure (first number) less than 140   Your diastolic blood pressure (second number) should be less than 90  · Wear medical alert identification  Wear medical alert jewelry or carry a card that says you have diabetes  Ask your healthcare provider where to get these items  · Ask about vaccines  You have a higher risk for serious illness if you get the flu, pneumonia, or hepatitis  Ask your healthcare provider if you should get a flu, pneumonia, or hepatitis B vaccine, and when to get the vaccine  What are the risks of type 2 diabetes? Uncontrolled diabetes can damage your nerves, veins, and arteries  High blood sugar levels may damage other body tissue and organs over time  Damage to arteries may increase your risk for heart attack and stroke  Nerve damage may also lead to other heart, stomach, and nerve problems  Diabetes is life-threatening if it is not controlled  Control your blood glucose levels to prevent health problems  Call 911 for any of the following:   · You have any of the following signs of a stroke:      ¨ Numbness or drooping on one side of your face     ¨ Weakness in an arm or leg    ¨ Confusion or difficulty speaking    ¨ Dizziness, a severe headache, or vision loss    · You have any of the following signs of a heart attack:      ¨ Squeezing, pressure, or pain in your chest that lasts longer than 5 minutes or returns    ¨ Discomfort or pain in your back, neck, jaw, stomach, or arm     ¨ Trouble breathing    ¨ Nausea or vomiting    ¨ Lightheadedness or a sudden cold sweat, especially with chest pain or trouble breathing  When should I seek immediate care? · You have severe abdominal pain, or the pain spreads to your back  You may also be vomiting  · You have trouble staying awake or focusing  · You are shaking or sweating  · You have blurred or double vision  · Your breath has a fruity, sweet smell  · Your breathing is deep and labored, or rapid and shallow  · Your heartbeat is fast and weak    When should I contact my healthcare provider? · You are vomiting or have diarrhea  · You have an upset stomach and cannot eat the foods on your meal plan  · You feel weak or more tired than usual      · You feel dizzy, have headaches, or are easily irritated  · Your skin is red, warm, dry, or swollen  · You have a wound that does not heal      · You have numbness in your arms or legs  · You have trouble coping with your illness, or you feel anxious or depressed  · You have questions or concerns about your condition or care  CARE AGREEMENT:   You have the right to help plan your care  Learn about your health condition and how it may be treated  Discuss treatment options with your caregivers to decide what care you want to receive  You always have the right to refuse treatment  The above information is an  only  It is not intended as medical advice for individual conditions or treatments  Talk to your doctor, nurse or pharmacist before following any medical regimen to see if it is safe and effective for you  © 2017 [address] Information is for End User's use only and may not be sold, redistributed or otherwise used for commercial purposes  All illustrations and images included in CareNotes® are the copyrighted property of [employer]  or [employer]  Medicare Preventive Visit Patient Instructions  Thank you for completing your Welcome to Medicare Visit or Medicare Annual Wellness Visit today  Your next wellness visit will be due in one year (9/16/2020)  The screening/preventive services that you may require over the next 5-10 years are detailed below  Some tests may not apply to you based off risk factors and/or age  Screening tests ordered at today's visit but not completed yet may show as past due  Also, please note that scanned in results may not display below    Preventive Screenings:  Service Recommendations Previous Testing/Comments   Colorectal Cancer Screening  * Colonoscopy    * Fecal Occult Blood Test (FOBT)/Fecal Immunochemical Test (FIT)  * Fecal DNA/Cologuard Test  * Flexible Sigmoidoscopy Age: 54-65 years old   Colonoscopy: every 10 years (may be performed more frequently if at higher risk)  OR  FOBT/FIT: every 1 year  OR  Cologuard: every 3 years  OR  Sigmoidoscopy: every 5 years  Screening may be recommended earlier than age 48 if at higher risk for colorectal cancer  Also, an individualized decision between you and your healthcare provider will decide whether screening between the ages of 74-80 would be appropriate  Colonoscopy: Not on file  FOBT/FIT: 07/13/2018  Cologuard: Not on file  Sigmoidoscopy: Not on file         Breast Cancer Screening Age: 36 years old  Frequency: every 1-2 years  Not required if history of left and right mastectomy Mammogram: 07/23/2018    Screening Current   Cervical Cancer Screening Between the ages of 21-29, pap smear recommended once every 3 years  Between the ages of 33-67, can perform pap smear with HPV co-testing every 5 years  Recommendations may differ for women with a history of total hysterectomy, cervical cancer, or abnormal pap smears in past  Pap Smear: 01/29/2016    Screening Not Indicated   Hepatitis C Screening Once for adults born between 1945 and 1965  More frequently in patients at high risk for Hepatitis C Hep C Antibody: 07/16/2019    Screening Current   Diabetes Screening 1-2 times per year if you're at risk for diabetes or have pre-diabetes Fasting glucose: 131 mg/dL   A1C: 6 6    Screening Not Indicated  History Diabetes   Cholesterol Screening Once every 5 years if you don't have a lipid disorder  May order more often based on risk factors  Lipid panel: 11/03/2015    Screening Not Indicated  History Lipid Disorder     Other Preventive Screenings Covered by Medicare:  1  Abdominal Aortic Aneurysm (AAA) Screening: covered once if your at risk   You're considered to be at risk if you have a family history of AAA  2  Lung Cancer Screening: covers low dose CT scan once per year if you meet all of the following conditions: (1) Age 50-69; (2) No signs or symptoms of lung cancer; (3) Current smoker or have quit smoking within the last 15 years; (4) You have a tobacco smoking history of at least 30 pack years (packs per day multiplied by number of years you smoked); (5) You get a written order from a healthcare provider  3  Glaucoma Screening: covered annually if you're considered high risk: (1) You have diabetes OR (2) Family history of glaucoma OR (3)  aged 48 and older OR (3)  American aged 72 and older  3  Osteoporosis Screening: covered every 2 years if you meet one of the following conditions: (1) You're estrogen deficient and at risk for osteoporosis based off medical history and other findings; (2) Have a vertebral abnormality; (3) On glucocorticoid therapy for more than 3 months; (4) Have primary hyperparathyroidism; (5) On osteoporosis medications and need to assess response to drug therapy  · Last bone density test (DXA Scan): 07/23/2018  5  HIV Screening: covered annually if you're between the age of 12-76  Also covered annually if you are younger than 13 and older than 72 with risk factors for HIV infection  For pregnant patients, it is covered up to 3 times per pregnancy  Immunizations:  Immunization Recommendations   Influenza Vaccine Annual influenza vaccination during flu season is recommended for all persons aged >= 6 months who do not have contraindications   Pneumococcal Vaccine (Prevnar and Pneumovax)  * Prevnar = PCV13  * Pneumovax = PPSV23   Adults 25-60 years old: 1-3 doses may be recommended based on certain risk factors  Adults 72 years old: Prevnar (PCV13) vaccine recommended followed by Pneumovax (PPSV23) vaccine  If already received PPSV23 since turning 65, then PCV13 recommended at least one year after PPSV23 dose     Hepatitis B Vaccine 3 dose series if at intermediate or high risk (ex: diabetes, end stage renal disease, liver disease)   Tetanus (Td) Vaccine - COST NOT COVERED BY MEDICARE PART B Following completion of primary series, a booster dose should be given every 10 years to maintain immunity against tetanus  Td may also be given as tetanus wound prophylaxis  Tdap Vaccine - COST NOT COVERED BY MEDICARE PART B Recommended at least once for all adults  For pregnant patients, recommended with each pregnancy  Shingles Vaccine (Shingrix) - COST NOT COVERED BY MEDICARE PART B  2 shot series recommended in those aged 48 and above     Health Maintenance Due:      Topic Date Due    CRC Screening: Colonoscopy  1945    CRC Screening: FOBTx3/FIT  07/13/2019    MAMMOGRAM  07/23/2019    DXA SCAN  07/23/2020    Hepatitis C Screening  Completed     Immunizations Due:      Topic Date Due    HEPATITIS B VACCINES (1 of 3 - Risk 3-dose series) 06[birth year]25/1965    INFLUENZA VACCINE  07/01/2019     Advance Directives   What are advance directives? Advance directives are legal documents that state your wishes and plans for medical care  These plans are made ahead of time in case you lose your ability to make decisions for yourself  Advance directives can apply to any medical decision, such as the treatments you want, and if you want to donate organs  What are the types of advance directives? There are many types of advance directives, and each state has rules about how to use them  You may choose a combination of any of the following:  · Living will: This is a written record of the treatment you want  You can also choose which treatments you do not want, which to limit, and which to stop at a certain time  This includes surgery, medicine, IV fluid, and tube feedings  · Durable power of  for healthcare [facility]): This is a written record that states who you want to make healthcare choices for you when you are unable to make them for yourself  This person, called a proxy, is usually a family member or a friend  You may choose more than 1 proxy  · Do not resuscitate (DNR) order:  A DNR order is used in case your heart stops beating or you stop breathing  It is a request not to have certain forms of treatment, such as CPR  A DNR order may be included in other types of advance directives  · Medical directive: This covers the care that you want if you are in a coma, near death, or unable to make decisions for yourself  You can list the treatments you want for each condition  Treatment may include pain medicine, surgery, blood transfusions, dialysis, IV or tube feedings, and a ventilator (breathing machine)  · Values history: This document has questions about your views, beliefs, and how you feel and think about life  This information can help others choose the care that you would choose  Why are advance directives important? An advance directive helps you control your care  Although spoken wishes may be used, it is better to have your wishes written down  Spoken wishes can be misunderstood, or not followed  Treatments may be given even if you do not want them  An advance directive may make it easier for your family to make difficult choices about your care  Fall Prevention    Fall prevention  includes ways to make your home and other areas safer  It also includes ways you can move more carefully to prevent a fall  Health conditions that cause changes in your blood pressure, vision, or muscle strength and coordination may increase your risk for falls  Medicines may also increase your risk for falls if they make you dizzy, weak, or sleepy  Fall prevention tips:   · Stand or sit up slowly  · Use assistive devices as directed  · Wear shoes that fit well and have soles that   · Wear a personal alarm  · Stay active  · Manage your medical conditions  Home Safety Tips:  · Add items to prevent falls in the bathroom      · Keep paths clear     · Install bright lights in your home  · Keep items you use often on shelves within reach  · Paint or place reflective tape on the edges of your stairs  Weight Management   Why it is important to manage your weight:  Being overweight increases your risk of health conditions such as heart disease, high blood pressure, type 2 diabetes, and certain types of cancer  It can also increase your risk for osteoarthritis, sleep apnea, and other respiratory problems  Aim for a slow, steady weight loss  Even a small amount of weight loss can lower your risk of health problems  How to lose weight safely:  A safe and healthy way to lose weight is to eat fewer calories and get regular exercise  You can lose up about 1 pound a week by decreasing the number of calories you eat by 500 calories each day  Healthy meal plan for weight management:  A healthy meal plan includes a variety of foods, contains fewer calories, and helps you stay healthy  A healthy meal plan includes the following:  · Eat whole-grain foods more often  A healthy meal plan should contain fiber  Fiber is the part of grains, fruits, and vegetables that is not broken down by your body  Whole-grain foods are healthy and provide extra fiber in your diet  Some examples of whole-grain foods are whole-wheat breads and pastas, oatmeal, brown rice, and bulgur  · Eat a variety of vegetables every day  Include dark, leafy greens such as spinach, kale, jessica greens, and mustard greens  Eat yellow and orange vegetables such as carrots, sweet potatoes, and winter squash  · Eat a variety of fruits every day  Choose fresh or canned fruit (canned in its own juice or light syrup) instead of juice  Fruit juice has very little or no fiber  · Eat low-fat dairy foods  Drink fat-free (skim) milk or 1% milk  Eat fat-free yogurt and low-fat cottage cheese  Try low-fat cheeses such as mozzarella and other reduced-fat cheeses    · Choose meat and other protein foods that are low in fat  Choose beans or other legumes such as split peas or lentils  Choose fish, skinless poultry (chicken or turkey), or lean cuts of red meat (beef or pork)  Before you cook meat or poultry, cut off any visible fat  · Use less fat and oil  Try baking foods instead of frying them  Add less fat, such as margarine, sour cream, regular salad dressing and mayonnaise to foods  Eat fewer high-fat foods  Some examples of high-fat foods include french fries, doughnuts, ice cream, and cakes  · Eat fewer sweets  Limit foods and drinks that are high in sugar  This includes candy, cookies, regular soda, and sweetened drinks  Exercise:  Exercise at least 30 minutes per day on most days of the week  Some examples of exercise include walking, biking, dancing, and swimming  You can also fit in more physical activity by taking the stairs instead of the elevator or parking farther away from stores  Ask your healthcare provider about the best exercise plan for you  © Copyright [employer] 2018 Information is for End User's use only and may not be sold, redistributed or otherwise used for commercial purposes  All illustrations and images included in CareNotes® are the copyrighted property of [employer]  or [facility] Preventive Visit Patient Instructions  Thank you for completing your Welcome to Medicare Visit or Medicare Annual Wellness Visit today  Your next wellness visit will be due in one year (9/16/2020)  The screening/preventive services that you may require over the next 5-10 years are detailed below  Some tests may not apply to you based off risk factors and/or age  Screening tests ordered at today's visit but not completed yet may show as past due  Also, please note that scanned in results may not display below    Preventive Screenings:  Service Recommendations Previous Testing/Comments   Colorectal Cancer Screening  * Colonoscopy    * Fecal Occult Blood Test (FOBT)/Fecal Immunochemical Test (FIT)  * Fecal DNA/Cologuard Test  * Flexible Sigmoidoscopy Age: 54-65 years old   Colonoscopy: every 10 years (may be performed more frequently if at higher risk)  OR  FOBT/FIT: every 1 year  OR  Cologuard: every 3 years  OR  Sigmoidoscopy: every 5 years  Screening may be recommended earlier than age 48 if at higher risk for colorectal cancer  Also, an individualized decision between you and your healthcare provider will decide whether screening between the ages of 74-80 would be appropriate  Colonoscopy: Not on file  FOBT/FIT: 07/13/2018  Cologuard: Not on file  Sigmoidoscopy: Not on file         Breast Cancer Screening Age: 36 years old  Frequency: every 1-2 years  Not required if history of left and right mastectomy Mammogram: 07/23/2018    Screening Current   Cervical Cancer Screening Between the ages of 21-29, pap smear recommended once every 3 years  Between the ages of 33-67, can perform pap smear with HPV co-testing every 5 years  Recommendations may differ for women with a history of total hysterectomy, cervical cancer, or abnormal pap smears in past  Pap Smear: 01/29/2016    Screening Not Indicated   Hepatitis C Screening Once for adults born between 1945 and 1965  More frequently in patients at high risk for Hepatitis C Hep C Antibody: 07/16/2019    Screening Current   Diabetes Screening 1-2 times per year if you're at risk for diabetes or have pre-diabetes Fasting glucose: 131 mg/dL   A1C: 6 6    Screening Not Indicated  History Diabetes   Cholesterol Screening Once every 5 years if you don't have a lipid disorder  May order more often based on risk factors  Lipid panel: 11/03/2015    Screening Not Indicated  History Lipid Disorder     Other Preventive Screenings Covered by Medicare:  6  Abdominal Aortic Aneurysm (AAA) Screening: covered once if your at risk  You're considered to be at risk if you have a family history of AAA    7  Lung Cancer Screening: covers low dose CT scan once per year if you meet all of the following conditions: (1) Age 50-69; (2) No signs or symptoms of lung cancer; (3) Current smoker or have quit smoking within the last 15 years; (4) You have a tobacco smoking history of at least 30 pack years (packs per day multiplied by number of years you smoked); (5) You get a written order from a healthcare provider  8  Glaucoma Screening: covered annually if you're considered high risk: (1) You have diabetes OR (2) Family history of glaucoma OR (3)  aged 48 and older OR (3)  American aged 72 and older  5  Osteoporosis Screening: covered every 2 years if you meet one of the following conditions: (1) You're estrogen deficient and at risk for osteoporosis based off medical history and other findings; (2) Have a vertebral abnormality; (3) On glucocorticoid therapy for more than 3 months; (4) Have primary hyperparathyroidism; (5) On osteoporosis medications and need to assess response to drug therapy  · Last bone density test (DXA Scan): 07/23/2018  10  HIV Screening: covered annually if you're between the age of 12-76  Also covered annually if you are younger than 13 and older than 72 with risk factors for HIV infection  For pregnant patients, it is covered up to 3 times per pregnancy  Immunizations:  Immunization Recommendations   Influenza Vaccine Annual influenza vaccination during flu season is recommended for all persons aged >= 6 months who do not have contraindications   Pneumococcal Vaccine (Prevnar and Pneumovax)  * Prevnar = PCV13  * Pneumovax = PPSV23   Adults 25-60 years old: 1-3 doses may be recommended based on certain risk factors  Adults 72 years old: Prevnar (PCV13) vaccine recommended followed by Pneumovax (PPSV23) vaccine  If already received PPSV23 since turning 65, then PCV13 recommended at least one year after PPSV23 dose     Hepatitis B Vaccine 3 dose series if at intermediate or high risk (ex: diabetes, end stage renal disease, liver disease)   Tetanus (Td) Vaccine - COST NOT COVERED BY MEDICARE PART B Following completion of primary series, a booster dose should be given every 10 years to maintain immunity against tetanus  Td may also be given as tetanus wound prophylaxis  Tdap Vaccine - COST NOT COVERED BY MEDICARE PART B Recommended at least once for all adults  For pregnant patients, recommended with each pregnancy  Shingles Vaccine (Shingrix) - COST NOT COVERED BY MEDICARE PART B  2 shot series recommended in those aged 48 and above     Health Maintenance Due:      Topic Date Due    CRC Screening: Colonoscopy  1945    CRC Screening: FOBTx3/FIT  07/13/2019    MAMMOGRAM  07/23/2019    DXA SCAN  07/23/2020    Hepatitis C Screening  Completed     Immunizations Due:      Topic Date Due    HEPATITIS B VACCINES (1 of 3 - Risk 3-dose series) [birth year]1965    INFLUENZA VACCINE  07/01/2019     Advance Directives   What are advance directives? Advance directives are legal documents that state your wishes and plans for medical care  These plans are made ahead of time in case you lose your ability to make decisions for yourself  Advance directives can apply to any medical decision, such as the treatments you want, and if you want to donate organs  What are the types of advance directives? There are many types of advance directives, and each state has rules about how to use them  You may choose a combination of any of the following:  · Living will: This is a written record of the treatment you want  You can also choose which treatments you do not want, which to limit, and which to stop at a certain time  This includes surgery, medicine, IV fluid, and tube feedings  · Durable power of  for healthcare [facility]): This is a written record that states who you want to make healthcare choices for you when you are unable to make them for yourself   This person, called a proxy, is usually a family member or a friend  You may choose more than 1 proxy  · Do not resuscitate (DNR) order:  A DNR order is used in case your heart stops beating or you stop breathing  It is a request not to have certain forms of treatment, such as CPR  A DNR order may be included in other types of advance directives  · Medical directive: This covers the care that you want if you are in a coma, near death, or unable to make decisions for yourself  You can list the treatments you want for each condition  Treatment may include pain medicine, surgery, blood transfusions, dialysis, IV or tube feedings, and a ventilator (breathing machine)  · Values history: This document has questions about your views, beliefs, and how you feel and think about life  This information can help others choose the care that you would choose  Why are advance directives important? An advance directive helps you control your care  Although spoken wishes may be used, it is better to have your wishes written down  Spoken wishes can be misunderstood, or not followed  Treatments may be given even if you do not want them  An advance directive may make it easier for your family to make difficult choices about your care  Fall Prevention    Fall prevention  includes ways to make your home and other areas safer  It also includes ways you can move more carefully to prevent a fall  Health conditions that cause changes in your blood pressure, vision, or muscle strength and coordination may increase your risk for falls  Medicines may also increase your risk for falls if they make you dizzy, weak, or sleepy  Fall prevention tips:   · Stand or sit up slowly  · Use assistive devices as directed  · Wear shoes that fit well and have soles that   · Wear a personal alarm  · Stay active  · Manage your medical conditions  Home Safety Tips:  · Add items to prevent falls in the bathroom  · Keep paths clear  · Install bright lights in your home      · Keep items you use often on shelves within reach  · Paint or place reflective tape on the edges of your stairs  Weight Management   Why it is important to manage your weight:  Being overweight increases your risk of health conditions such as heart disease, high blood pressure, type 2 diabetes, and certain types of cancer  It can also increase your risk for osteoarthritis, sleep apnea, and other respiratory problems  Aim for a slow, steady weight loss  Even a small amount of weight loss can lower your risk of health problems  How to lose weight safely:  A safe and healthy way to lose weight is to eat fewer calories and get regular exercise  You can lose up about 1 pound a week by decreasing the number of calories you eat by 500 calories each day  Healthy meal plan for weight management:  A healthy meal plan includes a variety of foods, contains fewer calories, and helps you stay healthy  A healthy meal plan includes the following:  · Eat whole-grain foods more often  A healthy meal plan should contain fiber  Fiber is the part of grains, fruits, and vegetables that is not broken down by your body  Whole-grain foods are healthy and provide extra fiber in your diet  Some examples of whole-grain foods are whole-wheat breads and pastas, oatmeal, brown rice, and bulgur  · Eat a variety of vegetables every day  Include dark, leafy greens such as spinach, kale, jessica greens, and mustard greens  Eat yellow and orange vegetables such as carrots, sweet potatoes, and winter squash  · Eat a variety of fruits every day  Choose fresh or canned fruit (canned in its own juice or light syrup) instead of juice  Fruit juice has very little or no fiber  · Eat low-fat dairy foods  Drink fat-free (skim) milk or 1% milk  Eat fat-free yogurt and low-fat cottage cheese  Try low-fat cheeses such as mozzarella and other reduced-fat cheeses  · Choose meat and other protein foods that are low in fat    Choose beans or other legumes such as split peas or lentils  Choose fish, skinless poultry (chicken or turkey), or lean cuts of red meat (beef or pork)  Before you cook meat or poultry, cut off any visible fat  · Use less fat and oil  Try baking foods instead of frying them  Add less fat, such as margarine, sour cream, regular salad dressing and mayonnaise to foods  Eat fewer high-fat foods  Some examples of high-fat foods include french fries, doughnuts, ice cream, and cakes  · Eat fewer sweets  Limit foods and drinks that are high in sugar  This includes candy, cookies, regular soda, and sweetened drinks  Exercise:  Exercise at least 30 minutes per day on most days of the week  Some examples of exercise include walking, biking, dancing, and swimming  You can also fit in more physical activity by taking the stairs instead of the elevator or parking farther away from stores  Ask your healthcare provider about the best exercise plan for you  © Copyright [employer] 2018 Information is for End User's use only and may not be sold, redistributed or otherwise used for commercial purposes   All illustrations and images included in CareNotes® are the copyrighted property of A D A M , Inc  or [address] [place] [id number]

## 2019-09-16 NOTE — PROGRESS NOTES
Assessment/Plan:  Problem List Items Addressed This Visit        Digestive    GERD without esophagitis       Endocrine    [facility] ([facility] )    Type 2 diabetes mellitus with renal manifestations, controlled ([facility] ) - Primary    Relevant Medications    nateglinide (STARLIX) 120 mg tablet    Other Relevant Orders    Hemoglobin A1C       Cardiovascular and Mediastinum    Cardiovascular arteriosclerosis    Congestive heart failure (HCC)    Hypertension       Musculoskeletal and Integument    Age-related osteoporosis without current pathological fracture       Genitourinary    Anemia of chronic kidney failure    Relevant Orders    Ferritin    Iron    TIBC    Hemoglobin and hematocrit, blood    End-stage renal disease on hemodialysis ([facility] )       Other    Hyperlipidemia      Other Visit Diagnoses     Screening for colon cancer        Relevant Orders    Ambulatory referral to Gastroenterology    Negative depression screening        Medicare annual wellness visit, subsequent        Screen for colon cancer        Type 2 diabetes mellitus with complication, unspecified whether long term insulin use ([facility])        Relevant Medications    nateglinide (STARLIX) 120 mg tablet    Encounter for vaccination               Diagnoses and all orders for this visit:    Controlled type 2 diabetes mellitus with chronic kidney disease on chronic dialysis, with long-term current use of insulin ([facility] )  -     Hemoglobin A1C; Future    Screening for colon cancer  -     Ambulatory referral to Gastroenterology; Future    Secondary hyperparathyroidism (HCC)    GERD without esophagitis    Cardiovascular arteriosclerosis    Chronic congestive heart failure, unspecified heart failure type ([facility] )    Essential hypertension    Age-related osteoporosis without current pathological fracture    Anemia of chronic renal failure, stage 5 (HCC)  -     Ferritin; Future  -     Iron; Future  -     TIBC;  Future  -     Hemoglobin and hematocrit, blood; Future    End-stage renal disease on hemodialysis ([facility] )    Mixed hyperlipidemia    Negative depression screening    Medicare annual wellness visit, subsequent    Screen for colon cancer    Type 2 diabetes mellitus with complication, unspecified whether long term insulin use ([facility] )  -     nateglinide (STARLIX) 120 mg tablet; Take 1 tablet (120 mg total) by mouth 4 (four) times a day    Encounter for vaccination        No problem-specific Assessment & Plan notes found for this encounter  A/P: Doing well and will check labs  Refer to GI for CRC  Discussed the flu vaccine and will update  Continue current treatment and RTC three months for routine  Subjective:      Patient ID: Rhiannon Alonso is a 69 y o  female  WF RTC with her  for f/u dm, CAD, etc  Doing well and no new issues  Remains as active as possible and no falls  Sugars less than 150 mostly in the AM and around 300 at bedtime per their desire, but no low sugars  Continues with HD TIW  Denies angina, SOB, edema, palpitations, orthopnea, or PND  No reflux  Due for labs, CRC, and vaccines  The following portions of the patient's history were reviewed and updated as appropriate:   She has a past medical history of Abnormal weight loss, Anemia, Chronic kidney disease, Clostridium difficile colitis, Depression, Diabetes mellitus ([facility] ), Fracture, tibial plateau, GERD (gastroesophageal reflux disease), Hyperkalemia, Hypertension, Hypoglycemia, Hypokalemia, Hypothermia, Hypoxia, Metatarsalgia, Old myocardial infarction, Osteoporosis, Syncope, and Visual disturbance  ,  does not have any pertinent problems on file  ,   has a past surgical history that includes Venous thrombectomy; Bypass Graft (Right); Coronary artery bypass graft; Cataract extraction; Cholecystectomy; Hernia repair; Carotid endarterectomy; Foot Amputation (Bilateral); and Tonsillectomy and adenoidectomy  ,  family history includes Aneurysm in her sister; Breast cancer in her mother; Cancer in her mother; Coronary artery disease in her father and sister; Heart disease in her mother; Liver cancer in her brother  ,   reports that she has quit smoking  She has never used smokeless tobacco  She reports that she does not drink alcohol or use drugs  ,  is allergic to pollen extract and prolia [denosumab]     Current Outpatient Medications   Medication Sig Dispense Refill    acetaminophen (TYLENOL) 325 mg tablet 2 tablets      amLODIPine (NORVASC) 5 mg tablet Take 1 tablet (5 mg total) by mouth daily 90 tablet 0    aspirin 81 MG tablet Take 1 tablet by mouth daily      b complex-vitamin C-folic acid (RENAL) 1 mg Take by mouth daily      calcium acetate (PHOSLO) 667 mg capsule Take 1,334 mg by mouth 3 (three) times a day with meals      ergocalciferol (VITAMIN D2) 50,000 units take 1 capsule by mouth every week 12 capsule 3    fluticasone (FLONASE) 50 mcg/act nasal spray instill 2 sprays into each nostril once daily 16 g 5    glucose blood (ONE TOUCH ULTRA TEST) test strip by In Vitro route 2 (two) times a day      Misc  Devices [facility]) MISC by Does not apply route daily 1 each 0    Multiple Vitamins-Minerals (OCUVITE ADULT 50+ PO) Take by mouth daily      nateglinide (STARLIX) 120 mg tablet Take 1 tablet (120 mg total) by mouth 4 (four) times a day 120 tablet 5    Omega-3 Fatty Acids (FISH OIL) 1200 MG CAPS Take by mouth 4 (four) times a day      pantoprazole (PROTONIX) 40 mg tablet Take 1 tablet (40 mg total) by mouth daily 30 tablet 5    pravastatin (PRAVACHOL) 80 mg tablet take 1 tablet by mouth once daily 90 tablet 3     No current facility-administered medications for this visit  Review of Systems   Constitutional: Negative for activity change, chills, diaphoresis, fatigue and fever  HENT: Negative  Eyes: Negative for visual disturbance  Respiratory: Negative for cough, chest tightness, shortness of breath and wheezing      Cardiovascular: Negative for chest pain, palpitations and leg swelling  Gastrointestinal: Negative for abdominal pain, constipation, diarrhea, nausea and vomiting  Endocrine: Negative for cold intolerance and heat intolerance  Genitourinary: Negative for difficulty urinating, dysuria and frequency  Musculoskeletal: Negative for arthralgias, gait problem and myalgias  Neurological: Negative for dizziness, seizures, syncope, weakness, light-headedness and headaches  Psychiatric/Behavioral: Negative for confusion, dysphoric mood and sleep disturbance  The patient is not nervous/anxious  PHQ-9 Depression Screening    PHQ-9:    Frequency of the following problems over the past two weeks:       Little interest or pleasure in doing things:  0 - not at all  Feeling down, depressed, or hopeless:  0 - not at all  PHQ-2 Score:  0        Objective:  Vitals:    09/16/19 1314   BP: 128/70   Pulse: 74   Resp: 18   Temp: 97 8 °F (36 6 °C)   TempSrc: Tympanic   Weight: 73 kg (161 lb)   Height: 5' 4" (1 626 m)     Body mass index is 27 64 kg/m²  Physical Exam   Constitutional: She is oriented to person, place, and time  She appears well-developed and well-nourished  No distress  HENT:   Head: Normocephalic and atraumatic  Mouth/Throat: Oropharynx is clear and moist    Eyes: Pupils are equal, round, and reactive to light  Conjunctivae and EOM are normal    Neck: Neck supple  No JVD present  Cardiovascular: Normal rate, regular rhythm and normal heart sounds  Pulmonary/Chest: Effort normal and breath sounds normal    Abdominal: Soft  Bowel sounds are normal  She exhibits no distension  There is no tenderness  Musculoskeletal: She exhibits deformity  She exhibits no edema  Neurological: She is alert and oriented to person, place, and time  Psychiatric: She has a normal mood and affect  Her behavior is normal  Judgment and thought content normal    Nursing note and vitals reviewed

## 2019-09-16 NOTE — PROGRESS NOTES
Assessment and Plan:     Problem List Items Addressed This Visit        Digestive    GERD without esophagitis       Endocrine    Secondary hyperparathyroidism ([facility] )    Type 2 diabetes mellitus with renal manifestations, controlled ([facility] ) - Primary    Relevant Medications    nateglinide (STARLIX) 120 mg tablet    Other Relevant Orders    Hemoglobin A1C       Cardiovascular and Mediastinum    Cardiovascular arteriosclerosis    Congestive heart failure (HCC)    Hypertension       Musculoskeletal and Integument    Age-related osteoporosis without current pathological fracture       Genitourinary    Anemia of chronic kidney failure    Relevant Orders    Ferritin    Iron    TIBC    Hemoglobin and hematocrit, blood    End-stage renal disease on hemodialysis ([facility] )       Other    Hyperlipidemia      Other Visit Diagnoses     Screening for colon cancer        Relevant Orders    Ambulatory referral to Gastroenterology    Negative depression screening        Medicare annual wellness visit, subsequent        Screen for colon cancer        Type 2 diabetes mellitus with complication, unspecified whether long term insulin use ([facility] )        Relevant Medications    nateglinide (STARLIX) 120 mg tablet    Encounter for vaccination               Preventive health issues were discussed with patient, and age appropriate screening tests were ordered as noted in patient's After Visit Summary  Personalized health advice and appropriate referrals for health education or preventive services given if needed, as noted in patient's After Visit Summary       History of Present Illness:     Patient presents for Medicare Annual Wellness visit    Patient Care Team:  Everett Lombard, DO as PCP - General (Internal Medicine)     Problem List:     Patient Active Problem List   Diagnosis    Allergic rhinitis    Anemia of chronic kidney failure    Arteriosclerosis of carotid artery    Cardiovascular arteriosclerosis    Cataract    Congestive heart failure ([place] [address] )    Diabetes mellitus with foot ulcer ([place] [address] )    Diastolic dysfunction    End-stage renal disease on hemodialysis ([place] [address] )    GERD without esophagitis    Hyperlipidemia    Hypertension    Iron deficiency anemia    Overactive bladder    Peripheral arterial disease (HCC)    Age-related osteoporosis without current pathological fracture    Secondary hyperparathyroidism ([place] [address] )    Type 2 diabetes mellitus with renal manifestations, controlled ([place] [address] )    Vitamin D deficiency    Acquired absence of left great toe (HCC)    Thrombocytopenia ([place] [address] )      Past Medical and Surgical History:     Past Medical History:   Diagnosis Date    Abnormal weight loss     last assessed [id number]    Anemia     Chronic kidney disease     Clostridium difficile colitis     last assessed [id number]    Depression     resolved [id number]    Diabetes mellitus ([place] [address] )     Fracture, tibial plateau     last assessed [id number]    GERD (gastroesophageal reflux disease)     Hyperkalemia     last assessed [id number]    Hypertension     Hypoglycemia     Sugars now up   Will increase the metformin;  last assessed [id number]    Hypokalemia     last assessed [id number]    Hypothermia     last assessed [id number]    Hypoxia     last assessed [id number]    Metatarsalgia     last assessed [id number]    Old myocardial infarction     Osteoporosis     Syncope     last assessed [id number]    Visual disturbance     last assessed [id number]     Past Surgical History:   Procedure Laterality Date    BYPASS GRAFT Right     H/O bypass graft (non-vein), Right Leg    CAROTID ENDARTERECTOMY      Neurological Surgery Cartoid Endartectomy    CATARACT EXTRACTION      CHOLECYSTECTOMY      questionable    CORONARY ARTERY BYPASS GRAFT      FOOT AMPUTATION Bilateral     H/O surgery foot amputation Metatarsal and toe;  Bilat    HERNIA REPAIR      Inguinal sliding    TONSILLECTOMY AND ADENOIDECTOMY      VENOUS THROMBECTOMY      H/O Arteriobenous surgery Thrombectomy of AV Fistula Percantaneous;  last assessed [id number]      Family History:     Family History   Problem Relation Age of Onset   Antoine [place] Breast cancer Mother     Cancer Mother     Heart disease Mother     Coronary artery disease Father     Aneurysm Sister         Cerebral Artery    Coronary artery disease Sister     Liver cancer Brother       Social History:     Social History     Socioeconomic History    Marital status: /Civil Union     Spouse name: None    Number of children: None    Years of education: None    Highest education level: None   Occupational History    Occupation: retired    Social Needs    Financial resource strain: None    Food insecurity:     Worry: None     Inability: None    Transportation needs:     Medical: None     Non-medical: None   Tobacco Use    Smoking status: Former Smoker    Smokeless tobacco: Never Used   Substance and Sexual Activity    Alcohol use: No    Drug use: No    Sexual activity: None   Lifestyle    Physical activity:     Days per week: None     Minutes per session: None    Stress: None   Relationships    Social connections:     Talks on phone: None     Gets together: None     Attends Yazdanism service: None     Active member of club or organization: None     Attends meetings of clubs or organizations: None     Relationship status: None    Intimate partner violence:     Fear of current or ex partner: None     Emotionally abused: None     Physically abused: None     Forced sexual activity: None   Other Topics Concern    None   Social History Narrative    None       Medications and Allergies:     Current Outpatient Medications   Medication Sig Dispense Refill    acetaminophen (TYLENOL) 325 mg tablet 2 tablets      amLODIPine (NORVASC) 5 mg tablet Take 1 tablet (5 mg total) by mouth daily 90 tablet 0    aspirin 81 MG tablet Take 1 tablet by mouth daily      b complex-vitamin C-folic acid (RENAL) 1 mg Take by mouth daily  calcium acetate (PHOSLO) 667 mg capsule Take 1,334 mg by mouth 3 (three) times a day with meals      ergocalciferol (VITAMIN D2) 50,000 units take 1 capsule by mouth every week 12 capsule 3    fluticasone (FLONASE) 50 mcg/act nasal spray instill 2 sprays into each nostril once daily 16 g 5    glucose blood (ONE TOUCH ULTRA TEST) test strip by In Vitro route 2 (two) times a day      Misc  Devices [facility]) MISC by Does not apply route daily 1 each 0    Multiple Vitamins-Minerals (OCUVITE ADULT 50+ PO) Take by mouth daily      nateglinide (STARLIX) 120 mg tablet Take 1 tablet (120 mg total) by mouth 4 (four) times a day 120 tablet 5    Omega-3 Fatty Acids (FISH OIL) 1200 MG CAPS Take by mouth 4 (four) times a day      pantoprazole (PROTONIX) 40 mg tablet Take 1 tablet (40 mg total) by mouth daily 30 tablet 5    pravastatin (PRAVACHOL) 80 mg tablet take 1 tablet by mouth once daily 90 tablet 3     No current facility-administered medications for this visit  Allergies   Allergen Reactions    Pollen Extract     Prolia [Denosumab]      Severe calcium drop and is on dialysis        Immunizations:     Immunization History   Administered Date(s) Administered    INFLUENZA 10/26/2015, 10/01/2016, 09/16/2017    Influenza Split High Dose Preservative Free IM 10/08/2014, 10/26/2015, 10/01/2016, 09/16/2017, 09/29/2018    Pneumococcal Conjugate 13-Valent 03/22/2017    Pneumococcal Polysaccharide PPV23 07/06/2018    Tdap 03/26/2014      Health Maintenance:         Topic Date Due    CRC Screening: Colonoscopy  1945    CRC Screening: FOBTx3/FIT  07/13/2019    MAMMOGRAM  07/23/2019    DXA SCAN  07/23/2020    Hepatitis C Screening  Completed         Topic Date Due    HEPATITIS B VACCINES (1 of 3 - Risk 3-dose series) 06/25/1965    INFLUENZA VACCINE  07/01/2019      Medicare Health Risk Assessment:     /70   Pulse 74   Temp 97 8 °F (36 6 °C) (Tympanic)   Resp 18   Ht 5' 4" (1 626 m)   [employer] 73 kg (161 lb)   BMI 27 64 kg/m²      Jakob Edwards is here for her Subsequent Wellness visit  Last Medicare Wellness visit information reviewed, patient interviewed and updates made to the record today  Health Risk Assessment:   Patient rates overall health as poor  Patient feels that their physical health rating is slightly better  Eyesight was rated as same  Hearing was rated as slightly worse  Patient feels that their emotional and mental health rating is same  Pain experienced in the last 7 days has been none  Patient states that she has experienced no weight loss or gain in last 6 months  Depression Screening:   PHQ-2 Score: 0      Fall Risk Screening: In the past year, patient has experienced: history of falling in past year    Number of falls: 1  Injured during fall?: No      Urinary Incontinence Screening:   Patient has not leaked urine accidently in the last six months  Home Safety:  Patient has trouble with stairs inside or outside of their home  Patient has working smoke alarms and has no working carbon monoxide detector  Home safety hazards include: none  Nutrition:   Current diet is Regular and Limited junk food  Medications:   Patient is currently taking over-the-counter supplements  OTC medications include: see medication list  Patient is not able to manage medications  Activities of Daily Living (ADLs)/Instrumental Activities of Daily Living (IADLs):   Walk and transfer into and out of bed and chair?: No  Dress and groom yourself?: Yes    Bathe or shower yourself?: Yes    Feed yourself?  Yes  Do your laundry/housekeeping?: No  Manage your money, pay your bills and track your expenses?: No  Make your own meals?: Yes    Do your own shopping?: Yes    Previous Hospitalizations:   Any hospitalizations or ED visits within the last 12 months?: Yes    How many hospitalizations have you had in the last year?: 1-2    Advance Care Planning:   Living will: No    Durable POA for healthcare: No    Advanced directive: No    Advanced directive counseling given: Yes    Five wishes given: Yes    Patient declined ACP directive: No    End of Life Decisions reviewed with patient: Yes    Provider agrees with end of life decisions: Yes      Comments: Will discuss further once pt reviews information  Cognitive Screening:   Provider or family/friend/caregiver concerned regarding cognition?: No    PREVENTIVE SCREENINGS      Cardiovascular Screening:    General: History Lipid Disorder and Screening Current      Diabetes Screening:     General: History Diabetes and Screening Current    Due for: Blood Glucose      Colorectal Cancer Screening:     General: Risks and Benefits Discussed    Due for: Colonoscopy - Low Risk      Breast Cancer Screening:     General: Screening Current      Cervical Cancer Screening:    General: Screening Not Indicated      Osteoporosis Screening:    General: History Osteoporosis and Screening Current      Abdominal Aortic Aneurysm (AAA) Screening:        General: Screening Not Indicated      Lung Cancer Screening:     General: Screening Not Indicated      Hepatitis C Screening:    General: Screening Current    Other Counseling Topics:   Car/seat belt/driving safety and calcium and vitamin D intake and regular weightbearing exercise  A/P: Doing well and no falls  Denies depression and feels safe at home  Diverse diet  Doesn't drive, but uses seat belts  No living will or POA  Information give for pt to review  No DME or referrals needed today  RTC in one year for medicare wellness       Altaf Yun DO

## 2019-10-17 DIAGNOSIS — L97.509 TYPE 2 DIABETES MELLITUS WITH FOOT ULCER, UNSPECIFIED WHETHER LONG TERM INSULIN USE (HCC): Primary | ICD-10-CM

## 2019-10-17 DIAGNOSIS — E11.621 TYPE 2 DIABETES MELLITUS WITH FOOT ULCER, UNSPECIFIED WHETHER LONG TERM INSULIN USE (HCC): Primary | ICD-10-CM

## 2019-12-16 ENCOUNTER — OFFICE VISIT (OUTPATIENT)
Dept: INTERNAL MEDICINE CLINIC | Facility: CLINIC | Age: 74
End: 2019-12-16
Payer: MEDICARE

## 2019-12-16 VITALS
HEART RATE: 80 BPM | RESPIRATION RATE: 18 BRPM | SYSTOLIC BLOOD PRESSURE: 142 MMHG | TEMPERATURE: 98.5 F | DIASTOLIC BLOOD PRESSURE: 70 MMHG

## 2019-12-16 DIAGNOSIS — D50.9 IRON DEFICIENCY ANEMIA, UNSPECIFIED IRON DEFICIENCY ANEMIA TYPE: ICD-10-CM

## 2019-12-16 DIAGNOSIS — E11.9 ENCOUNTER FOR DIABETIC FOOT EXAM (HCC): ICD-10-CM

## 2019-12-16 DIAGNOSIS — Z13.31 NEGATIVE DEPRESSION SCREENING: ICD-10-CM

## 2019-12-16 DIAGNOSIS — D63.1 ANEMIA OF CHRONIC RENAL FAILURE, STAGE 5 (HCC): ICD-10-CM

## 2019-12-16 DIAGNOSIS — I50.9 CHRONIC CONGESTIVE HEART FAILURE, UNSPECIFIED HEART FAILURE TYPE (HCC): ICD-10-CM

## 2019-12-16 DIAGNOSIS — N18.5 ANEMIA OF CHRONIC RENAL FAILURE, STAGE 5 (HCC): ICD-10-CM

## 2019-12-16 DIAGNOSIS — N25.81 SECONDARY HYPERPARATHYROIDISM (HCC): ICD-10-CM

## 2019-12-16 DIAGNOSIS — E11.621 TYPE 2 DIABETES MELLITUS WITH FOOT ULCER, UNSPECIFIED WHETHER LONG TERM INSULIN USE (HCC): ICD-10-CM

## 2019-12-16 DIAGNOSIS — N18.6 CONTROLLED TYPE 2 DIABETES MELLITUS WITH CHRONIC KIDNEY DISEASE ON CHRONIC DIALYSIS, WITH LONG-TERM CURRENT USE OF INSULIN (HCC): ICD-10-CM

## 2019-12-16 DIAGNOSIS — L97.509 TYPE 2 DIABETES MELLITUS WITH FOOT ULCER, UNSPECIFIED WHETHER LONG TERM INSULIN USE (HCC): ICD-10-CM

## 2019-12-16 DIAGNOSIS — E11.22 CONTROLLED TYPE 2 DIABETES MELLITUS WITH CHRONIC KIDNEY DISEASE ON CHRONIC DIALYSIS, WITH LONG-TERM CURRENT USE OF INSULIN (HCC): ICD-10-CM

## 2019-12-16 DIAGNOSIS — Z79.4 CONTROLLED TYPE 2 DIABETES MELLITUS WITH CHRONIC KIDNEY DISEASE ON CHRONIC DIALYSIS, WITH LONG-TERM CURRENT USE OF INSULIN (HCC): ICD-10-CM

## 2019-12-16 DIAGNOSIS — I25.10 CARDIOVASCULAR ARTERIOSCLEROSIS: ICD-10-CM

## 2019-12-16 DIAGNOSIS — N18.6 END-STAGE RENAL DISEASE ON HEMODIALYSIS (HCC): ICD-10-CM

## 2019-12-16 DIAGNOSIS — Z99.2 CONTROLLED TYPE 2 DIABETES MELLITUS WITH CHRONIC KIDNEY DISEASE ON CHRONIC DIALYSIS, WITH LONG-TERM CURRENT USE OF INSULIN (HCC): ICD-10-CM

## 2019-12-16 DIAGNOSIS — E55.9 VITAMIN D DEFICIENCY: ICD-10-CM

## 2019-12-16 DIAGNOSIS — R19.7 DIARRHEA, UNSPECIFIED TYPE: ICD-10-CM

## 2019-12-16 DIAGNOSIS — I10 ESSENTIAL HYPERTENSION: ICD-10-CM

## 2019-12-16 DIAGNOSIS — M81.0 AGE-RELATED OSTEOPOROSIS WITHOUT CURRENT PATHOLOGICAL FRACTURE: ICD-10-CM

## 2019-12-16 DIAGNOSIS — K21.9 GERD WITHOUT ESOPHAGITIS: ICD-10-CM

## 2019-12-16 DIAGNOSIS — Z99.2 END-STAGE RENAL DISEASE ON HEMODIALYSIS (HCC): ICD-10-CM

## 2019-12-16 DIAGNOSIS — E78.2 MIXED HYPERLIPIDEMIA: ICD-10-CM

## 2019-12-16 DIAGNOSIS — Z12.39 SCREENING FOR BREAST CANCER: Primary | ICD-10-CM

## 2019-12-16 PROCEDURE — 99214 OFFICE O/P EST MOD 30 MIN: CPT | Performed by: INTERNAL MEDICINE

## 2019-12-16 RX ORDER — DIPHENOXYLATE HYDROCHLORIDE AND ATROPINE SULFATE 2.5; .025 MG/1; MG/1
1 TABLET ORAL 4 TIMES DAILY PRN
Qty: 30 TABLET | Refills: 0 | Status: SHIPPED | OUTPATIENT
Start: 2019-12-16 | End: 2021-01-01 | Stop reason: HOSPADM

## 2019-12-16 NOTE — PROGRESS NOTES
Assessment/Plan:  Problem List Items Addressed This Visit        Digestive    GERD without esophagitis       Endocrine    Diabetes mellitus with foot ulcer ([place] [address] )    Secondary hyperparathyroidism ([place] [address] )    Type 2 diabetes mellitus with renal manifestations, controlled ([place] [address] )    Relevant Orders    CBC and differential    Comprehensive metabolic panel    Hemoglobin A1C    Lipid Panel with Direct LDL reflex       Cardiovascular and Mediastinum    Cardiovascular arteriosclerosis    Congestive heart failure ([facility])    Hypertension       Musculoskeletal and Integument    Age-related osteoporosis without current pathological fracture       Genitourinary    Anemia of chronic kidney failure    End-stage renal disease on hemodialysis ([facility])       Other    Hyperlipidemia    Iron deficiency anemia    Relevant Orders    Ferritin    Iron    TIBC    Vitamin D deficiency    Relevant Orders    Vitamin D 25 hydroxy      Other Visit Diagnoses     Screening for breast cancer    -  Primary    Relevant Orders    Mammo screening bilateral w 3d & cad    Diarrhea, unspecified type        Relevant Medications    diphenoxylate-atropine (LOMOTIL) 2 5-0 025 mg per tablet    Negative depression screening        Encounter for diabetic foot exam ([place] [address] )               Diagnoses and all orders for this visit:    Screening for breast cancer  -     Mammo screening bilateral w 3d & cad; Future    Diarrhea, unspecified type  -     diphenoxylate-atropine (LOMOTIL) 2 5-0 025 mg per tablet; Take 1 tablet by mouth 4 (four) times a day as needed for diarrhea    Controlled type 2 diabetes mellitus with chronic kidney disease on chronic dialysis, with long-term current use of insulin ([facility])  -     CBC and differential; Future  -     Comprehensive metabolic panel; Future  -     Hemoglobin A1C; Future  -     Lipid Panel with Direct LDL reflex;  Future    Secondary hyperparathyroidism ([place] [address] )    GERD without esophagitis    Cardiovascular arteriosclerosis    Chronic congestive heart failure, unspecified heart failure type ([place] [address] )    Essential hypertension    Age-related osteoporosis without current pathological fracture    Anemia of chronic renal failure, stage 5 (HCC)    End-stage renal disease on hemodialysis (HCC)    Mixed hyperlipidemia    Iron deficiency anemia, unspecified iron deficiency anemia type  -     Ferritin; Future  -     Iron; Future  -     TIBC; Future    Vitamin D deficiency  -     Vitamin D 25 hydroxy; Future    Negative depression screening    Encounter for diabetic foot exam ([place] [address] )    Type 2 diabetes mellitus with foot ulcer, unspecified whether long term insulin use ([place] [address] )        No problem-specific Assessment & Plan notes found for this encounter  A/P: Doing well, but sugars are up  Await labs  Defers med change until labs are back  VAccines up to date  Keep f/u with DPM and monitor feet daily  Continue current treatment, including HD, and RTC three months for routine or PRN if wound worsens  Will call in several weeks for f/u on the wound  Suspect pt will need a med change for her sugars, but ? If she will allow it  Subjective:      Patient ID: Traci Go is a 69 y o  female  WF RTC with her  for f/u dm, htn, etc  Doing ok and no new issues  Remains wheelchair bound and no falls, but recently stubbed her right great toes  Has a wound and is seeing DPM  Sugars still high, with most Daytime sugars less than 160, but PM in the 200's and refuses to get them lower due to severe hypoglycemia in the past  Attend HD TIW  No reflux  Denies angina, edema, SOB, palpitations, orthopnea, or PND  Due for labs and vaccines are up to date         The following portions of the patient's history were reviewed and updated as appropriate:   She has a past medical history of Abnormal weight loss, Anemia, Chronic kidney disease, Clostridium difficile colitis, Depression, Diabetes mellitus ([place] [address] ), Fracture, tibial plateau, GERD (gastroesophageal reflux disease), Hyperkalemia, Hypertension, Hypoglycemia, Hypokalemia, Hypothermia, Hypoxia, Metatarsalgia, Old myocardial infarction, Osteoporosis, Syncope, and Visual disturbance  ,  does not have any pertinent problems on file  ,   has a past surgical history that includes Venous thrombectomy; Bypass Graft (Right); Coronary artery bypass graft; Cataract extraction; Cholecystectomy; Hernia repair; Carotid endarterectomy; Foot Amputation (Bilateral); and Tonsillectomy and adenoidectomy  ,  family history includes Aneurysm in her sister; Breast cancer in her mother; Cancer in her mother; Coronary artery disease in her father and sister; Heart disease in her mother; Liver cancer in her brother  ,   reports that she has quit smoking  She has never used smokeless tobacco  She reports that she does not drink alcohol or use drugs  ,  is allergic to pollen extract and prolia [denosumab]     Current Outpatient Medications   Medication Sig Dispense Refill    acetaminophen (TYLENOL) 325 mg tablet 2 tablets      amLODIPine (NORVASC) 5 mg tablet Take 1 tablet (5 mg total) by mouth daily 90 tablet 0    aspirin 81 MG tablet Take 1 tablet by mouth daily      b complex-vitamin C-folic acid (RENAL) 1 mg Take by mouth daily      calcium acetate (PHOSLO) 667 mg capsule Take 1,334 mg by mouth 3 (three) times a day with meals      ergocalciferol (VITAMIN D2) 50,000 units take 1 capsule by mouth every week 12 capsule 3    fluticasone (FLONASE) 50 mcg/act nasal spray instill 2 sprays into each nostril once daily 16 g 5    glucose blood (ONE TOUCH ULTRA TEST) test strip 1 each by Other route 2 (two) times a day 200 each 5    Misc   Devices [facility]) MISC by Does not apply route daily 1 each 0    nateglinide (STARLIX) 120 mg tablet Take 1 tablet (120 mg total) by mouth 4 (four) times a day 120 tablet 5    Omega-3 Fatty Acids (FISH OIL) 1200 MG CAPS Take by mouth 4 (four) times a day      pantoprazole (PROTONIX) 40 mg tablet Take 1 tablet (40 mg total) by mouth daily 30 tablet 5    pravastatin (PRAVACHOL) 80 mg tablet take 1 tablet by mouth once daily 90 tablet 3    diphenoxylate-atropine (LOMOTIL) 2 5-0 025 mg per tablet Take 1 tablet by mouth 4 (four) times a day as needed for diarrhea 30 tablet 0     No current facility-administered medications for this visit  Review of Systems   Constitutional: Negative for activity change, chills, diaphoresis, fatigue and fever  HENT: Negative  Eyes: Negative for visual disturbance  Respiratory: Negative for cough, chest tightness, shortness of breath and wheezing  Cardiovascular: Negative for chest pain, palpitations and leg swelling  Gastrointestinal: Negative for abdominal pain, constipation, diarrhea, nausea and vomiting  Endocrine: Negative for cold intolerance and heat intolerance  Genitourinary: Negative for difficulty urinating, dysuria and frequency  Musculoskeletal: Positive for gait problem  Negative for arthralgias and myalgias  Skin: Positive for wound  Neurological: Negative for dizziness, seizures, syncope, weakness, light-headedness and headaches  Psychiatric/Behavioral: Negative for confusion, dysphoric mood and sleep disturbance  The patient is not nervous/anxious  PHQ-9 Depression Screening    PHQ-9:    Frequency of the following problems over the past two weeks:             Objective:  Vitals:    12/16/19 1337   BP: 142/70   Pulse: 80   Resp: 18   Temp: 98 5 °F (36 9 °C)   TempSrc: Tympanic     There is no height or weight on file to calculate BMI  Physical Exam   Constitutional: She is oriented to person, place, and time  She appears well-developed and well-nourished  No distress  HENT:   Head: Normocephalic and atraumatic  Mouth/Throat: Oropharynx is clear and moist    Eyes: Pupils are equal, round, and reactive to light  Conjunctivae and EOM are normal    Neck: Neck supple  No JVD present     Cardiovascular: Normal rate, regular rhythm and normal heart sounds  Pulses are no weak pulses  Pulses:       Dorsalis pedis pulses are 1+ on the right side, and 1+ on the left side  Posterior tibial pulses are 1+ on the right side, and 1+ on the left side  Pulmonary/Chest: Effort normal and breath sounds normal  No respiratory distress  She has no wheezes  Abdominal: Soft  Bowel sounds are normal  She exhibits no distension  There is no tenderness  Musculoskeletal: She exhibits no edema  Feet:   Right Foot:   Skin Integrity: Positive for ulcer (right great toe with a scabbed lesion w/o any d/c , fluctuation,  or induration  NO erythema  )  Negative for skin breakdown, erythema, warmth, callus or dry skin  Left Foot:   Skin Integrity: Negative for ulcer, skin breakdown, erythema, warmth, callus or dry skin  Neurological: She is alert and oriented to person, place, and time  Skin:   Right great tow with a scabbed lesion w/o d/c, erythema, fluctuation, or induration  Psychiatric: She has a normal mood and affect  Her behavior is normal  Judgment and thought content normal    Nursing note and vitals reviewed  Patient's shoes and socks removed  Right Foot/Ankle   Right Foot Inspection  Skin Exam: skin normal, skin intact and ulcer (right great toe with a scabbed lesion w/o any d/c , fluctuation,  or induration  NO erythema  ) no dry skin, no warmth, no callus, no erythema, no maceration, no abnormal color, no pre-ulcer and no callus                          Toe Exam: ROM and strength within normal limitsno swelling, no tenderness, erythema and  no right toe deformity  Sensory       Monofilament testing: diminished  Vascular  Capillary refills: < 3 seconds  The right DP pulse is 1+  The right PT pulse is 1+       Left Foot/Ankle  Left Foot Inspection  Skin Exam: skin normal and skin intactno dry skin, no warmth, no erythema, no maceration, normal color, no pre-ulcer, no ulcer and no callus                         Toe Exam: ROM and strength within normal limitsno swelling, no tenderness, no erythema and no left toe deformity                   Sensory       Monofilament: diminished  Vascular  Capillary refills: < 3 seconds  The left DP pulse is 1+  The left PT pulse is 1+  Assign Risk Category:  Deformity present; Loss of protective sensation; No weak pulses       Risk: 3     Scheduled Medication Review:  Pt's scheduled medication use was reviewed by myself/staff via the [employer] website  Pt's use has been found to be appropriate w/o any concerns for misuse by the patient  Pt's current conditions require continued scheduled medication use at this time  Future review for continued appropriate medication use and misuse will continue

## 2019-12-20 LAB — HBA1C MFR BLD HPLC: 7.5 %

## 2019-12-27 ENCOUNTER — OFFICE VISIT (OUTPATIENT)
Dept: INTERNAL MEDICINE CLINIC | Facility: CLINIC | Age: 74
End: 2019-12-27
Payer: MEDICARE

## 2019-12-27 VITALS
SYSTOLIC BLOOD PRESSURE: 134 MMHG | TEMPERATURE: 97.5 F | RESPIRATION RATE: 18 BRPM | DIASTOLIC BLOOD PRESSURE: 58 MMHG | OXYGEN SATURATION: 99 % | HEART RATE: 78 BPM

## 2019-12-27 DIAGNOSIS — J01.10 ACUTE NON-RECURRENT FRONTAL SINUSITIS: Primary | ICD-10-CM

## 2019-12-27 PROCEDURE — 99213 OFFICE O/P EST LOW 20 MIN: CPT | Performed by: INTERNAL MEDICINE

## 2019-12-27 RX ORDER — FLUTICASONE PROPIONATE 50 MCG
1 SPRAY, SUSPENSION (ML) NASAL DAILY
Qty: 16 G | Refills: 0 | Status: SHIPPED | OUTPATIENT
Start: 2019-12-27 | End: 2020-03-12 | Stop reason: SDUPTHER

## 2019-12-27 RX ORDER — GUAIFENESIN AND CODEINE PHOSPHATE 100; 10 MG/5ML; MG/5ML
5 SOLUTION ORAL 3 TIMES DAILY PRN
Qty: 120 ML | Refills: 0 | Status: SHIPPED | OUTPATIENT
Start: 2019-12-27 | End: 2021-01-01

## 2019-12-27 RX ORDER — AMOXICILLIN AND CLAVULANATE POTASSIUM 500; 125 MG/1; MG/1
1 TABLET, FILM COATED ORAL EVERY 12 HOURS SCHEDULED
Qty: 20 TABLET | Refills: 0 | Status: SHIPPED | OUTPATIENT
Start: 2019-12-27 | End: 2020-01-06

## 2019-12-27 RX ORDER — GUAIFENESIN 600 MG
600 TABLET, EXTENDED RELEASE 12 HR ORAL EVERY 12 HOURS SCHEDULED
Qty: 20 TABLET | Refills: 0 | Status: SHIPPED | OUTPATIENT
Start: 2019-12-27 | End: 2020-08-19 | Stop reason: HOSPADM

## 2019-12-27 NOTE — PROGRESS NOTES
Assessment/Plan:  Problem List Items Addressed This Visit     None      Visit Diagnoses     Acute non-recurrent frontal sinusitis    -  Primary    Relevant Medications    amoxicillin-clavulanate (AUGMENTIN) 500-125 mg per tablet    guaiFENesin (MUCINEX) 600 mg 12 hr tablet    fluticasone (FLONASE) 50 mcg/act nasal spray    guaifenesin-codeine (GUAIFENESIN AC) 100-10 MG/5ML liquid           Diagnoses and all orders for this visit:    Acute non-recurrent frontal sinusitis  -     amoxicillin-clavulanate (AUGMENTIN) 500-125 mg per tablet; Take 1 tablet by mouth every 12 (twelve) hours for 10 days  -     guaiFENesin (MUCINEX) 600 mg 12 hr tablet; Take 1 tablet (600 mg total) by mouth every 12 (twelve) hours  -     fluticasone (FLONASE) 50 mcg/act nasal spray; 1 spray into each nostril daily  -     guaifenesin-codeine (GUAIFENESIN AC) 100-10 MG/5ML liquid; Take 5 mL by mouth 3 (three) times a day as needed for cough    Other orders  -     Multiple Vitamins-Minerals (OCUVITE ADULT 50+ PO); Take by mouth        No problem-specific Assessment & Plan notes found for this encounter  A/P: Rest and increase po fluids  OTC PRN motrin or tylenol  Will start abx at renal dosing for HD, INS, cough medicine, and mucinex  RTC as scheduled or PRN  Subjective:  Non smoking WF presents for a several day h/o URI s/s  No travel  But attends [facility] and several other pt are ill  Denies  fever/chills  Slight sore throat  Notes PND, nasal congestion, headache, and productive cough  Denies SOB  Denies wheezing  Patient ID: Bacilio Biggs is a 69 y o  female      HPI    The following portions of the patient's history were reviewed and updated as appropriate:   She has a past medical history of Abnormal weight loss, Anemia, Chronic kidney disease, Clostridium difficile colitis, Depression, Diabetes mellitus ([address] ), Fracture, tibial plateau, GERD (gastroesophageal reflux disease), Hyperkalemia, Hypertension, Hypoglycemia, Hypokalemia, Hypothermia, Hypoxia, Metatarsalgia, Old myocardial infarction, Osteoporosis, Syncope, and Visual disturbance  ,  does not have any pertinent problems on file  ,   has a past surgical history that includes Venous thrombectomy; Bypass Graft (Right); Coronary artery bypass graft; Cataract extraction; Cholecystectomy; Hernia repair; Carotid endarterectomy; Foot Amputation (Bilateral); and Tonsillectomy and adenoidectomy  ,  family history includes Aneurysm in her sister; Breast cancer in her mother; Cancer in her mother; Coronary artery disease in her father and sister; Heart disease in her mother; Liver cancer in her brother  ,   reports that she has quit smoking  She has never used smokeless tobacco  She reports that she does not drink alcohol or use drugs  ,  is allergic to pollen extract and prolia [denosumab]     Current Outpatient Medications   Medication Sig Dispense Refill    acetaminophen (TYLENOL) 325 mg tablet 2 tablets      amLODIPine (NORVASC) 5 mg tablet Take 1 tablet (5 mg total) by mouth daily 90 tablet 0    aspirin 81 MG tablet Take 1 tablet by mouth daily      b complex-vitamin C-folic acid (RENAL) 1 mg Take by mouth daily      calcium acetate (PHOSLO) 667 mg capsule Take 1,334 mg by mouth 3 (three) times a day with meals      diphenoxylate-atropine (LOMOTIL) 2 5-0 025 mg per tablet Take 1 tablet by mouth 4 (four) times a day as needed for diarrhea 30 tablet 0    ergocalciferol (VITAMIN D2) 50,000 units take 1 capsule by mouth every week 12 capsule 3    glucose blood (ONE TOUCH ULTRA TEST) test strip 1 each by Other route 2 (two) times a day 200 each 5    Misc   Devices [facility]) MISC by Does not apply route daily 1 each 0    Multiple Vitamins-Minerals (OCUVITE ADULT 50+ PO) Take by mouth      nateglinide (STARLIX) 120 mg tablet Take 1 tablet (120 mg total) by mouth 4 (four) times a day 120 tablet 5    Omega-3 Fatty Acids (FISH OIL) 1200 MG CAPS Take by mouth 4 (four) times a day  pantoprazole (PROTONIX) 40 mg tablet Take 1 tablet (40 mg total) by mouth daily 30 tablet 5    pravastatin (PRAVACHOL) 80 mg tablet take 1 tablet by mouth once daily 90 tablet 3    amoxicillin-clavulanate (AUGMENTIN) 500-125 mg per tablet Take 1 tablet by mouth every 12 (twelve) hours for 10 days 20 tablet 0    fluticasone (FLONASE) 50 mcg/act nasal spray 1 spray into each nostril daily 16 g 0    guaiFENesin (MUCINEX) 600 mg 12 hr tablet Take 1 tablet (600 mg total) by mouth every 12 (twelve) hours 20 tablet 0    guaifenesin-codeine (GUAIFENESIN AC) 100-10 MG/5ML liquid Take 5 mL by mouth 3 (three) times a day as needed for cough 120 mL 0     No current facility-administered medications for this visit  Review of Systems   Constitutional: Positive for activity change  Negative for chills, diaphoresis, fatigue and fever  HENT: Positive for congestion, facial swelling, postnasal drip, rhinorrhea, sinus pressure and sore throat  Negative for ear discharge, ear pain, sinus pain and trouble swallowing  Respiratory: Positive for cough and wheezing (with forced expiration  )  Negative for chest tightness and shortness of breath  Cardiovascular: Negative for chest pain, palpitations and leg swelling  Gastrointestinal: Negative for abdominal pain, constipation, diarrhea, nausea and vomiting  Genitourinary: Negative for difficulty urinating, dysuria and frequency  Musculoskeletal: Negative for arthralgias, gait problem and myalgias  Neurological: Negative for dizziness, seizures, syncope, weakness, light-headedness and headaches  Psychiatric/Behavioral: Negative for confusion  The patient is not nervous/anxious          PHQ-9 Depression Screening    PHQ-9:    Frequency of the following problems over the past two weeks:             Objective:  Vitals:    12/27/19 0854   BP: 134/58   Pulse: 78   Resp: 18   Temp: 97 5 °F (36 4 °C)   TempSrc: Tympanic   SpO2: 99%     There is no height or weight on file to calculate BMI  Physical Exam   Constitutional: She is oriented to person, place, and time  She appears well-developed and well-nourished  No distress  HENT:   Head: Normocephalic and atraumatic  Right Ear: External ear normal    Left Ear: External ear normal    Mouth/Throat: Oropharynx is clear and moist  No oropharyngeal exudate  Sinus tenderness noted with turbinates red and inflamed  Cobblestoning noted  Eyes: Pupils are equal, round, and reactive to light  Conjunctivae and EOM are normal    Neck: Neck supple  Cardiovascular: Normal rate, regular rhythm and normal heart sounds  Pulmonary/Chest: Effort normal and breath sounds normal  No respiratory distress  She has no wheezes  She has no rales  Abdominal: Soft  Bowel sounds are normal  She exhibits no distension  There is no tenderness  Lymphadenopathy:     She has no cervical adenopathy  Neurological: She is alert and oriented to person, place, and time  Psychiatric: She has a normal mood and affect  Her behavior is normal  Judgment and thought content normal    Nursing note and vitals reviewed

## 2019-12-27 NOTE — PATIENT INSTRUCTIONS
Sinusitis   AMBULATORY CARE:   Sinusitis  is inflammation or infection of your sinuses  It is most often caused by a virus  Acute sinusitis may last up to 12 weeks  Chronic sinusitis lasts longer than 12 weeks  Recurrent sinusitis means you have 4 or more times in 1 year  Common symptoms include the following:   · Fever    · Pain, pressure, redness, or swelling around the forehead, cheeks, or eyes    · Thick yellow or green discharge from your nose    · Tenderness when you touch your face over your sinuses    · Dry cough that happens mostly at night or when you lie down    · Headache and face pain that is worse when you lean forward    · Tooth pain, or pain when you chew  Seek care immediately if:   · Your eye and eyelid are red, swollen, and painful  · You cannot open your eye  · You have vision changes, such as double vision  · Your eyeball bulges out or you cannot move your eye  · You are more sleepy than normal, or you notice changes in your ability to think, move, or talk  · You have a stiff neck, a fever, or a bad headache  · You have swelling of your forehead or scalp  Contact your healthcare provider if:   · Your symptoms do not improve after 3 days  · Your symptoms do not go away after 10 days  · You have nausea and are vomiting  · Your nose is bleeding  · You have questions or concerns about your condition or care  Treatment for sinusitis:  Your symptoms may go away on their own  Your healthcare provider may recommend watchful waiting for up to 10 days before starting antibiotics  You may  need any of the following:  · Acetaminophen  decreases pain and fever  It is available without a doctor's order  Ask how much to take and how often to take it  Follow directions  Read the labels of all other medicines you are using to see if they also contain acetaminophen, or ask your doctor or pharmacist  Acetaminophen can cause liver damage if not taken correctly   Do not use more than 4 grams (4,000 milligrams) total of acetaminophen in one day  · NSAIDs , such as ibuprofen, help decrease swelling, pain, and fever  This medicine is available with or without a doctor's order  NSAIDs can cause stomach bleeding or kidney problems in certain people  If you take blood thinner medicine, always ask your healthcare provider if NSAIDs are safe for you  Always read the medicine label and follow directions  · Nasal steroid sprays  may help decrease inflammation in your nose and sinuses  · Decongestants  help reduce swelling and drain mucus in the nose and sinuses  They may help you breathe easier  · Antihistamines  help dry mucus in the nose and relieve sneezing  · Antibiotics  help treat or prevent a bacterial infection  · Take your medicine as directed  Contact your healthcare provider if you think your medicine is not helping or if you have side effects  Tell him or her if you are allergic to any medicine  Keep a list of the medicines, vitamins, and herbs you take  Include the amounts, and when and why you take them  Bring the list or the pill bottles to follow-up visits  Carry your medicine list with you in case of an emergency  Self-care:   · Rinse your sinuses  Use a sinus rinse device to rinse your nasal passages with a saline (salt water) solution or distilled water  Do not use tap water  This will help thin the mucus in your nose and rinse away pollen and dirt  It will also help reduce swelling so you can breathe normally  Ask your healthcare provider how often to do this  · Breathe in steam   Heat a bowl of water until you see steam  Lean over the bowl and make a tent over your head with a large towel  Breathe deeply for about 20 minutes  Be careful not to get too close to the steam or burn yourself  Do this 3 times a day  You can also breathe deeply when you take a hot shower  · Sleep with your head elevated    Place an extra pillow under your head before you go to sleep to help your sinuses drain  · Drink liquids as directed  Ask your healthcare provider how much liquid to drink each day and which liquids are best for you  Liquids will thin the mucus in your nose and help it drain  Avoid drinks that contain alcohol or caffeine  · Do not smoke, and avoid secondhand smoke  Nicotine and other chemicals in cigarettes and cigars can make your symptoms worse  Ask your healthcare provider for information if you currently smoke and need help to quit  E-cigarettes or smokeless tobacco still contain nicotine  Talk to your healthcare provider before you use these products  Prevent the spread of germs that cause sinusitis:  Wash your hands often with soap and water  Wash your hands after you use the bathroom, change a child's diaper, or sneeze  Wash your hands before you prepare or eat food  Follow up with your healthcare provider as directed: You may be referred to an ear, nose, and throat specialist  Write down your questions so you remember to ask them during your visits  © 2017 [facility] Information is for End User's use only and may not be sold, redistributed or otherwise used for commercial purposes  All illustrations and images included in CareNotes® are the copyrighted property of [employer] , [employer]  or [employer]  The above information is an  only  It is not intended as medical advice for individual conditions or treatments  Talk to your doctor, nurse or pharmacist before following any medical regimen to see if it is safe and effective for you

## 2020-01-07 ENCOUNTER — TELEPHONE (OUTPATIENT)
Dept: GASTROENTEROLOGY | Facility: CLINIC | Age: 75
End: 2020-01-07

## 2020-01-07 NOTE — TELEPHONE ENCOUNTER
Called patient to arrange appointment  As per , patient does not want to go through with a colonoscopy  He will have patient contact our office if our services are needed

## 2020-01-13 DIAGNOSIS — K21.9 GASTROESOPHAGEAL REFLUX DISEASE WITHOUT ESOPHAGITIS: ICD-10-CM

## 2020-01-13 RX ORDER — PANTOPRAZOLE SODIUM 40 MG/1
40 TABLET, DELAYED RELEASE ORAL DAILY
Qty: 30 TABLET | Refills: 5 | Status: SHIPPED | OUTPATIENT
Start: 2020-01-13 | End: 2020-08-10 | Stop reason: SDUPTHER

## 2020-01-21 LAB
HBA1C MFR BLD HPLC: 7 %
HCV AB SER-ACNC: NON REACTIVE

## 2020-01-31 ENCOUNTER — HOSPITAL ENCOUNTER (OUTPATIENT)
Dept: MAMMOGRAPHY | Facility: HOSPITAL | Age: 75
Discharge: HOME | End: 2020-01-31
Payer: MEDICARE

## 2020-01-31 VITALS — BODY MASS INDEX: 27.49 KG/M2 | HEIGHT: 64 IN | WEIGHT: 161 LBS

## 2020-01-31 DIAGNOSIS — Z12.39 SCREENING FOR BREAST CANCER: ICD-10-CM

## 2020-01-31 PROCEDURE — 77067 SCR MAMMO BI INCL CAD: CPT

## 2020-01-31 PROCEDURE — 77063 BREAST TOMOSYNTHESIS BI: CPT

## 2020-02-04 DIAGNOSIS — I10 ESSENTIAL HYPERTENSION: ICD-10-CM

## 2020-02-04 RX ORDER — AMLODIPINE BESYLATE 5 MG/1
TABLET ORAL
Qty: 90 TABLET | Refills: 0 | Status: SHIPPED | OUTPATIENT
Start: 2020-02-04 | End: 2020-05-11

## 2020-03-12 DIAGNOSIS — I25.10 CORONARY ARTERY DISEASE WITHOUT ANGINA PECTORIS, UNSPECIFIED VESSEL OR LESION TYPE, UNSPECIFIED WHETHER NATIVE OR TRANSPLANTED HEART: ICD-10-CM

## 2020-03-12 DIAGNOSIS — J01.10 ACUTE NON-RECURRENT FRONTAL SINUSITIS: ICD-10-CM

## 2020-03-12 RX ORDER — FLUTICASONE PROPIONATE 50 MCG
1 SPRAY, SUSPENSION (ML) NASAL DAILY
Qty: 16 G | Refills: 3 | Status: SHIPPED | OUTPATIENT
Start: 2020-03-12

## 2020-03-12 RX ORDER — PRAVASTATIN SODIUM 80 MG/1
TABLET ORAL
Qty: 90 TABLET | Refills: 3 | Status: SHIPPED | OUTPATIENT
Start: 2020-03-12 | End: 2021-01-01

## 2020-04-20 DIAGNOSIS — E11.8 TYPE 2 DIABETES MELLITUS WITH COMPLICATION (HCC): ICD-10-CM

## 2020-04-20 RX ORDER — NATEGLINIDE 120 MG/1
120 TABLET ORAL 4 TIMES DAILY
Qty: 120 TABLET | Refills: 5 | Status: SHIPPED | OUTPATIENT
Start: 2020-04-20 | End: 2020-10-21

## 2020-05-11 DIAGNOSIS — I10 ESSENTIAL HYPERTENSION: ICD-10-CM

## 2020-05-11 RX ORDER — AMLODIPINE BESYLATE 5 MG/1
TABLET ORAL
Qty: 90 TABLET | Refills: 1 | Status: SHIPPED | OUTPATIENT
Start: 2020-05-11 | End: 2020-08-19 | Stop reason: HOSPADM

## 2020-06-01 ENCOUNTER — APPOINTMENT (OUTPATIENT)
Dept: LAB | Facility: CLINIC | Age: 75
End: 2020-06-01
Payer: MEDICARE

## 2020-06-01 ENCOUNTER — OFFICE VISIT (OUTPATIENT)
Dept: INTERNAL MEDICINE CLINIC | Facility: CLINIC | Age: 75
End: 2020-06-01
Payer: MEDICARE

## 2020-06-01 VITALS
DIASTOLIC BLOOD PRESSURE: 56 MMHG | TEMPERATURE: 97.2 F | SYSTOLIC BLOOD PRESSURE: 136 MMHG | HEART RATE: 93 BPM | OXYGEN SATURATION: 97 %

## 2020-06-01 DIAGNOSIS — N18.6 END-STAGE RENAL DISEASE ON HEMODIALYSIS (HCC): ICD-10-CM

## 2020-06-01 DIAGNOSIS — E55.9 VITAMIN D DEFICIENCY: ICD-10-CM

## 2020-06-01 DIAGNOSIS — E11.621 TYPE 2 DIABETES MELLITUS WITH FOOT ULCER, UNSPECIFIED WHETHER LONG TERM INSULIN USE (HCC): ICD-10-CM

## 2020-06-01 DIAGNOSIS — D50.9 IRON DEFICIENCY ANEMIA, UNSPECIFIED IRON DEFICIENCY ANEMIA TYPE: ICD-10-CM

## 2020-06-01 DIAGNOSIS — N18.6 CONTROLLED TYPE 2 DIABETES MELLITUS WITH CHRONIC KIDNEY DISEASE ON CHRONIC DIALYSIS, WITH LONG-TERM CURRENT USE OF INSULIN (HCC): ICD-10-CM

## 2020-06-01 DIAGNOSIS — K21.9 GERD WITHOUT ESOPHAGITIS: ICD-10-CM

## 2020-06-01 DIAGNOSIS — D63.1 ANEMIA OF CHRONIC RENAL FAILURE, STAGE 5 (HCC): ICD-10-CM

## 2020-06-01 DIAGNOSIS — Z99.2 CONTROLLED TYPE 2 DIABETES MELLITUS WITH CHRONIC KIDNEY DISEASE ON CHRONIC DIALYSIS, WITH LONG-TERM CURRENT USE OF INSULIN (HCC): ICD-10-CM

## 2020-06-01 DIAGNOSIS — Z99.2 CONTROLLED TYPE 2 DIABETES MELLITUS WITH CHRONIC KIDNEY DISEASE ON CHRONIC DIALYSIS, WITH LONG-TERM CURRENT USE OF INSULIN (HCC): Primary | ICD-10-CM

## 2020-06-01 DIAGNOSIS — E11.22 CONTROLLED TYPE 2 DIABETES MELLITUS WITH CHRONIC KIDNEY DISEASE ON CHRONIC DIALYSIS, WITH LONG-TERM CURRENT USE OF INSULIN (HCC): Primary | ICD-10-CM

## 2020-06-01 DIAGNOSIS — E78.2 MIXED HYPERLIPIDEMIA: ICD-10-CM

## 2020-06-01 DIAGNOSIS — M81.0 AGE-RELATED OSTEOPOROSIS WITHOUT CURRENT PATHOLOGICAL FRACTURE: ICD-10-CM

## 2020-06-01 DIAGNOSIS — I73.9 PERIPHERAL ARTERIAL DISEASE (HCC): ICD-10-CM

## 2020-06-01 DIAGNOSIS — E11.22 CONTROLLED TYPE 2 DIABETES MELLITUS WITH CHRONIC KIDNEY DISEASE ON CHRONIC DIALYSIS, WITH LONG-TERM CURRENT USE OF INSULIN (HCC): ICD-10-CM

## 2020-06-01 DIAGNOSIS — I10 ESSENTIAL HYPERTENSION: ICD-10-CM

## 2020-06-01 DIAGNOSIS — Z89.412 ACQUIRED ABSENCE OF LEFT GREAT TOE (HCC): ICD-10-CM

## 2020-06-01 DIAGNOSIS — D69.6 THROMBOCYTOPENIA (HCC): ICD-10-CM

## 2020-06-01 DIAGNOSIS — N18.5 ANEMIA OF CHRONIC RENAL FAILURE, STAGE 5 (HCC): ICD-10-CM

## 2020-06-01 DIAGNOSIS — Z99.2 END-STAGE RENAL DISEASE ON HEMODIALYSIS (HCC): ICD-10-CM

## 2020-06-01 DIAGNOSIS — N18.6 CONTROLLED TYPE 2 DIABETES MELLITUS WITH CHRONIC KIDNEY DISEASE ON CHRONIC DIALYSIS, WITH LONG-TERM CURRENT USE OF INSULIN (HCC): Primary | ICD-10-CM

## 2020-06-01 DIAGNOSIS — I25.10 CARDIOVASCULAR ARTERIOSCLEROSIS: ICD-10-CM

## 2020-06-01 DIAGNOSIS — Z79.4 CONTROLLED TYPE 2 DIABETES MELLITUS WITH CHRONIC KIDNEY DISEASE ON CHRONIC DIALYSIS, WITH LONG-TERM CURRENT USE OF INSULIN (HCC): Primary | ICD-10-CM

## 2020-06-01 DIAGNOSIS — I50.9 CHRONIC CONGESTIVE HEART FAILURE, UNSPECIFIED HEART FAILURE TYPE (HCC): ICD-10-CM

## 2020-06-01 DIAGNOSIS — N25.81 SECONDARY HYPERPARATHYROIDISM (HCC): ICD-10-CM

## 2020-06-01 DIAGNOSIS — L97.509 TYPE 2 DIABETES MELLITUS WITH FOOT ULCER, UNSPECIFIED WHETHER LONG TERM INSULIN USE (HCC): ICD-10-CM

## 2020-06-01 DIAGNOSIS — Z79.4 CONTROLLED TYPE 2 DIABETES MELLITUS WITH CHRONIC KIDNEY DISEASE ON CHRONIC DIALYSIS, WITH LONG-TERM CURRENT USE OF INSULIN (HCC): ICD-10-CM

## 2020-06-01 LAB
25(OH)D3 SERPL-MCNC: 81.2 NG/ML (ref 30–100)
ALBUMIN SERPL BCP-MCNC: 2.9 G/DL (ref 3.5–5)
ALP SERPL-CCNC: 200 U/L (ref 46–116)
ALT SERPL W P-5'-P-CCNC: 10 U/L (ref 12–78)
ANION GAP SERPL CALCULATED.3IONS-SCNC: 7 MMOL/L (ref 4–13)
AST SERPL W P-5'-P-CCNC: 11 U/L (ref 5–45)
BASOPHILS # BLD AUTO: 0.06 THOUSANDS/ΜL (ref 0–0.1)
BASOPHILS NFR BLD AUTO: 1 % (ref 0–1)
BILIRUB SERPL-MCNC: 0.42 MG/DL (ref 0.2–1)
BUN SERPL-MCNC: 33 MG/DL (ref 5–25)
CALCIUM SERPL-MCNC: 9.8 MG/DL (ref 8.3–10.1)
CHLORIDE SERPL-SCNC: 98 MMOL/L (ref 100–108)
CHOLEST SERPL-MCNC: 145 MG/DL (ref 50–200)
CO2 SERPL-SCNC: 28 MMOL/L (ref 21–32)
CREAT SERPL-MCNC: 4.88 MG/DL (ref 0.6–1.3)
EOSINOPHIL # BLD AUTO: 0.28 THOUSAND/ΜL (ref 0–0.61)
EOSINOPHIL NFR BLD AUTO: 4 % (ref 0–6)
ERYTHROCYTE [DISTWIDTH] IN BLOOD BY AUTOMATED COUNT: 17.2 % (ref 11.6–15.1)
EST. AVERAGE GLUCOSE BLD GHB EST-MCNC: 131 MG/DL
FERRITIN SERPL-MCNC: 1370 NG/ML (ref 8–388)
GFR SERPL CREATININE-BSD FRML MDRD: 8 ML/MIN/1.73SQ M
GLUCOSE SERPL-MCNC: 197 MG/DL (ref 65–140)
HBA1C MFR BLD: 6.2 %
HCT VFR BLD AUTO: 36.6 % (ref 34.8–46.1)
HDLC SERPL-MCNC: 36 MG/DL
HGB BLD-MCNC: 11 G/DL (ref 11.5–15.4)
IMM GRANULOCYTES # BLD AUTO: 0.03 THOUSAND/UL (ref 0–0.2)
IMM GRANULOCYTES NFR BLD AUTO: 0 % (ref 0–2)
IRON SERPL-MCNC: 42 UG/DL (ref 50–170)
LDLC SERPL CALC-MCNC: 62 MG/DL (ref 0–100)
LYMPHOCYTES # BLD AUTO: 1.35 THOUSANDS/ΜL (ref 0.6–4.47)
LYMPHOCYTES NFR BLD AUTO: 19 % (ref 14–44)
MCH RBC QN AUTO: 28.9 PG (ref 26.8–34.3)
MCHC RBC AUTO-ENTMCNC: 30.1 G/DL (ref 31.4–37.4)
MCV RBC AUTO: 96 FL (ref 82–98)
MONOCYTES # BLD AUTO: 0.64 THOUSAND/ΜL (ref 0.17–1.22)
MONOCYTES NFR BLD AUTO: 9 % (ref 4–12)
NEUTROPHILS # BLD AUTO: 4.63 THOUSANDS/ΜL (ref 1.85–7.62)
NEUTS SEG NFR BLD AUTO: 67 % (ref 43–75)
NRBC BLD AUTO-RTO: 0 /100 WBCS
PLATELET # BLD AUTO: 251 THOUSANDS/UL (ref 149–390)
PMV BLD AUTO: 10.1 FL (ref 8.9–12.7)
POTASSIUM SERPL-SCNC: 3.7 MMOL/L (ref 3.5–5.3)
PROT SERPL-MCNC: 7.6 G/DL (ref 6.4–8.2)
RBC # BLD AUTO: 3.81 MILLION/UL (ref 3.81–5.12)
SODIUM SERPL-SCNC: 133 MMOL/L (ref 136–145)
TIBC SERPL-MCNC: 154 UG/DL (ref 250–450)
TRIGL SERPL-MCNC: 233 MG/DL
WBC # BLD AUTO: 6.99 THOUSAND/UL (ref 4.31–10.16)

## 2020-06-01 PROCEDURE — 36415 COLL VENOUS BLD VENIPUNCTURE: CPT

## 2020-06-01 PROCEDURE — 83540 ASSAY OF IRON: CPT

## 2020-06-01 PROCEDURE — 85025 COMPLETE CBC W/AUTO DIFF WBC: CPT

## 2020-06-01 PROCEDURE — 82306 VITAMIN D 25 HYDROXY: CPT

## 2020-06-01 PROCEDURE — 1036F TOBACCO NON-USER: CPT | Performed by: INTERNAL MEDICINE

## 2020-06-01 PROCEDURE — 4040F PNEUMOC VAC/ADMIN/RCVD: CPT | Performed by: INTERNAL MEDICINE

## 2020-06-01 PROCEDURE — 3051F HG A1C>EQUAL 7.0%<8.0%: CPT | Performed by: INTERNAL MEDICINE

## 2020-06-01 PROCEDURE — 83550 IRON BINDING TEST: CPT

## 2020-06-01 PROCEDURE — 82728 ASSAY OF FERRITIN: CPT

## 2020-06-01 PROCEDURE — 83036 HEMOGLOBIN GLYCOSYLATED A1C: CPT

## 2020-06-01 PROCEDURE — 1160F RVW MEDS BY RX/DR IN RCRD: CPT | Performed by: INTERNAL MEDICINE

## 2020-06-01 PROCEDURE — 80053 COMPREHEN METABOLIC PANEL: CPT

## 2020-06-01 PROCEDURE — 99214 OFFICE O/P EST MOD 30 MIN: CPT | Performed by: INTERNAL MEDICINE

## 2020-06-01 PROCEDURE — 80061 LIPID PANEL: CPT

## 2020-06-01 PROCEDURE — 3066F NEPHROPATHY DOC TX: CPT | Performed by: INTERNAL MEDICINE

## 2020-07-06 DIAGNOSIS — E55.9 VITAMIN D DEFICIENCY: ICD-10-CM

## 2020-07-06 RX ORDER — ERGOCALCIFEROL 1.25 MG/1
50000 CAPSULE ORAL WEEKLY
Qty: 12 CAPSULE | Refills: 3 | Status: SHIPPED | OUTPATIENT
Start: 2020-07-06 | End: 2021-01-01

## 2020-08-10 DIAGNOSIS — K21.9 GASTROESOPHAGEAL REFLUX DISEASE WITHOUT ESOPHAGITIS: ICD-10-CM

## 2020-08-10 RX ORDER — PANTOPRAZOLE SODIUM 40 MG/1
40 TABLET, DELAYED RELEASE ORAL DAILY
Qty: 30 TABLET | Refills: 5 | Status: SHIPPED | OUTPATIENT
Start: 2020-08-10 | End: 2021-01-01

## 2020-08-11 ENCOUNTER — TELEPHONE (OUTPATIENT)
Dept: OTHER | Facility: OTHER | Age: 75
End: 2020-08-11

## 2020-08-11 ENCOUNTER — HOSPITAL ENCOUNTER (INPATIENT)
Facility: HOSPITAL | Age: 75
LOS: 7 days | Discharge: NURSING, REHAB OR LONG-TERM CARE | DRG: 073 | End: 2020-08-19
Attending: EMERGENCY MEDICINE | Admitting: INTERNAL MEDICINE
Payer: MEDICARE

## 2020-08-11 ENCOUNTER — APPOINTMENT (EMERGENCY)
Dept: RADIOLOGY | Facility: HOSPITAL | Age: 75
DRG: 073 | End: 2020-08-11
Payer: MEDICARE

## 2020-08-11 DIAGNOSIS — R53.1 GENERALIZED WEAKNESS: ICD-10-CM

## 2020-08-11 DIAGNOSIS — Z99.2 END-STAGE RENAL DISEASE ON HEMODIALYSIS (HCC): ICD-10-CM

## 2020-08-11 DIAGNOSIS — N18.6 END-STAGE RENAL DISEASE ON HEMODIALYSIS (HCC): ICD-10-CM

## 2020-08-11 DIAGNOSIS — I95.9 HYPOTENSION: ICD-10-CM

## 2020-08-11 DIAGNOSIS — R53.1 WEAKNESS: Primary | ICD-10-CM

## 2020-08-11 DIAGNOSIS — I10 ESSENTIAL HYPERTENSION: ICD-10-CM

## 2020-08-11 PROBLEM — R11.0 NAUSEA: Status: ACTIVE | Noted: 2020-08-11

## 2020-08-11 LAB
ALBUMIN SERPL BCP-MCNC: 3.8 G/DL (ref 3.5–5.7)
ALP SERPL-CCNC: 121 U/L (ref 55–165)
ALT SERPL W P-5'-P-CCNC: 7 U/L (ref 7–52)
ANION GAP SERPL CALCULATED.3IONS-SCNC: 9 MMOL/L (ref 4–13)
AST SERPL W P-5'-P-CCNC: 13 U/L (ref 13–39)
ATRIAL RATE: 97 BPM
BASOPHILS # BLD AUTO: 0.1 THOUSANDS/ΜL (ref 0–0.1)
BASOPHILS NFR BLD AUTO: 1 % (ref 0–2)
BILIRUB SERPL-MCNC: 0.5 MG/DL (ref 0.2–1)
BNP SERPL-MCNC: 2050 PG/ML (ref 1–100)
BUN SERPL-MCNC: 14 MG/DL (ref 7–25)
CALCIUM SERPL-MCNC: 9.6 MG/DL (ref 8.6–10.5)
CHLORIDE SERPL-SCNC: 95 MMOL/L (ref 98–107)
CO2 SERPL-SCNC: 33 MMOL/L (ref 21–31)
CREAT SERPL-MCNC: 2.45 MG/DL (ref 0.6–1.2)
EOSINOPHIL # BLD AUTO: 0.1 THOUSAND/ΜL (ref 0–0.61)
EOSINOPHIL NFR BLD AUTO: 1 % (ref 0–5)
ERYTHROCYTE [DISTWIDTH] IN BLOOD BY AUTOMATED COUNT: 18.5 % (ref 11.5–14.5)
GFR SERPL CREATININE-BSD FRML MDRD: 19 ML/MIN/1.73SQ M
GLUCOSE SERPL-MCNC: 105 MG/DL (ref 65–99)
GLUCOSE SERPL-MCNC: 113 MG/DL (ref 65–140)
HCT VFR BLD AUTO: 35.2 % (ref 42–47)
HGB BLD-MCNC: 11.6 G/DL (ref 12–16)
LIPASE SERPL-CCNC: <10 U/L (ref 11–82)
LYMPHOCYTES # BLD AUTO: 0.3 THOUSANDS/ΜL (ref 0.6–4.47)
LYMPHOCYTES NFR BLD AUTO: 4 % (ref 21–51)
MCH RBC QN AUTO: 30.1 PG (ref 26–34)
MCHC RBC AUTO-ENTMCNC: 32.9 G/DL (ref 31–37)
MCV RBC AUTO: 91 FL (ref 81–99)
MONOCYTES # BLD AUTO: 0.5 THOUSAND/ΜL (ref 0.17–1.22)
MONOCYTES NFR BLD AUTO: 6 % (ref 2–12)
NEUTROPHILS # BLD AUTO: 7.4 THOUSANDS/ΜL (ref 1.4–6.5)
NEUTS SEG NFR BLD AUTO: 88 % (ref 42–75)
P AXIS: 39 DEGREES
PLATELET # BLD AUTO: 204 THOUSANDS/UL (ref 149–390)
PMV BLD AUTO: 8.1 FL (ref 8.6–11.7)
POTASSIUM SERPL-SCNC: 3.2 MMOL/L (ref 3.5–5.5)
PR INTERVAL: 190 MS
PROT SERPL-MCNC: 7.5 G/DL (ref 6.4–8.9)
QRS AXIS: 114 DEGREES
QRSD INTERVAL: 148 MS
QT INTERVAL: 400 MS
QTC INTERVAL: 505 MS
RBC # BLD AUTO: 3.85 MILLION/UL (ref 3.9–5.2)
SODIUM SERPL-SCNC: 137 MMOL/L (ref 134–143)
T WAVE AXIS: -38 DEGREES
TROPONIN I SERPL-MCNC: 0.07 NG/ML
VENTRICULAR RATE: 96 BPM
WBC # BLD AUTO: 8.5 THOUSAND/UL (ref 4.8–10.8)

## 2020-08-11 PROCEDURE — 99220 PR INITIAL OBSERVATION CARE/DAY 70 MINUTES: CPT | Performed by: NURSE PRACTITIONER

## 2020-08-11 PROCEDURE — 71045 X-RAY EXAM CHEST 1 VIEW: CPT

## 2020-08-11 PROCEDURE — 87081 CULTURE SCREEN ONLY: CPT | Performed by: INTERNAL MEDICINE

## 2020-08-11 PROCEDURE — 83690 ASSAY OF LIPASE: CPT | Performed by: EMERGENCY MEDICINE

## 2020-08-11 PROCEDURE — 99285 EMERGENCY DEPT VISIT HI MDM: CPT | Performed by: EMERGENCY MEDICINE

## 2020-08-11 PROCEDURE — 1124F ACP DISCUSS-NO DSCNMKR DOCD: CPT | Performed by: NURSE PRACTITIONER

## 2020-08-11 PROCEDURE — 36415 COLL VENOUS BLD VENIPUNCTURE: CPT | Performed by: EMERGENCY MEDICINE

## 2020-08-11 PROCEDURE — 82948 REAGENT STRIP/BLOOD GLUCOSE: CPT

## 2020-08-11 PROCEDURE — 93005 ELECTROCARDIOGRAM TRACING: CPT

## 2020-08-11 PROCEDURE — 73502 X-RAY EXAM HIP UNI 2-3 VIEWS: CPT

## 2020-08-11 PROCEDURE — 80053 COMPREHEN METABOLIC PANEL: CPT | Performed by: EMERGENCY MEDICINE

## 2020-08-11 PROCEDURE — 83880 ASSAY OF NATRIURETIC PEPTIDE: CPT | Performed by: EMERGENCY MEDICINE

## 2020-08-11 PROCEDURE — 93010 ELECTROCARDIOGRAM REPORT: CPT | Performed by: INTERNAL MEDICINE

## 2020-08-11 PROCEDURE — 84484 ASSAY OF TROPONIN QUANT: CPT | Performed by: EMERGENCY MEDICINE

## 2020-08-11 PROCEDURE — 99285 EMERGENCY DEPT VISIT HI MDM: CPT

## 2020-08-11 PROCEDURE — 85025 COMPLETE CBC W/AUTO DIFF WBC: CPT | Performed by: EMERGENCY MEDICINE

## 2020-08-11 RX ORDER — ONDANSETRON 2 MG/ML
4 INJECTION INTRAMUSCULAR; INTRAVENOUS EVERY 6 HOURS PRN
Status: DISCONTINUED | OUTPATIENT
Start: 2020-08-11 | End: 2020-08-19 | Stop reason: HOSPADM

## 2020-08-11 RX ORDER — PANTOPRAZOLE SODIUM 40 MG/1
40 TABLET, DELAYED RELEASE ORAL DAILY
Status: DISCONTINUED | OUTPATIENT
Start: 2020-08-12 | End: 2020-08-19 | Stop reason: HOSPADM

## 2020-08-11 RX ORDER — POTASSIUM CHLORIDE 20 MEQ/1
20 TABLET, EXTENDED RELEASE ORAL ONCE
Status: COMPLETED | OUTPATIENT
Start: 2020-08-11 | End: 2020-08-11

## 2020-08-11 RX ORDER — DIPHENOXYLATE HYDROCHLORIDE AND ATROPINE SULFATE 2.5; .025 MG/1; MG/1
1 TABLET ORAL 4 TIMES DAILY PRN
Status: DISCONTINUED | OUTPATIENT
Start: 2020-08-11 | End: 2020-08-19 | Stop reason: HOSPADM

## 2020-08-11 RX ORDER — FLUTICASONE PROPIONATE 50 MCG
1 SPRAY, SUSPENSION (ML) NASAL DAILY
Status: DISCONTINUED | OUTPATIENT
Start: 2020-08-12 | End: 2020-08-19 | Stop reason: HOSPADM

## 2020-08-11 RX ORDER — NATEGLINIDE 120 MG/1
120 TABLET ORAL 4 TIMES DAILY
Status: DISCONTINUED | OUTPATIENT
Start: 2020-08-11 | End: 2020-08-11

## 2020-08-11 RX ORDER — ONDANSETRON 2 MG/ML
4 INJECTION INTRAMUSCULAR; INTRAVENOUS ONCE
Status: DISCONTINUED | OUTPATIENT
Start: 2020-08-11 | End: 2020-08-19 | Stop reason: HOSPADM

## 2020-08-11 RX ORDER — ASPIRIN 81 MG/1
81 TABLET ORAL DAILY
Status: DISCONTINUED | OUTPATIENT
Start: 2020-08-12 | End: 2020-08-19 | Stop reason: HOSPADM

## 2020-08-11 RX ORDER — AMLODIPINE BESYLATE 5 MG/1
5 TABLET ORAL DAILY
Status: DISCONTINUED | OUTPATIENT
Start: 2020-08-12 | End: 2020-08-19

## 2020-08-11 RX ORDER — HEPARIN SODIUM 5000 [USP'U]/ML
5000 INJECTION, SOLUTION INTRAVENOUS; SUBCUTANEOUS EVERY 8 HOURS SCHEDULED
Status: DISCONTINUED | OUTPATIENT
Start: 2020-08-11 | End: 2020-08-19 | Stop reason: HOSPADM

## 2020-08-11 RX ORDER — GUAIFENESIN 600 MG
600 TABLET, EXTENDED RELEASE 12 HR ORAL EVERY 12 HOURS SCHEDULED
Status: DISCONTINUED | OUTPATIENT
Start: 2020-08-11 | End: 2020-08-11

## 2020-08-11 RX ORDER — PRAVASTATIN SODIUM 40 MG
80 TABLET ORAL DAILY
Status: DISCONTINUED | OUTPATIENT
Start: 2020-08-12 | End: 2020-08-19 | Stop reason: HOSPADM

## 2020-08-11 RX ORDER — ACETAMINOPHEN 325 MG/1
650 TABLET ORAL EVERY 6 HOURS PRN
Status: DISCONTINUED | OUTPATIENT
Start: 2020-08-11 | End: 2020-08-19 | Stop reason: HOSPADM

## 2020-08-11 RX ORDER — CHOLECALCIFEROL (VITAMIN D3) 10 MCG
1 TABLET ORAL DAILY
Status: DISCONTINUED | OUTPATIENT
Start: 2020-08-12 | End: 2020-08-19 | Stop reason: HOSPADM

## 2020-08-11 RX ADMIN — POTASSIUM CHLORIDE 20 MEQ: 1500 TABLET, EXTENDED RELEASE ORAL at 18:59

## 2020-08-11 RX ADMIN — HEPARIN SODIUM 5000 UNITS: 5000 INJECTION INTRAVENOUS; SUBCUTANEOUS at 22:00

## 2020-08-11 NOTE — ASSESSMENT & PLAN NOTE
· Known history baseline hemoglobin appears to be 9-11  · Patient hemoglobin on admission 11 6  · Continue to monitor

## 2020-08-11 NOTE — ASSESSMENT & PLAN NOTE
· Patient complaining of left hip pain what rate on x-ray appears unremarkable trend for final read from Radiology  · PT OT evaluation  · Patient with known history of osteoporosis  · Continue home supplementation

## 2020-08-11 NOTE — ASSESSMENT & PLAN NOTE
· Presented to ED as a max assist 4-5 in-patient generally see this x1 with the spouse  · Under lying cause not appreciated chest x-ray unremarkable unable to obtain UA as patient makes minimal urine  · Will order UA microscopic if patient would produce urine can collect a sample  · Patient is afebrile without leukocytosis  · Will obtain PT OT evaluation  · If patient would have son spike a fever obtain blood cultures and pursue further infectious workup

## 2020-08-11 NOTE — ASSESSMENT & PLAN NOTE
Lab Results   Component Value Date    HGBA1C 6 2 (H) 06/01/2020       No results for input(s): POCGLU in the last 72 hours      Blood Sugar Average: Last 72 hrs:   patient appears to be diet controlled  Will monitor blood glucose for 24 hours  If noted hyperglycemia can order SSI and a c  HS Accu-Cheks

## 2020-08-11 NOTE — H&P
H&P- Parisa  1945, 77 y o  female MRN: [id number]    Unit/Bed#: ED 11 Encounter: [id number]    Primary Care Provider: Barbara Osler, DO   Date and time admitted to hospital: 8/11/2020  4:14 PM        * Generalized weakness  Assessment & Plan  · Presented to ED as a max assist 4-5 in-patient generally see this x1 with the spouse  · Under lying cause not appreciated chest x-ray unremarkable unable to obtain UA as patient makes minimal urine  · Will order UA microscopic if patient would produce urine can collect a sample  · Patient is afebrile without leukocytosis  · Will obtain PT OT evaluation  · If patient would have son spike a fever obtain blood cultures and pursue further infectious workup    Nausea  Assessment & Plan  · Patient reported some nausea with vomiting this morning   · Could be a viral gastroenteritis  · Patient status post replete for hypokalemia will get a BMP in a m    · Zofran for nausea    Age-related osteoporosis without current pathological fracture  Assessment & Plan  · Patient complaining of left hip pain what rate on x-ray appears unremarkable trend for final read from Radiology  · PT OT evaluation  · Patient with known history of osteoporosis  · Continue home supplementation    Anemia of chronic kidney failure  Assessment & Plan  · Known history baseline hemoglobin appears to be 9-11  · Patient hemoglobin on admission 11 6  · Continue to monitor    Congestive heart failure (HCC)  Assessment & Plan  Wt Readings from Last 3 Encounters:   08/11/20 73 kg (161 lb)   01/31/20 73 kg (161 lb)   09/16/19 73 kg (161 lb)     Patient showing no evidence of weight change or volume overload BNP elevated however this does occur in end-stage renal disease chest x-ray unremarkable in reviewing images  Daily weight, I/O, low-sodium diet  Would avoid diuretic at this time given patient was having hypotension with dialysis today        Diabetes mellitus with foot ulcer ([facility] [address] )  Assessment & Plan  Lab Results   Component Value Date    HGBA1C 6 2 (H) 06/01/2020       No results for input(s): POCGLU in the last 72 hours  Blood Sugar Average: Last 72 hrs:   patient appears to be diet controlled  Will monitor blood glucose for 24 hours  If noted hyperglycemia can order SSI and a c  HS Accu-Cheks    End-stage renal disease on hemodialysis [facility])  Assessment & Plan  · End-stage renal disease on dialysis patient typically dialyzed on Tuesday Thursday Saturday  · Status post dialysis today with hypotension requiring IV fluids  · Blood pressure normalized at this time  · If patient remains in patient will need to consult Nephrology for dialysis on Thursday    Hypertension  Assessment & Plan  · Blood pressure acceptable  · Continue Norvasc with parameters  · Routine vital monitoring    Hyperlipidemia  Assessment & Plan  · Continue statin for now    GERD without esophagitis  Assessment & Plan  · Continue PPI    VTE Prophylaxis: Heparin  / sequential compression device   Code Status:  Full code  POLST: POLST is not applicable to this patient  Discussion with family:  None at bedside    Anticipated Length of Stay:  Patient will be admitted on an Observation basis with an anticipated length of stay of  < 2 midnights  Justification for Hospital Stay:  Generalized weakness of unclear etiology possible viral gastroenteritis    Total Time for Visit, including Counseling / Coordination of Care: 40 minutes  Greater than 50% of this total time spent on direct patient counseling and coordination of care      Chief Complaint:   I feel weak    History of Present Illness:    Marah Kirkpatrick is a 77 y o  female who presents with known history but not limited to diabetes, end-stage renal disease on dialysis, hypertension, hyperlipidemia brought to the ED for by her  after dialysis today when he could not get her out of the car by himself as usual   Patient reported some upset stomach and nausea this morning denies vomiting but his did dry heaving patient went to dialysis where she experienced some hypotension and required some IV fluid to improve her pressures  Patient denies any lightheadedness dizziness but reported that she just felt all over weakness  Patient does appear forgetful as when questioned if she produce any urine patient was shaking her head no and however was just cleaned up from a large amount of urinary incontinence  Patient reports she generally is able to transfer herself in a wheelchair she did sustain a fall on her own several weeks ago denies any injury and states she felt fine up until this morning when she started with some nausea and then going to dialysis patient does report dialysis that patient has have been showing up without mask on have been coughing and sneezing when patient had sudden onset of nausea and dry heaving in the room  Patient denies any weight gain denies any edema of her upper or lower extremities further denies fevers chills shortness of breath above her baseline patient reports she does get shortness of breath when she exerts herself but this is been normal for her from some time  Review of Systems:    Review of Systems   Constitutional: Positive for activity change and fatigue  Negative for appetite change, chills, diaphoresis and fever  HENT: Negative  Respiratory: Negative  Reports a baseline shortness of breath   Cardiovascular: Negative  Gastrointestinal: Positive for abdominal pain and nausea  Genitourinary: Negative  Musculoskeletal: Negative  Neurological: Positive for weakness  Negative for dizziness, light-headedness and headaches  Psychiatric/Behavioral: Negative          Past Medical and Surgical History:     Past Medical History:   Diagnosis Date    Abnormal weight loss     last assessed [id number]    Anemia     Chronic kidney disease     Clostridium difficile colitis     last assessed [id number]    Depression     resolved [id number]    Diabetes mellitus ([facility] [address] )     Fracture, tibial plateau     last assessed [id number]    GERD (gastroesophageal reflux disease)     Hyperkalemia     last assessed [id number]    Hypertension     Hypoglycemia     Sugars now up  Will increase the metformin;  last assessed [id number]    Hypokalemia     last assessed [id number]    Hypothermia     last assessed [id number]    Hypoxia     last assessed [id number]    Metatarsalgia     last assessed [id number]    Old myocardial infarction     Osteoporosis     Syncope     last assessed [id number]    Visual disturbance     last assessed [id number]       Past Surgical History:   Procedure Laterality Date    BYPASS GRAFT Right     H/O bypass graft (non-vein), Right Leg    CAROTID ENDARTERECTOMY      Neurological Surgery Cartoid Endartectomy    CATARACT EXTRACTION      CHOLECYSTECTOMY      questionable    CORONARY ARTERY BYPASS GRAFT      FOOT AMPUTATION Bilateral     H/O surgery foot amputation Metatarsal and toe;  Bilat    HERNIA REPAIR      Inguinal sliding    TONSILLECTOMY AND ADENOIDECTOMY      VENOUS THROMBECTOMY      H/O Arteriobenous surgery Thrombectomy of AV Fistula Percantaneous;  last assessed [id number]       Meds/Allergies:    Prior to Admission medications    Medication Sig Start Date End Date Taking?  Authorizing Provider   acetaminophen (TYLENOL) 325 mg tablet 2 tablets    Historical Provider, MD   amLODIPine (NORVASC) 5 mg tablet take 1 tablet by mouth daily 5/11/20   Tracie Garcia DO   aspirin 81 MG tablet Take 1 tablet by mouth daily 3/26/14   Historical Provider, MD   b complex-vitamin C-folic acid (RENAL) 1 mg Take by mouth daily    Historical Provider, MD   calcium acetate (PHOSLO) 667 mg capsule Take 1,334 mg by mouth 3 (three) times a day with meals    Historical Provider, MD   diphenoxylate-atropine (LOMOTIL) 2 5-0 025 mg per tablet Take 1 tablet by mouth 4 (four) times a day as needed for diarrhea 12/16/19   Tracie Garcia DO ergocalciferol (VITAMIN D2) 50,000 units Take 1 capsule (50,000 Units total) by mouth once a week 7/6/20   Sameul Nap, DO   fluticasone [facility]) 50 mcg/act nasal spray 1 spray into each nostril daily 3/12/20   Sameul Nap, DO   glucose blood (ONE TOUCH ULTRA TEST) test strip 1 each by Other route 2 (two) times a day 10/17/19   Sameul Nap, DO   guaiFENesin (MUCINEX) 600 mg 12 hr tablet Take 1 tablet (600 mg total) by mouth every 12 (twelve) hours 12/27/19   Sameul Nap, DO   guaifenesin-codeine (GUAIFENESIN AC) 100-10 MG/5ML liquid Take 5 mL by mouth 3 (three) times a day as needed for cough 12/27/19   Sameul Nap, DO   Misc  Devices [facility]) MISC by Does not apply route daily 7/6/18   Sameul Nap, DO   Multiple Vitamins-Minerals (OCUVITE ADULT 50+ PO) Take by mouth    Historical Provider, MD   nateglinide (STARLIX) 120 mg tablet Take 1 tablet (120 mg total) by mouth 4 (four) times a day 4/20/20   Sameul Nap, DO   Omega-3 Fatty Acids (FISH OIL) 1200 MG CAPS Take by mouth 4 (four) times a day    Historical Provider, MD   pantoprazole (PROTONIX) 40 mg tablet Take 1 tablet (40 mg total) by mouth daily 8/10/20   Sameul Nap, DO   pravastatin (PRAVACHOL) 80 mg tablet take 1 tablet by mouth once daily 3/12/20   Sameul Nap, DO     I have reviewed home medications using allscripts  Allergies: Allergies   Allergen Reactions    Pollen Extract     Prolia [Denosumab]      Severe calcium drop and is on dialysis         Social History:     Marital Status: /Civil Union   Occupation:    Patient Pre-hospital Living Situation:  Home with   Patient Pre-hospital Level of Mobility:  Generally uses a wheelchair but has a has a walker and a cane  Patient Pre-hospital Diet Restrictions:    Substance Use History:   Social History     Substance and Sexual Activity   Alcohol Use Not Currently     Social History     Tobacco Use   Smoking Status Former Smoker   Smokeless Tobacco Never Used Social History     Substance and Sexual Activity   Drug Use No       Family History:    non-contributory    Physical Exam:     Vitals:   Blood Pressure: 137/89 (08/11/20 1900)  Pulse: 89 (08/11/20 1900)  Temperature: 99 6 °F (37 6 °C) (08/11/20 1615)  Temp Source: Temporal (08/11/20 1615)  Respirations: 20 (08/11/20 1800)  Height: 5' 4" (162 6 cm) (08/11/20 1615)  Weight - Scale: 73 kg (161 lb) (08/11/20 1615)  SpO2: 96 % (08/11/20 1900)    Physical Exam  HENT:      Head: Normocephalic and atraumatic  Eyes:      Pupils: Pupils are equal, round, and reactive to light  Neck:      Musculoskeletal: Normal range of motion  Cardiovascular:      Rate and Rhythm: Normal rate and regular rhythm  Pulmonary:      Effort: Accessory muscle usage present  No tachypnea or respiratory distress  Breath sounds: Examination of the right-lower field reveals decreased breath sounds  Examination of the left-lower field reveals decreased breath sounds  Decreased breath sounds present  Abdominal:      General: Bowel sounds are normal       Palpations: Abdomen is soft  Genitourinary:     Comments: Patient incontinent for large amount urine  Musculoskeletal:      Right lower leg: No edema  Left lower leg: No edema  Neurological:      Mental Status: She is alert and oriented to person, place, and time  Comments: Appears forgetful   Psychiatric:         Mood and Affect: Mood normal          Behavior: Behavior normal            Additional Data:     Lab Results: I have personally reviewed pertinent reports        Results from last 7 days   Lab Units 08/11/20  1630   WBC Thousand/uL 8 50   HEMOGLOBIN g/dL 11 6*   HEMATOCRIT % 35 2*   PLATELETS Thousands/uL 204   NEUTROS PCT % 88*   LYMPHS PCT % 4*   MONOS PCT % 6   EOS PCT % 1     Results from last 7 days   Lab Units 08/11/20  1630   SODIUM mmol/L 137   POTASSIUM mmol/L 3 2*   CHLORIDE mmol/L 95*   CO2 mmol/L 33*   BUN mg/dL 14   CREATININE mg/dL 2 45*   ANION GAP mmol/L 9   CALCIUM mg/dL 9 6   ALBUMIN g/dL 3 8   TOTAL BILIRUBIN mg/dL 0 50   ALK PHOS U/L 121   ALT U/L 7   AST U/L 13   GLUCOSE RANDOM mg/dL 105*                       Imaging: I have personally reviewed pertinent films in PACS    XR hip/pelv 2-3 vws left   ED Interpretation by Mary Beth Chahal MD (08/11 1850)   No acute fx or dislocation      XR chest 1 view    (Results Pending)       EKG, Pathology, and Other Studies Reviewed on Admission:   · EKG:      Allscripts / Epic Records Reviewed: Yes     ** Please Note: This note has been constructed using a voice recognition system   **

## 2020-08-11 NOTE — ED PROVIDER NOTES
History  Chief Complaint   Patient presents with    Weakness - Generalized    Hypotension     several times during dialysis treatment today    Abdominal Pain     HPI    This is a 80-year-old female with a history of diabetes, hypertension, CHF, anemia of chronic kidney disease, hyperlipidemia, iron deficiency, end-stage renal disease on dialysis , still makes urine, history of hyperkalemia, hypothermia, MI, presenting to the ED for evaluation of weakness  The patient was at dialysis earlier today, when she had several episodes of hypotension  She had to have her dialysis treatment stopped and get fluid several times  This is not usually the case  She also had sob, and had abdominal breathing, requiring oxygen  She has no complaints right now except for being weak  When she got home she was unable to stand  She required five people for assistance to get out of the wheelchair  Currently patient complains of left hip pain  She is wheelchair dependent, but can normally move somewhat with assistance, now unable to do so  On ros  says patient had emesis nbnb days ago  Otherwise no sob no abd pain no chest pain  Had abd pain earlier which is now resolved  No nausea vomiting diarrhea today  Did have physical therapy a year ago till it was discontinued    Prior to Admission Medications   Prescriptions Last Dose Informant Patient Reported? Taking? Misc   Devices [facility]) MISC   No No   Sig: by Does not apply route daily   Multiple Vitamins-Minerals (OCUVITE ADULT 50+ PO) 2020 at Unknown time  Yes Yes   Sig: Take by mouth   Omega-3 Fatty Acids (FISH OIL) 1200 MG CAPS 2020 at Unknown time  Yes Yes   Sig: Take by mouth 4 (four) times a day   acetaminophen (TYLENOL) 325 mg tablet 8/10/2020 at Unknown time  Yes Yes   Si tablets   amLODIPine (NORVASC) 5 mg tablet 2020 at Unknown time  No Yes   Sig: take 1 tablet by mouth daily   Patient taking differently: daily at bedtime    aspirin 81 MG tablet 2020 at Unknown time  Yes Yes   Sig: Take 1 tablet by mouth daily   b complex-vitamin C-folic acid (RENAL) 1 mg 2020 at Unknown time  Yes Yes   Sig: Take by mouth daily   calcium acetate (PHOSLO) 667 mg capsule 2020 at Unknown time  Yes Yes   Sig: Take 1,334 mg by mouth 3 (three) times a day with meals   diphenoxylate-atropine (LOMOTIL) 2 5-0 025 mg per tablet Past Week at Unknown time  No Yes   Sig: Take 1 tablet by mouth 4 (four) times a day as needed for diarrhea   ergocalciferol (VITAMIN D2) 50,000 units Past Week at Unknown time  No Yes   Sig: Take 1 capsule (50,000 Units total) by mouth once a week   fluticasone (FLONASE) 50 mcg/act nasal spray Past Month at Unknown time  No Yes   Si spray into each nostril daily   glucose blood (ONE TOUCH ULTRA TEST) test strip   No No   Si each by Other route 2 (two) times a day   guaiFENesin (MUCINEX) 600 mg 12 hr tablet Not Taking at Unknown time  No No   Sig: Take 1 tablet (600 mg total) by mouth every 12 (twelve) hours   Patient not taking: Reported on 2020   guaifenesin-codeine (GUAIFENESIN AC) 100-10 MG/5ML liquid Unknown at Unknown time  No No   Sig: Take 5 mL by mouth 3 (three) times a day as needed for cough   nateglinide (STARLIX) 120 mg tablet 2020 at Unknown time  No Yes   Sig: Take 1 tablet (120 mg total) by mouth 4 (four) times a day   pantoprazole (PROTONIX) 40 mg tablet 2020 at Unknown time  No Yes   Sig: Take 1 tablet (40 mg total) by mouth daily   pravastatin (PRAVACHOL) 80 mg tablet 2020 at Unknown time  No Yes   Sig: take 1 tablet by mouth once daily      Facility-Administered Medications: None       Past Medical History:   Diagnosis Date    Abnormal weight loss     last assessed [id number]    Anemia     Chronic kidney disease     Clostridium difficile colitis     last assessed [id number]    Depression     resolved [id number]    Diabetes mellitus ([facility] [address] )     Fracture, tibial plateau     last assessed [id number]    GERD (gastroesophageal reflux disease)     Hyperkalemia     last assessed [id number]    Hypertension     Hypoglycemia     Sugars now up  Will increase the metformin;  last assessed [id number]    Hypokalemia     last assessed [id number]    Hypothermia     last assessed [id number]    Hypoxia     last assessed [id number]    Metatarsalgia     last assessed [id number]    Old myocardial infarction     Osteoporosis     Syncope     last assessed [id number]    Visual disturbance     last assessed [id number]       Past Surgical History:   Procedure Laterality Date    BYPASS GRAFT Right     H/O bypass graft (non-vein), Right Leg    CAROTID ENDARTERECTOMY      Neurological Surgery Cartoid Endartectomy    CATARACT EXTRACTION      CHOLECYSTECTOMY      questionable    CORONARY ARTERY BYPASS GRAFT      FOOT AMPUTATION Bilateral     H/O surgery foot amputation Metatarsal and toe;  Bilat    HERNIA REPAIR      Inguinal sliding    TONSILLECTOMY AND ADENOIDECTOMY      VENOUS THROMBECTOMY      H/O Arteriobenous surgery Thrombectomy of AV Fistula Percantaneous;  last assessed [id number]       Family History   Problem Relation Age of Onset    Breast cancer Mother     Cancer Mother     Heart disease Mother     Coronary artery disease Father     Aneurysm Sister         Cerebral Artery    Coronary artery disease Sister     Liver cancer Brother     No Known Problems Daughter      I have reviewed and agree with the history as documented  E-Cigarette/Vaping    E-Cigarette Use Never User      E-Cigarette/Vaping Substances     Social History     Tobacco Use    Smoking status: Former Smoker    Smokeless tobacco: Never Used   Substance Use Topics    Alcohol use: Not Currently    Drug use: No       Review of Systems   Constitutional: Negative for chills, fatigue and fever  HENT: Negative for sore throat  Eyes: Negative for redness and visual disturbance     Respiratory: Negative for cough and shortness of breath  Cardiovascular: Negative for chest pain  Gastrointestinal: Negative for abdominal pain, diarrhea and nausea  Genitourinary: Negative for difficulty urinating, dysuria and pelvic pain  Musculoskeletal: Negative for back pain  Left hip pain   Skin: Negative for rash  Neurological: Negative for syncope, weakness and headaches  All other systems reviewed and are negative  Physical Exam  Physical Exam  Vitals signs and nursing note reviewed  Constitutional:       General: She is not in acute distress  Comments: obese   HENT:      Head: Normocephalic and atraumatic  Eyes:      Conjunctiva/sclera: Conjunctivae normal    Neck:      Musculoskeletal: Normal range of motion  Cardiovascular:      Rate and Rhythm: Normal rate and regular rhythm  Heart sounds: Normal heart sounds  Pulmonary:      Effort: Pulmonary effort is normal  No respiratory distress  Breath sounds: Normal breath sounds  Abdominal:      General: Bowel sounds are normal       Palpations: Abdomen is soft  Tenderness: There is no abdominal tenderness  Musculoskeletal: Normal range of motion  Comments: Fistula in left arm with palpable thrill  Everted right lower ext (present for past 4 years)  Mild tenderness on palpation of left hip on trochanter  No overlying skin changes or brusing  Full rom of both hips strength 4/5   Skin:     General: Skin is warm and dry  Findings: No rash  Neurological:      Mental Status: She is alert and oriented to person, place, and time  Cranial Nerves: No cranial nerve deficit  Sensory: No sensory deficit  Motor: No abnormal muscle tone        Coordination: Coordination normal          Vital Signs  ED Triage Vitals [08/11/20 1615]   Temperature Pulse Respirations Blood Pressure SpO2   99 6 °F (37 6 °C) 100 (!) 24 125/96 92 %      Temp Source Heart Rate Source Patient Position - Orthostatic VS BP Location FiO2 (%) Temporal Monitor Sitting Right arm --      Pain Score       5           Vitals:    08/13/20 1230 08/13/20 1535 08/13/20 2327 08/14/20 0727   BP: 108/52 137/61 145/65 153/70   Pulse: 62 82 78 82   Patient Position - Orthostatic VS:  Lying Lying Lying         Visual Acuity      ED Medications  Medications   ondansetron (ZOFRAN) injection 4 mg (4 mg Intravenous Not Given 8/11/20 2137)   amLODIPine (NORVASC) tablet 5 mg (5 mg Oral Given 8/14/20 0844)   aspirin (ECOTRIN LOW STRENGTH) EC tablet 81 mg (81 mg Oral Given 8/14/20 0843)   b complex-vitamin C-folic acid (NEPHROCAPS) capsule 1 capsule (1 capsule Oral Given 8/14/20 0843)   calcium acetate (PHOSLO) capsule 1,334 mg (1,334 mg Oral Given 8/14/20 0843)   diphenoxylate-atropine (LOMOTIL) 2 5-0 025 mg per tablet 1 tablet (has no administration in time range)   fluticasone (FLONASE) 50 mcg/act nasal spray 1 spray (1 spray Nasal Not Given 8/14/20 0844)   pantoprazole (PROTONIX) EC tablet 40 mg (40 mg Oral Given 8/14/20 0843)   pravastatin (PRAVACHOL) tablet 80 mg (80 mg Oral Given 8/14/20 0843)   heparin (porcine) subcutaneous injection 5,000 Units (5,000 Units Subcutaneous Given 8/14/20 0544)   acetaminophen (TYLENOL) tablet 650 mg (650 mg Oral Given 8/13/20 0623)   ondansetron (ZOFRAN) injection 4 mg (4 mg Intravenous Given 8/12/20 0652)   insulin lispro (HumaLOG) 100 units/mL subcutaneous injection 1-5 Units (1 Units Subcutaneous Not Given 8/14/20 0845)   insulin lispro (HumaLOG) 100 units/mL subcutaneous injection 1-5 Units (2 Units Subcutaneous Given 8/13/20 2151)   potassium chloride (K-DUR,KLOR-CON) CR tablet 20 mEq (20 mEq Oral Given 8/11/20 1859)       Diagnostic Studies  Results Reviewed     Procedure Component Value Units Date/Time    Lipase [[id number]]  (Abnormal) Collected:  08/11/20 1630    Lab Status:  Final result Specimen:  Blood from Arm, Right Updated:  08/11/20 1852     Lipase <10 u/L     B-Type Natriuretic Peptide ([address] [facility]) [[id number]]  (Abnormal) Collected:  08/11/20 1630    Lab Status:  Final result Specimen:  Blood from Arm, Right Updated:  08/11/20 1702     BNP 2,050 pg/mL     Troponin I [[id number]]  (Abnormal) Collected:  08/11/20 1630    Lab Status:  Final result Specimen:  Blood from Arm, Right Updated:  08/11/20 1656     Troponin I 0 07 ng/mL     Comprehensive metabolic panel [[id number]]  (Abnormal) Collected:  08/11/20 1630    Lab Status:  Final result Specimen:  Blood from Arm, Right Updated:  08/11/20 1655     Sodium 137 mmol/L      Potassium 3 2 mmol/L      Chloride 95 mmol/L      CO2 33 mmol/L      ANION GAP 9 mmol/L      BUN 14 mg/dL      Creatinine 2 45 mg/dL      Glucose 105 mg/dL      Calcium 9 6 mg/dL      AST 13 U/L      ALT 7 U/L      Alkaline Phosphatase 121 U/L      Total Protein 7 5 g/dL      Albumin 3 8 g/dL      Total Bilirubin 0 50 mg/dL      eGFR 19 ml/min/1 73sq m     Narrative:       [place] guidelines for Chronic Kidney Disease (CKD):     Stage 1 with normal or high GFR (GFR > 90 mL/min/1 73 square meters)    Stage 2 Mild CKD (GFR = 60-89 mL/min/1 73 square meters)    Stage 3A Moderate CKD (GFR = 45-59 mL/min/1 73 square meters)    Stage 3B Moderate CKD (GFR = 30-44 mL/min/1 73 square meters)    Stage 4 Severe CKD (GFR = 15-29 mL/min/1 73 square meters)    Stage 5 End Stage CKD (GFR <15 mL/min/1 73 square meters)  Note: GFR calculation is accurate only with a steady state creatinine    CBC and differential [[id number]]  (Abnormal) Collected:  08/11/20 1630    Lab Status:  Final result Specimen:  Blood from Arm, Right Updated:  08/11/20 1637     WBC 8 50 Thousand/uL      RBC 3 85 Million/uL      Hemoglobin 11 6 g/dL      Hematocrit 35 2 %      MCV 91 fL      MCH 30 1 pg      MCHC 32 9 g/dL      RDW 18 5 %      MPV 8 1 fL      Platelets 116 Thousands/uL      Neutrophils Relative 88 %      Lymphocytes Relative 4 %      Monocytes Relative 6 %      Eosinophils Relative 1 % Basophils Relative 1 %      Neutrophils Absolute 7 40 Thousands/µL      Lymphocytes Absolute 0 30 Thousands/µL      Monocytes Absolute 0 50 Thousand/µL      Eosinophils Absolute 0 10 Thousand/µL      Basophils Absolute 0 10 Thousands/µL     UA (URINE) with reflex to Scope [[id number]]     Lab Status:  No result Specimen:  Urine                  XR hip/pelv 2-3 vws left   ED Interpretation by Alejandro Sanders MD (08/11 1850)   No acute fx or dislocation      Final Result by Rosemarie Junior MD (08/11 2004)      No acute osseous abnormality  Workstation performed: [id number]         XR chest 1 view   Final Result by Rosemarie Junior MD (08/11 2004)      Cardiomegaly and mild central congestion likely on the basis of mild CHF  Workstation performed: [id number]                    Procedures  Procedures         ED Course  ED Course as of Aug 14 1135   Tue Aug 11, 2020   1638 WBC: 8 50   1638 Baseline 11   Hemoglobin(!): 11 6   1646 ECG 12 Lead Documentation  Date/Time: today/date: 8/11/2020  Performed by: Jarod Scott  Authorized by: Jarod Scott    ECG reviewed by me, the ED Provider: yes    Patient location:  ED   Previous ECG: If available: RBBB sinus rhythm with right axis deviation with prolonged FL  Rate:  ECG rate assessment: normal    Rhythm: sinus rhythm    Ectopy: none    QRS axis:  right  Intervals: qtc 502   Q waves: None   ST segments:  No acute ST changes, depressions avf III seen on piror ekg  T waves: inverted III avF V3 V1 V4 V5 V6      Impression: RBBB sinus rhythm with right axis deviation with prolonged FL, inversions noted above seen on prior          1724 Baseline 1600   BNP(!): 2,050       US AUDIT      Most Recent Value   Initial Alcohol Screen: US AUDIT-C    1  How often do you have a drink containing alcohol? 1 Filed at: 08/11/2020 1617   2  How many drinks containing alcohol do you have on a typical day you are drinking? 0 Filed at: 08/11/2020 1617   3a  Male UNDER 65:  How often do you have five or more drinks on one occasion? 0 Filed at: 08/11/2020 1617   3b  FEMALE Any Age, or MALE 65+: How often do you have 4 or more drinks on one occassion? 0 Filed at: 08/11/2020 1617   Audit-C Score  1 Filed at: 08/11/2020 1617                  MEGHANN/DAST-10      Most Recent Value   How many times in the past year have you    Used an illegal drug or used a prescription medication for non-medical reasons? Never Filed at: 08/11/2020 1617                                MDM  78-year-old female who presents to the ER for evaluation of weakness in setting of dialysis  The patient has several episode hypertension, will check lab work, looking for signs of anemia, hemoglobin dropped, any signs of cardiac involvement  The patient has had also having left hip pain, will get x-ray  Patient's lab work was with her normal limits  She does have mild hypokalemia, otherwise her creatinine was decreased, appropriate for her recent dialysis  Troponin at baseline elevated  EKG showed no acute changes  Slight elevation in BNP  The patient is still unable to ambulate, she was still requires multiple people for assistance  She is unable to be discharged home in the care of her   She will try physical therapy and will require admission for weakness as well as continued monitoring or blood pressure given her multiple episodes of hypotension post dialysis      Disposition  Final diagnoses:   Weakness   Generalized weakness   Hypotension   End-stage renal disease on hemodialysis [facility])     Time reflects when diagnosis was documented in both MDM as applicable and the Disposition within this note     Time User Action Codes Description Comment    8/11/2020  5:39 PM [place] Add [R53 1] Weakness     8/11/2020  5:40 PM [place] Add [R53 1] Generalized weakness     8/11/2020  5:40 PM [place] Add [I95 9] Hypotension     8/11/2020  5:40 PM [place] Add [N18 6,  Z99 2] End-stage renal disease on hemodialysis [facility])       ED Disposition     ED Disposition Condition Date/Time Comment    Admit Stable Tue Aug 11, 2020  7:19 PM Case was discussed with PRANAV and the patient's admission status was agreed to be Admission Status: observation status to the service of Dr Terrance Simon          Follow-up Information    None         Current Discharge Medication List      CONTINUE these medications which have NOT CHANGED    Details   acetaminophen (TYLENOL) 325 mg tablet 2 tablets      amLODIPine (NORVASC) 5 mg tablet take 1 tablet by mouth daily  Qty: 90 tablet, Refills: 1    Associated Diagnoses: Essential hypertension      aspirin 81 MG tablet Take 1 tablet by mouth daily      b complex-vitamin C-folic acid (RENAL) 1 mg Take by mouth daily      calcium acetate (PHOSLO) 667 mg capsule Take 1,334 mg by mouth 3 (three) times a day with meals      diphenoxylate-atropine (LOMOTIL) 2 5-0 025 mg per tablet Take 1 tablet by mouth 4 (four) times a day as needed for diarrhea  Qty: 30 tablet, Refills: 0    Associated Diagnoses: Diarrhea, unspecified type      ergocalciferol (VITAMIN D2) 50,000 units Take 1 capsule (50,000 Units total) by mouth once a week  Qty: 12 capsule, Refills: 3    Associated Diagnoses: Vitamin D deficiency      fluticasone (FLONASE) 50 mcg/act nasal spray 1 spray into each nostril daily  Qty: 16 g, Refills: 3    Associated Diagnoses: Acute non-recurrent frontal sinusitis      Multiple Vitamins-Minerals (OCUVITE ADULT 50+ PO) Take by mouth      nateglinide (STARLIX) 120 mg tablet Take 1 tablet (120 mg total) by mouth 4 (four) times a day  Qty: 120 tablet, Refills: 5    Associated Diagnoses: Type 2 diabetes mellitus with complication (HCC)      Omega-3 Fatty Acids (FISH OIL) 1200 MG CAPS Take by mouth 4 (four) times a day      pantoprazole (PROTONIX) 40 mg tablet Take 1 tablet (40 mg total) by mouth daily  Qty: 30 tablet, Refills: 5    Associated Diagnoses: Gastroesophageal reflux disease without esophagitis pravastatin (PRAVACHOL) 80 mg tablet take 1 tablet by mouth once daily  Qty: 90 tablet, Refills: 3    Associated Diagnoses: Coronary artery disease without angina pectoris, unspecified vessel or lesion type, unspecified whether native or transplanted heart      glucose blood (ONE TOUCH ULTRA TEST) test strip 1 each by Other route 2 (two) times a day  Qty: 200 each, Refills: 5    Associated Diagnoses: Type 2 diabetes mellitus with foot ulcer, unspecified whether long term insulin use (HCC)      guaiFENesin (MUCINEX) 600 mg 12 hr tablet Take 1 tablet (600 mg total) by mouth every 12 (twelve) hours  Qty: 20 tablet, Refills: 0    Associated Diagnoses: Acute non-recurrent frontal sinusitis      guaifenesin-codeine (GUAIFENESIN AC) 100-10 MG/5ML liquid Take 5 mL by mouth 3 (three) times a day as needed for cough  Qty: 120 mL, Refills: 0    Associated Diagnoses: Acute non-recurrent frontal sinusitis      Misc  Devices [facility]) MISC by Does not apply route daily  Qty: 1 each, Refills: 0    Associated Diagnoses: Gait abnormality           No discharge procedures on file      PDMP Review       Value Time User    PDMP Reviewed  Yes 12/27/2019  9:06 AM Metta Leyden, DO          ED Provider  Electronically Signed by           Mary Beth Chahal MD  08/14/20 9350

## 2020-08-11 NOTE — ASSESSMENT & PLAN NOTE
· End-stage renal disease on dialysis patient typically dialyzed on Tuesday Thursday Saturday  · Status post dialysis today with hypotension requiring IV fluids  · Blood pressure normalized at this time  · If patient remains in patient will need to consult Nephrology for dialysis on Thursday

## 2020-08-11 NOTE — ASSESSMENT & PLAN NOTE
· Patient reported some nausea with vomiting this morning   · Could be a viral gastroenteritis  · Patient status post replete for hypokalemia will get a BMP in a m    · Zofran for nausea

## 2020-08-11 NOTE — ASSESSMENT & PLAN NOTE
Wt Readings from Last 3 Encounters:   08/11/20 73 kg (161 lb)   01/31/20 73 kg (161 lb)   09/16/19 73 kg (161 lb)     Patient showing no evidence of weight change or volume overload BNP elevated however this does occur in end-stage renal disease chest x-ray unremarkable in reviewing images  Daily weight, I/O, low-sodium diet  Would avoid diuretic at this time given patient was having hypotension with dialysis today

## 2020-08-12 LAB
ANION GAP SERPL CALCULATED.3IONS-SCNC: 9 MMOL/L (ref 4–13)
BUN SERPL-MCNC: 20 MG/DL (ref 7–25)
CALCIUM SERPL-MCNC: 9.5 MG/DL (ref 8.6–10.5)
CHLORIDE SERPL-SCNC: 100 MMOL/L (ref 98–107)
CO2 SERPL-SCNC: 31 MMOL/L (ref 21–31)
CREAT SERPL-MCNC: 3.67 MG/DL (ref 0.6–1.2)
ERYTHROCYTE [DISTWIDTH] IN BLOOD BY AUTOMATED COUNT: 18.3 % (ref 11.5–14.5)
GFR SERPL CREATININE-BSD FRML MDRD: 12 ML/MIN/1.73SQ M
GLUCOSE SERPL-MCNC: 108 MG/DL (ref 65–140)
GLUCOSE SERPL-MCNC: 153 MG/DL (ref 65–140)
GLUCOSE SERPL-MCNC: 208 MG/DL (ref 65–140)
GLUCOSE SERPL-MCNC: 83 MG/DL (ref 65–99)
GLUCOSE SERPL-MCNC: 90 MG/DL (ref 65–140)
HCT VFR BLD AUTO: 32.1 % (ref 42–47)
HGB BLD-MCNC: 10.8 G/DL (ref 12–16)
MCH RBC QN AUTO: 30.7 PG (ref 26–34)
MCHC RBC AUTO-ENTMCNC: 33.5 G/DL (ref 31–37)
MCV RBC AUTO: 92 FL (ref 81–99)
PLATELET # BLD AUTO: 174 THOUSANDS/UL (ref 149–390)
PMV BLD AUTO: 8.5 FL (ref 8.6–11.7)
POTASSIUM SERPL-SCNC: 3.5 MMOL/L (ref 3.5–5.5)
RBC # BLD AUTO: 3.5 MILLION/UL (ref 3.9–5.2)
SODIUM SERPL-SCNC: 140 MMOL/L (ref 134–143)
TROPONIN I SERPL-MCNC: 0.07 NG/ML
WBC # BLD AUTO: 7 THOUSAND/UL (ref 4.8–10.8)

## 2020-08-12 PROCEDURE — 97163 PT EVAL HIGH COMPLEX 45 MIN: CPT

## 2020-08-12 PROCEDURE — 85027 COMPLETE CBC AUTOMATED: CPT | Performed by: NURSE PRACTITIONER

## 2020-08-12 PROCEDURE — 80048 BASIC METABOLIC PNL TOTAL CA: CPT | Performed by: NURSE PRACTITIONER

## 2020-08-12 PROCEDURE — 97167 OT EVAL HIGH COMPLEX 60 MIN: CPT

## 2020-08-12 PROCEDURE — 84484 ASSAY OF TROPONIN QUANT: CPT | Performed by: INTERNAL MEDICINE

## 2020-08-12 PROCEDURE — 82948 REAGENT STRIP/BLOOD GLUCOSE: CPT

## 2020-08-12 PROCEDURE — 99215 OFFICE O/P EST HI 40 MIN: CPT | Performed by: INTERNAL MEDICINE

## 2020-08-12 RX ADMIN — INSULIN LISPRO 1 UNITS: 100 INJECTION, SOLUTION INTRAVENOUS; SUBCUTANEOUS at 21:35

## 2020-08-12 RX ADMIN — CALCIUM ACETATE 1334 MG: 667 CAPSULE ORAL at 11:47

## 2020-08-12 RX ADMIN — CALCIUM ACETATE 1334 MG: 667 CAPSULE ORAL at 16:17

## 2020-08-12 RX ADMIN — FLUTICASONE PROPIONATE 1 SPRAY: 50 SPRAY, METERED NASAL at 08:10

## 2020-08-12 RX ADMIN — ACETAMINOPHEN 650 MG: 325 TABLET ORAL at 14:49

## 2020-08-12 RX ADMIN — AMLODIPINE BESYLATE 5 MG: 5 TABLET ORAL at 08:10

## 2020-08-12 RX ADMIN — ONDANSETRON 4 MG: 2 INJECTION INTRAMUSCULAR; INTRAVENOUS at 06:52

## 2020-08-12 RX ADMIN — PANTOPRAZOLE SODIUM 40 MG: 40 TABLET, DELAYED RELEASE ORAL at 08:10

## 2020-08-12 RX ADMIN — HEPARIN SODIUM 5000 UNITS: 5000 INJECTION INTRAVENOUS; SUBCUTANEOUS at 05:22

## 2020-08-12 RX ADMIN — HEPARIN SODIUM 5000 UNITS: 5000 INJECTION INTRAVENOUS; SUBCUTANEOUS at 13:54

## 2020-08-12 RX ADMIN — CALCIUM ACETATE 1334 MG: 667 CAPSULE ORAL at 08:10

## 2020-08-12 RX ADMIN — ASPIRIN 81 MG: 81 TABLET, COATED ORAL at 08:10

## 2020-08-12 RX ADMIN — Medication 1 CAPSULE: at 08:10

## 2020-08-12 RX ADMIN — PRAVASTATIN SODIUM 80 MG: 40 TABLET ORAL at 08:10

## 2020-08-12 RX ADMIN — INSULIN LISPRO 1 UNITS: 100 INJECTION, SOLUTION INTRAVENOUS; SUBCUTANEOUS at 16:17

## 2020-08-12 RX ADMIN — HEPARIN SODIUM 5000 UNITS: 5000 INJECTION INTRAVENOUS; SUBCUTANEOUS at 21:35

## 2020-08-12 NOTE — UTILIZATION REVIEW
Initial Clinical Review  Observation 8/11/20 @ 1919, converted to inpatient admission 8/12/20 @ 1619 for continued care & tx for generalized weakness, monitoring for fever & sepsis  Admission: Date/Time/Statement:   Admission Orders (From admission, onward)     Ordered        08/12/20 1619  Inpatient Admission  Once                   Orders Placed This Encounter   Procedures    Inpatient Admission     Standing Status:   Standing     Number of Occurrences:   1     Order Specific Question:   Admitting Physician     Answer:   Loida Ramirez     Order Specific Question:   Level of Care     Answer:   Med Surg [16]     Order Specific Question:   Estimated length of stay     Answer:   More than 2 Midnights     Order Specific Question:   Certification     Answer:   I certify that inpatient services are medically necessary for this patient for a duration of greater than two midnights  See H&P and MD Progress Notes for additional information about the patient's course of treatment  ED Arrival Information     Expected Arrival Acuity Means of Arrival Escorted By Service Admission Type    - 8/11/2020 16:06 Emergent Wheelchair Spouse General Medicine Emergency    Arrival Complaint    weakness        Chief Complaint   Patient presents with    Weakness - Generalized    Hypotension     several times during dialysis treatment today    Abdominal Pain     Assessment/Plan:   52-year-old female with a history of diabetes, hypertension, CHF, anemia of chronic kidney disease, hyperlipidemia, iron deficiency, end-stage renal disease on dialysis Tuesday Thursday Saturday, still makes urine, history of hyperkalemia, hypothermia, MI, presenting to the ED for evaluation of weakness  The patient was at dialysis earlier today, when she had several episodes of hypotension  She had to have her dialysis treatment stopped and get fluid several times  This is not usually the case    She also had sob, and had abdominal breathing, requiring oxygen  She has no complaints right now except for being weak  When she got home she was unable to stand  She required five people for assistance to get out of the wheelchair  Currently patient complains of left hip pain  She is wheelchair dependent, but can normally move somewhat with assistance, now unable to do so  On ros  says patient had emesis nbnb days ago  Otherwise no sob no abd pain no chest pain  Had abd pain earlier which is now resolved  No nausea vomiting diarrhea today  Generalized weakness  Assessment & Plan  · Presented to ED as a max assist 4-5 in-patient generally see this x1 with the spouse  · Under lying cause not appreciated chest x-ray unremarkable unable to obtain UA as patient makes minimal urine  · Will order UA microscopic if patient would produce urine can collect a sample  · Patient is afebrile without leukocytosis  · Will obtain PT OT evaluation  · If patient would have son spike a fever obtain blood cultures and pursue further infectious workup     Nausea  Assessment & Plan  · Patient reported some nausea with vomiting this morning   · Could be a viral gastroenteritis  · Patient status post replete for hypokalemia will get a BMP in a     · Zofran for nausea     Age-related osteoporosis without current pathological fracture  Assessment & Plan  · Patient complaining of left hip pain what rate on x-ray appears unremarkable trend for final read from Radiology  · PT OT evaluation  · Patient with known history of osteoporosis  · Continue home supplementation     Anemia of chronic kidney failure  Assessment & Plan  · Known history baseline hemoglobin appears to be 9-11  · Patient hemoglobin on admission 11 6  · Continue to monitor     Congestive heart failure (HCC)  Assessment & Plan      Wt Readings from Last 3 Encounters:   08/11/20 73 kg (161 lb)   01/31/20 73 kg (161 lb)   09/16/19 73 kg (161 lb)      Patient showing no evidence of weight change or volume overload BNP elevated however this does occur in end-stage renal disease chest x-ray unremarkable in reviewing images  Daily weight, I/O, low-sodium diet  Would avoid diuretic at this time given patient was having hypotension with dialysis today           Diabetes mellitus with foot ulcer ([facility] [address] )  Assessment & Plan        Lab Results   Component Value Date     HGBA1C 6 2 (H) 06/01/2020         No results for input(s): POCGLU in the last 72 hours      Blood Sugar Average: Last 72 hrs:   patient appears to be diet controlled  Will monitor blood glucose for 24 hours  If noted hyperglycemia can order SSI and a c  HS Accu-Cheks     End-stage renal disease on hemodialysis [facility])  Assessment & Plan  · End-stage renal disease on dialysis patient typically dialyzed on Tuesday Thursday Saturday  · Status post dialysis today with hypotension requiring IV fluids  · Blood pressure normalized at this time  · If patient remains in patient will need to consult Nephrology for dialysis on Thursday     Hypertension  Assessment & Plan  · Blood pressure acceptable  · Continue Norvasc with parameters  · Routine vital monitoring     Hyperlipidemia  Assessment & Plan  · Continue statin for now     GERD without esophagitis  Assessment & Plan  · Continue PPI      ED Triage Vitals [08/11/20 1615]   Temperature Pulse Respirations Blood Pressure SpO2   99 6 °F (37 6 °C) 100 (!) 24 125/96 92 %      Temp Source Heart Rate Source Patient Position - Orthostatic VS BP Location FiO2 (%)   Temporal Monitor Sitting Right arm --      Pain Score       5          Wt Readings from Last 1 Encounters:   08/13/20 76 kg (167 lb 8 8 oz)     Additional Vital Signs:   Date/Time   Temp   Pulse   Resp   BP   MAP (mmHg)   SpO2   O2 Device   Patient Position - Orthostatic VS    08/12/20 0751   99 4 °F (37 4 °C)   83   16   131/50      99 %   None (Room air)   Lying    08/12/20 0030   99 °F (37 2 °C)   88   16   134/68      98 %   None (Room air)   Lying    08/11/20 2020   98 9 °F (37 2 °C)   92   16   145/68      93 %   None (Room air)   Lying    08/11/20 1900      89      137/89   128   96 %          08/11/20 1800      92   20   133/57   81   95 %   None (Room air)   Sitting    08/11/20 1615   99 6 °F (37 6 °C)   100   24Abnormal     125/96   105   92 %   None (Room air)   Sitting      Pertinent Labs/Diagnostic Test Results:   Results from last 7 days   Lab Units 08/12/20  0523 08/11/20  1630   WBC Thousand/uL 7 00 8 50   HEMOGLOBIN g/dL 10 8* 11 6*   HEMATOCRIT % 32 1* 35 2*   PLATELETS Thousands/uL 174 204   NEUTROS ABS Thousands/µL  --  7 40*     Results from last 7 days   Lab Units 08/12/20  0523 08/11/20  1630   SODIUM mmol/L 140 137   POTASSIUM mmol/L 3 5 3 2*   CHLORIDE mmol/L 100 95*   CO2 mmol/L 31 33*   ANION GAP mmol/L 9 9   BUN mg/dL 20 14   CREATININE mg/dL 3 67* 2 45*   EGFR ml/min/1 73sq m 12 19   CALCIUM mg/dL 9 5 9 6     Results from last 7 days   Lab Units 08/11/20  1630   AST U/L 13   ALT U/L 7   ALK PHOS U/L 121   TOTAL PROTEIN g/dL 7 5   ALBUMIN g/dL 3 8   TOTAL BILIRUBIN mg/dL 0 50     Results from last 7 days   Lab Units 08/13/20  0622 08/12/20  2040 08/12/20  1558 08/12/20  1112 08/12/20  0640 08/11/20  2145   POC GLUCOSE mg/dl 133 208* 153* 108 90 113     Results from last 7 days   Lab Units 08/12/20  0523 08/11/20  1630   GLUCOSE RANDOM mg/dL 83 105*     Results from last 7 days   Lab Units 08/12/20  0530 08/11/20  1630   TROPONIN I ng/mL 0 07* 0 07*     Results from last 7 days   Lab Units 08/11/20  1630   BNP pg/mL 2,050*     Results from last 7 days   Lab Units 08/11/20  1630   LIPASE u/L <10*     8/11  L hip/pelvis xray=No acute osseous abnormality  Cxr=Cardiomegaly and mild central congestion likely on the basis of mild CHF    Ekg= Baseline artifact  likely sinus rhythm  Nonspecific ST-t wave changes  Rightward axis  Right bundle branch block    ED Treatment:   Medication Administration from 08/11/2020 1603 to 08/11/2020 2013       Date/Time Order Dose Route Action     08/11/2020 1859 potassium chloride (K-DUR,KLOR-CON) CR tablet 20 mEq 20 mEq Oral Given        Past Medical History:   Diagnosis Date    Abnormal weight loss     last assessed [id number]    Anemia     Chronic kidney disease     Clostridium difficile colitis     last assessed [id number]    Depression     resolved [id number]    [facility]     Fracture, tibial plateau     last assessed [id number]    GERD (gastroesophageal reflux disease)     Hyperkalemia     last assessed [id number]    Hypertension     Hypoglycemia     Sugars now up   Will increase the metformin;  last assessed [id number]    Hypokalemia     last assessed [id number]    Hypothermia     last assessed [id number]    Hypoxia     last assessed 31Psd3051    Metatarsalgia     last assessed 31Fpg4949    Old myocardial infarction     Osteoporosis     Syncope     last assessed 30Nov2016    Visual disturbance     last assessed 26Mar2014     Present on Admission:   Age-related osteoporosis without current pathological fracture   Anemia of chronic kidney failure   Congestive heart failure (HCC)   Diabetes mellitus with foot ulcer [facility]   GERD without esophagitis   Hyperlipidemia   Hypertension    Admitting Diagnosis: Weakness [R53 1]  Hypotension [I95 9]  End-stage renal disease on hemodialysis (HCC) [N18 6, Z99 2]  Generalized weakness [R53 1]  Age/Sex: 77 y o  female  Admission Orders:  Pt/ot eval & tx  Scd/foot pumps  accuchecks with coverage scale  HD tx 3x/wk    Scheduled Medications:  amLODIPine, 5 mg, Oral, Daily  aspirin, 81 mg, Oral, Daily  b complex-vitamin C-folic acid, 1 capsule, Oral, Daily  calcium acetate, 1,334 mg, Oral, TID With Meals  fluticasone, 1 spray, Nasal, Daily  heparin (porcine), 5,000 Units, Subcutaneous, Q8H [address]  insulin lispro, 1-5 Units, Subcutaneous, TID AC  insulin lispro, 1-5 Units, Subcutaneous, HS  ondansetron, 4 mg, Intravenous, Once  pantoprazole, 40 mg, Oral, Daily  pravastatin, 80 mg, Oral, Daily    PRN Meds:  acetaminophen, 650 mg, Oral, Q6H PRN  diphenoxylate-atropine, 1 tablet, Oral, 4x Daily PRN  ondansetron, 4 mg, Intravenous, Q6H PRN    Network Utilization Review Department  [email or web address]  ATTENTION: Please call with any questions or concerns to [phone] and carefully listen to the prompts so that you are directed to the right person  All voicemails are confidential   Servando Robles all requests for admission clinical reviews, approved or denied determinations and any other requests to dedicated fax number below belonging to the campus where the patient is receiving treatment   List of dedicated fax numbers for the Facilities:  [address] DENIALS (Administrative/Medical Necessity) [phone]   [address] (Maternity/NICU/Pediatrics) [phone]   Kim Hensley [phone]   [facility] [phone]   [facility] [phone]   Karenann Deal [phone]   [address] [place] [address] [phone]   [facility]  [phone]   [address] [facility]  [address] [facility] [address] [facility] [phone]

## 2020-08-12 NOTE — ASSESSMENT & PLAN NOTE
· PT input appreciated - suspect multifactorial given age and comorbidities  · Patient will benefit from SNF - [facility] is family's first choice, await determination for bed  · Case management involved

## 2020-08-12 NOTE — PLAN OF CARE
Problem: Potential for Falls  Goal: Patient will remain free of falls  Description: INTERVENTIONS:  - Assess patient frequently for physical needs  -  Identify cognitive and physical deficits and behaviors that affect risk of falls    -  [place] fall precautions as indicated by assessment   - Educate patient/family on patient safety including physical limitations  - Instruct patient to call for assistance with activity based on assessment  - Modify environment to reduce risk of injury  - Consider OT/PT consult to assist with strengthening/mobility  8/12/2020 0921 by Thong Carey RN  Outcome: Progressing  8/12/2020 0921 by Thong Carey RN  Outcome: Progressing     Problem: PAIN - ADULT  Goal: Verbalizes/displays adequate comfort level or baseline comfort level  Description: Interventions:  - Encourage patient to monitor pain and request assistance  - Assess pain using appropriate pain scale  - Administer analgesics based on type and severity of pain and evaluate response  - Implement non-pharmacological measures as appropriate and evaluate response  - Consider cultural and social influences on pain and pain management  - Notify physician/advanced practitioner if interventions unsuccessful or patient reports new pain  8/12/2020 0921 by Thong Carey RN  Outcome: Progressing  8/12/2020 0921 by Thong Carey RN  Outcome: Progressing     Problem: INFECTION - ADULT  Goal: Absence or prevention of progression during hospitalization  Description: INTERVENTIONS:  - Assess and monitor for signs and symptoms of infection  - Monitor lab/diagnostic results  - Monitor all insertion sites, i e  indwelling lines, tubes, and drains  - Monitor endotracheal if appropriate and nasal secretions for changes in amount and color  - [place] appropriate cooling/warming therapies per order  - Administer medications as ordered  - Instruct and encourage patient and family to use good hand hygiene technique  - Identify and instruct in appropriate isolation precautions for identified infection/condition  8/12/2020 0921 by Mariel Loya RN  Outcome: Progressing  8/12/2020 0921 by Mariel Loya RN  Outcome: Progressing  Goal: Absence of fever/infection during neutropenic period  Description: INTERVENTIONS:  - Monitor WBC    8/12/2020 0921 by Mariel Loya RN  Outcome: Progressing  8/12/2020 0921 by Mariel Loya RN  Outcome: Progressing     Problem: SAFETY ADULT  Goal: Patient will remain free of falls  Description: INTERVENTIONS:  - Assess patient frequently for physical needs  -  Identify cognitive and physical deficits and behaviors that affect risk of falls    -  [place] fall precautions as indicated by assessment   - Educate patient/family on patient safety including physical limitations  - Instruct patient to call for assistance with activity based on assessment  - Modify environment to reduce risk of injury  - Consider OT/PT consult to assist with strengthening/mobility  8/12/2020 0921 by Mariel Loya RN  Outcome: Progressing  8/12/2020 0921 by Mariel Loya RN  Outcome: Progressing  Goal: Maintain or return to baseline ADL function  Description: INTERVENTIONS:  -  Assess patient's ability to carry out ADLs; assess patient's baseline for ADL function and identify physical deficits which impact ability to perform ADLs (bathing, care of mouth/teeth, toileting, grooming, dressing, etc )  - Assess/evaluate cause of self-care deficits   - Assess range of motion  - Assess patient's mobility; develop plan if impaired  - Assess patient's need for assistive devices and provide as appropriate  - Encourage maximum independence but intervene and supervise when necessary  - Involve family in performance of ADLs  - Assess for home care needs following discharge   - Consider OT consult to assist with ADL evaluation and planning for discharge  - Provide patient education as appropriate  8/12/2020 0921 by Mariel Loya RN  Outcome: Progressing  8/12/2020 0921 by Torres Dominguez RN  Outcome: Progressing  Goal: Maintain or return mobility status to optimal level  Description: INTERVENTIONS:  - Assess patient's baseline mobility status (ambulation, transfers, stairs, etc )    - Identify cognitive and physical deficits and behaviors that affect mobility  - Identify mobility aids required to assist with transfers and/or ambulation (gait belt, sit-to-stand, lift, walker, cane, etc )  - [place] fall precautions as indicated by assessment  - Record patient progress and toleration of activity level on Mobility SBAR; progress patient to next Phase/Stage  - Instruct patient to call for assistance with activity based on assessment  - Consider rehabilitation consult to assist with strengthening/weightbearing, etc   8/12/2020 0921 by Torres Dominguez RN  Outcome: Progressing  8/12/2020 0921 by Torres Dominguez RN  Outcome: Progressing     Problem: DISCHARGE PLANNING  Goal: Discharge to home or other facility with appropriate resources  Description: INTERVENTIONS:  - Identify barriers to discharge w/patient and caregiver  - Arrange for needed discharge resources and transportation as appropriate  - Identify discharge learning needs (meds, wound care, etc )  - Arrange for interpretive services to assist at discharge as needed  - Refer to Case Management Department for coordinating discharge planning if the patient needs post-hospital services based on physician/advanced practitioner order or complex needs related to functional status, cognitive ability, or social support system  8/12/2020 0921 by Torres Dominguez RN  Outcome: Progressing  8/12/2020 0921 by Torres Dominguez RN  Outcome: Progressing     Problem: Knowledge Deficit  Goal: Patient/family/caregiver demonstrates understanding of disease process, treatment plan, medications, and discharge instructions  Description: Complete learning assessment and assess knowledge base    Interventions:  - Provide teaching at level of understanding  - Provide teaching via preferred learning methods  8/12/2020 0921 by Toshia Norman RN  Outcome: Progressing  8/12/2020 0921 by Toshia Norman RN  Outcome: Progressing     Problem: CARDIOVASCULAR - ADULT  Goal: Maintains optimal cardiac output and hemodynamic stability  Description: INTERVENTIONS:  - Monitor I/O, vital signs and rhythm  - Monitor for S/S and trends of decreased cardiac output  - Administer and titrate ordered vasoactive medications to optimize hemodynamic stability  - Assess quality of pulses, skin color and temperature  - Assess for signs of decreased coronary artery perfusion  - Instruct patient to report change in severity of symptoms  8/12/2020 0921 by Toshia Norman RN  Outcome: Progressing  8/12/2020 0921 by Toshia Norman RN  Outcome: Progressing     Problem: GASTROINTESTINAL - ADULT  Goal: Maintains adequate nutritional intake  Description: INTERVENTIONS:  - Monitor percentage of each meal consumed  - Identify factors contributing to decreased intake, treat as appropriate  - Assist with meals as needed  - Monitor I&O, weight, and lab values if indicated  - Obtain nutrition services referral as needed  8/12/2020 0921 by Toshia Norman RN  Outcome: Progressing  8/12/2020 0921 by Toshia Norman RN  Outcome: Progressing     Problem: METABOLIC, FLUID AND ELECTROLYTES - ADULT  Goal: Electrolytes maintained within normal limits  Description: INTERVENTIONS:  - Monitor labs and assess patient for signs and symptoms of electrolyte imbalances  - Administer electrolyte replacement as ordered  - Monitor response to electrolyte replacements, including repeat lab results as appropriate  - Instruct patient on fluid and nutrition as appropriate  8/12/2020 0921 by Toshia Norman RN  Outcome: Progressing  8/12/2020 0921 by Toshia Norman RN  Outcome: Progressing  Goal: Fluid balance maintained  Description: INTERVENTIONS:  - Monitor labs   - Monitor I/O and WT  - Instruct patient on fluid and nutrition as appropriate  - Assess for signs & symptoms of volume excess or deficit  8/12/2020 0921 by Jennifer Murrieta RN  Outcome: Progressing  8/12/2020 0921 by Jennifer Murrieta RN  Outcome: Progressing     Problem: SKIN/TISSUE INTEGRITY - ADULT  Goal: Skin integrity remains intact  Description: INTERVENTIONS  - Identify patients at risk for skin breakdown  - Assess and monitor skin integrity  - Assess and monitor nutrition and hydration status  - Monitor labs (i e  albumin)  - Assess for incontinence   - Turn and reposition patient  - Assist with mobility/ambulation  - Relieve pressure over bony prominences  - Avoid friction and shearing  - Provide appropriate hygiene as needed including keeping skin clean and dry  - Evaluate need for skin moisturizer/barrier cream  - Collaborate with interdisciplinary team (i e  Nutrition, Rehabilitation, etc )   - Patient/family teaching  8/12/2020 8503 by Jennifer Murrieta RN  Outcome: Progressing  8/12/2020 0921 by Jennifer Murrieta RN  Outcome: Progressing     Problem: Nutrition/Hydration-ADULT  Goal: Nutrient/Hydration intake appropriate for improving, restoring or maintaining nutritional needs  Description: Monitor and assess patient's nutrition/hydration status for malnutrition  Collaborate with interdisciplinary team and initiate plan and interventions as ordered  Monitor patient's weight and dietary intake as ordered or per policy  Utilize nutrition screening tool and intervene as necessary  Determine patient's food preferences and provide high-protein, high-caloric foods as appropriate       INTERVENTIONS:  - Monitor oral intake, urinary output, labs, and treatment plans  - Assess nutrition and hydration status and recommend course of action  - Evaluate amount of meals eaten  - Assist patient with eating if necessary   - Allow adequate time for meals  - Recommend/ encourage appropriate diets, oral nutritional supplements, and vitamin/mineral supplements  - Order, calculate, and assess calorie counts as needed  - Recommend, monitor, and adjust tube feedings and TPN/PPN based on assessed needs  - Assess need for intravenous fluids  - Provide specific nutrition/hydration education as appropriate  - Include patient/family/caregiver in decisions related to nutrition  8/12/2020 0921 by Gurwinder Rodriguez RN  Outcome: Progressing  8/12/2020 0921 by Gurwinder Rodriguez RN  Outcome: Progressing

## 2020-08-12 NOTE — OCCUPATIONAL THERAPY NOTE
Occupational Therapy Evaluation      Marah Eisenbergjaz    8/12/2020    Principal Problem:    Generalized weakness  Active Problems:    Anemia of chronic kidney failure    Congestive heart failure (HCC)    Diabetes mellitus with foot ulcer ([facility] )    End-stage renal disease on hemodialysis ([facility] )    GERD without esophagitis    Hyperlipidemia    Hypertension    Age-related osteoporosis without current pathological fracture    Nausea      Past Medical History:   Diagnosis Date    Abnormal weight loss     last assessed [id number]    Anemia     Chronic kidney disease     Clostridium difficile colitis     last assessed [id number]    Depression     resolved [id number]    Diabetes mellitus ([facility] )     Fracture, tibial plateau     last assessed [id number]    GERD (gastroesophageal reflux disease)     Hyperkalemia     last assessed 14Dlf0321    Hypertension     Hypoglycemia     Sugars now up   Will increase the metformin;  last assessed 70Igx0056    Hypokalemia     last assessed 05Ced4118    Hypothermia     last assessed 34RFF7057    Hypoxia     last assessed 73Cwh2388    Metatarsalgia     last assessed [id number]    Old myocardial infarction     Osteoporosis     Syncope     last assessed 78Vga0681    Visual disturbance     last assessed 50Jkl5452       Past Surgical History:   Procedure Laterality Date    BYPASS GRAFT Right     H/O bypass graft (non-vein), Right Leg    CAROTID ENDARTERECTOMY      Neurological Surgery Cartoid Endartectomy    CATARACT EXTRACTION      CHOLECYSTECTOMY      questionable    CORONARY ARTERY BYPASS GRAFT      FOOT AMPUTATION Bilateral     H/O surgery foot amputation Metatarsal and toe;  Bilat    HERNIA REPAIR      Inguinal sliding    TONSILLECTOMY AND ADENOIDECTOMY      VENOUS THROMBECTOMY      H/O Arteriobenous surgery Thrombectomy of AV Fistula Percantaneous;  last assessed 26SNQ1258        08/12/20 0838   Note Type   Note type Eval/Treat   Restrictions/Precautions   Weight Bearing Precautions Per Order No   Other Precautions Fall Risk;Limb alert; Bed Alarm  (LUE limb alert/dialysis access)   Pain Assessment   Pain Assessment Tool Pain Assessment not indicated - pt denies pain   Pain Score No Pain   Home Living   Type of Home House   Home Layout Performs ADLs on one level; Able to live on main level with bedroom/bathroom; Two level;Ramped entrance  (set up 1st floor)   Bathroom Shower/Tub Tub only  (sponge bathes at sink)   [address] [facility] Walker;Cane;Wheelchair-manual  (mobile via W/C primarily, RW at times)   Prior Function   Level of [place] Needs assistance with IADLs; Needs assistance with ADLs and functional mobility   Lives With Spouse; Family  (daughter and grandson lives on 2nd floor)   Receives Help From Family   ADL Assistance Needs assistance  (some A with bathing (Back/buttocks/feet))   IADLs Needs assistance  (spouse completes [facility] chores)   Falls in the last 6 months 1 to 4   Psychosocial   Psychosocial (WDL) WDL   Subjective   Subjective agreeable to therapy eval   ADL   Eating Assistance 5  Supervision/Setup   Eating Deficit Setup   Grooming Assistance 4  Minimal Assistance   Grooming Deficit Setup; Increased time to complete   UB Bathing Assistance 3  Moderate Assistance   LB Bathing Assistance Unable to assess   UB Dressing Assistance 3  Moderate Assistance   LB Dressing Assistance Unable to assess   Bed Mobility   Rolling R 3  Moderate assistance   Additional items Assist x 1;Bedrails; Increased time required;Verbal cues;LE management   Rolling L 3  Moderate assistance   Additional items Assist x 1;Bedrails; Increased time required;Verbal cues;LE management   Supine to Sit 3  Moderate assistance   Additional items Assist x 2; Increased time required;Verbal cues;LE management   Sit to Supine 3  Moderate assistance   Additional items Assist x 2; Increased time required;Verbal cues;LE management   Additional Comments side sit EOB,w/ c/o of increasing nausea, standing at this time deferred, pt assisted in returning to bed   Transfers   Sit to Stand Unable to assess   Functional Mobility   Functional Mobility   (unable at this time)   Balance   Static Sitting Fair   Dynamic Sitting Poor +   Activity Tolerance   Activity Tolerance Patient limited by fatigue;Treatment limited secondary to medical complications (Comment); Other (Comment)  (nausea and lightheaded)   Nurse Made Aware yes   RUE Assessment   RUE Assessment   (strength F+/G-)   LUE Assessment   LUE Assessment   (strength F+/G-)   Hand Function   Gross Motor Coordination Functional   Fine Motor Coordination Functional   Cognition   Overall Cognitive Status WFL   Arousal/Participation Alert; Cooperative   Attention Attends with cues to redirect   Orientation Level Oriented to person;Oriented to place;Oriented to situation;Disoriented to time   Memory Decreased recall of precautions;Decreased recall of recent events   Following Commands Follows one step commands with increased time or repetition   Assessment   Limitation Decreased ADL status; Decreased UE strength;Decreased Safe judgement during ADL;Decreased endurance;Decreased self-care trans   Prognosis Good   Assessment Pt is a 77 y o  female seen for OT evaluation s/p admit to [facility] on 8/11/2020 w/ Generalized weakness  Comorbidities affecting pt's functional performance at time of assessment include: DM, HTN and Anemia, Hemodialysis D, see H & P for additional PMHx  Personal factors affecting pt at time of IE include:difficulty performing ADLS  Prior to admission, pt was living at home with spouse/family support, completing most self care ADL's, D for IADL's, I'ly mobile in W/C and uses RW on occasion/to transfer  Tawana Zaragoza  Upon evaluation: Pt requires an increased level of assistance with self care ADL's and functional transfers/toileting r/t  the following deficits impacting occupational performance: weakness, decreased strength, decreased balance, decreased tolerance and decreased safety awareness  Pt to benefit from continued skilled OT tx while in the hospital to address deficits as defined above and maximize level of functional independence w ADL's and functional mobility  Occupational Performance areas to address include: bathing/shower, dressing and functional mobility  From OT standpoint, recommendation at time of d/c would be STR    Goals   Patient Goals to feel better   STG Time Frame 3-5   Short Term Goal #1 increase knowledge re: adaptations to good to increase level of safety and independence   Short Term Goal #2 increase UB self care to Min A following set up   Short Term Goal  increase bed mobility to min A for self care participation   LTG Time Frame 7-10   Long Term Goal #1 increase bed mobility to MI for ADL's   Long Term Goal #2 increase LB self care to PLOF's, with adaptations as indicated   Long Term Goal increase B/L UE strength X 1/2 grade for increased level of safety and independence with functional transfers       Plan   Treatment Interventions ADL retraining; Activityengagement;Patient/family training; Endurance training;UE strengthening/ROM; Functional transfer training;Equipment evaluation/education; Energy conservation;Continued evaluation; Compensatory technique education   Goal Expiration Date 08/22/20   OT Treatment Day 0   OT Frequency 3-5x/wk   Recommendation   OT Discharge Recommendation Post-Acute Rehabilitation Services   OT - OK to Discharge Yes  (when medically cleared)   Naresh Phillips OT

## 2020-08-12 NOTE — PLAN OF CARE
Problem: PHYSICAL THERAPY ADULT  Goal: Performs mobility at highest level of function for planned discharge setting  See evaluation for individualized goals  Description: Treatment/Interventions: Functional transfer training, LE strengthening/ROM, Therapeutic exercise, Endurance training, Cognitive reorientation, Patient/family training, Equipment eval/education, Bed mobility, Gait training, Spoke to nursing, Spoke to case management, OT  Equipment Recommended: (TBD)       See flowsheet documentation for full assessment, interventions and recommendations  Note: Prognosis: Fair  Problem List: Decreased strength, Decreased endurance, Impaired balance, Decreased mobility, Decreased cognition, Decreased safety awareness  Assessment: Pt is 77 y o  female seen for PT evaluation on 8/12/2020 s/p admit to [address] on 8/11/2020 w/ Generalized weakness  PT consulted to assess pt's functional mobility and d/c needs  Order placed for PT eval and tx, w/ up w/ A order  PT performed at least 2 patient identifiers during session: Name and wristband  Comorbidities affecting pt's physical performance at time of assessment include: age related osteoporosis, anemia of chronic kidney failure, CHF, DM, ESRD on HD T/Thu/Sat, HTN, HLD, GERD  PTA, pt was requiring A for mobility and lives w/ spouse and family in 2 level home c 1st floor setup  Personal factors affecting pt at time of IE include: ambulating w/ assistive device, inability to navigate level surfaces w/o external assistance, unable to perform dynamic tasks in community, positive fall history, decreased initiation and engagement, unable to perform physical activity, limited insight into impairments, inability to perform IADLs and inability to perform ADLs   Please find objective findings from PT assessment regarding body systems outlined above with impairments and limitations including weakness, impaired balance, decreased endurance, decreased activity tolerance, decreased functional mobility tolerance, decreased safety awareness and fall risk, as well as mobility assessment (need for cueing for mobility technique)  The following objective measures performed on IE also reveal limitations: Barthel Index: 30/100  Pt's clinical presentation is currently unstable/unpredictable seen in pt's presentation of abnormal lab value(s), need for input for task focus and mobility technique and ongoing medical assessment  Pt to benefit from continued PT tx to address deficits as defined above and maximize level of functional independent mobility and consistency  From PT/mobility standpoint, recommendation at time of d/c would be STR pending progress in order to facilitate return to PLOF  Barriers to Discharge: Other (Comment)(pt reports having 24/7 assistance from family)     PT Discharge Recommendation: [address]     PT - OK to Discharge: No    See flowsheet documentation for full assessment

## 2020-08-12 NOTE — CONSULTS
Consultation - Nephrology   Usha Singh 77 y o  female MRN: [id number]  Unit/Bed#: -02 Encounter: [id number]      A/P:  81-year-old female with past medical history of end-stage kidney disease on hemodialysis presented to [address] with increased weakness  Also reported some nausea vomiting and primary care team has been working up for the same  1  End-stage kidney disease on hemodialysis TTS  She had her full HD yesterday at her home dialysis unit  2  Anemia of chronic kidney disease  3  CKD with bone mineral disease  4  History of high blood pressure   5  History of diabetes mellitus  6  CAD, CABG  7  Peripheral vascular disease, carotid endarterectomies in the past  8  Osteoporosis   9  Axis type AV fistula left upper arm  -no urgent need for renal replacement therapy today  -electrolytes, acid-base status, volume status are currently acceptable  -will plan on hemodialysis treatment tomorrow  -will obtain [facility] from [facility]-estimated dry weight 72 5 kilos  -H&H currently acceptable  -hemodynamically stable continue blood pressure medication  -continue PhosLo for high phosphate  -infectious workup as per primary team      Thank you for allowing us to participate in the care of your patient  Please feel free to contact us regarding the care of this patient, or any other questions/concerns that may be applicable      Patient Active Problem List   Diagnosis    Allergic rhinitis    Anemia of chronic kidney failure    Arteriosclerosis of carotid artery    Cardiovascular arteriosclerosis    Cataract    Congestive heart failure (HCC)    Diabetes mellitus with foot ulcer (HCC)    Diastolic dysfunction    End-stage renal disease on hemodialysis ([facility] )    GERD without esophagitis    Hyperlipidemia    Hypertension    Iron deficiency anemia    Overactive bladder    Peripheral arterial disease ([facility] )    Age-related osteoporosis without current pathological fracture    Secondary hyperparathyroidism ([facility] )    Type 2 diabetes mellitus with renal manifestations, controlled ([facility] )    Vitamin D deficiency    Acquired absence of left great toe (HCC)    Thrombocytopenia (HCC)    Generalized weakness    Nausea       History of Present Illness   Physician Requesting Consult: Sunita Trinidad MD  Reason for Consult / Principal Problem: ESRD on HD  Hx and PE limited by:   HPI: James Espitia is a 77y o  year old female who presents with     History obtained from chart review and the patient  78-year-old female with history of end-stage renal disease on dialysis at [place] [address] at the Saturday, hypertension, hyperlipidemia, diabetes, was brought in after dialysis yesterday to the ED because of the weakness  Patient reported some upset stomach and nausea and dry heaves but patient did go yesterday for dialysis where she experienced some hypotension and not much ultrafiltration was done yesterday but IV fluid was given because of the hypertension  And patient felt dizzy and lightheadedness which prompted the patient to be coming to the ER  Currently the patient has been evaluated for the reason for weakness  But patient remained hemodynamically stable and so for workup is non specific  Infectious workup has been for by ongoing by the primary team   Mild hypokalemia has been replaced and this morning BMP is stable  He also noted some questionable left hip osteoporosis with pathological fracture which has been addressed  H&H at the baseline  No bleeding history at the site of AV access or GI bleeding  Nephrology consult was called for continuation of the HD treatment  Review of Systems - Negative except as mentioned above in HPI, more specifics as mentioned below    Review of Systems - Negative except HPI    Historical Information   Past Medical History:   Diagnosis Date    Abnormal weight loss     last assessed [id number]    Anemia     Chronic kidney disease     Clostridium difficile colitis     last assessed [id number]    Depression     resolved 14Onm3242    Diabetes mellitus ([address] )     Fracture, tibial plateau     last assessed [id number]    GERD (gastroesophageal reflux disease)     Hyperkalemia     last assessed 11Elx7188    Hypertension     Hypoglycemia     Sugars now up   Will increase the metformin;  last assessed 79Kxu9974    Hypokalemia     last assessed 78Hzi7087    Hypothermia     last assessed 83XLT2537    Hypoxia     last assessed 45Eqs3621    Metatarsalgia     last assessed 32Gst2473    Old myocardial infarction     Osteoporosis     Syncope     last assessed 14Bjl2853    Visual disturbance     last assessed 68Bcj5234     Past Surgical History:   Procedure Laterality Date    BYPASS GRAFT Right     H/O bypass graft (non-vein), Right Leg    CAROTID ENDARTERECTOMY      Neurological Surgery Cartoid Endartectomy    CATARACT EXTRACTION      CHOLECYSTECTOMY      questionable    CORONARY ARTERY BYPASS GRAFT      FOOT AMPUTATION Bilateral     H/O surgery foot amputation Metatarsal and toe;  Bilat    HERNIA REPAIR      Inguinal sliding    TONSILLECTOMY AND ADENOIDECTOMY      VENOUS THROMBECTOMY      H/O Arteriobenous surgery Thrombectomy of AV Fistula Percantaneous;  last assessed 20FKH0472     Social History   Social History     Substance and Sexual Activity   Alcohol Use Not Currently     Social History     Substance and Sexual Activity   Drug Use No     Social History     Tobacco Use   Smoking Status Former Smoker   Smokeless Tobacco Never Used     Family History   Problem Relation Age of Onset    Breast cancer Mother     Cancer Mother     Heart disease Mother     Coronary artery disease Father     Aneurysm Sister         Cerebral Artery    Coronary artery disease Sister     Liver cancer Brother     No Known Problems Daughter        Meds/Allergies   all current active meds have been reviewed, current meds:   Current Facility-Administered Medications   Medication Dose Route Frequency    acetaminophen (TYLENOL) tablet 650 mg  650 mg Oral Q6H PRN    amLODIPine (NORVASC) tablet 5 mg  5 mg Oral Daily    aspirin (ECOTRIN LOW STRENGTH) EC tablet 81 mg  81 mg Oral Daily    b complex-vitamin C-folic acid (NEPHROCAPS) capsule 1 capsule  1 capsule Oral Daily    calcium acetate (PHOSLO) capsule 1,334 mg  1,334 mg Oral TID With Meals    diphenoxylate-atropine (LOMOTIL) 2 5-0 025 mg per tablet 1 tablet  1 tablet Oral 4x Daily PRN    fluticasone (FLONASE) 50 mcg/act nasal spray 1 spray  1 spray Nasal Daily    heparin (porcine) subcutaneous injection 5,000 Units  5,000 Units Subcutaneous Q8H [address]    insulin lispro (HumaLOG) 100 units/mL subcutaneous injection 1-5 Units  1-5 Units Subcutaneous TID AC    insulin lispro (HumaLOG) 100 units/mL subcutaneous injection 1-5 Units  1-5 Units Subcutaneous HS    ondansetron (ZOFRAN) injection 4 mg  4 mg Intravenous Once    ondansetron (ZOFRAN) injection 4 mg  4 mg Intravenous Q6H PRN    pantoprazole (PROTONIX) EC tablet 40 mg  40 mg Oral Daily    pravastatin (PRAVACHOL) tablet 80 mg  80 mg Oral Daily    and PTA meds:    Medications Prior to Admission   Medication    acetaminophen (TYLENOL) 325 mg tablet    amLODIPine (NORVASC) 5 mg tablet    aspirin 81 MG tablet    b complex-vitamin C-folic acid (RENAL) 1 mg    calcium acetate (PHOSLO) 667 mg capsule    diphenoxylate-atropine (LOMOTIL) 2 5-0 025 mg per tablet    ergocalciferol (VITAMIN D2) 50,000 units    fluticasone (FLONASE) 50 mcg/act nasal spray    Multiple Vitamins-Minerals (OCUVITE ADULT 50+ PO)    nateglinide (STARLIX) 120 mg tablet    Omega-3 Fatty Acids (FISH OIL) 1200 MG CAPS    pantoprazole (PROTONIX) 40 mg tablet    pravastatin (PRAVACHOL) 80 mg tablet    glucose blood (ONE TOUCH ULTRA TEST) test strip    guaiFENesin (MUCINEX) 600 mg 12 hr tablet    guaifenesin-codeine (GUAIFENESIN AC) 100-10 MG/5ML liquid    Misc   Devices [facility]) MISC         Allergies Allergen Reactions    Pollen Extract     Prolia [Denosumab]      Severe calcium drop and is on dialysis  Objective   No intake or output data in the 24 hours ending 08/12/20 1105    Invasive Devices:        Physical Exam      No intake/output data recorded  Vitals:    08/12/20 0751   BP: 131/50   Pulse: 83   Resp: 16   Temp: 99 4 °F (37 4 °C)   SpO2: 99%       Gen: in NAD, Alert/Awake  HEENT: no sclerous icterus, MMM, neck supple  CV: +S1/S2, RRR  Lungs: CTA bilaterally  Abd: +BS, soft NT/ND  Ext: all four extremities are warm  Av access left upper extremity AV fistula-thrill positive  Skin: no rashes noted  Neuro: CN II-XII intact    Current Weight: Weight - Scale: 76 1 kg (167 lb 12 3 oz)  First Weight: Weight - Scale: 73 kg (161 lb)    Lab Results:  I have personally reviewed pertinent labs      CBC:   Lab Results   Component Value Date    WBC 7 00 08/12/2020    HGB 10 8 (L) 08/12/2020    HCT 32 1 (L) 08/12/2020    MCV 92 08/12/2020     08/12/2020    MCH 30 7 08/12/2020    MCHC 33 5 08/12/2020    RDW 18 3 (H) 08/12/2020    MPV 8 5 (L) 08/12/2020     CMP:   Lab Results   Component Value Date    K 3 5 08/12/2020     08/12/2020    CO2 31 08/12/2020    BUN 20 08/12/2020    CREATININE 3 67 (H) 08/12/2020    CALCIUM 9 5 08/12/2020    AST 13 08/11/2020    ALT 7 08/11/2020    ALKPHOS 121 08/11/2020    EGFR 12 08/12/2020     Phosphorus: No results found for: PHOS  Magnesium: No results found for: MG  Urinalysis: No results found for: Dulcie Bellow, SPECGRAV, PHUR, LEUKOCYTESUR, NITRITE, PROTEINUA, GLUCOSEU, KETONESU, BILIRUBINUR, BLOODU  Ionized Calcium: No results found for: CAION  Coagulation: No results found for: PT, INR, APTT  Troponin:   Lab Results   Component Value Date    TROPONINI 0 07 (H) 08/12/2020     ABG: No results found for: PHART, LMR3DFB, PO2ART, HQV0BLV, V5YSXKHH, BEART, SOURCE    Results from last 7 days   Lab Units 08/12/20  0523 08/11/20  1630   POTASSIUM mmol/L 3 5 3 2* CHLORIDE mmol/L 100 95*   CO2 mmol/L 31 33*   BUN mg/dL 20 14   CREATININE mg/dL 3 67* 2 45*   CALCIUM mg/dL 9 5 9 6   ALK PHOS U/L  --  121   ALT U/L  --  7   AST U/L  --  13       Radiology review:  Procedure: Xr Hip/pelv 2-3 Vws Left    Result Date: 8/11/2020  Narrative: LEFT HIP INDICATION:   hip pain  COMPARISON:  CT abdomen pelvis 5/18/2015 VIEWS:  XR HIP/PELV 2-3 VWS LEFT  W PELVIS IF PERFORMED FINDINGS: There is no acute fracture or dislocation  No significant hip degenerative changes  No lytic or blastic osseous lesion  There are atherosclerotic calcifications  Soft tissues are otherwise unremarkable  The visualized lumbar spine is unremarkable  Impression: No acute osseous abnormality  Workstation performed: [id number]     Procedure: Xr Chest 1 View    Result Date: 8/11/2020  Narrative: CHEST INDICATION:   weakness  COMPARISON:  1/31/2019 EXAM PERFORMED/VIEWS:  XR CHEST 1 VIEW FINDINGS:  Median sternotomy wires  Unchanged cardiomegaly  Mild central vascular congestion with otherwise clear lungs  No pneumothorax or pleural effusion  Osseous structures appear within normal limits for patient age  Impression: Cardiomegaly and mild central congestion likely on the basis of mild CHF  Workstation performed: [id number]         EKG, Pathology, and Other Studies:  Reviewed      Counseling / Coordination of Care  Total ADDITIONAL floor / unit time spent today 45 minutes  Greater than 50% of total time was spent with the patient and / or family counseling and / or coordination of care  A description of the counseling / coordination of care: 20 min    Hadley Jeronimo MD      This consultation note was produced in part using a dictation device which may document imprecise wording from author's original intent

## 2020-08-12 NOTE — PHYSICIAN ADVISOR
Current patient class: Observation  The patient is currently on Hospital Day: 2 at [address]      This patient was originally admitted to the hospital under observation status  After admission the patient was reevaluated and determined to require further hospitalization  The patient is now documented to require at least a 2nd midnight in the hospital  As such the patient is now expected to satisfy the 2 midnight benchmark and is therefore eligible for inpatient admission  After review of the relevant documentation, labs, vital signs and test results, the patient is appropriate for INPATIENT ADMISSION  Admission to the hospital as an inpatient is a complex decision making process which requires the practitioner to consider the patients presenting complaint, history and physical examination and all relevant testing  With this in mind, in this case, the patient was deemed appropriate for INPATIENT ADMISSION  After review of the documentation and testing available at the time of the admission I concur with this clinical determination of medical necessity  Rationale is as follows: The patient is a 77 yrs Female who presented to the ED at 8/11/2020  4:14 PM with a chief complaint of Weakness - Generalized; Hypotension (several times during dialysis treatment today); and Abdominal Pain  Patient is currently a assist of 4  This is not her baseline  Usually she gets up with assistance x1 with her   Patient has a history end-stage renal disease as well  Patient is to get dialysis treatment tomorrow  Given increased need for support and also safe discharge planning secondary the patient now being an assistive 4 the patient is going to cross the 2 midnight benchmark  The patient is also being monitored for fever and sepsis and may require cultures as well    If the patient is going to stay overnight the patient is inpatient status appropriate for the above reasons  The patients vitals on arrival were   ED Triage Vitals [08/11/20 1615]   Temperature Pulse Respirations Blood Pressure SpO2   99 6 °F (37 6 °C) 100 (!) 24 125/96 92 %      Temp Source Heart Rate Source Patient Position - Orthostatic VS BP Location FiO2 (%)   Temporal Monitor Sitting Right arm --      Pain Score       5           Past Medical History:   Diagnosis Date    Abnormal weight loss     last assessed [id number]    Anemia     Chronic kidney disease     Clostridium difficile colitis     last assessed [id number]    Depression     resolved [id number]    Diabetes mellitus ([facility] )     Fracture, tibial plateau     last assessed [id number]    GERD (gastroesophageal reflux disease)     Hyperkalemia     last assessed [id number]    Hypertension     Hypoglycemia     Sugars now up   Will increase the metformin;  last assessed 87Tqi9971    Hypokalemia     last assessed 48Vpu4662    Hypothermia     last assessed 16GMM5198    Hypoxia     last assessed 06Jva3004    Metatarsalgia     last assessed 99Byr9673    Old myocardial infarction     Osteoporosis     Syncope     last assessed 21Wia6051    Visual disturbance     last assessed 69Bxi7404     Past Surgical History:   Procedure Laterality Date    BYPASS GRAFT Right     H/O bypass graft (non-vein), Right Leg    CAROTID ENDARTERECTOMY      Neurological Surgery Cartoid Endartectomy    CATARACT EXTRACTION      CHOLECYSTECTOMY      questionable    CORONARY ARTERY BYPASS GRAFT      FOOT AMPUTATION Bilateral     H/O surgery foot amputation Metatarsal and toe;  Bilat    HERNIA REPAIR      Inguinal sliding    TONSILLECTOMY AND ADENOIDECTOMY      VENOUS THROMBECTOMY      H/O Arteriobenous surgery Thrombectomy of AV Fistula Percantaneous;  last assessed 24YAK1214       The patient was admitted to the hospital at N/A on N/A for the following diagnosis:  Weakness [R53 1]  Hypotension [I95 9]  End-stage renal disease on hemodialysis (HCC) [N18 6, Z99 2]  Generalized weakness [R53 1]    Consults have been placed to:   IP CONSULT TO NEPHROLOGY    Vitals:    08/11/20 2020 08/12/20 0030 08/12/20 0600 08/12/20 0751   BP: 145/68 134/68  131/50   BP Location: Right arm Right arm  Right arm   Pulse: 92 88  83   Resp: 16 16  16   Temp: 98 9 °F (37 2 °C) 99 °F (37 2 °C)  99 4 °F (37 4 °C)   TempSrc: Temporal Temporal  Temporal   SpO2: 93% 98%  99%   Weight: 72 7 kg (160 lb 4 4 oz)  76 1 kg (167 lb 12 3 oz)    Height: 5' 4" (1 626 m)          Most recent labs:    Recent Labs     08/11/20  1630 08/12/20  0523 08/12/20  0530   WBC 8 50 7 00  --    HGB 11 6* 10 8*  --    HCT 35 2* 32 1*  --     174  --    K 3 2* 3 5  --    CALCIUM 9 6 9 5  --    BUN 14 20  --    CREATININE 2 45* 3 67*  --    LIPASE <10*  --   --    TROPONINI 0 07*  --  0 07*   AST 13  --   --    ALT 7  --   --    ALKPHOS 121  --   --        Scheduled Meds:  Current Facility-Administered Medications   Medication Dose Route Frequency Provider Last Rate    acetaminophen  650 mg Oral Q6H PRN BRY Ayala      amLODIPine  5 mg Oral Daily BRY Ayala      aspirin  81 mg Oral Daily BRY Ayala      b complex-vitamin C-folic acid  1 capsule Oral Daily BRY Ayala      calcium acetate  1,334 mg Oral TID With Meals BRY Ayala      diphenoxylate-atropine  1 tablet Oral 4x Daily PRN BRY Ayala      fluticasone  1 spray Nasal Daily BRY Ayala      heparin (porcine)  5,000 Units Subcutaneous [facility] BRY Ayala      insulin lispro  1-5 Units Subcutaneous TID AC BRY Ayala      insulin lispro  1-5 Units Subcutaneous HS BRY Ayala      ondansetron  4 mg Intravenous Once BRY Ayala      ondansetron  4 mg Intravenous Q6H PRN BRY Ayala      pantoprazole  40 mg Oral Daily BRY Ayala      pravastatin  80 mg Oral Daily BRY Ayala       Continuous Infusions:   PRN Meds:   acetaminophen    diphenoxylate-atropine    ondansetron    Surgical procedures (if appropriate):

## 2020-08-12 NOTE — PHYSICAL THERAPY NOTE
Physical Therapy Evaluation     Patient's Name: James Espitia    Admitting Diagnosis  Weakness [R53 1]  Hypotension [I95 9]  End-stage renal disease on hemodialysis ([facility] ) [N18 6, Z99 2]  Generalized weakness [R53 1]    Problem List  Patient Active Problem List   Diagnosis    Allergic rhinitis    Anemia of chronic kidney failure    Arteriosclerosis of carotid artery    Cardiovascular arteriosclerosis    Cataract    Congestive heart failure ([place] [address] )    Diabetes mellitus with foot ulcer ([place] [address] )    Diastolic dysfunction    End-stage renal disease on hemodialysis ([place] [address] )    GERD without esophagitis    Hyperlipidemia    Hypertension    Iron deficiency anemia    Overactive bladder    Peripheral arterial disease ([place] [address] )    Age-related osteoporosis without current pathological fracture    Secondary hyperparathyroidism ([place] [address] )    Type 2 diabetes mellitus with renal manifestations, controlled ([place] [address] )    Vitamin D deficiency    Acquired absence of left great toe (HCC)    Thrombocytopenia (HCC)    Generalized weakness    Nausea       Past Medical History  Past Medical History:   Diagnosis Date    Abnormal weight loss     last assessed 60Gbf0369    Anemia     Chronic kidney disease     Clostridium difficile colitis     last assessed 71UUI6736    Depression     resolved 49Znf3469    Diabetes mellitus ([facility] )     Fracture, tibial plateau     last assessed 48GSA4018    GERD (gastroesophageal reflux disease)     Hyperkalemia     last assessed 41Ctx1677    Hypertension     Hypoglycemia     Sugars now up   Will increase the metformin;  last assessed 23UPU2259    Hypokalemia     last assessed 83Bpr7898    Hypothermia     last assessed 60JFF1526    Hypoxia     last assessed 35Tvs0709    Metatarsalgia     last assessed 06KWL2153    Old myocardial infarction     Osteoporosis     Syncope     last assessed 65WMR8076    Visual disturbance     last assessed 22IIY4678       Past Surgical History  Past Surgical History:   Procedure Laterality Date    BYPASS GRAFT Right     H/O bypass graft (non-vein), Right Leg    CAROTID ENDARTERECTOMY      Neurological Surgery Cartoid Endartectomy    CATARACT EXTRACTION      CHOLECYSTECTOMY      questionable    CORONARY ARTERY BYPASS GRAFT      FOOT AMPUTATION Bilateral     H/O surgery foot amputation Metatarsal and toe;  Bilat    HERNIA REPAIR      Inguinal sliding    TONSILLECTOMY AND ADENOIDECTOMY      VENOUS THROMBECTOMY      H/O Arteriobenous surgery Thrombectomy of AV Fistula Percantaneous;  last assessed 22JHU9209          08/12/20 0816   Note Type   Note type Eval/Treat   Pain Assessment   Pain Assessment Tool Pain Assessment not indicated - pt denies pain   Pain Score No Pain   Home Living   Type of [address] [place] [address] Ramped entrance; Two level;Performs ADLs on one level; Able to live on main level with bedroom/bathroom  (1st floor setup)   Bathroom Shower/Tub Tub only  (pt sponge bathes at sink)   [facility] Walker;Cane;Wheelchair-manual  (primarily uses w/c for mob in home, RW occ)   Prior Function   Level of Hope Needs assistance with ADLs and functional mobility; Needs assistance with IADLs  (grandson &  help with mobility)   Lives With Spouse; Family  (dtr and grandson live on 2nd floor,  lives c pt)   Receives Help From Family   ADL Assistance Needs assistance  (needs help with bathing)   IADLs Needs assistance  ( cooks, cleans)   Falls in the last 6 months 1 to 4  (cannot recall how many)   Restrictions/Precautions   Weight Bearing Precautions Per Order No   Other Precautions Limb alert; Bed Alarm; Fall Risk  (L UE limb alert)   General   Family/Caregiver Present No   Cognition   Arousal/Participation Alert   Orientation Level Oriented to person;Oriented to place; Disoriented to time;Oriented to situation   Memory Decreased recall of precautions;Decreased recall of recent events   Following Commands Follows one step commands with increased time or repetition   Comments pt agreeable to PT session   RLE Assessment   RLE Assessment X   Strength RLE   RLE Overall Strength 3-/5   LLE Assessment   LLE Assessment X   Strength LLE   LLE Overall Strength 3-/5   Coordination   Movements are Fluid and Coordinated 0   Sensation X   Bed Mobility   Rolling R 3  Moderate assistance   Additional items Assist x 1;Bedrails; Increased time required;Verbal cues;LE management   Rolling L 3  Moderate assistance   Additional items Assist x 1;Bedrails; Increased time required;Verbal cues;LE management   Supine to Sit 3  Moderate assistance   Additional items Assist x 2; Increased time required;Verbal cues;LE management   Sit to Supine 3  Moderate assistance   Additional items Assist x 2; Increased time required;Verbal cues;LE management   Additional Comments during EOB dangling attempt, pt reports increasing nausea, (-) emesis, with increasing lightheadedness  Pt assisted back into supine position, PT deferred standing attempt at this time   Transfers   Sit to Stand Unable to assess   Ambulation/Elevation   Gait pattern Not tested   Balance   Static Sitting Fair   Dynamic Sitting Poor +   Static Standing   (DNT)   Endurance Deficit   Endurance Deficit Yes   Activity Tolerance   Activity Tolerance Treatment limited secondary to medical complications (Comment); Patient limited by fatigue  (lightheadedness, nausea)   Nurse Made Aware Yes, RN Magdaleno Gupta verbalized pt appropriate for PT session, made aware of nausea and lightheadedness   Assessment   Prognosis Fair   Problem List Decreased strength;Decreased endurance; Impaired balance;Decreased mobility; Decreased cognition;Decreased safety awareness   Assessment Pt is 77 y o  female seen for PT evaluation on 8/12/2020 s/p admit to [address] on 8/11/2020 w/ Generalized weakness   PT consulted to assess pt's functional mobility and d/c needs  Order placed for PT eval and tx, w/ up w/ A order  PT performed at least 2 patient identifiers during session: Name and wristband  Comorbidities affecting pt's physical performance at time of assessment include: age related osteoporosis, anemia of chronic kidney failure, CHF, DM, ESRD on HD T/Thu/Sat, HTN, HLD, GERD  PTA, pt was requiring A for mobility and lives w/ spouse and family in 2 level home c 1st floor setup  Personal factors affecting pt at time of IE include: ambulating w/ assistive device, inability to navigate level surfaces w/o external assistance, unable to perform dynamic tasks in community, positive fall history, decreased initiation and engagement, unable to perform physical activity, limited insight into impairments, inability to perform IADLs and inability to perform ADLs  Please find objective findings from PT assessment regarding body systems outlined above with impairments and limitations including weakness, impaired balance, decreased endurance, decreased activity tolerance, decreased functional mobility tolerance, decreased safety awareness and fall risk, as well as mobility assessment (need for cueing for mobility technique)  The following objective measures performed on IE also reveal limitations: Barthel Index: 30/100  Pt's clinical presentation is currently unstable/unpredictable seen in pt's presentation of abnormal lab value(s), need for input for task focus and mobility technique and ongoing medical assessment  Pt to benefit from continued PT tx to address deficits as defined above and maximize level of functional independent mobility and consistency  From PT/mobility standpoint, recommendation at time of d/c would be STR pending progress in order to facilitate return to PLOF     Barriers to Discharge Other (Comment)  (pt reports having 24/7 assistance from family)   Goals   Patient Goals "to feel better"   [facility] Expiration Date 08/22/20   Short Term Goal #1 In 7-10 days: Increase bilateral LE strength 1/2 grade to facilitate independent mobility, Perform all bed mobility tasks with close S to decrease caregiver burden, Increase all balance 1/2 grade to decrease risk for falls, Tolerate 4 hr OOB to faciliate upright tolerance, Complete % of the time, Tolerate seated at EOB 15 minutes to facilitate functional task performance, PT provider will perform functional balance assessment to determine fall risk and PT to see and establish goals for transfers, ambulation when appropriate   PT Treatment Day 0   Plan   Treatment/Interventions Functional transfer training;LE strengthening/ROM; Therapeutic exercise; Endurance training;Cognitive reorientation;Patient/family training;Equipment eval/education; Bed mobility;Gait training;Spoke to nursing;Spoke to case management;OT   PT Frequency   (3-5x/wk)   Recommendation   PT Discharge Recommendation Post-Acute Rehabilitation Services   Equipment Recommended   (TBD)   PT - OK to Discharge No   Barthel Index   Feeding 5   Bathing 0   Grooming Score 0   Dressing Score 5   Bladder Score 5   Bowels Score 5   Toilet Use Score 5   Transfers (Bed/Chair) Score 5   Mobility (Level Surface) Score 0   Stairs Score 0   Barthel Index Score 30         Gatito Dose, PT

## 2020-08-12 NOTE — ASSESSMENT & PLAN NOTE
· Continue insulin sliding scale, diabetic diet  Lab Results   Component Value Date    GLUC 83 08/12/2020    GLUC 105 (H) 08/11/2020    GLUC 197 (H) 06/01/2020

## 2020-08-12 NOTE — ASSESSMENT & PLAN NOTE
· With vomiting - presently resolved, though she does admit her appetite is subpar  · Likely secondary to gastroparesis  · Counseled on diet modification  · Nutrition consult appreciated

## 2020-08-12 NOTE — SOCIAL WORK
Evaluated the pt at the bedside  Pt was admitted to the hospital for weakness  Explained the role of CM and the options of discharge planning with the pt  Pt states she lives with her spouse in a 2 story home with a ramp on the outside  Pt daughter and son lives on the second floor  Pt states she bathes and dresses herself, sponge bathes  Pt spouse and grandson assist with all IADL's  Pt has a cane, walker, WC, BSC, shower chair at home  Pt uses the rollator at times, WC in the house and to get to and from dialysis  Pt goes to [facility] on Tue-Thurs-Sat  Pt spouse takes her to dialysis  Pt PCP is Dr George Nath  Pt gets her medications at [facility] [address]   PT is recommending STR  Spouse at the bedside and agreeable to same  A post acute care recommendation was made by your care team for Acute Rehab  Discussed [place] [facility] with both patient and caregiver  List of facilities given to both patient and caregiver via in person  both patient and caregiver aware the list is custom filtered for them by preference  and that [facility] post acute providers are designated  A referral was made to [facility] at pt and spouse request  Patient/caregiver received discharge checklist   Content reviewed  Patient/caregiver encouraged to participate in discharge plan of care prior to discharge home  CM reviewed d/c planning process including the following: identifying help at home, patient preference for d/c planning needs, availability of treatment team to discuss questions or concerns patient and/or family may have regarding understanding medications and recognizing signs and symptoms once discharged  CM also encouraged patient to follow up with all recommended appointments after discharge  Patient advised of importance for patient and family to participate in managing patients medical well being

## 2020-08-12 NOTE — PLAN OF CARE
Problem: Potential for Falls  Goal: Patient will remain free of falls  Description: INTERVENTIONS:  - Assess patient frequently for physical needs  -  Identify cognitive and physical deficits and behaviors that affect risk of falls    -  [place] fall precautions as indicated by assessment   - Educate patient/family on patient safety including physical limitations  - Instruct patient to call for assistance with activity based on assessment  - Modify environment to reduce risk of injury  - Consider OT/PT consult to assist with strengthening/mobility  Outcome: Progressing     Problem: PAIN - ADULT  Goal: Verbalizes/displays adequate comfort level or baseline comfort level  Description: Interventions:  - Encourage patient to monitor pain and request assistance  - Assess pain using appropriate pain scale  - Administer analgesics based on type and severity of pain and evaluate response  - Implement non-pharmacological measures as appropriate and evaluate response  - Consider cultural and social influences on pain and pain management  - Notify physician/advanced practitioner if interventions unsuccessful or patient reports new pain  Outcome: Progressing     Problem: INFECTION - ADULT  Goal: Absence or prevention of progression during hospitalization  Description: INTERVENTIONS:  - Assess and monitor for signs and symptoms of infection  - Monitor lab/diagnostic results  - Monitor all insertion sites, i e  indwelling lines, tubes, and drains  - Monitor endotracheal if appropriate and nasal secretions for changes in amount and color  - [place] appropriate cooling/warming therapies per order  - Administer medications as ordered  - Instruct and encourage patient and family to use good hand hygiene technique  - Identify and instruct in appropriate isolation precautions for identified infection/condition  Outcome: Progressing  Goal: Absence of fever/infection during neutropenic period  Description: INTERVENTIONS:  - Monitor WBC    Outcome: Progressing     Problem: SAFETY ADULT  Goal: Patient will remain free of falls  Description: INTERVENTIONS:  - Assess patient frequently for physical needs  -  Identify cognitive and physical deficits and behaviors that affect risk of falls    -  [employer] fall precautions as indicated by assessment   - Educate patient/family on patient safety including physical limitations  - Instruct patient to call for assistance with activity based on assessment  - Modify environment to reduce risk of injury  - Consider OT/PT consult to assist with strengthening/mobility  Outcome: Progressing  Goal: Maintain or return to baseline ADL function  Description: INTERVENTIONS:  -  Assess patient's ability to carry out ADLs; assess patient's baseline for ADL function and identify physical deficits which impact ability to perform ADLs (bathing, care of mouth/teeth, toileting, grooming, dressing, etc )  - Assess/evaluate cause of self-care deficits   - Assess range of motion  - Assess patient's mobility; develop plan if impaired  - Assess patient's need for assistive devices and provide as appropriate  - Encourage maximum independence but intervene and supervise when necessary  - Involve family in performance of ADLs  - Assess for home care needs following discharge   - Consider OT consult to assist with ADL evaluation and planning for discharge  - Provide patient education as appropriate  Outcome: Progressing  Goal: Maintain or return mobility status to optimal level  Description: INTERVENTIONS:  - Assess patient's baseline mobility status (ambulation, transfers, stairs, etc )    - Identify cognitive and physical deficits and behaviors that affect mobility  - Identify mobility aids required to assist with transfers and/or ambulation (gait belt, sit-to-stand, lift, walker, cane, etc )  - [employer] fall precautions as indicated by assessment  - Record patient progress and toleration of activity level on Mobility SBAR; progress patient to next Phase/Stage  - Instruct patient to call for assistance with activity based on assessment  - Consider rehabilitation consult to assist with strengthening/weightbearing, etc   Outcome: Progressing     Problem: DISCHARGE PLANNING  Goal: Discharge to home or other facility with appropriate resources  Description: INTERVENTIONS:  - Identify barriers to discharge w/patient and caregiver  - Arrange for needed discharge resources and transportation as appropriate  - Identify discharge learning needs (meds, wound care, etc )  - Arrange for interpretive services to assist at discharge as needed  - Refer to Case Management Department for coordinating discharge planning if the patient needs post-hospital services based on physician/advanced practitioner order or complex needs related to functional status, cognitive ability, or social support system  Outcome: Progressing     Problem: Knowledge Deficit  Goal: Patient/family/caregiver demonstrates understanding of disease process, treatment plan, medications, and discharge instructions  Description: Complete learning assessment and assess knowledge base    Interventions:  - Provide teaching at level of understanding  - Provide teaching via preferred learning methods  Outcome: Progressing     Problem: CARDIOVASCULAR - ADULT  Goal: Maintains optimal cardiac output and hemodynamic stability  Description: INTERVENTIONS:  - Monitor I/O, vital signs and rhythm  - Monitor for S/S and trends of decreased cardiac output  - Administer and titrate ordered vasoactive medications to optimize hemodynamic stability  - Assess quality of pulses, skin color and temperature  - Assess for signs of decreased coronary artery perfusion  - Instruct patient to report change in severity of symptoms  Outcome: Progressing     Problem: GASTROINTESTINAL - ADULT  Goal: Maintains adequate nutritional intake  Description: INTERVENTIONS:  - Monitor percentage of each meal consumed  - Identify factors contributing to decreased intake, treat as appropriate  - Assist with meals as needed  - Monitor I&O, weight, and lab values if indicated  - Obtain nutrition services referral as needed  Outcome: Progressing     Problem: METABOLIC, FLUID AND ELECTROLYTES - ADULT  Goal: Electrolytes maintained within normal limits  Description: INTERVENTIONS:  - Monitor labs and assess patient for signs and symptoms of electrolyte imbalances  - Administer electrolyte replacement as ordered  - Monitor response to electrolyte replacements, including repeat lab results as appropriate  - Instruct patient on fluid and nutrition as appropriate  Outcome: Progressing  Goal: Fluid balance maintained  Description: INTERVENTIONS:  - Monitor labs   - Monitor I/O and WT  - Instruct patient on fluid and nutrition as appropriate  - Assess for signs & symptoms of volume excess or deficit  Outcome: Progressing     Problem: SKIN/TISSUE INTEGRITY - ADULT  Goal: Skin integrity remains intact  Description: INTERVENTIONS  - Identify patients at risk for skin breakdown  - Assess and monitor skin integrity  - Assess and monitor nutrition and hydration status  - Monitor labs (i e  albumin)  - Assess for incontinence   - Turn and reposition patient  - Assist with mobility/ambulation  - Relieve pressure over bony prominences  - Avoid friction and shearing  - Provide appropriate hygiene as needed including keeping skin clean and dry  - Evaluate need for skin moisturizer/barrier cream  - Collaborate with interdisciplinary team (i e  Nutrition, Rehabilitation, etc )   - Patient/family teaching  Outcome: Progressing

## 2020-08-13 ENCOUNTER — APPOINTMENT (INPATIENT)
Dept: DIALYSIS | Facility: HOSPITAL | Age: 75
DRG: 073 | End: 2020-08-13
Payer: MEDICARE

## 2020-08-13 LAB
GLUCOSE SERPL-MCNC: 133 MG/DL (ref 65–140)
GLUCOSE SERPL-MCNC: 216 MG/DL (ref 65–140)
GLUCOSE SERPL-MCNC: 241 MG/DL (ref 65–140)
GLUCOSE SERPL-MCNC: 257 MG/DL (ref 65–140)
MRSA NOSE QL CULT: NORMAL

## 2020-08-13 PROCEDURE — 97110 THERAPEUTIC EXERCISES: CPT

## 2020-08-13 PROCEDURE — 97530 THERAPEUTIC ACTIVITIES: CPT

## 2020-08-13 PROCEDURE — 5A1D70Z PERFORMANCE OF URINARY FILTRATION, INTERMITTENT, LESS THAN 6 HOURS PER DAY: ICD-10-PCS | Performed by: HOSPITALIST

## 2020-08-13 PROCEDURE — 82948 REAGENT STRIP/BLOOD GLUCOSE: CPT

## 2020-08-13 PROCEDURE — 99232 SBSQ HOSP IP/OBS MODERATE 35: CPT | Performed by: INTERNAL MEDICINE

## 2020-08-13 PROCEDURE — 99232 SBSQ HOSP IP/OBS MODERATE 35: CPT | Performed by: PHYSICIAN ASSISTANT

## 2020-08-13 RX ADMIN — FLUTICASONE PROPIONATE 1 SPRAY: 50 SPRAY, METERED NASAL at 09:45

## 2020-08-13 RX ADMIN — PRAVASTATIN SODIUM 80 MG: 40 TABLET ORAL at 13:46

## 2020-08-13 RX ADMIN — HEPARIN SODIUM 5000 UNITS: 5000 INJECTION INTRAVENOUS; SUBCUTANEOUS at 06:01

## 2020-08-13 RX ADMIN — ASPIRIN 81 MG: 81 TABLET, COATED ORAL at 13:47

## 2020-08-13 RX ADMIN — ACETAMINOPHEN 650 MG: 325 TABLET ORAL at 06:23

## 2020-08-13 RX ADMIN — CALCIUM ACETATE 1334 MG: 667 CAPSULE ORAL at 16:39

## 2020-08-13 RX ADMIN — Medication 1 CAPSULE: at 09:45

## 2020-08-13 RX ADMIN — HEPARIN SODIUM 5000 UNITS: 5000 INJECTION INTRAVENOUS; SUBCUTANEOUS at 21:51

## 2020-08-13 RX ADMIN — CALCIUM ACETATE 1334 MG: 667 CAPSULE ORAL at 09:45

## 2020-08-13 RX ADMIN — INSULIN LISPRO 2 UNITS: 100 INJECTION, SOLUTION INTRAVENOUS; SUBCUTANEOUS at 21:51

## 2020-08-13 RX ADMIN — HEPARIN SODIUM 5000 UNITS: 5000 INJECTION INTRAVENOUS; SUBCUTANEOUS at 13:46

## 2020-08-13 RX ADMIN — PANTOPRAZOLE SODIUM 40 MG: 40 TABLET, DELAYED RELEASE ORAL at 13:47

## 2020-08-13 RX ADMIN — CALCIUM ACETATE 1334 MG: 667 CAPSULE ORAL at 13:46

## 2020-08-13 RX ADMIN — INSULIN LISPRO 2 UNITS: 100 INJECTION, SOLUTION INTRAVENOUS; SUBCUTANEOUS at 16:40

## 2020-08-13 RX ADMIN — INSULIN LISPRO 1 UNITS: 100 INJECTION, SOLUTION INTRAVENOUS; SUBCUTANEOUS at 11:49

## 2020-08-13 NOTE — PROGRESS NOTES
Progress Note - Nephrology   Nel Salazar 77 y o  female MRN: [id number]  Unit/Bed#: -02 Encounter: [id number]    A/P:  50-year-old female with past medical history of end-stage kidney disease on hemodialysis presented to [address] with increased weakness  Also reported some nausea vomiting and primary care team has been working up for the same  1  End-stage kidney disease on hemodialysis TTS the  Had a hemodialysis session this morning without any adverse events  Tolerated well for 4 hours using left AV fistula   3 L ultrafiltration was done  No inter dialytic hypotension noted  Hemodynamically stable  Electrolytes acid-base status stable  Next hemodialysis session Saturday  2  Anemia of chronic kidney disease  3  CKD with bone mineral disease  4  History of high blood pressure   5  History of diabetes mellitus  6  CAD, CABG  7  Peripheral vascular disease, carotid endarterectomies in the past  8  Osteoporosis   9  Left arm   AV fistula left upper arm    -electrolytes, acid-base status, volume status are currently acceptable    -H&H currently acceptable  -hemodynamically stable continue blood pressure medication  -continue PhosLo for high phosphate  -infectious workup as per primary team   -spoke to the hospital medicine team for PT OT evaluation and placement has been discussed with case management    Has been at the bedside discussed any concern with her satisfaction    Follow up reason for today's visit:  Hemodialysis, and ESRDGeneralized weakness    Patient Active Problem List   Diagnosis    Allergic rhinitis    Anemia of chronic kidney failure    Arteriosclerosis of carotid artery    Cardiovascular arteriosclerosis    Cataract    Congestive heart failure ([address] )    Diabetes mellitus with foot ulcer [facility]    Diastolic dysfunction    End-stage renal disease on hemodialysis ([facility]    GERD without esophagitis    Hyperlipidemia    Hypertension    Iron deficiency anemia    Overactive bladder    Peripheral arterial disease (HCC)    Age-related osteoporosis without current pathological fracture    Secondary hyperparathyroidism [facility]    Type 2 diabetes mellitus with renal manifestations, controlled ([facility] )    Vitamin D deficiency    Acquired absence of left great toe (HCC)    Thrombocytopenia (HCC)    Generalized weakness    Nausea         Subjective:   Feeling better, ate breakfast  at the bedside    Objective:     Vitals: Blood pressure 137/61, pulse 82, temperature 98 3 °F (36 8 °C), temperature source Temporal, resp  rate 20, height 5' 4" (1 626 m), weight 76 kg (167 lb 8 8 oz), SpO2 96 %, not currently breastfeeding  ,Body mass index is 28 76 kg/m²  Weight (last 2 days)     Date/Time   Weight    08/13/20 0600   76 (167 55)    08/12/20 0600   76 1 (167 77)    08/11/20 2020   72 7 (160 27)    08/11/20 1615   73 (161)                Intake/Output Summary (Last 24 hours) at 8/13/2020 1910  Last data filed at 8/13/2020 1700  Gross per 24 hour   Intake 740 ml   Output 3500 ml   Net -2760 ml     I/O last 3 completed shifts: In: 740 [P O :240; I V :500]  Out: 3500 [Other:3500]         Physical Exam: /61 (BP Location: Right arm)   Pulse 82   Temp 98 3 °F (36 8 °C) (Temporal)   Resp 20   Ht 5' 4" (1 626 m)   Wt 76 kg (167 lb 8 8 oz)   SpO2 96%   Breastfeeding No   BMI 28 76 kg/m²     Gen: in NAD, Alert/Awake  HEENT: no sclerous icterus, MMM, neck supple  CV: +S1/S2, RRR  Lungs: CTA bilaterally  Abd: +BS, soft NT/ND  Ext: all four extremities are warm  Av access left upper extremity AV fistula-thrill positive  Skin: no rashes noted  Neuro: CN II-XII intact        Lab, Imaging and other studies: I have personally reviewed pertinent labs  CBC: No results found for: WBC, HGB, HCT, MCV, PLT, ADJUSTEDWBC, MCH, MCHC, RDW, MPV, NRBC  CMP: No results found for: NA, K, CL, CO2, ANIONGAP, BUN, CREATININE, GLUCOSE, CALCIUM, AST, ALT, ALKPHOS, PROT, BILITOT, EGFR        Results from last 7 days   Lab Units 08/12/20  0523 08/11/20  1630   POTASSIUM mmol/L 3 5 3 2*   CHLORIDE mmol/L 100 95*   CO2 mmol/L 31 33*   BUN mg/dL 20 14   CREATININE mg/dL 3 67* 2 45*   CALCIUM mg/dL 9 5 9 6   ALK PHOS U/L  --  121   ALT U/L  --  7   AST U/L  --  13         Phosphorus: No results found for: PHOS  Magnesium: No results found for: MG  Urinalysis: No results found for: Harlin Lites, SPECGRAV, PHUR, LEUKOCYTESUR, NITRITE, PROTEINUA, GLUCOSEU, KETONESU, BILIRUBINUR, BLOODU  Ionized Calcium: No results found for: CAION  Coagulation: No results found for: PT, INR, APTT  Troponin: No results found for: TROPONINI  ABG: No results found for: PHART, SPM9NKL, PO2ART, VHN0WPH, C2JOACNL, BEART, SOURCE  Radiology review:     IMAGING  Procedure: Xr Hip/pelv 2-3 Vws Left    Result Date: 8/11/2020  Narrative: LEFT HIP INDICATION:   hip pain  COMPARISON:  CT abdomen pelvis 5/18/2015 VIEWS:  XR HIP/PELV 2-3 VWS LEFT  W PELVIS IF PERFORMED FINDINGS: There is no acute fracture or dislocation  No significant hip degenerative changes  No lytic or blastic osseous lesion  There are atherosclerotic calcifications  Soft tissues are otherwise unremarkable  The visualized lumbar spine is unremarkable  Impression: No acute osseous abnormality  Workstation performed: [id number]     Procedure: Xr Chest 1 View    Result Date: 8/11/2020  Narrative: CHEST INDICATION:   weakness  COMPARISON:  1/31/2019 EXAM PERFORMED/VIEWS:  XR CHEST 1 VIEW FINDINGS:  Median sternotomy wires  Unchanged cardiomegaly  Mild central vascular congestion with otherwise clear lungs  No pneumothorax or pleural effusion  Osseous structures appear within normal limits for patient age  Impression: Cardiomegaly and mild central congestion likely on the basis of mild CHF   Workstation performed: [id number]       Current Facility-Administered Medications   Medication Dose Route Frequency    acetaminophen (TYLENOL) tablet 650 mg  650 mg Oral Q6H PRN    amLODIPine (NORVASC) tablet 5 mg  5 mg Oral Daily    aspirin (ECOTRIN LOW STRENGTH) EC tablet 81 mg  81 mg Oral Daily    b complex-vitamin C-folic acid (NEPHROCAPS) capsule 1 capsule  1 capsule Oral Daily    calcium acetate (PHOSLO) capsule 1,334 mg  1,334 mg Oral TID With Meals    diphenoxylate-atropine (LOMOTIL) 2 5-0 025 mg per tablet 1 tablet  1 tablet Oral 4x Daily PRN    fluticasone (FLONASE) 50 mcg/act nasal spray 1 spray  1 spray Nasal Daily    heparin (porcine) subcutaneous injection 5,000 Units  5,000 Units Subcutaneous Q8H [address]    insulin lispro (HumaLOG) 100 units/mL subcutaneous injection 1-5 Units  1-5 Units Subcutaneous TID AC    insulin lispro (HumaLOG) 100 units/mL subcutaneous injection 1-5 Units  1-5 Units Subcutaneous HS    ondansetron (ZOFRAN) injection 4 mg  4 mg Intravenous Once    ondansetron (ZOFRAN) injection 4 mg  4 mg Intravenous Q6H PRN    pantoprazole (PROTONIX) EC tablet 40 mg  40 mg Oral Daily    pravastatin (PRAVACHOL) tablet 80 mg  80 mg Oral Daily     Medications Discontinued During This Encounter   Medication Reason    guaiFENesin (MUCINEX) 12 hr tablet 600 mg     nateglinide (STARLIX) tablet 120 mg        Gerda Marvin MD      This progress note was produced in part using a dictation device which may document imprecise wording from author's original intent

## 2020-08-13 NOTE — ASSESSMENT & PLAN NOTE
Wt Readings from Last 3 Encounters:   08/13/20 76 kg (167 lb 8 8 oz)   01/31/20 73 kg (161 lb)   09/16/19 73 kg (161 lb)     · Patient showing no evidence of weight change or volume overload BNP elevated however this does occur in end-stage renal disease chest x-ray unremarkable in reviewing images  · Daily weight, I/O, low-sodium diet  · Would avoid diuretic at this time given patient was having hypotension with dialysis

## 2020-08-13 NOTE — PHYSICAL THERAPY NOTE
08/13/20 1408   Pain Assessment   Pain Assessment Tool 0-10   Pain Score No Pain   Restrictions/Precautions   Weight Bearing Precautions Per Order No   Other Precautions Bed Alarm;Limb alert; Fall Risk   General   Chart Reviewed Yes   Family/Caregiver Present No   Cognition   Overall Cognitive Status WFL   Arousal/Participation Alert; Cooperative   Attention Attends with cues to redirect   Orientation Level Oriented X4   Memory Decreased recall of precautions;Decreased recall of recent events   Following Commands Follows one step commands with increased time or repetition   Comments pt agreed to PT session  (co treated with PIERRE)   Subjective   Subjective "I'm tired after dialysis "   Bed Mobility   Supine to Sit 3  Moderate assistance   Additional items Assist x 2;Bedrails; Increased time required;Verbal cues;LE management   Sit to Supine 3  Moderate assistance   Additional items Assist x 2;Bedrails; Increased time required;Verbal cues;LE management   Additional Comments pt sat at EOB 25 min self supported for sitting balance & ther ex  Good sitting balance   Transfers   Sit to Stand 2  Maximal assistance   Additional items Assist x 2;Bedrails; Increased time required;Verbal cues  (unable to clear buttocks from bed to stand erect)   Stand to Sit 2  Maximal assistance   Additional items Assist x 2; Increased time required;Verbal cues   Ambulation/Elevation   Gait pattern Not tested; Not appropriate   Balance   Static Sitting Good   Dynamic Sitting Fair   Endurance Deficit   Endurance Deficit Yes   Activity Tolerance   Activity Tolerance Patient limited by fatigue;Treatment limited secondary to medical complications (Comment)   Nurse Made Aware yes RN [facility] aware   Exercises   Hip Flexion Sitting;20 reps;AROM; Bilateral   Hip Abduction Sitting;20 reps;AROM; Bilateral   Hip Adduction Sitting;20 reps;AROM; Bilateral  (isometric)   Knee AROM Long Arc Quad Sitting;20 reps;AROM; Bilateral   Ankle Pumps Sitting;20 reps;AROM; Bilateral   Balance training  sitting   Assessment   Prognosis Fair   Problem List Decreased strength;Decreased endurance; Impaired balance;Decreased mobility; Decreased cognition;Decreased safety awareness   Assessment Pt seen for PT treatment session this date with interventions consisting of Therapeutic exercise consisting of: AROM 20 reps B LE in sitting position and therapeutic activity consisting of training: supine<>sit transfers, sit<>stand transfers and static sitting tolerance at EOB for 25 minutes w/ B UE support  Pt agreeable to PT treatment session upon arrival, pt found supine in bed w/ HOB elevated, in no apparent distress  In comparison to previous session, pt with improvements in activity tolerance  Post session: pt returned BTB, bed alarm engaged and all needs in reach Continue to recommend STR at time of d/c in order to maximize pt's functional independence and safety w/ mobility  Pt continues to be functioning below baseline level, and remains limited 2* factors listed above and including decreased strength, endurance & safe functional mobility  PT will continue to see pt while here in order to address the deficits listed above and provide interventions consistent w/ POC in effort to achieve STGs  Goals   Patient Goals to get better & go home   PT Treatment Day 1   Plan   Treatment/Interventions Functional transfer training;LE strengthening/ROM; Therapeutic exercise; Endurance training;Cognitive reorientation;Patient/family training;Equipment eval/education; Bed mobility;Gait training;Spoke to nursing   Progress Slow progress, decreased activity tolerance   PT Frequency   (3-5 x week)   Recommendation   PT Discharge Recommendation Post-Acute Rehabilitation Services   PT - OK to Discharge No   Additional Comments SCDs active post session   Prudence Back, NIMA

## 2020-08-13 NOTE — PLAN OF CARE
Problem: OCCUPATIONAL THERAPY ADULT  Goal: Performs self-care activities at highest level of function for planned discharge setting  See evaluation for individualized goals  Outcome: Progressing  Note: Limitation: Decreased ADL status, Decreased UE strength, Decreased Safe judgement during ADL, Decreased endurance, Decreased self-care trans  Prognosis: Good  Assessment: Patient participated in Skilled OT session this date with interventions consisting of Energy Conservation techniques and therapeutic exercise to: increase functional use of BUEs, increase BUE muscle strength    Patient agreeable to OT treatment session, upon arrival patient was found supine in bed  Patient transfers supine <> sit EOb with Mod A x 2  Sits EOB, self supporting, x 25 mins to perform UB TE as detailed in flow sheet  Patient attempted sit to stand transfer x 1; pt was only able to stand about 25% clearance from bed before having to immediately sit  Pt states "I really got weak " Patient returned to bed, TD to boost  SCDs reapplied, call bell within reach, and all needs met  In comparison to previous session, patient with improvements in endurance and UB strength   Patient requiring verbal cues for safety, verbal cues for correct technique, verbal cues for pacing thru activity steps and frequent rest periods  Patient performance  demonstrated good carryover of learned techniques and strategies to facilitate safety during functional tasks  Patient continues to be functioning below baseline level, occupational performance remains limited secondary to factors listed above and increased risk for falls and injury  From OT standpoint, recommendation at time of d/c would be Short Term Rehab  Patient to benefit from continued Occupational Therapy treatment while in the hospital to address deficits as defined above and maximize level of functional independence with ADLs and functional mobility in order to return to PLOF        OT Discharge Recommendation: Post-Acute Rehabilitation Services  OT - OK to Discharge: Yes(when medically cleared)

## 2020-08-13 NOTE — OCCUPATIONAL THERAPY NOTE
08/13/20 1407   Restrictions/Precautions   Weight Bearing Precautions Per Order No   Other Precautions Fall Risk;Limb alert; Bed Alarm  (L UE limb alert / dialysis access)   Pain Assessment   Pain Assessment Tool 0-10   Pain Score No Pain   Bed Mobility   Supine to Sit 3  Moderate assistance   Additional items Assist x 2;HOB elevated; Bedrails; Increased time required;Verbal cues;LE management   Sit to Supine 3  Moderate assistance   Additional items Assist x 2;Bedrails;HOB elevated; Increased time required;Verbal cues;LE management   Additional Comments Pt sat EOb x 25 mins to complete UB TE, self supported, with good sitting balance noted  Transfers   Sit to Stand 2  Maximal assistance   Additional items Assist x 2;Bedrails;Verbal cues   Stand to Sit 2  Maximal assistance   Additional items Assist x 2;Armrests; Increased time required;Verbal cues   Therapeutic Excerise-Strength   UE Strength Yes   Right Upper Extremity- Strength   R Shoulder Flexion; Extension;Horizontal ABduction  (horiz  adduction, scapular protraction / retraction)   R Elbow Elbow flexion;Elbow extension   R Wrist   (wrist rotation)   R Weight/Reps/Sets 1 pound weight x 2 sets of 10 reps   Left Upper Extremity-Strength   L Weights/Reps/Sets TE same as R UE above   Cognition   Overall Cognitive Status WFL   Arousal/Participation Alert; Cooperative   Attention Attends with cues to redirect   Orientation Level Oriented X4   Memory Decreased recall of precautions;Decreased recall of recent events   Following Commands Follows one step commands with increased time or repetition   Comments pt agreeable to [facility] treatment  Activity Tolerance   Activity Tolerance Patient limited by fatigue   Assessment   Assessment Patient participated in Skilled OT session this date with interventions consisting of Energy Conservation techniques and therapeutic exercise to: increase functional use of BUEs, increase BUE muscle strength     Patient agreeable to OT treatment session, upon arrival patient was found supine in bed  Patient transfers supine <> sit EOb with Mod A x 2  Sits EOB, self supporting, x 25 mins to perform UB TE as detailed in flow sheet  Patient attempted sit to stand transfer x 1; pt was only able to stand about 25% clearance from bed before having to immediately sit  Pt states "I really got weak " Patient returned to bed, TD to boost  SCDs reapplied, call bell within reach, and all needs met  In comparison to previous session, patient with improvements in endurance and UB strength   Patient requiring verbal cues for safety, verbal cues for correct technique, verbal cues for pacing thru activity steps and frequent rest periods  Patient performance  demonstrated good carryover of learned techniques and strategies to facilitate safety during functional tasks  Patient continues to be functioning below baseline level, occupational performance remains limited secondary to factors listed above and increased risk for falls and injury  From OT standpoint, recommendation at time of d/c would be Short Term Rehab  Patient to benefit from continued Occupational Therapy treatment while in the hospital to address deficits as defined above and maximize level of functional independence with ADLs and functional mobility in order to return to PLOF  Plan   Treatment Interventions Functional transfer training;UE strengthening/ROM; Energy conservation;Patient/family training   Goal Expiration Date 08/22/20   OT Treatment Day 1   OT Frequency 3-5x/wk   Recommendation   OT Discharge Recommendation Post-Acute Rehabilitation Services   OT - OK to Discharge Yes  (when medically cleared)   GABBY Neal

## 2020-08-13 NOTE — OCCUPATIONAL THERAPY NOTE
Attempted OT treatment today at 0900  Patient was unavailable due to dialysis  Will continue as able     Mahad Griffin

## 2020-08-13 NOTE — ASSESSMENT & PLAN NOTE
· Patient complaining of left hip pain what rate on x-ray appears unremarkable trend for final read from Radiology  · PT OT evaluations appreciated, await SNF placement  · Patient with known history of osteoporosis  · Continue home supplementation

## 2020-08-13 NOTE — PLAN OF CARE
Problem: PHYSICAL THERAPY ADULT  Goal: Performs mobility at highest level of function for planned discharge setting  See evaluation for individualized goals  Description: Treatment/Interventions: Functional transfer training, LE strengthening/ROM, Therapeutic exercise, Endurance training, Cognitive reorientation, Patient/family training, Equipment eval/education, Bed mobility, Gait training, Spoke to nursing, Spoke to case management, OT  Equipment Recommended: (TBD)       See flowsheet documentation for full assessment, interventions and recommendations  Outcome: Progressing  Note: Prognosis: Fair  Problem List: Decreased strength, Decreased endurance, Impaired balance, Decreased mobility, Decreased cognition, Decreased safety awareness  Assessment: Pt seen for PT treatment session this date with interventions consisting of Therapeutic exercise consisting of: AROM 20 reps B LE in sitting position and therapeutic activity consisting of training: supine<>sit transfers, sit<>stand transfers and static sitting tolerance at EOB for 25 minutes w/ B UE support  Pt agreeable to PT treatment session upon arrival, pt found supine in bed w/ HOB elevated, in no apparent distress  In comparison to previous session, pt with improvements in activity tolerance  Post session: pt returned BTB, bed alarm engaged and all needs in reach Continue to recommend STR at time of d/c in order to maximize pt's functional independence and safety w/ mobility  Pt continues to be functioning below baseline level, and remains limited 2* factors listed above and including decreased strength, endurance & safe functional mobility  PT will continue to see pt while here in order to address the deficits listed above and provide interventions consistent w/ POC in effort to achieve STGs  Barriers to Discharge:  Other (Comment)(pt reports having 24/7 assistance from family)     PT Discharge Recommendation: Post-Acute Rehabilitation Services     PT  [place] to Discharge: No    See flowsheet documentation for full assessment

## 2020-08-13 NOTE — PLAN OF CARE
Patient stable on 4 hr HD tx using L AV fistula  Pre tx wt- 76 kg, post wt- 73 2 kg  Total UF removal- 3 L  No issues noted  Report given to primary nurse           Goal- remove 3 L as tolerated      Problem: METABOLIC, FLUID AND ELECTROLYTES - ADULT  Goal: Electrolytes maintained within normal limits  Description: INTERVENTIONS:  - Monitor labs and assess patient for signs and symptoms of electrolyte imbalances  - Administer electrolyte replacement as ordered  - Monitor response to electrolyte replacements, including repeat lab results as appropriate  - Instruct patient on fluid and nutrition as appropriate  Outcome: Progressing

## 2020-08-13 NOTE — PROGRESS NOTES
Progress Note - Kristy Goins 1945, 77 y o  female MRN: [id number]    Unit/Bed#: -02 Encounter: [id number]    Primary Care Provider: Mortimer Simple, DO   Date and time admitted to hospital: 8/11/2020  4:14 PM        * Generalized weakness  Assessment & Plan  · PT input appreciated - suspect multifactorial given age and comorbidities  · Patient will benefit from SNF - [facility] is family's first choice, await determination for bed  · Case management involved    End-stage renal disease on hemodialysis ([facility] )  Assessment & Plan  · Stable, continue dialysis per Nephrology    Hypertension  Assessment & Plan  · BP currently stable  · Continue current meds    Nausea  Assessment & Plan  · With vomiting - presently resolved, though she does admit her appetite is subpar  · Likely secondary to gastroparesis  · Counseled on diet modification  · Nutrition consult appreciated    Congestive heart failure ([place] [address] )  Assessment & Plan  Wt Readings from Last 3 Encounters:   08/13/20 76 kg (167 lb 8 8 oz)   01/31/20 73 kg (161 lb)   09/16/19 73 kg (161 lb)     · Patient showing no evidence of weight change or volume overload BNP elevated however this does occur in end-stage renal disease chest x-ray unremarkable in reviewing images  · Daily weight, I/O, low-sodium diet  · Would avoid diuretic at this time given patient was having hypotension with dialysis        Diabetes mellitus with foot ulcer ([facility] )  Assessment & Plan  · Continue insulin sliding scale, diabetic diet  Lab Results   Component Value Date    GLUC 83 08/12/2020    GLUC 105 (H) 08/11/2020    GLUC 197 (H) 06/01/2020       GERD without esophagitis  Assessment & Plan  · Continue PPI    Anemia of chronic kidney failure  Assessment & Plan  · Secondary to end-stage renal disease  · No signs of acute bleeding  · Stable labs    Age-related osteoporosis without current pathological fracture  Assessment & Plan  · Patient complaining of left hip pain what rate on x-ray appears unremarkable trend for final read from Radiology  · PT OT evaluations appreciated, await SNF placement  · Patient with known history of osteoporosis  · Continue home supplementation    Hyperlipidemia  Assessment & Plan  · Continue statin for now        VTE Pharmacologic Prophylaxis:   Pharmacologic: Heparin  Mechanical VTE Prophylaxis in Place: Yes    Patient Centered Rounds: I have performed bedside rounds with nursing staff today  Discussions with Specialists or Other Care Team Provider: Case management     Education and Discussions with Family / Patient:  called without answer, no message left on unverified voicemail box     Time Spent for Care: 20 minutes  More than 50% of total time spent on counseling and coordination of care as described above  Current Length of Stay: 1 day(s)    Current Patient Status: Inpatient   Certification Statement: The patient will continue to require additional inpatient hospital stay due to placement    Discharge Plan: medically stable for d/c pending SNF placement     Code Status: Level 1 - Full Code      Subjective:   Patient seen during hemodialysis, she is feeling well  Does feel that her weakness overall has improved, however she does not feel that she is at the point where she could walk around with her daughter at home like she used to  She is hesitant about nursing facility, however is agreeable if her  feels that is necessary  Presently denies chest pain or palpitations, no shortness of breath  No lower extremity edema  She does report some poor appetite  Objective:     Vitals:   Temp (24hrs), Av °F (36 7 °C), Min:97 2 °F (36 2 °C), Max:99 1 °F (37 3 °C)    Temp:  [97 2 °F (36 2 °C)-99 1 °F (37 3 °C)] 97 2 °F (36 2 °C)  HR:  [66-84] 84  Resp:  [18-20] 18  BP: ()/(52-68) 129/62  SpO2:  [92 %] 92 %  Body mass index is 28 76 kg/m²  Input and Output Summary (last 24 hours):        Intake/Output Summary (Last 24 hours) at 8/13/2020 1230  Last data filed at 8/13/2020 0815  Gross per 24 hour   Intake 200 ml   Output    Net 200 ml       Physical Exam:     Physical Exam  Vitals signs and nursing note reviewed  Constitutional:       General: She is not in acute distress  Appearance: Normal appearance  She is not toxic-appearing  Cardiovascular:      Rate and Rhythm: Normal rate and regular rhythm  Heart sounds: No murmur  Pulmonary:      Effort: Pulmonary effort is normal  No respiratory distress  Breath sounds: Normal breath sounds  Abdominal:      General: Bowel sounds are normal  There is no distension  Palpations: Abdomen is soft  Musculoskeletal:         General: No swelling  Neurological:      Mental Status: She is alert  Psychiatric:         Mood and Affect: Mood normal          Behavior: Behavior normal          Additional Data:     Labs:    Results from last 7 days   Lab Units 08/12/20  0523 08/11/20  1630   WBC Thousand/uL 7 00 8 50   HEMOGLOBIN g/dL 10 8* 11 6*   HEMATOCRIT % 32 1* 35 2*   PLATELETS Thousands/uL 174 204   NEUTROS PCT %  --  88*   LYMPHS PCT %  --  4*   MONOS PCT %  --  6   EOS PCT %  --  1     Results from last 7 days   Lab Units 08/12/20  0523 08/11/20  1630   SODIUM mmol/L 140 137   POTASSIUM mmol/L 3 5 3 2*   CHLORIDE mmol/L 100 95*   CO2 mmol/L 31 33*   BUN mg/dL 20 14   CREATININE mg/dL 3 67* 2 45*   ANION GAP mmol/L 9 9   CALCIUM mg/dL 9 5 9 6   ALBUMIN g/dL  --  3 8   TOTAL BILIRUBIN mg/dL  --  0 50   ALK PHOS U/L  --  121   ALT U/L  --  7   AST U/L  --  13   GLUCOSE RANDOM mg/dL 83 105*         Results from last 7 days   Lab Units 08/13/20  1118 08/13/20  0622 08/12/20  2040 08/12/20  1558 08/12/20  1112 08/12/20  0640 08/11/20  2145   POC GLUCOSE mg/dl 216* 133 208* 153* 108 90 113                   * I Have Reviewed All Lab Data Listed Above  * Additional Pertinent Lab Tests Reviewed:  [facility] Admission Reviewed    Imaging:    Imaging Reports Reviewed Today Include:   Imaging Personally Reviewed by Myself Includes:      Recent Cultures (last 7 days):           Last 24 Hours Medication List:   Current Facility-Administered Medications   Medication Dose Route Frequency Provider Last Rate    acetaminophen  650 mg Oral Q6H PRN Amanda David, CRNP      amLODIPine  5 mg Oral Daily Amanda David, CRNP      aspirin  81 mg Oral Daily Amanda David, CRNP      b complex-vitamin C-folic acid  1 capsule Oral Daily [place] Abeben, CRNP      calcium acetate  1,334 mg Oral TID With Meals [place] Abeben, CRNP      diphenoxylate-atropine  1 tablet Oral 4x Daily PRN Amanda David, CRNP      fluticasone  1 spray Nasal Daily [place] Abeben, CRNP      heparin (porcine)  5,000 Units Subcutaneous Q8H [address] [place] David, CRNP      insulin lispro  1-5 Units Subcutaneous TID AC Amanda David, CRNP      insulin lispro  1-5 Units Subcutaneous HS [place] David, CRNP      ondansetron  4 mg Intravenous Once [place] David, CRNP      ondansetron  4 mg Intravenous Q6H PRN Amanda David, CRNP      pantoprazole  40 mg Oral Daily Amanda David, CRNP      pravastatin  80 mg Oral Daily Amandadamion Hernandez, BRY          Today, Patient Was Seen By: Eduardo Garcias PA-C    ** Please Note: Dictation voice to text software may have been used in the creation of this document   **

## 2020-08-14 LAB
GLUCOSE SERPL-MCNC: 226 MG/DL (ref 65–140)
GLUCOSE SERPL-MCNC: 249 MG/DL (ref 65–140)
GLUCOSE SERPL-MCNC: 254 MG/DL (ref 65–140)
GLUCOSE SERPL-MCNC: 89 MG/DL (ref 65–140)

## 2020-08-14 PROCEDURE — 97530 THERAPEUTIC ACTIVITIES: CPT

## 2020-08-14 PROCEDURE — 99231 SBSQ HOSP IP/OBS SF/LOW 25: CPT | Performed by: INTERNAL MEDICINE

## 2020-08-14 PROCEDURE — 97110 THERAPEUTIC EXERCISES: CPT

## 2020-08-14 PROCEDURE — 99232 SBSQ HOSP IP/OBS MODERATE 35: CPT | Performed by: INTERNAL MEDICINE

## 2020-08-14 PROCEDURE — 82948 REAGENT STRIP/BLOOD GLUCOSE: CPT

## 2020-08-14 RX ADMIN — ASPIRIN 81 MG: 81 TABLET, COATED ORAL at 08:43

## 2020-08-14 RX ADMIN — Medication 1 CAPSULE: at 08:43

## 2020-08-14 RX ADMIN — PRAVASTATIN SODIUM 80 MG: 40 TABLET ORAL at 08:43

## 2020-08-14 RX ADMIN — HEPARIN SODIUM 5000 UNITS: 5000 INJECTION INTRAVENOUS; SUBCUTANEOUS at 15:43

## 2020-08-14 RX ADMIN — INSULIN LISPRO 2 UNITS: 100 INJECTION, SOLUTION INTRAVENOUS; SUBCUTANEOUS at 16:18

## 2020-08-14 RX ADMIN — PANTOPRAZOLE SODIUM 40 MG: 40 TABLET, DELAYED RELEASE ORAL at 08:43

## 2020-08-14 RX ADMIN — HEPARIN SODIUM 5000 UNITS: 5000 INJECTION INTRAVENOUS; SUBCUTANEOUS at 05:44

## 2020-08-14 RX ADMIN — CALCIUM ACETATE 1334 MG: 667 CAPSULE ORAL at 16:18

## 2020-08-14 RX ADMIN — CALCIUM ACETATE 1334 MG: 667 CAPSULE ORAL at 11:46

## 2020-08-14 RX ADMIN — HEPARIN SODIUM 5000 UNITS: 5000 INJECTION INTRAVENOUS; SUBCUTANEOUS at 21:52

## 2020-08-14 RX ADMIN — CALCIUM ACETATE 1334 MG: 667 CAPSULE ORAL at 08:43

## 2020-08-14 RX ADMIN — INSULIN LISPRO 2 UNITS: 100 INJECTION, SOLUTION INTRAVENOUS; SUBCUTANEOUS at 21:52

## 2020-08-14 RX ADMIN — AMLODIPINE BESYLATE 5 MG: 5 TABLET ORAL at 08:44

## 2020-08-14 RX ADMIN — INSULIN LISPRO 2 UNITS: 100 INJECTION, SOLUTION INTRAVENOUS; SUBCUTANEOUS at 11:49

## 2020-08-14 NOTE — PROGRESS NOTES
Progress Note - Nephrology   Gaye Ontiveros 77 y o  female MRN: [id number]  Unit/Bed#: -02 Encounter: [id number]    A/P:  16-year-old female with past medical history of end-stage kidney disease on hemodialysis presented to [address] with increased weakness  Also reported some nausea vomiting and primary care team has been working up for the same  1  End-stage kidney disease on hemodialysis TTS   Stable hemodynamics  She had an uneventful HD session yesterday  Electrolytes acid-base status stable  Next hemodialysis session tomorrow Saturday  2  Anemia of chronic kidney disease  3  CKD with bone mineral disease  4  History of high blood pressure   5  History of diabetes mellitus  6  CAD, CABG  7  Peripheral vascular disease, carotid endarterectomies in the past  8  Osteoporosis   9  Left arm   AV fistula left upper arm    -electrolytes, acid-base status, volume status are currently acceptable  -H&H currently acceptable  -hemodynamically stable continue blood pressure medication  -continue PhosLo for high phosphate  -infectious workup as per primary team   -spoke to the hospital medicine team for PT OT evaluation and placement has been discussed with case management    Has been at the bedside discussed any concern with her satisfaction    Follow up reason for today's visit:  Hemodialysis, and ESRD    Generalized weakness    Patient Active Problem List   Diagnosis    Allergic rhinitis    Anemia of chronic kidney failure    Arteriosclerosis of carotid artery    Cardiovascular arteriosclerosis    Cataract    Congestive heart failure ([address] )    Diabetes mellitus with foot ulcer ([address]    Diastolic dysfunction    End-stage renal disease on hemodialysis ([address] )    GERD without esophagitis    Hyperlipidemia    Hypertension    Iron deficiency anemia    Overactive bladder    Peripheral arterial disease (HCC)    Age-related osteoporosis without current pathological fracture    Secondary hyperparathyroidism ([facility] )    Type 2 diabetes mellitus with renal manifestations, controlled ([facility] )    Vitamin D deficiency    Acquired absence of left great toe (HCC)    Thrombocytopenia (HCC)    Generalized weakness    Nausea         Subjective:   Feeling better, ate breakfast  at the bedside    Objective:     Vitals: Blood pressure 153/70, pulse 82, temperature 98 5 °F (36 9 °C), temperature source Temporal, resp  rate 18, height 5' 4" (1 626 m), weight 73 7 kg (162 lb 7 7 oz), SpO2 94 %, not currently breastfeeding  ,Body mass index is 27 89 kg/m²  Weight (last 2 days)     Date/Time   Weight    08/14/20 0600   73 7 (162 48)    08/13/20 0600   76 (167 55)    08/12/20 0600   76 1 (167 77)                Intake/Output Summary (Last 24 hours) at 8/14/2020 1146  Last data filed at 8/13/2020 1700  Gross per 24 hour   Intake 540 ml   Output 3500 ml   Net -2960 ml     I/O last 3 completed shifts: In: 740 [P O :240; I V :500]  Out: 3500 [Other:3500]         Physical Exam: /70 (BP Location: Right arm)   Pulse 82   Temp 98 5 °F (36 9 °C) (Temporal)   Resp 18   Ht 5' 4" (1 626 m)   Wt 73 7 kg (162 lb 7 7 oz)   SpO2 94%   Breastfeeding No   BMI 27 89 kg/m²     Gen: in NAD, Alert/Awake  HEENT: no sclerous icterus, MMM, neck supple  CV: +S1/S2, RRR  Lungs: CTA bilaterally  Abd: +BS, soft NT/ND  Ext: all four extremities are warm  Av access left upper extremity AV fistula-thrill positive  Skin: no rashes noted  Neuro: CN II-XII intact        Lab, Imaging and other studies: I have personally reviewed pertinent labs  CBC: No results found for: WBC, HGB, HCT, MCV, PLT, ADJUSTEDWBC, MCH, MCHC, RDW, MPV, NRBC  CMP: No results found for: NA, K, CL, CO2, ANIONGAP, BUN, CREATININE, GLUCOSE, CALCIUM, AST, ALT, ALKPHOS, PROT, BILITOT, EGFR        Results from last 7 days   Lab Units 08/12/20  0523 08/11/20  1630   POTASSIUM mmol/L 3 5 3 2*   CHLORIDE mmol/L 100 95*   CO2 mmol/L 31 33*   BUN mg/dL 20 14 CREATININE mg/dL 3 67* 2 45*   CALCIUM mg/dL 9 5 9 6   ALK PHOS U/L  --  121   ALT U/L  --  7   AST U/L  --  13         Phosphorus: No results found for: PHOS  Magnesium: No results found for: MG  Urinalysis: No results found for: Juline Reina, SPECGRAV, PHUR, LEUKOCYTESUR, NITRITE, PROTEINUA, GLUCOSEU, KETONESU, BILIRUBINUR, BLOODU  Ionized Calcium: No results found for: CAION  Coagulation: No results found for: PT, INR, APTT  Troponin: No results found for: TROPONINI  ABG: No results found for: PHART, LDM6CJG, PO2ART, RPW7VPC, C5OYSCFH, BEART, SOURCE  Radiology review:     IMAGING  Procedure: Xr Hip/pelv 2-3 Vws Left    Result Date: 8/11/2020  Narrative: LEFT HIP INDICATION:   hip pain  COMPARISON:  CT abdomen pelvis 5/18/2015 VIEWS:  XR HIP/PELV 2-3 VWS LEFT  W PELVIS IF PERFORMED FINDINGS: There is no acute fracture or dislocation  No significant hip degenerative changes  No lytic or blastic osseous lesion  There are atherosclerotic calcifications  Soft tissues are otherwise unremarkable  The visualized lumbar spine is unremarkable  Impression: No acute osseous abnormality  Workstation performed: [id number]     Procedure: Xr Chest 1 View    Result Date: 8/11/2020  Narrative: CHEST INDICATION:   weakness  COMPARISON:  1/31/2019 EXAM PERFORMED/VIEWS:  XR CHEST 1 VIEW FINDINGS:  Median sternotomy wires  Unchanged cardiomegaly  Mild central vascular congestion with otherwise clear lungs  No pneumothorax or pleural effusion  Osseous structures appear within normal limits for patient age  Impression: Cardiomegaly and mild central congestion likely on the basis of mild CHF   Workstation performed: [id number]       Current Facility-Administered Medications   Medication Dose Route Frequency    acetaminophen (TYLENOL) tablet 650 mg  650 mg Oral Q6H PRN    amLODIPine (NORVASC) tablet 5 mg  5 mg Oral Daily    aspirin (ECOTRIN LOW STRENGTH) EC tablet 81 mg  81 mg Oral Daily    b complex-vitamin C-folic acid (NEPHROCAPS) capsule 1 capsule  1 capsule Oral Daily    calcium acetate (PHOSLO) capsule 1,334 mg  1,334 mg Oral TID With Meals    diphenoxylate-atropine (LOMOTIL) 2 5-0 025 mg per tablet 1 tablet  1 tablet Oral 4x Daily PRN    fluticasone (FLONASE) 50 mcg/act nasal spray 1 spray  1 spray Nasal Daily    heparin (porcine) subcutaneous injection 5,000 Units  5,000 Units Subcutaneous Q8H [facility]    insulin lispro (HumaLOG) 100 units/mL subcutaneous injection 1-5 Units  1-5 Units Subcutaneous TID AC    insulin lispro (HumaLOG) 100 units/mL subcutaneous injection 1-5 Units  1-5 Units Subcutaneous HS    ondansetron (ZOFRAN) injection 4 mg  4 mg Intravenous Once    ondansetron (ZOFRAN) injection 4 mg  4 mg Intravenous Q6H PRN    pantoprazole (PROTONIX) EC tablet 40 mg  40 mg Oral Daily    pravastatin (PRAVACHOL) tablet 80 mg  80 mg Oral Daily     Medications Discontinued During This Encounter   Medication Reason    guaiFENesin (MUCINEX) 12 hr tablet 600 mg     nateglinide (STARLIX) tablet 120 mg        Diane Atwood MD      This progress note was produced in part using a dictation device which may document imprecise wording from author's original intent

## 2020-08-14 NOTE — PHYSICAL THERAPY NOTE
08/14/20 0845   Pain Assessment   Pain Assessment Tool Pain Assessment not indicated - pt denies pain   Restrictions/Precautions   Weight Bearing Precautions Per Order No   Other Precautions Bed Alarm;Limb alert; Fall Risk   General   Chart Reviewed Yes   Response to Previous Treatment Patient with no complaints from previous session  Family/Caregiver Present Yes   Cognition   Overall Cognitive Status WFL   Arousal/Participation Alert; Cooperative   Attention Attends with cues to redirect   Orientation Level Oriented to person;Oriented to place   Memory Decreased recall of recent events   Following Commands Follows one step commands without difficulty   Subjective   Subjective " I don't want to go to [facility] "   Bed Mobility   Rolling R 5  Supervision   Additional items HOB elevated; Bedrails; Increased time required;Assist x 1;Verbal cues   Rolling L 5  Supervision   Additional items Assist x 1;HOB elevated; Bedrails; Increased time required;Verbal cues   Supine to Sit 5  Supervision   Additional items Assist x 1;HOB elevated; Bedrails; Increased time required;Verbal cues   Sit to Supine 3  Moderate assistance   Additional items Assist x 1;Bedrails;HOB elevated; Increased time required;Verbal cues;LE management   Transfers   Sit to Stand 3  Moderate assistance   Additional items Assist x 1; Armrests; Increased time required;Verbal cues   Stand to Sit 3  Moderate assistance   Additional items Assist x 1; Armrests; Increased time required;Verbal cues   Stand pivot 3  Moderate assistance   Additional items Assist x 1; Armrests; Increased time required;Verbal cues   Balance   Static Sitting Good   Dynamic Sitting Fair   Static Standing Poor -   Dynamic Standing Poor -   Endurance Deficit   Endurance Deficit Yes   Activity Tolerance   Activity Tolerance Patient limited by fatigue;Treatment limited secondary to medical complications (Comment)  (R knee valgus thrust)   Exercises   Quad Sets Sitting;20 reps;AROM; Bilateral Heelslides Sitting;20 reps;AROM; Bilateral   Hip Flexion Sitting;20 reps;AROM; Bilateral   Hip Abduction Sitting;20 reps;AROM; Bilateral   Hip Adduction Sitting;20 reps;AROM; Bilateral   Knee AROM Long Arc Quad Sitting;20 reps;AROM; Bilateral   Ankle Pumps Sitting;20 reps;AROM; Bilateral   Assessment   Prognosis Fair   Problem List Decreased strength;Decreased endurance; Impaired balance;Decreased mobility; Decreased coordination;Decreased safety awareness   Assessment Pt  found resting in bed but willing to participate in PT treatment  Pt  performed bed mobility tasks including rolling from side to side with S   Pt  transfered from supine to sit with S with use of BSR to assist   Pt  was able remain sitting at EOB unsupported  Pt  and pt 's  instructed on proper body mechnanics and proper SPS technique  Pt 's  was able to transfer his wife safely from EOB to stationary chair with mod Ax1 via bear hug technique  Therapy educated patients  that the use of a gait belt will be benificial for safety  Pt 's  reported concern with transfers into car following dialysis  Pt  then performed ther ex as per treatment flow sheet to BLE  without difficulty  Pt  was left with all needs in reach and SCD's were re-applied  Pt  will do well at home with family support and HHPT  Continue current POC and progress as tolerated  Goals   Patient Goals to go home   PT Treatment Day 2   Plan   Treatment/Interventions Functional transfer training;LE strengthening/ROM; Therapeutic exercise; Endurance training;Patient/family training;Bed mobility;Gait training;Spoke to case management;OT;Family   Progress Progressing toward goals   PT Frequency   (3-5x/wk)   Recommendation   PT Discharge Recommendation Home with skilled therapy; Return to previous environment with social support   Equipment Recommended Other (Comment)  (gait belt)   PT - OK to Discharge Yes   Additional Comments when medically stable   Awa Zamudio David Lang, PTA

## 2020-08-14 NOTE — ASSESSMENT & PLAN NOTE
Wt Readings from Last 3 Encounters:   08/14/20 73 7 kg (162 lb 7 7 oz)   01/31/20 73 kg (161 lb)   09/16/19 73 kg (161 lb)     · Patient showing no evidence of weight change or volume overload BNP elevated however this does occur in end-stage renal disease chest x-ray unremarkable in reviewing images  · Daily weight, I/O, low-sodium diet  · Would avoid diuretic at this time given patient was having hypotension with dialysis

## 2020-08-14 NOTE — ASSESSMENT & PLAN NOTE
· PT input appreciated - suspect multifactorial given age and comorbidities  · Patient will benefit from SNF  · Case management working on placement

## 2020-08-14 NOTE — PROGRESS NOTES
Progress Note - Parveen Gonzalez 1945, 77 y o  female MRN: [id number]    Unit/Bed#: -02 Encounter: [id number]    Primary Care Provider: Yashira Govea DO   Date and time admitted to hospital: 8/11/2020  4:14 PM        * Generalized weakness  Assessment & Plan  · PT input appreciated - suspect multifactorial given age and comorbidities  · Patient will benefit from SNF  · Case management working on placement    Nausea  Assessment & Plan  · With vomiting - presently resolved, though she does admit her appetite is subpar  · Likely secondary to gastroparesis  · Counseled on diet modification  · Nutrition consult appreciated    GERD without esophagitis  Assessment & Plan  · Continue PPI    End-stage renal disease on hemodialysis ([facility] )  Assessment & Plan  · Stable, continue dialysis per Nephrology    Diabetes mellitus with foot ulcer ([facility] )  Assessment & Plan  Continue insulin sliding scale, diabetic diet    Congestive heart failure ([facility] )  Assessment & Plan  Wt Readings from Last 3 Encounters:   08/14/20 73 7 kg (162 lb 7 7 oz)   01/31/20 73 kg (161 lb)   09/16/19 73 kg (161 lb)     · Patient showing no evidence of weight change or volume overload BNP elevated however this does occur in end-stage renal disease chest x-ray unremarkable in reviewing images  · Daily weight, I/O, low-sodium diet  · Would avoid diuretic at this time given patient was having hypotension with dialysis        Anemia of chronic kidney failure  Assessment & Plan  · Secondary to end-stage renal disease  · No signs of acute bleeding  · Stable labs      VTE Prophylaxis:  Heparin    Patient Centered Rounds: I have performed bedside rounds with nursing staff today      Discussions with Specialists or Other Care Team Provider: yes  Education and Discussions with Family / Patient:  Spoke to  at bedside regarding plan of care    Current Length of Stay: 2 day(s)    Current Patient Status: Inpatient   Certification Statement: The patient will continue to require additional inpatient hospital stay due to Generalized weakness    Discharge Plan:  Patient stable for discharge however case management is working on placement    Code Status: Level 1 - Full Code    Subjective:   No overnight events noted  Patient tolerating diet  No nausea or vomiting    Objective:     Vitals:   Temp (24hrs), Av 4 °F (36 9 °C), Min:97 8 °F (36 6 °C), Max:98 8 °F (37 1 °C)    Temp:  [97 8 °F (36 6 °C)-98 8 °F (37 1 °C)] 98 8 °F (37 1 °C)  HR:  [78-86] 86  Resp:  [18] 18  BP: (145-168)/(65-74) 168/74  SpO2:  [94 %-96 %] 96 %  Body mass index is 27 89 kg/m²  Input and Output Summary (last 24 hours): Intake/Output Summary (Last 24 hours) at 2020 1551  Last data filed at 2020 1700  Gross per 24 hour   Intake 240 ml   Output    Net 240 ml       Physical Exam:   Physical Exam  Constitutional:       General: She is not in acute distress  Comments: Frail elderly female   HENT:      Head: Normocephalic and atraumatic  Nose: Nose normal       Mouth/Throat:      Mouth: Mucous membranes are moist    Eyes:      Extraocular Movements: Extraocular movements intact  Conjunctiva/sclera: Conjunctivae normal    Neck:      Musculoskeletal: Normal range of motion and neck supple  Cardiovascular:      Rate and Rhythm: Normal rate and regular rhythm  Pulmonary:      Effort: Pulmonary effort is normal  No respiratory distress  Abdominal:      Palpations: Abdomen is soft  Tenderness: There is no abdominal tenderness  Musculoskeletal: Normal range of motion  General: No swelling  Comments: Generalized weakness   Skin:     General: Skin is warm and dry  Neurological:      General: No focal deficit present  Cranial Nerves: No cranial nerve deficit     Psychiatric:         Mood and Affect: Mood normal          Behavior: Behavior normal          Additional Data:     Labs:    Results from last 7 days   Lab Units 20  8638 08/11/20  1630   WBC Thousand/uL 7 00 8 50   HEMOGLOBIN g/dL 10 8* 11 6*   HEMATOCRIT % 32 1* 35 2*   PLATELETS Thousands/uL 174 204   NEUTROS PCT %  --  88*   LYMPHS PCT %  --  4*   MONOS PCT %  --  6   EOS PCT %  --  1     Results from last 7 days   Lab Units 08/12/20  0523 08/11/20  1630   SODIUM mmol/L 140 137   POTASSIUM mmol/L 3 5 3 2*   CHLORIDE mmol/L 100 95*   CO2 mmol/L 31 33*   BUN mg/dL 20 14   CREATININE mg/dL 3 67* 2 45*   CALCIUM mg/dL 9 5 9 6   ALK PHOS U/L  --  121   ALT U/L  --  7   AST U/L  --  13         Results from last 7 days   Lab Units 08/14/20  1102 08/14/20  0613 08/13/20 2012 08/13/20  1545 08/13/20  1118 08/13/20  0622 08/12/20  2040 08/12/20  1558 08/12/20  1112 08/12/20  0640 08/11/20  2145   POC GLUCOSE mg/dl 254* 89 241* 257* 216* 133 208* 153* 108 90 113           * I Have Reviewed All Lab Data Listed Above  * Additional Pertinent Lab Tests Reviewed:  [facility] Admission  Reviewed    Imaging:  Imaging Reports Reviewed Today Include:  No new imaging    Recent Cultures (last 7 days):           Last 24 Hours Medication List:   Current Facility-Administered Medications   Medication Dose Route Frequency Provider Last Rate    acetaminophen  650 mg Oral Q6H PRN BRY Shukla      amLODIPine  5 mg Oral Daily BRY Shukla      aspirin  81 mg Oral Daily BRY Shukla      b complex-vitamin C-folic acid  1 capsule Oral Daily BRY Shukla      calcium acetate  1,334 mg Oral TID With Meals BRY Shukla      diphenoxylate-atropine  1 tablet Oral 4x Daily PRN BRY Shukla      fluticasone  1 spray Nasal Daily BRY Shukla      heparin (porcine)  5,000 Units Subcutaneous Q8H [facility] BRY Shukla      insulin lispro  1-5 Units Subcutaneous TID AC BRY Shukla      insulin lispro  1-5 Units Subcutaneous HS Melony Culebra, CRNP      ondansetron  4 mg Intravenous Once Melony Church, BRY      ondansetron 4 mg Intravenous Q6H PRN [place] Guard, CRNP      pantoprazole  40 mg Oral Daily [place] Guard, CRNP      pravastatin  80 mg Oral Daily [place] Guard, CRNP          Today, Patient Was Seen By: Morris Ferrera MD    ** Please Note: Dictation voice to text software may have been used in the creation of this document   **

## 2020-08-14 NOTE — PLAN OF CARE
Problem: PHYSICAL THERAPY ADULT  Goal: Performs mobility at highest level of function for planned discharge setting  See evaluation for individualized goals  Description: Treatment/Interventions: Functional transfer training, LE strengthening/ROM, Therapeutic exercise, Endurance training, Cognitive reorientation, Patient/family training, Equipment eval/education, Bed mobility, Gait training, Spoke to nursing, Spoke to case management, OT  Equipment Recommended: (TBD)       See flowsheet documentation for full assessment, interventions and recommendations  Outcome: Progressing  Note: Prognosis: Fair  Problem List: Decreased strength, Decreased endurance, Impaired balance, Decreased mobility, Decreased coordination, Decreased safety awareness  Assessment: Pt  found resting in bed but willing to participate in PT treatment  Pt  performed bed mobility tasks including rolling from side to side with S   Pt  transfered from supine to sit with S with use of BSR to assist   Pt  was able remain sitting at EOB unsupported  Pt  and pt 's  instructed on proper body mechnanics and proper SPS technique  Pt 's  was able to transfer his wife safely from EOB to stationary chair with mod Ax1 via bear hug technique  Therapy educated patients  that the use of a gait belt will be benificial for safety  Pt 's  reported concern with transfers into car following dialysis  Pt  then performed ther ex as per treatment flow sheet to BLE  without difficulty  Pt  was left with all needs in reach and SCD's were re-applied  Pt  will do well at home with family support and HHPT  Continue current POC and progress as tolerated  Barriers to Discharge: Other (Comment)(pt reports having 24/7 assistance from family)     PT Discharge Recommendation: Home with skilled therapy, Return to previous environment with social support     PT - OK to Discharge: Yes    See flowsheet documentation for full assessment  Maxine Oconnor, PTA

## 2020-08-14 NOTE — PHYSICAL THERAPY NOTE
08/14/20 1043   Pain Assessment   Pain Assessment Tool Pain Assessment not indicated - pt denies pain   Restrictions/Precautions   Weight Bearing Precautions Per Order No   Other Precautions Bed Alarm;Limb alert; Fall Risk   General   Chart Reviewed Yes   Response to Previous Treatment Patient with no complaints from previous session  Family/Caregiver Present Yes   Cognition   Overall Cognitive Status WFL   Arousal/Participation Alert; Cooperative   Attention Attends with cues to redirect   Orientation Level Oriented X4   Memory Decreased recall of recent events   Following Commands Follows one step commands without difficulty   Subjective   Subjective " I really wanna go home "   Bed Mobility   Sit to Supine 3  Moderate assistance   Additional items Assist x 1;Bedrails; Increased time required;Verbal cues;LE management;HOB elevated   Transfers   Sit to Stand 3  Moderate assistance   Additional items Assist x 1; Armrests; Increased time required   Stand to Sit 3  Moderate assistance   Additional items Assist x 1;HOB elevated; Bedrails; Increased time required   Stand pivot 3  Moderate assistance   Additional items Assist x 1; Increased time required;Verbal cues  (bear hug technique)   Balance   Static Sitting Good   Dynamic Sitting Fair   Static Standing Poor -   Dynamic Standing Poor -   Endurance Deficit   Endurance Deficit Yes   Activity Tolerance   Activity Tolerance Patient limited by fatigue;Treatment limited secondary to medical complications (Comment)  (R knee valgus thrust)   Assessment   Prognosis Fair   Problem List Decreased strength;Decreased endurance; Impaired balance;Decreased mobility; Decreased coordination;Decreased safety awareness   Assessment Pt  found sitting OOB in stationary chair and willing to work with PT to assist her  in [address] transfer back into bed  Pt  transfered from sit to stand with mod Ax1 and performed SPS to bed side with mod Ax1 with bear hud technique    Residents  was able to safely perform transfer of his wife with verbal cues for safety and direction and to preform proper body mechanics to prevent self injury  Pt  was then assited from sit to supine with mod Ax1  Pt  was then positiojned for comfort and left with all needs in reach  Continue current POC and progress as tolerated  Pt 's  continues to voice concern about transfers after dialysis  Goals   Patient Goals to go home   PT Treatment Day 3   Plan   Treatment/Interventions Functional transfer training;LE strengthening/ROM; Therapeutic exercise; Endurance training;Patient/family training;Bed mobility;Gait training;Spoke to case management   Progress Progressing toward goals   PT Frequency   (3-5x/wk)   Recommendation   PT Discharge Recommendation Home with skilled therapy; Return to previous environment with social support   Equipment Recommended   (gait belt)   PT - OK to Discharge Yes   Additional Comments when medically stable   Kristen Sullivan, PTA

## 2020-08-14 NOTE — SOCIAL WORK
Received TC from Kiarra Abbasi at [facility] stating she will need the spouse to contact Michelle to release medical records to [facility]  Notified spouse of same  As per Levy the chair time and records will need to be reviewed  Will not have time probably until after the week-end  Notified Ruby Faust at [employer] [facility]  Clarified the Ricki Foster information with [place]

## 2020-08-14 NOTE — SOCIAL WORK
PT did assist spouse with getting pt OOB  Spouse feels he will have difficulty getting her out of the car after dialysis and wants her to go to [facility] for STR  Pt has been accepted to [facility]  Faxed information to Evita Guevara to get pt a chair time in [place]  As per Beata Certain in admissions at [facility] they will set up ambulance transport for dialysis  Will be able to send the pt over the week-end  Will notify [place] with a chair time and set up transport

## 2020-08-14 NOTE — OCCUPATIONAL THERAPY NOTE
08/14/20 7697   Restrictions/Precautions   Weight Bearing Precautions Per Order No   Other Precautions Bed Alarm;Limb alert; Fall Risk   Pain Assessment   Pain Assessment Tool Pain Assessment not indicated - pt denies pain   Bed Mobility   Supine to Sit 5  Supervision   Additional items Assist x 1;HOB elevated; Bedrails; Increased time required;Verbal cues   Transfers   Sit to Stand 3  Moderate assistance   Additional items Assist x 1; Armrests; Increased time required;Verbal cues   Stand to Sit 3  Moderate assistance   Additional items Assist x 1; Armrests; Verbal cues   Stand pivot Unable to assess   Therapeutic Excerise-Strength   UE Strength Yes   Right Upper Extremity- Strength   R Shoulder Flexion; Extension;Horizontal ABduction  (horiz  adduction, scapular protraction/retraction)   R Elbow Elbow flexion;Elbow extension   R Wrist   (wrist rotation)   R Weight/Reps/Sets 1 pound weight x 30 reps   Left Upper Extremity-Strength   L Weights/Reps/Sets TE same as R UE above   Cognition   Overall Cognitive Status WFL   Arousal/Participation Alert; Cooperative   Attention Attends with cues to redirect   Orientation Level Oriented X4   Memory Decreased recall of recent events   Following Commands Follows one step commands without difficulty   Comments pt agreeable to OT treatment  Activity Tolerance   Activity Tolerance Patient tolerated treatment well   Assessment   Assessment Patient participated in Skilled OT session this date with interventions consisting of functional transfer training, Energy Conservation techniques and therapeutic exercise to: increase functional use of BUEs, increase BUE muscle strength    Patient agreeable to OT treatment session, upon arrival patient was found supine in bed  Patient transfers supine to sit EOB with S   present during session in order to transfer wife to determine if he could handle her at home  He was able to successfully and safely transfer patient from bed to chair  Patient did make an attempt again with Mod A x 2 to pivot back to bed, however, was unable to due to LEs feeling like they were going out on her  Patient then performed UB TE as detailed in flow sheet  Patient chose to stay out in chair; SCDs reapplied and all needs met  In comparison to previous session, patient with improvements in functional transfers, bed mobility, endurance, and UB strength   Patient requiring verbal cues for safety, verbal cues for correct technique and verbal cues for pacing thru activity steps  Patient performance  demonstrated good carryover of learned techniques and strategies to facilitate safety during functional tasks  Patient continues to be functioning below baseline level, occupational performance remains limited secondary to factors listed above and increased risk for falls and injury  From OT standpoint, recommendation at time of d/c would be Short Term Rehab  Patient to benefit from continued Occupational Therapy treatment while in the hospital to address deficits as defined above and maximize level of functional independence with ADLs and functional mobility in order to return to PLOF  Plan   Treatment Interventions Functional transfer training;UE strengthening/ROM; Energy conservation;Patient/family training   Goal Expiration Date 08/22/20   OT Treatment Day 2   OT Frequency 3-5x/wk   Recommendation   OT Discharge Recommendation Post-Acute Rehabilitation Services   OT - OK to Discharge Yes  (when medically cleared)   GABBY Ovalles

## 2020-08-14 NOTE — PLAN OF CARE
Problem: PHYSICAL THERAPY ADULT  Goal: Performs mobility at highest level of function for planned discharge setting  See evaluation for individualized goals  Description: Treatment/Interventions: Functional transfer training, LE strengthening/ROM, Therapeutic exercise, Endurance training, Cognitive reorientation, Patient/family training, Equipment eval/education, Bed mobility, Gait training, Spoke to nursing, Spoke to case management, OT  Equipment Recommended: (TBD)       See flowsheet documentation for full assessment, interventions and recommendations  8/14/2020 1245 by Dave Tsang PTA  Outcome: Progressing  Note: Prognosis: Fair  Problem List: Decreased strength, Decreased endurance, Impaired balance, Decreased mobility, Decreased coordination, Decreased safety awareness  Assessment: Pt  found sitting OOB in stationary chair and willing to work with PT to assist her  in SPS transfer back into bed  Pt  transfered from sit to stand with mod Ax1 and performed SPS to bed side with mod Ax1 with bear hud technique  Residents  was able to safely perform transfer of his wife with verbal cues for safety and direction and to preform proper body mechanics to prevent self injury  Pt  was then assited from sit to supine with mod Ax1  Pt  was then positiojned for comfort and left with all needs in reach  Continue current POC and progress as tolerated  Pt 's  continues to voice concern about transfers after dialysis  Barriers to Discharge: Other (Comment)(pt reports having 24/7 assistance from family)     PT Discharge Recommendation: Home with skilled therapy, Return to previous environment with social support     PT - OK to Discharge: Yes    See flowsheet documentation for full assessment       8/14/2020 1106 by Dave Tsang PTA  Outcome: Progressing  Note: Prognosis: Fair  Problem List: Decreased strength, Decreased endurance, Impaired balance, Decreased mobility, Decreased coordination, Decreased safety awareness  Assessment: Pt  found resting in bed but willing to participate in PT treatment  Pt  performed bed mobility tasks including rolling from side to side with S   Pt  transfered from supine to sit with S with use of BSR to assist   Pt  was able remain sitting at EOB unsupported  Pt  and pt 's  instructed on proper body mechnanics and proper SPS technique  Pt 's  was able to transfer his wife safely from EOB to stationary chair with mod Ax1 via bear hug technique  Therapy educated patients  that the use of a gait belt will be benificial for safety  Pt 's  reported concern with transfers into car following dialysis  Pt  then performed ther ex as per treatment flow sheet to BLE  without difficulty  Pt  was left with all needs in reach and SCD's were re-applied  Pt  will do well at home with family support and HHPT  Continue current POC and progress as tolerated  Barriers to Discharge: Other (Comment)(pt reports having 24/7 assistance from family)     PT Discharge Recommendation: Home with skilled therapy, Return to previous environment with social support     PT - OK to Discharge: Yes    See flowsheet documentation for full assessment        Zach Hernandez, PTA

## 2020-08-15 ENCOUNTER — APPOINTMENT (INPATIENT)
Dept: DIALYSIS | Facility: HOSPITAL | Age: 75
DRG: 073 | End: 2020-08-15
Attending: INTERNAL MEDICINE
Payer: MEDICARE

## 2020-08-15 LAB
ALBUMIN SERPL BCP-MCNC: 3.3 G/DL (ref 3.5–5.7)
ANION GAP SERPL CALCULATED.3IONS-SCNC: 8 MMOL/L (ref 4–13)
BUN SERPL-MCNC: 26 MG/DL (ref 7–25)
CALCIUM SERPL-MCNC: 10.8 MG/DL (ref 8.6–10.5)
CHLORIDE SERPL-SCNC: 100 MMOL/L (ref 98–107)
CO2 SERPL-SCNC: 28 MMOL/L (ref 21–31)
CREAT SERPL-MCNC: 5.45 MG/DL (ref 0.6–1.2)
GFR SERPL CREATININE-BSD FRML MDRD: 7 ML/MIN/1.73SQ M
GLUCOSE SERPL-MCNC: 159 MG/DL (ref 65–140)
GLUCOSE SERPL-MCNC: 169 MG/DL (ref 65–140)
GLUCOSE SERPL-MCNC: 261 MG/DL (ref 65–140)
GLUCOSE SERPL-MCNC: 265 MG/DL (ref 65–140)
GLUCOSE SERPL-MCNC: 321 MG/DL (ref 65–99)
PHOSPHATE SERPL-MCNC: 1.5 MG/DL (ref 3–5.5)
POTASSIUM SERPL-SCNC: 4.6 MMOL/L (ref 3.5–5.5)
SODIUM SERPL-SCNC: 136 MMOL/L (ref 134–143)

## 2020-08-15 PROCEDURE — 99232 SBSQ HOSP IP/OBS MODERATE 35: CPT | Performed by: INTERNAL MEDICINE

## 2020-08-15 PROCEDURE — 82948 REAGENT STRIP/BLOOD GLUCOSE: CPT

## 2020-08-15 PROCEDURE — 80069 RENAL FUNCTION PANEL: CPT | Performed by: INTERNAL MEDICINE

## 2020-08-15 PROCEDURE — 99231 SBSQ HOSP IP/OBS SF/LOW 25: CPT | Performed by: INTERNAL MEDICINE

## 2020-08-15 PROCEDURE — 5A1D70Z PERFORMANCE OF URINARY FILTRATION, INTERMITTENT, LESS THAN 6 HOURS PER DAY: ICD-10-PCS | Performed by: HOSPITALIST

## 2020-08-15 RX ORDER — LANOLIN ALCOHOL/MO/W.PET/CERES
6 CREAM (GRAM) TOPICAL
Status: DISCONTINUED | OUTPATIENT
Start: 2020-08-15 | End: 2020-08-15

## 2020-08-15 RX ORDER — LANOLIN ALCOHOL/MO/W.PET/CERES
6 CREAM (GRAM) TOPICAL
Status: DISCONTINUED | OUTPATIENT
Start: 2020-08-15 | End: 2020-08-19 | Stop reason: HOSPADM

## 2020-08-15 RX ADMIN — CALCIUM ACETATE 1334 MG: 667 CAPSULE ORAL at 11:14

## 2020-08-15 RX ADMIN — ACETAMINOPHEN 650 MG: 325 TABLET ORAL at 15:14

## 2020-08-15 RX ADMIN — INSULIN LISPRO 1 UNITS: 100 INJECTION, SOLUTION INTRAVENOUS; SUBCUTANEOUS at 17:40

## 2020-08-15 RX ADMIN — CALCIUM ACETATE 1334 MG: 667 CAPSULE ORAL at 17:40

## 2020-08-15 RX ADMIN — HEPARIN SODIUM 5000 UNITS: 5000 INJECTION INTRAVENOUS; SUBCUTANEOUS at 22:26

## 2020-08-15 RX ADMIN — INSULIN LISPRO 2 UNITS: 100 INJECTION, SOLUTION INTRAVENOUS; SUBCUTANEOUS at 22:26

## 2020-08-15 RX ADMIN — PANTOPRAZOLE SODIUM 40 MG: 40 TABLET, DELAYED RELEASE ORAL at 09:36

## 2020-08-15 RX ADMIN — ONDANSETRON 4 MG: 2 INJECTION INTRAMUSCULAR; INTRAVENOUS at 08:03

## 2020-08-15 RX ADMIN — MELATONIN 6 MG: at 22:42

## 2020-08-15 RX ADMIN — CALCIUM ACETATE 1334 MG: 667 CAPSULE ORAL at 07:54

## 2020-08-15 RX ADMIN — ASPIRIN 81 MG: 81 TABLET, COATED ORAL at 09:36

## 2020-08-15 RX ADMIN — AMLODIPINE BESYLATE 5 MG: 5 TABLET ORAL at 09:35

## 2020-08-15 RX ADMIN — INSULIN LISPRO 2 UNITS: 100 INJECTION, SOLUTION INTRAVENOUS; SUBCUTANEOUS at 11:48

## 2020-08-15 RX ADMIN — HEPARIN SODIUM 5000 UNITS: 5000 INJECTION INTRAVENOUS; SUBCUTANEOUS at 14:33

## 2020-08-15 RX ADMIN — HEPARIN SODIUM 5000 UNITS: 5000 INJECTION INTRAVENOUS; SUBCUTANEOUS at 05:42

## 2020-08-15 RX ADMIN — PRAVASTATIN SODIUM 80 MG: 40 TABLET ORAL at 09:35

## 2020-08-15 RX ADMIN — Medication 1 CAPSULE: at 09:35

## 2020-08-15 RX ADMIN — INSULIN LISPRO 1 UNITS: 100 INJECTION, SOLUTION INTRAVENOUS; SUBCUTANEOUS at 07:54

## 2020-08-15 RX ADMIN — FLUTICASONE PROPIONATE 1 SPRAY: 50 SPRAY, METERED NASAL at 10:34

## 2020-08-15 NOTE — OCCUPATIONAL THERAPY NOTE
Occupational Therapy    OT treatment attempted today, Pt unavailable due to being at dialysis  Will continue to monitor      Shaq Connors MS, OTR/L

## 2020-08-15 NOTE — PROGRESS NOTES
Progress Note - Nephrology   Balbina Perkins 77 y o  female MRN: [id number]  Unit/Bed#: -02 Encounter: [id number]    A/P:  20-year-old female with past medical history of end-stage kidney disease on hemodialysis presented to [address] with increased weakness  Also reported some nausea vomiting and primary care team has been working up for the same  1  End-stage kidney disease on hemodialysis TTS   Will get HD today  Next hemodialysis session  Tuesday  2  Anemia of chronic kidney disease  3  CKD with bone mineral disease  4  History of high blood pressure   5  History of diabetes mellitus  6  CAD, CABG  7  Peripheral vascular disease, carotid endarterectomies in the past  8  Osteoporosis   9  Left arm   AV fistula left upper arm    -electrolytes, acid-base status, volume status are currently acceptable  -H&H currently acceptable  -hemodynamically stable continue blood pressure medication  -continue PhosLo for high phosphate  -infectious workup as per primary team   -spoke to the hospital medicine team for PT OT evaluation and placement has been discussed with case management    Has been at the bedside discussed any concern with her satisfaction    Follow up reason for today's visit:  Hemodialysis, and ESRD    Generalized weakness    Patient Active Problem List   Diagnosis    Allergic rhinitis    Anemia of chronic kidney failure    Arteriosclerosis of carotid artery    Cardiovascular arteriosclerosis    Cataract    Congestive heart failure ([address]    Diabetes mellitus with foot ulcer ([address] )    Diastolic dysfunction    End-stage renal disease on hemodialysis ([address]    GERD without esophagitis    Hyperlipidemia    Hypertension    Iron deficiency anemia    Overactive bladder    Peripheral arterial disease (HCC)    Age-related osteoporosis without current pathological fracture    Secondary hyperparathyroidism ([address]    Type 2 diabetes mellitus with renal manifestations, controlled ([address] )    Vitamin D deficiency    Acquired absence of left great toe (HCC)    Thrombocytopenia (HCC)    Generalized weakness    Nausea         Subjective:   Feeling better, ate breakfast, denies any specific new symptoms    Objective:     Vitals: Blood pressure (!) 188/75, pulse 72, temperature (!) 97 2 °F (36 2 °C), temperature source Temporal, resp  rate 18, height 5' 4" (1 626 m), weight 75 1 kg (165 lb 9 1 oz), SpO2 94 %, not currently breastfeeding  ,Body mass index is 28 42 kg/m²  Weight (last 2 days)     Date/Time   Weight    08/15/20 0552   75 1 (165 57)    08/14/20 0600   73 7 (162 48)    08/13/20 0600   76 (167 55)                Intake/Output Summary (Last 24 hours) at 8/15/2020 0953  Last data filed at 8/15/2020 0935  Gross per 24 hour   Intake 240 ml   Output    Net 240 ml     No intake/output data recorded  Physical Exam: BP (!) 188/75   Pulse 72   Temp (!) 97 2 °F (36 2 °C) (Temporal)   Resp 18   Ht 5' 4" (1 626 m)   Wt 75 1 kg (165 lb 9 1 oz)   SpO2 94%   Breastfeeding No   BMI 28 42 kg/m²     Gen: in NAD, Alert/Awake  HEENT: no sclerous icterus, MMM, neck supple  CV: +S1/S2, RRR  Lungs: CTA bilaterally  Abd: +BS, soft NT/ND  Ext: all four extremities are warm  Av access left upper extremity AV fistula-thrill positive  Skin: no rashes noted  Neuro: CN II-XII intact        Lab, Imaging and other studies: I have personally reviewed pertinent labs  CBC: No results found for: WBC, HGB, HCT, MCV, PLT, ADJUSTEDWBC, MCH, MCHC, RDW, MPV, NRBC  CMP: No results found for: NA, K, CL, CO2, ANIONGAP, BUN, CREATININE, GLUCOSE, CALCIUM, AST, ALT, ALKPHOS, PROT, BILITOT, EGFR        Results from last 7 days   Lab Units 08/12/20  0523 08/11/20  1630   POTASSIUM mmol/L 3 5 3 2*   CHLORIDE mmol/L 100 95*   CO2 mmol/L 31 33*   BUN mg/dL 20 14   CREATININE mg/dL 3 67* 2 45*   CALCIUM mg/dL 9 5 9 6   ALK PHOS U/L  --  121   ALT U/L  --  7   AST U/L  --  13         Phosphorus: No results found for: PHOS  Magnesium: No results found for: MG  Urinalysis: No results found for: Khadra Stain, SPECGRAV, PHUR, LEUKOCYTESUR, NITRITE, PROTEINUA, GLUCOSEU, KETONESU, BILIRUBINUR, BLOODU  Ionized Calcium: No results found for: CAION  Coagulation: No results found for: PT, INR, APTT  Troponin: No results found for: TROPONINI  ABG: No results found for: PHART, LWH8XEZ, PO2ART, XFY7SAB, W3FQSOEM, BEART, SOURCE  Radiology review:     IMAGING  Procedure: Xr Hip/pelv 2-3 Vws Left    Result Date: 8/11/2020  Narrative: LEFT HIP INDICATION:   hip pain  COMPARISON:  CT abdomen pelvis 5/18/2015 VIEWS:  XR HIP/PELV 2-3 VWS LEFT  W PELVIS IF PERFORMED FINDINGS: There is no acute fracture or dislocation  No significant hip degenerative changes  No lytic or blastic osseous lesion  There are atherosclerotic calcifications  Soft tissues are otherwise unremarkable  The visualized lumbar spine is unremarkable  Impression: No acute osseous abnormality  Workstation performed: [id number]     Procedure: Xr Chest 1 View    Result Date: 8/11/2020  Narrative: CHEST INDICATION:   weakness  COMPARISON:  1/31/2019 EXAM PERFORMED/VIEWS:  XR CHEST 1 VIEW FINDINGS:  Median sternotomy wires  Unchanged cardiomegaly  Mild central vascular congestion with otherwise clear lungs  No pneumothorax or pleural effusion  Osseous structures appear within normal limits for patient age  Impression: Cardiomegaly and mild central congestion likely on the basis of mild CHF   Workstation performed: [id number]       Current Facility-Administered Medications   Medication Dose Route Frequency    acetaminophen (TYLENOL) tablet 650 mg  650 mg Oral Q6H PRN    amLODIPine (NORVASC) tablet 5 mg  5 mg Oral Daily    aspirin (ECOTRIN LOW STRENGTH) EC tablet 81 mg  81 mg Oral Daily    b complex-vitamin C-folic acid (NEPHROCAPS) capsule 1 capsule  1 capsule Oral Daily    calcium acetate (PHOSLO) capsule 1,334 mg  1,334 mg Oral TID With Meals    diphenoxylate-atropine (LOMOTIL) 2 5-0 025 mg per tablet 1 tablet  1 tablet Oral 4x Daily PRN    fluticasone (FLONASE) 50 mcg/act nasal spray 1 spray  1 spray Nasal Daily    heparin (porcine) subcutaneous injection 5,000 Units  5,000 Units Subcutaneous Q8H [address]    insulin lispro (HumaLOG) 100 units/mL subcutaneous injection 1-5 Units  1-5 Units Subcutaneous TID AC    insulin lispro (HumaLOG) 100 units/mL subcutaneous injection 1-5 Units  1-5 Units Subcutaneous HS    ondansetron (ZOFRAN) injection 4 mg  4 mg Intravenous Once    ondansetron (ZOFRAN) injection 4 mg  4 mg Intravenous Q6H PRN    pantoprazole (PROTONIX) EC tablet 40 mg  40 mg Oral Daily    pravastatin (PRAVACHOL) tablet 80 mg  80 mg Oral Daily     Medications Discontinued During This Encounter   Medication Reason    guaiFENesin (MUCINEX) 12 hr tablet 600 mg     nateglinide (STARLIX) tablet 120 mg        Sylvia Matthews MD      This progress note was produced in part using a dictation device which may document imprecise wording from author's original intent

## 2020-08-15 NOTE — PLAN OF CARE
Problem: Potential for Falls  Goal: Patient will remain free of falls  Description: INTERVENTIONS:  - Assess patient frequently for physical needs  -  Identify cognitive and physical deficits and behaviors that affect risk of falls    -  [place] fall precautions as indicated by assessment   - Educate patient/family on patient safety including physical limitations  - Instruct patient to call for assistance with activity based on assessment  - Modify environment to reduce risk of injury  - Consider OT/PT consult to assist with strengthening/mobility  8/14/2020 2233 by Shelly Navas RN  Outcome: Progressing  8/14/2020 2231 by Shelly Navas RN  Outcome: Progressing     Problem: PAIN - ADULT  Goal: Verbalizes/displays adequate comfort level or baseline comfort level  Description: Interventions:  - Encourage patient to monitor pain and request assistance  - Assess pain using appropriate pain scale  - Administer analgesics based on type and severity of pain and evaluate response  - Implement non-pharmacological measures as appropriate and evaluate response  - Consider cultural and social influences on pain and pain management  - Notify physician/advanced practitioner if interventions unsuccessful or patient reports new pain  8/14/2020 2233 by Shelly Navas RN  Outcome: Progressing  8/14/2020 2231 by Shelly Navas RN  Outcome: Progressing     Problem: INFECTION - ADULT  Goal: Absence or prevention of progression during hospitalization  Description: INTERVENTIONS:  - Assess and monitor for signs and symptoms of infection  - Monitor lab/diagnostic results  - Monitor all insertion sites, i e  indwelling lines, tubes, and drains  - Monitor endotracheal if appropriate and nasal secretions for changes in amount and color  - [place] appropriate cooling/warming therapies per order  - Administer medications as ordered  - Instruct and encourage patient and family to use good hand hygiene technique  - Identify and instruct in appropriate isolation precautions for identified infection/condition  8/14/2020 2233 by Faiza Anne RN  Outcome: Progressing  8/14/2020 2231 by Faiza Anne RN  Outcome: Progressing  Goal: Absence of fever/infection during neutropenic period  Description: INTERVENTIONS:  - Monitor WBC    8/14/2020 2233 by Faiza Anne RN  Outcome: Progressing  8/14/2020 2231 by Faiza Anne RN  Outcome: Progressing     Problem: SAFETY ADULT  Goal: Patient will remain free of falls  Description: INTERVENTIONS:  - Assess patient frequently for physical needs  -  Identify cognitive and physical deficits and behaviors that affect risk of falls    -  [place] fall precautions as indicated by assessment   - Educate patient/family on patient safety including physical limitations  - Instruct patient to call for assistance with activity based on assessment  - Modify environment to reduce risk of injury  - Consider OT/PT consult to assist with strengthening/mobility  8/14/2020 2233 by Faiza Anne RN  Outcome: Progressing  8/14/2020 2231 by Faiza Anne RN  Outcome: Progressing  Goal: Maintain or return to baseline ADL function  Description: INTERVENTIONS:  -  Assess patient's ability to carry out ADLs; assess patient's baseline for ADL function and identify physical deficits which impact ability to perform ADLs (bathing, care of mouth/teeth, toileting, grooming, dressing, etc )  - Assess/evaluate cause of self-care deficits   - Assess range of motion  - Assess patient's mobility; develop plan if impaired  - Assess patient's need for assistive devices and provide as appropriate  - Encourage maximum independence but intervene and supervise when necessary  - Involve family in performance of ADLs  - Assess for home care needs following discharge   - Consider OT consult to assist with ADL evaluation and planning for discharge  - Provide patient education as appropriate  8/14/2020 2233 by Faiza Anne RN  Outcome: Progressing  8/14/2020 2231 by Viral Harkins RN  Outcome: Progressing  Goal: Maintain or return mobility status to optimal level  Description: INTERVENTIONS:  - Assess patient's baseline mobility status (ambulation, transfers, stairs, etc )    - Identify cognitive and physical deficits and behaviors that affect mobility  - Identify mobility aids required to assist with transfers and/or ambulation (gait belt, sit-to-stand, lift, walker, cane, etc )  - [place] fall precautions as indicated by assessment  - Record patient progress and toleration of activity level on Mobility SBAR; progress patient to next Phase/Stage  - Instruct patient to call for assistance with activity based on assessment  - Consider rehabilitation consult to assist with strengthening/weightbearing, etc   8/14/2020 2233 by Viral Harkins RN  Outcome: Progressing  8/14/2020 2231 by Viral Harkins RN  Outcome: Progressing     Problem: DISCHARGE PLANNING  Goal: Discharge to home or other facility with appropriate resources  Description: INTERVENTIONS:  - Identify barriers to discharge w/patient and caregiver  - Arrange for needed discharge resources and transportation as appropriate  - Identify discharge learning needs (meds, wound care, etc )  - Arrange for interpretive services to assist at discharge as needed  - Refer to Case Management Department for coordinating discharge planning if the patient needs post-hospital services based on physician/advanced practitioner order or complex needs related to functional status, cognitive ability, or social support system  8/14/2020 2233 by Viral Harkins RN  Outcome: Progressing  8/14/2020 2231 by Viral Harkins RN  Outcome: Progressing     Problem: Knowledge Deficit  Goal: Patient/family/caregiver demonstrates understanding of disease process, treatment plan, medications, and discharge instructions  Description: Complete learning assessment and assess knowledge base    Interventions:  - Provide teaching at level of understanding  - Provide teaching via preferred learning methods  8/14/2020 2233 by Jake Mercer RN  Outcome: Progressing  8/14/2020 2231 by Jake Mercer RN  Outcome: Progressing     Problem: CARDIOVASCULAR - ADULT  Goal: Maintains optimal cardiac output and hemodynamic stability  Description: INTERVENTIONS:  - Monitor I/O, vital signs and rhythm  - Monitor for S/S and trends of decreased cardiac output  - Administer and titrate ordered vasoactive medications to optimize hemodynamic stability  - Assess quality of pulses, skin color and temperature  - Assess for signs of decreased coronary artery perfusion  - Instruct patient to report change in severity of symptoms  8/14/2020 2233 by Jake Mercer RN  Outcome: Progressing  8/14/2020 2231 by Jake Mercer RN  Outcome: Progressing     Problem: GASTROINTESTINAL - ADULT  Goal: Maintains adequate nutritional intake  Description: INTERVENTIONS:  - Monitor percentage of each meal consumed  - Identify factors contributing to decreased intake, treat as appropriate  - Assist with meals as needed  - Monitor I&O, weight, and lab values if indicated  - Obtain nutrition services referral as needed  8/14/2020 2233 by Jake Mercer RN  Outcome: Progressing  8/14/2020 2231 by Jake Mercer RN  Outcome: Progressing     Problem: METABOLIC, FLUID AND ELECTROLYTES - ADULT  Goal: Electrolytes maintained within normal limits  Description: INTERVENTIONS:  - Monitor labs and assess patient for signs and symptoms of electrolyte imbalances  - Administer electrolyte replacement as ordered  - Monitor response to electrolyte replacements, including repeat lab results as appropriate  - Instruct patient on fluid and nutrition as appropriate  8/14/2020 2233 by Jake Mercer RN  Outcome: Progressing  8/14/2020 2231 by Jake Mercer RN  Outcome: Progressing  Goal: Fluid balance maintained  Description: INTERVENTIONS:  - Monitor labs   - Monitor I/O and WT  - Instruct patient on fluid and nutrition as appropriate  - Assess for signs & symptoms of volume excess or deficit  8/14/2020 2233 by Berenice Sandoval RN  Outcome: Progressing  8/14/2020 2231 by Berenice Sandoval RN  Outcome: Progressing     Problem: SKIN/TISSUE INTEGRITY - ADULT  Goal: Skin integrity remains intact  Description: INTERVENTIONS  - Identify patients at risk for skin breakdown  - Assess and monitor skin integrity  - Assess and monitor nutrition and hydration status  - Monitor labs (i e  albumin)  - Assess for incontinence   - Turn and reposition patient  - Assist with mobility/ambulation  - Relieve pressure over bony prominences  - Avoid friction and shearing  - Provide appropriate hygiene as needed including keeping skin clean and dry  - Evaluate need for skin moisturizer/barrier cream  - Collaborate with interdisciplinary team (i e  Nutrition, Rehabilitation, etc )   - Patient/family teaching  8/14/2020 2233 by Berenice Sandoval RN  Outcome: Progressing  8/14/2020 2231 by Berenice Sandoval RN  Outcome: Progressing     Problem: Nutrition/Hydration-ADULT  Goal: Nutrient/Hydration intake appropriate for improving, restoring or maintaining nutritional needs  Description: Monitor and assess patient's nutrition/hydration status for malnutrition  Collaborate with interdisciplinary team and initiate plan and interventions as ordered  Monitor patient's weight and dietary intake as ordered or per policy  Utilize nutrition screening tool and intervene as necessary  Determine patient's food preferences and provide high-protein, high-caloric foods as appropriate       INTERVENTIONS:  - Monitor oral intake, urinary output, labs, and treatment plans  - Assess nutrition and hydration status and recommend course of action  - Evaluate amount of meals eaten  - Assist patient with eating if necessary   - Allow adequate time for meals  - Recommend/ encourage appropriate diets, oral nutritional supplements, and vitamin/mineral supplements  - Order, calculate, and assess calorie counts as needed  - Recommend, monitor, and adjust tube feedings and TPN/PPN based on assessed needs  - Assess need for intravenous fluids  - Provide specific nutrition/hydration education as appropriate  - Include patient/family/caregiver in decisions related to nutrition  8/14/2020 2233 by Andriy Peraza RN  Outcome: Progressing  8/14/2020 2231 by Anrdiy Peraza RN  Outcome: Progressing     Problem: Prexisting or High Potential for Compromised Skin Integrity  Goal: Skin integrity is maintained or improved  Description: INTERVENTIONS:  - Identify patients at risk for skin breakdown  - Assess and monitor skin integrity  - Assess and monitor nutrition and hydration status  - Monitor labs   - Assess for incontinence   - Turn and reposition patient  - Assist with mobility/ambulation  - Relieve pressure over bony prominences  - Avoid friction and shearing  - Provide appropriate hygiene as needed including keeping skin clean and dry  - Evaluate need for skin moisturizer/barrier cream  - Collaborate with interdisciplinary team   - Patient/family teaching  - Consider wound care consult   8/14/2020 2233 by Andriy Peraza RN  Outcome: Progressing  8/14/2020 2231 by Andriy Peraza RN  Outcome: Progressing

## 2020-08-15 NOTE — PLAN OF CARE
HD tx plan: Received brief report from primary [address]  4 hrs tx, removing 3L as pilar per physician order  4K bath for most recent serum K 3 5, renal function panel drawn pre hd  Changed to 2K bath mid tx upon receiving lab result of serum K 4 6  Post HD: Pt dialyzed for 4 hrs  Adequate flow from EV av fistula when pt able to sit still  Many art and jenifer pressure alarms during tx when moving access arm  Educated pt multiple times on the importance of keeping access arm still for her own safety due to needles in arm  Pt restless and fussing with sheets, gown and water cup and continued to move access arm  Pt apologetic saying "I don't know why I keep doing it " Pt repositioned for comfort several times and tylenol given for back pain  Pt reinfused for air detector alarm due to clot in venous chamber with over an hour left in tx, no blood loss  Resumed tx with new system set up  2900ml uf removed  Pre bed wt 74 1kg, post 72kg, -2 2kg  Dialysis report given to primary nurse Luna Lay      Problem: METABOLIC, FLUID AND ELECTROLYTES - ADULT  Goal: Electrolytes maintained within normal limits  Description: INTERVENTIONS:  - Monitor labs and assess patient for signs and symptoms of electrolyte imbalances  - Administer electrolyte replacement as ordered  - Monitor response to electrolyte replacements, including repeat lab results as appropriate  - Instruct patient on fluid and nutrition as appropriate  Outcome: Progressing     Problem: METABOLIC, FLUID AND ELECTROLYTES - ADULT  Goal: Fluid balance maintained  Description: INTERVENTIONS:  - Monitor labs   - Monitor I/O and WT  - Instruct patient on fluid and nutrition as appropriate  - Assess for signs & symptoms of volume excess or deficit  Outcome: Progressing

## 2020-08-15 NOTE — PROGRESS NOTES
Progress Note - Donnamae Najjar 1945, 77 y o  female MRN: [id number]    Unit/Bed#: -02 Encounter: [id number]    Primary Care Provider: Ankush Westfall DO   Date and time admitted to hospital: 2020  4:14 PM      * Generalized weakness  Assessment & Plan  · PT input appreciated - suspect multifactorial given age and comorbidities  · Patient will benefit from SNF  · Case management working on placement    GERD without esophagitis  Assessment & Plan  · Continue PPI    End-stage renal disease on hemodialysis ([facility] [address] )  Assessment & Plan  · Stable, continue dialysis per Nephrology    Diabetes mellitus with foot ulcer ([facility] [address] )  Assessment & Plan  Continue insulin sliding scale, diabetic diet    Congestive heart failure ([facility] [address] )  Assessment & Plan  · Stable at this time  · Will continue dialysis for volume management  · Continue home meds        Anemia of chronic kidney failure  Assessment & Plan  · Secondary to end-stage renal disease  · No signs of acute bleeding  · Stable labs      VTE Prophylaxis:  Heparin    Patient Centered Rounds: I have performed bedside rounds with nursing staff today  Discussions with Specialists or Other Care Team Provider: yes  Education and Discussions with Family / Patient:  Updated patient on plan of care    Current Length of Stay: 3 day(s)    Current Patient Status: Inpatient   Certification Statement: The patient will continue to require additional inpatient hospital stay due to Pending placement    Discharge Plan:  Patient ready for discharge however patient does not have an accepting facility for rehab  Case management working on placement    Code Status: Level 1 - Full Code    Subjective:   No overnight events noted  Patient was seen during dialysis today  Next dialysis session will be on Tuesday    Patient with no complaints today    Objective:     Vitals:   Temp (24hrs), Av 6 °F (36 4 °C), Min:96 5 °F (35 8 °C), Max:98 8 °F (37 1 °C)    Temp:  [96 5 °F (35 8 °C)-98 8 °F (37 1 °C)] 96 5 °F (35 8 °C)  HR:  [72-86] 76  Resp:  [16-18] 18  BP: (108-188)/(52-75) 108/52  SpO2:  [94 %-96 %] 94 %  Body mass index is 28 42 kg/m²  Input and Output Summary (last 24 hours): Intake/Output Summary (Last 24 hours) at 8/15/2020 1534  Last data filed at 8/15/2020 1400  Gross per 24 hour   Intake 680 ml   Output    Net 680 ml       Physical Exam:   Physical Exam  Constitutional:       General: She is not in acute distress  Comments: Frail elderly female   HENT:      Head: Normocephalic and atraumatic  Nose: Nose normal       Mouth/Throat:      Mouth: Mucous membranes are moist    Eyes:      Extraocular Movements: Extraocular movements intact  Conjunctiva/sclera: Conjunctivae normal    Neck:      Musculoskeletal: Normal range of motion and neck supple  Cardiovascular:      Rate and Rhythm: Normal rate and regular rhythm  Abdominal:      Palpations: Abdomen is soft  Tenderness: There is no abdominal tenderness  Musculoskeletal:      Comments: Generalized weakness   Skin:     General: Skin is warm and dry  Neurological:      General: No focal deficit present  Cranial Nerves: No cranial nerve deficit     Psychiatric:         Mood and Affect: Mood normal          Behavior: Behavior normal          Additional Data:     Labs:    Results from last 7 days   Lab Units 08/12/20  0523 08/11/20  1630   WBC Thousand/uL 7 00 8 50   HEMOGLOBIN g/dL 10 8* 11 6*   HEMATOCRIT % 32 1* 35 2*   PLATELETS Thousands/uL 174 204   NEUTROS PCT %  --  88*   LYMPHS PCT %  --  4*   MONOS PCT %  --  6   EOS PCT %  --  1     Results from last 7 days   Lab Units 08/15/20  1308  08/11/20  1630   SODIUM mmol/L 136   < > 137   POTASSIUM mmol/L 4 6   < > 3 2*   CHLORIDE mmol/L 100   < > 95*   CO2 mmol/L 28   < > 33*   BUN mg/dL 26*   < > 14   CREATININE mg/dL 5 45*   < > 2 45*   CALCIUM mg/dL 10 8*   < > 9 6   ALK PHOS U/L  --   --  121   ALT U/L  --   --  7   AST U/L  --   --  13    < > = values in this interval not displayed  Results from last 7 days   Lab Units 08/15/20  1129 08/15/20  0627 08/14/20  2039 08/14/20  1611 08/14/20  1102 08/14/20  0613 08/13/20 2012 08/13/20  1545 08/13/20  1118 08/13/20  0622 08/12/20  2040 08/12/20  1558   POC GLUCOSE mg/dl 265* 159* 249* 226* 254* 89 241* 257* 216* 133 208* 153*           * I Have Reviewed All Lab Data Listed Above  * Additional Pertinent Lab Tests Reviewed: [facility] Admission  Reviewed    Imaging:  Imaging Reports Reviewed Today Include:  No new imaging    Recent Cultures (last 7 days):           Last 24 Hours Medication List:   Current Facility-Administered Medications   Medication Dose Route Frequency Provider Last Rate    acetaminophen  650 mg Oral Q6H PRN Miguelangel End, CRNP      amLODIPine  5 mg Oral Daily Miguelangel End, CRNP      aspirin  81 mg Oral Daily Miguelangel End, CRNP      b complex-vitamin C-folic acid  1 capsule Oral Daily Miguelangel End, CRNP      calcium acetate  1,334 mg Oral TID With Meals Miguelangel End, CRNP      diphenoxylate-atropine  1 tablet Oral 4x Daily PRN Miguelangel End, CRNP      fluticasone  1 spray Nasal Daily Miguelangel End, CRNP      heparin (porcine)  5,000 Units Subcutaneous Q8H [facility] Miguelangel End, CRNP      insulin lispro  1-5 Units Subcutaneous TID AC Miguelangel End, CRNP      insulin lispro  1-5 Units Subcutaneous HS Miguelangel End, CRNP      ondansetron  4 mg Intravenous Once Miguelangel End, CRNP      ondansetron  4 mg Intravenous Q6H PRN Miguelangel End, CRNP      pantoprazole  40 mg Oral Daily Miguelangel End, CRNP      pravastatin  80 mg Oral Daily Miguelangel End, CRNP          Today, Patient Was Seen By: Rosalio Graham MD    ** Please Note: Dictation voice to text software may have been used in the creation of this document   **

## 2020-08-15 NOTE — SOCIAL WORK
Pt for STR at [facility]  CM working to change pt dialysis chair from [employer] to [employer]  Same is in process and cannot be completed over the weekend  Dr Radha Joe aware  Pt also aware of same  CM to follow up with [facility] and [facility] Monday 8/17  Will continue to follow

## 2020-08-16 LAB
GLUCOSE SERPL-MCNC: 164 MG/DL (ref 65–140)
GLUCOSE SERPL-MCNC: 220 MG/DL (ref 65–140)
GLUCOSE SERPL-MCNC: 291 MG/DL (ref 65–140)
GLUCOSE SERPL-MCNC: 302 MG/DL (ref 65–140)

## 2020-08-16 PROCEDURE — 99231 SBSQ HOSP IP/OBS SF/LOW 25: CPT | Performed by: INTERNAL MEDICINE

## 2020-08-16 PROCEDURE — 97530 THERAPEUTIC ACTIVITIES: CPT

## 2020-08-16 PROCEDURE — 82948 REAGENT STRIP/BLOOD GLUCOSE: CPT

## 2020-08-16 PROCEDURE — 99232 SBSQ HOSP IP/OBS MODERATE 35: CPT | Performed by: HOSPITALIST

## 2020-08-16 PROCEDURE — 97110 THERAPEUTIC EXERCISES: CPT

## 2020-08-16 RX ADMIN — CALCIUM ACETATE 1334 MG: 667 CAPSULE ORAL at 09:06

## 2020-08-16 RX ADMIN — PRAVASTATIN SODIUM 80 MG: 40 TABLET ORAL at 09:05

## 2020-08-16 RX ADMIN — Medication 1 CAPSULE: at 09:05

## 2020-08-16 RX ADMIN — AMLODIPINE BESYLATE 5 MG: 5 TABLET ORAL at 09:05

## 2020-08-16 RX ADMIN — HEPARIN SODIUM 5000 UNITS: 5000 INJECTION INTRAVENOUS; SUBCUTANEOUS at 13:24

## 2020-08-16 RX ADMIN — INSULIN LISPRO 2 UNITS: 100 INJECTION, SOLUTION INTRAVENOUS; SUBCUTANEOUS at 22:29

## 2020-08-16 RX ADMIN — CALCIUM ACETATE 1334 MG: 667 CAPSULE ORAL at 11:29

## 2020-08-16 RX ADMIN — HEPARIN SODIUM 5000 UNITS: 5000 INJECTION INTRAVENOUS; SUBCUTANEOUS at 22:29

## 2020-08-16 RX ADMIN — PANTOPRAZOLE SODIUM 40 MG: 40 TABLET, DELAYED RELEASE ORAL at 09:06

## 2020-08-16 RX ADMIN — ONDANSETRON 4 MG: 2 INJECTION INTRAMUSCULAR; INTRAVENOUS at 08:32

## 2020-08-16 RX ADMIN — ASPIRIN 81 MG: 81 TABLET, COATED ORAL at 09:05

## 2020-08-16 RX ADMIN — INSULIN LISPRO 1 UNITS: 100 INJECTION, SOLUTION INTRAVENOUS; SUBCUTANEOUS at 11:28

## 2020-08-16 RX ADMIN — HEPARIN SODIUM 5000 UNITS: 5000 INJECTION INTRAVENOUS; SUBCUTANEOUS at 05:01

## 2020-08-16 RX ADMIN — CALCIUM ACETATE 1334 MG: 667 CAPSULE ORAL at 15:59

## 2020-08-16 RX ADMIN — INSULIN LISPRO 1 UNITS: 100 INJECTION, SOLUTION INTRAVENOUS; SUBCUTANEOUS at 07:26

## 2020-08-16 RX ADMIN — INSULIN LISPRO 3 UNITS: 100 INJECTION, SOLUTION INTRAVENOUS; SUBCUTANEOUS at 16:00

## 2020-08-16 RX ADMIN — FLUTICASONE PROPIONATE 1 SPRAY: 50 SPRAY, METERED NASAL at 09:12

## 2020-08-16 NOTE — ASSESSMENT & PLAN NOTE
· Continue Accu-Cheks AC and HS with sliding scale insulin coverage  · Continue diabetic diet  · Patient otherwise stable

## 2020-08-16 NOTE — ASSESSMENT & PLAN NOTE
· X-ray hip-no acute pathology  · PT OT evaluations appreciated, await SNF placement  · Patient with known history of osteoporosis  · Continue home supplementation

## 2020-08-16 NOTE — ASSESSMENT & PLAN NOTE
· PT input appreciated - suspect multifactorial given age and comorbidities  · Hopeful discharge planning to the [facility] skilled nursing facility on 08/17/2020  · Case management is working on outpatient dialysis chair issues  · Patient is otherwise medically stable for discharge  · Appreciate PT and OT input

## 2020-08-16 NOTE — PHYSICAL THERAPY NOTE
PT Treatment Note       08/16/20 1233   Pain Assessment   Pain Assessment Tool Pain Assessment not indicated - pt denies pain   Restrictions/Precautions   Weight Bearing Precautions Per Order No   Other Precautions Cognitive; Fall Risk   General   Chart Reviewed Yes   Response to Previous Treatment Patient with no complaints from previous session  Family/Caregiver Present Yes   Cognition   Overall Cognitive Status WFL   Arousal/Participation Alert; Cooperative   Attention Attends with cues to redirect   Orientation Level Oriented to person;Disoriented to place; Disoriented to time;Disoriented to situation   Memory Decreased recall of precautions;Decreased recall of recent events;Decreased short term memory   Following Commands Follows one step commands with increased time or repetition   Comments pt agreeable to PT treatment   Subjective   Subjective pt reported that she was tired   Bed Mobility   Supine to Sit 4  Minimal assistance   Additional items Assist x 1;Bedrails;LE management;Verbal cues; Increased time required   Sit to Supine 5  Supervision   Additional items Verbal cues; Bedrails; Increased time required   Additional Comments pt sat EOB x 9 minutes with supervision and denied dizziness or pain   Transfers   Sit to Stand 2  Maximal assistance   Additional items Assist x 1;Verbal cues   Stand to Sit 2  Maximal assistance   Additional items Assist x 1;Verbal cues  (unable to attain full erect position)   Ambulation/Elevation   Gait Assistance Not tested   Balance   Static Sitting Good   Dynamic Sitting Fair +   Endurance Deficit   Endurance Deficit Yes   Endurance Deficit Description reported fatigue with activity   Activity Tolerance   Activity Tolerance Patient limited by fatigue   Exercises   Heelslides Supine;15 reps;AAROM; Bilateral   Glute Sets Supine;15 reps;AROM; Bilateral   Hip Flexion Supine;15 reps;AAROM; Bilateral   Hip Abduction Supine;15 reps;AAROM; Bilateral   Hip Adduction Supine;15 reps;AROM; Bilateral   Ankle Pumps Supine;15 reps;AROM; Bilateral  (ankle inv/ev)   Assessment   Prognosis Fair   Assessment Pt seen for PT treatment session this date with interventions consisting of Therapeutic exercise consisting of: AROM and AAROM 15 reps B LE in supine position and therapeutic activity consisting of training: supine<>sit transfers, sit<>stand transfers and static sitting tolerance at EOB for 9 minutes w/ B UE support  Pt agreeable to PT treatment session upon arrival, pt found supine in bed w/ HOB elevated, in no apparent distress  In comparison to previous session, pt with improvements in increased sitting tolerance  Post session: pt returned BTB and all needs in reach Continue to recommend STR at time of d/c in order to maximize pt's functional independence and safety w/ mobility  Pt continues to be functioning below baseline level, and remains limited 2* factors listed above and including transfers  PT will continue to see pt while here in order to address the deficits listed above and provide interventions consistent w/ POC in effort to achieve goals     Goals   Patient Goals to go to rehab   Short Term Goal #1 goals remain appropriate   PT Treatment Day 4   Plan   Progress Slow progress, decreased activity tolerance   Recommendation   PT Discharge Recommendation Post-Acute Rehabilitation Services   Equipment Recommended Wheelchair   PT - OK to Discharge Yes   Additional Comments when medically stable to [address] [place] [address] , PT

## 2020-08-16 NOTE — PLAN OF CARE
Problem: Potential for Falls  Goal: Patient will remain free of falls  Description: INTERVENTIONS:  - Assess patient frequently for physical needs  -  Identify cognitive and physical deficits and behaviors that affect risk of falls    -  [place] fall precautions as indicated by assessment   - Educate patient/family on patient safety including physical limitations  - Instruct patient to call for assistance with activity based on assessment  - Modify environment to reduce risk of injury  - Consider OT/PT consult to assist with strengthening/mobility  Outcome: Progressing     Problem: PAIN - ADULT  Goal: Verbalizes/displays adequate comfort level or baseline comfort level  Description: Interventions:  - Encourage patient to monitor pain and request assistance  - Assess pain using appropriate pain scale  - Administer analgesics based on type and severity of pain and evaluate response  - Implement non-pharmacological measures as appropriate and evaluate response  - Consider cultural and social influences on pain and pain management  - Notify physician/advanced practitioner if interventions unsuccessful or patient reports new pain  Outcome: Progressing     Problem: INFECTION - ADULT  Goal: Absence or prevention of progression during hospitalization  Description: INTERVENTIONS:  - Assess and monitor for signs and symptoms of infection  - Monitor lab/diagnostic results  - Monitor all insertion sites, i e  indwelling lines, tubes, and drains  - Monitor endotracheal if appropriate and nasal secretions for changes in amount and color  - [place] appropriate cooling/warming therapies per order  - Administer medications as ordered  - Instruct and encourage patient and family to use good hand hygiene technique  - Identify and instruct in appropriate isolation precautions for identified infection/condition  Outcome: Progressing  Goal: Absence of fever/infection during neutropenic period  Description: INTERVENTIONS:  - Monitor WBC    Outcome: Progressing     Problem: SAFETY ADULT  Goal: Patient will remain free of falls  Description: INTERVENTIONS:  - Assess patient frequently for physical needs  -  Identify cognitive and physical deficits and behaviors that affect risk of falls    -  [place] fall precautions as indicated by assessment   - Educate patient/family on patient safety including physical limitations  - Instruct patient to call for assistance with activity based on assessment  - Modify environment to reduce risk of injury  - Consider OT/PT consult to assist with strengthening/mobility  Outcome: Progressing  Goal: Maintain or return to baseline ADL function  Description: INTERVENTIONS:  -  Assess patient's ability to carry out ADLs; assess patient's baseline for ADL function and identify physical deficits which impact ability to perform ADLs (bathing, care of mouth/teeth, toileting, grooming, dressing, etc )  - Assess/evaluate cause of self-care deficits   - Assess range of motion  - Assess patient's mobility; develop plan if impaired  - Assess patient's need for assistive devices and provide as appropriate  - Encourage maximum independence but intervene and supervise when necessary  - Involve family in performance of ADLs  - Assess for home care needs following discharge   - Consider OT consult to assist with ADL evaluation and planning for discharge  - Provide patient education as appropriate  Outcome: Progressing  Goal: Maintain or return mobility status to optimal level  Description: INTERVENTIONS:  - Assess patient's baseline mobility status (ambulation, transfers, stairs, etc )    - Identify cognitive and physical deficits and behaviors that affect mobility  - Identify mobility aids required to assist with transfers and/or ambulation (gait belt, sit-to-stand, lift, walker, cane, etc )  - [place] fall precautions as indicated by assessment  - Record patient progress and toleration of activity level on Mobility SBAR; progress patient to next Phase/Stage  - Instruct patient to call for assistance with activity based on assessment  - Consider rehabilitation consult to assist with strengthening/weightbearing, etc   Outcome: Progressing     Problem: DISCHARGE PLANNING  Goal: Discharge to home or other facility with appropriate resources  Description: INTERVENTIONS:  - Identify barriers to discharge w/patient and caregiver  - Arrange for needed discharge resources and transportation as appropriate  - Identify discharge learning needs (meds, wound care, etc )  - Arrange for interpretive services to assist at discharge as needed  - Refer to Case Management Department for coordinating discharge planning if the patient needs post-hospital services based on physician/advanced practitioner order or complex needs related to functional status, cognitive ability, or social support system  Outcome: Progressing     Problem: Knowledge Deficit  Goal: Patient/family/caregiver demonstrates understanding of disease process, treatment plan, medications, and discharge instructions  Description: Complete learning assessment and assess knowledge base    Interventions:  - Provide teaching at level of understanding  - Provide teaching via preferred learning methods  Outcome: Progressing     Problem: CARDIOVASCULAR - ADULT  Goal: Maintains optimal cardiac output and hemodynamic stability  Description: INTERVENTIONS:  - Monitor I/O, vital signs and rhythm  - Monitor for S/S and trends of decreased cardiac output  - Administer and titrate ordered vasoactive medications to optimize hemodynamic stability  - Assess quality of pulses, skin color and temperature  - Assess for signs of decreased coronary artery perfusion  - Instruct patient to report change in severity of symptoms  Outcome: Progressing     Problem: GASTROINTESTINAL - ADULT  Goal: Maintains adequate nutritional intake  Description: INTERVENTIONS:  - Monitor percentage of each meal consumed  - Identify factors contributing to decreased intake, treat as appropriate  - Assist with meals as needed  - Monitor I&O, weight, and lab values if indicated  - Obtain nutrition services referral as needed  Outcome: Progressing     Problem: METABOLIC, FLUID AND ELECTROLYTES - ADULT  Goal: Electrolytes maintained within normal limits  Description: INTERVENTIONS:  - Monitor labs and assess patient for signs and symptoms of electrolyte imbalances  - Administer electrolyte replacement as ordered  - Monitor response to electrolyte replacements, including repeat lab results as appropriate  - Instruct patient on fluid and nutrition as appropriate  Outcome: Progressing  Goal: Fluid balance maintained  Description: INTERVENTIONS:  - Monitor labs   - Monitor I/O and WT  - Instruct patient on fluid and nutrition as appropriate  - Assess for signs & symptoms of volume excess or deficit  Outcome: Progressing     Problem: SKIN/TISSUE INTEGRITY - ADULT  Goal: Skin integrity remains intact  Description: INTERVENTIONS  - Identify patients at risk for skin breakdown  - Assess and monitor skin integrity  - Assess and monitor nutrition and hydration status  - Monitor labs (i e  albumin)  - Assess for incontinence   - Turn and reposition patient  - Assist with mobility/ambulation  - Relieve pressure over bony prominences  - Avoid friction and shearing  - Provide appropriate hygiene as needed including keeping skin clean and dry  - Evaluate need for skin moisturizer/barrier cream  - Collaborate with interdisciplinary team (i e  Nutrition, Rehabilitation, etc )   - Patient/family teaching  Outcome: Progressing     Problem: Nutrition/Hydration-ADULT  Goal: Nutrient/Hydration intake appropriate for improving, restoring or maintaining nutritional needs  Description: Monitor and assess patient's nutrition/hydration status for malnutrition  Collaborate with interdisciplinary team and initiate plan and interventions as ordered    Monitor patient's weight and dietary intake as ordered or per policy  Utilize nutrition screening tool and intervene as necessary  Determine patient's food preferences and provide high-protein, high-caloric foods as appropriate       INTERVENTIONS:  - Monitor oral intake, urinary output, labs, and treatment plans  - Assess nutrition and hydration status and recommend course of action  - Evaluate amount of meals eaten  - Assist patient with eating if necessary   - Allow adequate time for meals  - Recommend/ encourage appropriate diets, oral nutritional supplements, and vitamin/mineral supplements  - Order, calculate, and assess calorie counts as needed  - Recommend, monitor, and adjust tube feedings and TPN/PPN based on assessed needs  - Assess need for intravenous fluids  - Provide specific nutrition/hydration education as appropriate  - Include patient/family/caregiver in decisions related to nutrition  Outcome: Progressing     Problem: Prexisting or High Potential for Compromised Skin Integrity  Goal: Skin integrity is maintained or improved  Description: INTERVENTIONS:  - Identify patients at risk for skin breakdown  - Assess and monitor skin integrity  - Assess and monitor nutrition and hydration status  - Monitor labs   - Assess for incontinence   - Turn and reposition patient  - Assist with mobility/ambulation  - Relieve pressure over bony prominences  - Avoid friction and shearing  - Provide appropriate hygiene as needed including keeping skin clean and dry  - Evaluate need for skin moisturizer/barrier cream  - Collaborate with interdisciplinary team   - Patient/family teaching  - Consider wound care consult   Outcome: Progressing

## 2020-08-16 NOTE — PROGRESS NOTES
Progress Note - Nephrology   Nel Salazar 77 y o  female MRN: [id number]  Unit/Bed#: -02 Encounter: [id number]  71-year-old female with past medical history of end-stage kidney disease on hemodialysis presented to [address] with increased weakness  Also reported some nausea vomiting and primary care team has been working up for the same  1  End-stage kidney disease on hemodialysis TTS   Received HD without any adverse events  2 2 L UF done   Tolerated well  Next hemodialysis session Tuesday if still inpatient  2  Anemia of chronic kidney disease  3  CKD with bone mineral disease  4  History of high blood pressure   5  History of diabetes mellitus  6  CAD, CABG  7  Peripheral vascular disease, carotid endarterectomies in the past  8  Osteoporosis   9  Left arm   AV fistula left upper arm    -electrolytes, acid-base status, volume status are currently acceptable  -H&H currently acceptable  -hemodynamically stable continue blood pressure medication  -continue PhosLo for high phosphate  -infectious workup as per primary team   -spoke to the hospital medicine team for PT OT evaluation and placement has been discussed with case management    She been  discussed any concern with her satisfaction  Follow up reason for today's visit:  ESRD    Generalized weakness    Patient Active Problem List   Diagnosis    Allergic rhinitis    Anemia of chronic kidney failure    Arteriosclerosis of carotid artery    Cardiovascular arteriosclerosis    Cataract    Congestive heart failure (HCC)    Diabetes mellitus with foot ulcer ([address] )    Diastolic dysfunction    End-stage renal disease on hemodialysis ([address] )    GERD without esophagitis    Hyperlipidemia    Hypertension    Iron deficiency anemia    Overactive bladder    Peripheral arterial disease (HCC)    Age-related osteoporosis without current pathological fracture    Secondary hyperparathyroidism ([address] )    Type 2 diabetes mellitus with renal manifestations, controlled ([address] )    Vitamin D deficiency    Acquired absence of left great toe (HCC)    Thrombocytopenia (HCC)    Generalized weakness    Nausea         Subjective:   Feeling better, ate breakfast,working with PT    Objective:     Vitals: Blood pressure 150/64, pulse 75, temperature 97 6 °F (36 4 °C), temperature source Temporal, resp  rate 17, height 5' 4" (1 626 m), weight 74 kg (163 lb 2 3 oz), SpO2 95 %, not currently breastfeeding  ,Body mass index is 28 kg/m²  Weight (last 2 days)     Date/Time   Weight    08/16/20 0600   74 (163 14)    08/15/20 0552   75 1 (165 57)    08/14/20 0600   73 7 (162 48)                Intake/Output Summary (Last 24 hours) at 8/16/2020 1806  Last data filed at 8/16/2020 1650  Gross per 24 hour   Intake 240 ml   Output    Net 240 ml     I/O last 3 completed shifts: In: 1280 [P O :480; I V :500; Other:300]  Out: 2900 [Other:2900]         Physical Exam: /64 (BP Location: Right arm)   Pulse 75   Temp 97 6 °F (36 4 °C) (Temporal)   Resp 17   Ht 5' 4" (1 626 m)   Wt 74 kg (163 lb 2 3 oz)   SpO2 95%   Breastfeeding No   BMI 28 00 kg/m²     Gen: in NAD, Alert/Awake  HEENT: no sclerous icterus, MMM, neck supple  CV: +S1/S2, RRR  Lungs: CTA bilaterally  Abd: +BS, soft NT/ND  Ext: all four extremities are warm  Av access left upper extremity AV fistula-thrill positive  Skin: no rashes noted  Neuro: CN II-XII intact        Lab, Imaging and other studies: I have personally reviewed pertinent labs  CBC: No results found for: WBC, HGB, HCT, MCV, PLT, ADJUSTEDWBC, MCH, MCHC, RDW, MPV, NRBC  CMP: No results found for: NA, K, CL, CO2, ANIONGAP, BUN, CREATININE, GLUCOSE, CALCIUM, AST, ALT, ALKPHOS, PROT, BILITOT, EGFR        Results from last 7 days   Lab Units 08/15/20  1308 08/12/20  0523 08/11/20  1630   POTASSIUM mmol/L 4 6 3 5 3 2*   CHLORIDE mmol/L 100 100 95*   CO2 mmol/L 28 31 33*   BUN mg/dL 26* 20 14   CREATININE mg/dL 5 45* 3 67* 2 45*   CALCIUM mg/dL 10 8* 9 5 9 6   ALK PHOS U/L  --   --  121   ALT U/L  --   --  7   AST U/L  --   --  13         Phosphorus: No results found for: PHOS  Magnesium: No results found for: MG  Urinalysis: No results found for: [place], SPECGRAV, PHUR, LEUKOCYTESUR, NITRITE, PROTEINUA, GLUCOSEU, KETONESU, BILIRUBINUR, BLOODU  Ionized Calcium: No results found for: CAION  Coagulation: No results found for: PT, INR, APTT  Troponin: No results found for: TROPONINI  ABG: No results found for: PHART, MBJ0PDM, PO2ART, QSY4AXW, Q1OVRCBF, BEART, SOURCE  Radiology review:     IMAGING  Procedure: Xr Hip/pelv 2-3 Vws Left    Result Date: 8/11/2020  Narrative: LEFT HIP INDICATION:   hip pain  COMPARISON:  CT abdomen pelvis 5/18/2015 VIEWS:  XR HIP/PELV 2-3 VWS LEFT  W PELVIS IF PERFORMED FINDINGS: There is no acute fracture or dislocation  No significant hip degenerative changes  No lytic or blastic osseous lesion  There are atherosclerotic calcifications  Soft tissues are otherwise unremarkable  The visualized lumbar spine is unremarkable  Impression: No acute osseous abnormality  Workstation performed: [id number]     Procedure: Xr Chest 1 View    Result Date: 8/11/2020  Narrative: CHEST INDICATION:   weakness  COMPARISON:  1/31/2019 EXAM PERFORMED/VIEWS:  XR CHEST 1 VIEW FINDINGS:  Median sternotomy wires  Unchanged cardiomegaly  Mild central vascular congestion with otherwise clear lungs  No pneumothorax or pleural effusion  Osseous structures appear within normal limits for patient age  Impression: Cardiomegaly and mild central congestion likely on the basis of mild CHF   Workstation performed: [id number]       Current Facility-Administered Medications   Medication Dose Route Frequency    acetaminophen (TYLENOL) tablet 650 mg  650 mg Oral Q6H PRN    amLODIPine (NORVASC) tablet 5 mg  5 mg Oral Daily    aspirin (ECOTRIN LOW STRENGTH) EC tablet 81 mg  81 mg Oral Daily    b complex-vitamin C-folic acid (NEPHROCAPS) capsule 1 capsule  1 capsule Oral Daily    calcium acetate (PHOSLO) capsule 1,334 mg  1,334 mg Oral TID With Meals    diphenoxylate-atropine (LOMOTIL) 2 5-0 025 mg per tablet 1 tablet  1 tablet Oral 4x Daily PRN    fluticasone (FLONASE) 50 mcg/act nasal spray 1 spray  1 spray Nasal Daily    heparin (porcine) subcutaneous injection 5,000 Units  5,000 Units Subcutaneous Q8H [facility]    insulin lispro (HumaLOG) 100 units/mL subcutaneous injection 1-5 Units  1-5 Units Subcutaneous TID AC    insulin lispro (HumaLOG) 100 units/mL subcutaneous injection 1-5 Units  1-5 Units Subcutaneous HS    melatonin tablet 6 mg  6 mg Oral HS PRN    ondansetron (ZOFRAN) injection 4 mg  4 mg Intravenous Once    ondansetron (ZOFRAN) injection 4 mg  4 mg Intravenous Q6H PRN    pantoprazole (PROTONIX) EC tablet 40 mg  40 mg Oral Daily    pravastatin (PRAVACHOL) tablet 80 mg  80 mg Oral Daily     Medications Discontinued During This Encounter   Medication Reason    guaiFENesin (MUCINEX) 12 hr tablet 600 mg     nateglinide (STARLIX) tablet 120 mg     melatonin tablet 6 mg        Stacy Serrano MD      This progress note was produced in part using a dictation device which may document imprecise wording from author's original intent

## 2020-08-16 NOTE — PROGRESS NOTES
Progress Note - Kathy Jaquez 1945, 77 y o  female MRN: [id number]    Unit/Bed#: -02 Encounter: [id number]    Primary Care Provider: Aggie Corona DO   Date and time admitted to hospital: 8/11/2020  4:14 PM        * Generalized weakness  Assessment & Plan  · PT input appreciated - suspect multifactorial given age and comorbidities  · Hopeful discharge planning to the [facility] skilled nursing facility on 08/17/2020  · Case management is working on outpatient dialysis chair issues  · Patient is otherwise medically stable for discharge  · Appreciate PT and OT input    End-stage renal disease on hemodialysis [facility])  Assessment & Plan  · Stable, continue dialysis per Nephrology - Tuesday, Thursday, Saturday  · Continue same meds for the secondary hyperparathyroidism which include PhosLo and Nephrocaps    Congestive heart failure ([facility] )  Assessment & Plan  · Stable at this time  · Will continue dialysis for volume management  · Continue home meds        Anemia of chronic kidney failure  Assessment & Plan  · Secondary to end-stage renal disease  · No signs of acute bleeding  · Stable labs    Diabetes mellitus with foot ulcer ([facility] [address] )  Assessment & Plan  · Continue Accu-Cheks AC and HS with sliding scale insulin coverage  · Continue diabetic diet  · Patient otherwise stable    GERD without esophagitis  Assessment & Plan  · Continue PPI    Hypertension  Assessment & Plan  · BP currently stable  · Continue current meds    Nausea  Assessment & Plan  · With vomiting - presently resolved, though she does admit her appetite is subpar  · Likely secondary to gastroparesis  · Counseled on diet modification  · Nutrition consult appreciated    Age-related osteoporosis without current pathological fracture  Assessment & Plan  · X-ray hip-no acute pathology  · PT OT evaluations appreciated, await SNF placement  · Patient with known history of osteoporosis  · Continue home supplementation    Hyperlipidemia  Assessment & Plan  · Continue statin for now        VTE Prophylaxis:  Heparin    Patient Centered Rounds: I have performed bedside rounds with nursing staff today  Discussions with Specialists or Other Care Team Provider:  Nephrology, case management, nursing, pharmacy  Education and Discussions with Family / Patient:  Patient's  was bedside at the time of my evaluation, all questions answered to his satisfaction    Current Length of Stay: 4 day(s)    Current Patient Status: Inpatient   Certification Statement: The patient will continue to require additional inpatient hospital stay due to The need for case management to finalize discharge planning    Discharge Plan:  Patient remains medically stable for discharge, hopeful discharge planning to skilled nursing facility tomorrow if all the prerequisite to completed by case management    Code Status: Level 1 - Full Code    Subjective:   Patient seen and examined, no complaints no distress  Objective:     Vitals:   Temp (24hrs), Av 4 °F (36 3 °C), Min:96 5 °F (35 8 °C), Max:98 1 °F (36 7 °C)    Temp:  [96 5 °F (35 8 °C)-98 1 °F (36 7 °C)] 98 1 °F (36 7 °C)  HR:  [60-83] 78  Resp:  [18] 18  BP: (108-173)/(52-71) 149/56  SpO2:  [96 %-100 %] 96 %  Body mass index is 28 kg/m²  Input and Output Summary (last 24 hours): Intake/Output Summary (Last 24 hours) at 2020 1215  Last data filed at 8/15/2020 1710  Gross per 24 hour   Intake 1040 ml   Output 2900 ml   Net -1860 ml       Physical Exam:   Physical Exam  Constitutional:       General: She is not in acute distress  Appearance: Normal appearance  She is normal weight  She is not ill-appearing  HENT:      Head: Normocephalic and atraumatic  Nose: Nose normal       Mouth/Throat:      Mouth: Mucous membranes are moist    Eyes:      General: No scleral icterus  Extraocular Movements: Extraocular movements intact  Pupils: Pupils are equal, round, and reactive to light     Neck: Musculoskeletal: Normal range of motion and neck supple  No neck rigidity  Cardiovascular:      Rate and Rhythm: Normal rate and regular rhythm  Pulses: Normal pulses  Heart sounds: Normal heart sounds  No murmur  No friction rub  No gallop  Pulmonary:      Effort: Pulmonary effort is normal  No respiratory distress  Breath sounds: Normal breath sounds  No wheezing, rhonchi or rales  Abdominal:      General: There is no distension  Palpations: Abdomen is soft  There is no mass  Tenderness: There is no abdominal tenderness  Hernia: No hernia is present  Musculoskeletal: Normal range of motion  General: No swelling or tenderness  Right lower leg: No edema  Left lower leg: No edema  Skin:     General: Skin is warm  Capillary Refill: Capillary refill takes less than 2 seconds  Findings: No erythema or rash  Neurological:      General: No focal deficit present  Mental Status: She is alert and oriented to person, place, and time  Mental status is at baseline  Cranial Nerves: No cranial nerve deficit  Motor: No weakness  Psychiatric:         Mood and Affect: Mood normal          Behavior: Behavior normal          Additional Data:     Labs:    Results from last 7 days   Lab Units 08/12/20  0523 08/11/20  1630   WBC Thousand/uL 7 00 8 50   HEMOGLOBIN g/dL 10 8* 11 6*   HEMATOCRIT % 32 1* 35 2*   PLATELETS Thousands/uL 174 204   NEUTROS PCT %  --  88*   LYMPHS PCT %  --  4*   MONOS PCT %  --  6   EOS PCT %  --  1     Results from last 7 days   Lab Units 08/15/20  1308  08/11/20  1630   SODIUM mmol/L 136   < > 137   POTASSIUM mmol/L 4 6   < > 3 2*   CHLORIDE mmol/L 100   < > 95*   CO2 mmol/L 28   < > 33*   BUN mg/dL 26*   < > 14   CREATININE mg/dL 5 45*   < > 2 45*   CALCIUM mg/dL 10 8*   < > 9 6   ALK PHOS U/L  --   --  121   ALT U/L  --   --  7   AST U/L  --   --  13    < > = values in this interval not displayed           Results from last 7 days   Lab Units 08/16/20  1110 08/16/20  0550 08/15/20  2017 08/15/20  1615 08/15/20  1129 08/15/20  0627 08/14/20  2039 08/14/20  1611 08/14/20  1102 08/14/20  0613 08/13/20 2012 08/13/20  1545   POC GLUCOSE mg/dl 220* 164* 261* 169* 265* 159* 249* 226* 254* 89 241* 257*           * I Have Reviewed All Lab Data Listed Above  * Additional Pertinent Lab Tests Reviewed: [address] [facility] Admission  Reviewed    Imaging:  Imaging Reports Reviewed Today Include:  None    Recent Cultures (last 7 days):           Last 24 Hours Medication List:   Current Facility-Administered Medications   Medication Dose Route Frequency Provider Last Rate    acetaminophen  650 mg Oral Q6H PRN Miguelangel End, CRNP      amLODIPine  5 mg Oral Daily Miguelangel End, CRNP      aspirin  81 mg Oral Daily Miguelangel End, CRNP      b complex-vitamin C-folic acid  1 capsule Oral Daily Miguelangel End, CRNP      calcium acetate  1,334 mg Oral TID With Meals Miguelangel End, CRNP      diphenoxylate-atropine  1 tablet Oral 4x Daily PRN Miguelangel End, CRNP      fluticasone  1 spray Nasal Daily Miguelangel End, CRNP      heparin (porcine)  5,000 Units Subcutaneous Q8H [address] Miguelangel End, CRNP      insulin lispro  1-5 Units Subcutaneous TID AC Miguelangel End, CRNP      insulin lispro  1-5 Units Subcutaneous HS Miguelangel End, CRNP      melatonin  6 mg Oral HS PRN Toya , CRNP      ondansetron  4 mg Intravenous Once Miguelangel End, CRNP      ondansetron  4 mg Intravenous Q6H PRN Miguelangel End, CRNP      pantoprazole  40 mg Oral Daily Miguelangel End, CRNP      pravastatin  80 mg Oral Daily Miguelangel End, CRNP          Today, Patient Was Seen By: Neha Hoffmann MD    ** Please Note: Dictation voice to text software may have been used in the creation of this document   **

## 2020-08-16 NOTE — PLAN OF CARE
Problem: PHYSICAL THERAPY ADULT  Goal: Performs mobility at highest level of function for planned discharge setting  See evaluation for individualized goals  Description: Treatment/Interventions: Functional transfer training, LE strengthening/ROM, Therapeutic exercise, Endurance training, Cognitive reorientation, Patient/family training, Equipment eval/education, Bed mobility, Gait training, Spoke to nursing, Spoke to case management, OT  Equipment Recommended: (TBD)       See flowsheet documentation for full assessment, interventions and recommendations  Outcome: Progressing  Note: Prognosis: Fair  Problem List: Decreased strength, Decreased endurance, Impaired balance, Decreased mobility, Decreased coordination, Decreased safety awareness  Assessment: Pt seen for PT treatment session this date with interventions consisting of Therapeutic exercise consisting of: AROM and AAROM 15 reps B LE in supine position and therapeutic activity consisting of training: supine<>sit transfers, sit<>stand transfers and static sitting tolerance at EOB for 9 minutes w/ B UE support  Pt agreeable to PT treatment session upon arrival, pt found supine in bed w/ HOB elevated, in no apparent distress  In comparison to previous session, pt with improvements in increased sitting tolerance  Post session: pt returned BTB and all needs in reach Continue to recommend STR at time of d/c in order to maximize pt's functional independence and safety w/ mobility  Pt continues to be functioning below baseline level, and remains limited 2* factors listed above and including transfers  PT will continue to see pt while here in order to address the deficits listed above and provide interventions consistent w/ POC in effort to achieve goals  Barriers to Discharge:  Other (Comment)(pt reports having 24/7 assistance from family)     PT Discharge Recommendation: Post-Acute Rehabilitation Services     PT - OK to Discharge: Yes    See flowsheet documentation for full assessment   Usha Gómez, PT

## 2020-08-16 NOTE — ASSESSMENT & PLAN NOTE
· Stable, continue dialysis per Nephrology - Tuesday, Thursday, Saturday  · Continue same meds for the secondary hyperparathyroidism which include PhosLo and Nephrocaps

## 2020-08-17 LAB
GLUCOSE SERPL-MCNC: 163 MG/DL (ref 65–140)
GLUCOSE SERPL-MCNC: 244 MG/DL (ref 65–140)
GLUCOSE SERPL-MCNC: 271 MG/DL (ref 65–140)
GLUCOSE SERPL-MCNC: 307 MG/DL (ref 65–140)

## 2020-08-17 PROCEDURE — 97530 THERAPEUTIC ACTIVITIES: CPT

## 2020-08-17 PROCEDURE — 99232 SBSQ HOSP IP/OBS MODERATE 35: CPT | Performed by: INTERNAL MEDICINE

## 2020-08-17 PROCEDURE — 99232 SBSQ HOSP IP/OBS MODERATE 35: CPT | Performed by: HOSPITALIST

## 2020-08-17 PROCEDURE — 82948 REAGENT STRIP/BLOOD GLUCOSE: CPT

## 2020-08-17 RX ADMIN — ASPIRIN 81 MG: 81 TABLET, COATED ORAL at 08:55

## 2020-08-17 RX ADMIN — PANTOPRAZOLE SODIUM 40 MG: 40 TABLET, DELAYED RELEASE ORAL at 08:55

## 2020-08-17 RX ADMIN — PRAVASTATIN SODIUM 80 MG: 40 TABLET ORAL at 08:55

## 2020-08-17 RX ADMIN — CALCIUM ACETATE 1334 MG: 667 CAPSULE ORAL at 17:53

## 2020-08-17 RX ADMIN — HEPARIN SODIUM 5000 UNITS: 5000 INJECTION INTRAVENOUS; SUBCUTANEOUS at 14:22

## 2020-08-17 RX ADMIN — INSULIN LISPRO 2 UNITS: 100 INJECTION, SOLUTION INTRAVENOUS; SUBCUTANEOUS at 17:53

## 2020-08-17 RX ADMIN — Medication 1 CAPSULE: at 08:55

## 2020-08-17 RX ADMIN — HEPARIN SODIUM 5000 UNITS: 5000 INJECTION INTRAVENOUS; SUBCUTANEOUS at 21:44

## 2020-08-17 RX ADMIN — INSULIN LISPRO 2 UNITS: 100 INJECTION, SOLUTION INTRAVENOUS; SUBCUTANEOUS at 21:45

## 2020-08-17 RX ADMIN — CALCIUM ACETATE 1334 MG: 667 CAPSULE ORAL at 11:48

## 2020-08-17 RX ADMIN — CALCIUM ACETATE 1334 MG: 667 CAPSULE ORAL at 09:00

## 2020-08-17 RX ADMIN — HEPARIN SODIUM 5000 UNITS: 5000 INJECTION INTRAVENOUS; SUBCUTANEOUS at 05:26

## 2020-08-17 RX ADMIN — INSULIN LISPRO 1 UNITS: 100 INJECTION, SOLUTION INTRAVENOUS; SUBCUTANEOUS at 08:54

## 2020-08-17 RX ADMIN — ACETAMINOPHEN 650 MG: 325 TABLET ORAL at 21:44

## 2020-08-17 RX ADMIN — MELATONIN 6 MG: at 21:44

## 2020-08-17 RX ADMIN — INSULIN LISPRO 3 UNITS: 100 INJECTION, SOLUTION INTRAVENOUS; SUBCUTANEOUS at 11:48

## 2020-08-17 RX ADMIN — AMLODIPINE BESYLATE 5 MG: 5 TABLET ORAL at 08:55

## 2020-08-17 NOTE — PROGRESS NOTES
Progress Note - Nephrology   Sandra Hampton 77 y o  female MRN: [id number]  Unit/Bed#: -02 Encounter: [id number]    A/P:  1  End-stage renal disease   Continue hemodialysis sessions Tuesday Thursday Saturday, ultrafilter as tolerated  Next hemodialysis session for tomorrow, August 18th  2  Anemia of chronic kidney disease   Patient is on Mircera in the outpatient clinic, avoid BOOGIE at this time  She is appropriate in the hemoglobin standpoint currently 10 8 g/dL  3  Secondary hyperparathyroidism   Continue phosphorus binders, follow-up PTH in the outpatient setting  4  Hypercalcemia   Reassess blood work, if indicated may need to transition the patient off of Calcium Acetate to a different phosphorus binder  5  Diabetic nephropathy likely  6  Peripheral vascular disease  7   History coronary artery disease status post coronary artery bypass grafting        Follow up reason for today's visit:  End-stage renal disease/anemia chronic kidney disease/secondary hyperparathyroidism    Generalized weakness    Patient Active Problem List   Diagnosis    Allergic rhinitis    Anemia of chronic kidney failure    Arteriosclerosis of carotid artery    Cardiovascular arteriosclerosis    Cataract    Congestive heart failure ([address] )    Diabetes mellitus with foot ulcer (HCC)    Diastolic dysfunction    End-stage renal disease on hemodialysis ([address] )    GERD without esophagitis    Hyperlipidemia    Hypertension    Iron deficiency anemia    Overactive bladder    Peripheral arterial disease (HCC)    Age-related osteoporosis without current pathological fracture    Secondary hyperparathyroidism ([address]    Type 2 diabetes mellitus with renal manifestations, controlled ([address] )    Vitamin D deficiency    Acquired absence of left great toe (HCC)    Thrombocytopenia (HCC)    Generalized weakness    Nausea         Subjective:   No Acute events overnight    Objective:     Vitals: Blood pressure 168/70, pulse 80, temperature (!) 96 6 °F (35 9 °C), temperature source Tympanic, resp  rate 16, height 5' 4" (1 626 m), weight 74 6 kg (164 lb 7 4 oz), SpO2 97 %, not currently breastfeeding  ,Body mass index is 28 23 kg/m²  Weight (last 2 days)     Date/Time   Weight    08/17/20 0600   74 6 (164 46)    08/16/20 0600   74 (163 14)    08/15/20 0552   75 1 (165 57)                Intake/Output Summary (Last 24 hours) at 8/17/2020 0858  Last data filed at 8/16/2020 1650  Gross per 24 hour   Intake 240 ml   Output    Net 240 ml     I/O last 3 completed shifts: In: 240 [P O :240]  Out: -          Physical Exam: /70 (BP Location: Right arm)   Pulse 80   Temp (!) 96 6 °F (35 9 °C) (Tympanic)   Resp 16   Ht 5' 4" (1 626 m)   Wt 74 6 kg (164 lb 7 4 oz)   SpO2 97%   Breastfeeding No   BMI 28 23 kg/m²     General Appearance:    Alert, cooperative, no distress, appears stated age   Head:    Normocephalic, without obvious abnormality, atraumatic   Eyes:    Conjunctiva/corneas clear   Ears:    Normal external ears   Nose:   Nares normal, septum midline, mucosa normal, no drainage    or sinus tenderness   Throat:   Lips, mucosa, and tongue normal; teeth and gums normal   Neck:   Supple   Back:     Symmetric, no curvature, ROM normal, no CVA tenderness   Lungs:     Clear to auscultation bilaterally, respirations unlabored   Chest wall:    No tenderness or deformity   Heart:    Regular rate and rhythm, S1 and S2 normal, no murmur, rub   or gallop   Abdomen:     Soft, non-tender, bowel sounds active   Extremities:   Extremities normal, atraumatic, no cyanosis or edema   Skin:   Skin color, texture, turgor normal, no rashes or lesions   Lymph nodes:   Cervical normal   Neurologic:   CNII-XII intact            Lab, Imaging and other studies: I have personally reviewed pertinent labs    CBC: No results found for: WBC, HGB, HCT, MCV, PLT, ADJUSTEDWBC, MCH, MCHC, RDW, MPV, NRBC  CMP: No results found for: NA, K, CL, CO2, ANIONGAP, BUN, CREATININE, GLUCOSE, CALCIUM, AST, ALT, ALKPHOS, PROT, BILITOT, EGFR      Results from last 7 days   Lab Units 08/15/20  1308 08/12/20  0523 08/11/20  1630   POTASSIUM mmol/L 4 6 3 5 3 2*   CHLORIDE mmol/L 100 100 95*   CO2 mmol/L 28 31 33*   BUN mg/dL 26* 20 14   CREATININE mg/dL 5 45* 3 67* 2 45*   CALCIUM mg/dL 10 8* 9 5 9 6   ALK PHOS U/L  --   --  121   ALT U/L  --   --  7   AST U/L  --   --  13         Phosphorus: No results found for: PHOS  Magnesium: No results found for: MG  Urinalysis: No results found for: COLORU, CLARITYU, SPECGRAV, PHUR, LEUKOCYTESUR, NITRITE, PROTEINUA, GLUCOSEU, KETONESU, BILIRUBINUR, BLOODU  Ionized Calcium: No results found for: CAION  Coagulation: No results found for: PT, INR, APTT  Troponin: No results found for: TROPONINI  ABG: No results found for: PHART, KHQ0AGO, PO2ART, VAA1KSF, J3DDURWV, BEART, SOURCE  Radiology review:     IMAGING  No results found      Current Facility-Administered Medications   Medication Dose Route Frequency    acetaminophen (TYLENOL) tablet 650 mg  650 mg Oral Q6H PRN    amLODIPine (NORVASC) tablet 5 mg  5 mg Oral Daily    aspirin (ECOTRIN LOW STRENGTH) EC tablet 81 mg  81 mg Oral Daily    b complex-vitamin C-folic acid (NEPHROCAPS) capsule 1 capsule  1 capsule Oral Daily    calcium acetate (PHOSLO) capsule 1,334 mg  1,334 mg Oral TID With Meals    diphenoxylate-atropine (LOMOTIL) 2 5-0 025 mg per tablet 1 tablet  1 tablet Oral 4x Daily PRN    fluticasone (FLONASE) 50 mcg/act nasal spray 1 spray  1 spray Nasal Daily    heparin (porcine) subcutaneous injection 5,000 Units  5,000 Units Subcutaneous Q8H [address]    insulin lispro (HumaLOG) 100 units/mL subcutaneous injection 1-5 Units  1-5 Units Subcutaneous TID AC    insulin lispro (HumaLOG) 100 units/mL subcutaneous injection 1-5 Units  1-5 Units Subcutaneous HS    melatonin tablet 6 mg  6 mg Oral HS PRN    ondansetron (ZOFRAN) injection 4 mg  4 mg Intravenous Once    ondansetron (ZOFRAN) injection 4 mg  4 mg Intravenous Q6H PRN    pantoprazole (PROTONIX) EC tablet 40 mg  40 mg Oral Daily    pravastatin (PRAVACHOL) tablet 80 mg  80 mg Oral Daily     Medications Discontinued During This Encounter   Medication Reason    guaiFENesin (MUCINEX) 12 hr tablet 600 mg     nateglinide (STARLIX) tablet 120 mg     melatonin tablet 6 mg        Kevin Neigh, DO      This progress note was produced in part using a dictation device which may document imprecise wording from author's original intent

## 2020-08-17 NOTE — ASSESSMENT & PLAN NOTE
· PT input appreciated - suspect multifactorial given age and comorbidities  · Hopeful discharge planning to the [facility] skilled nursing facility on 08/17/2020  · Case management is working on outpatient dialysis chair issues  · Patient is otherwise medically stable for discharge  · Appreciate PT and OT input    --------------------------  · Update on 08/17/2020 - patient remains medically stable discharge, case management is working on discharge planning to get the patient to a skilled nursing facility and also to ensure that the patient has outpatient dialysis set up with a new company closer to the patient's planned skilled nursing facility  · Will discharge as soon as case management gives us the go ahead

## 2020-08-17 NOTE — SOCIAL WORK
Spoke to Julianne Valdez at [facility] who reports they have requested the medical records from [address] [place] [address] for pt to be transferred to the [address] [place] [address] office for dialysis  Pt needs a Hepatitis panel completed and COVID test which has been ordered  Spoke to Preston at [employer] who was made aware the chair time has not been completed yet d/t records and pending requested testing  Pt made aware of same  Spouse at the bedside and aware

## 2020-08-17 NOTE — PLAN OF CARE
Problem: PHYSICAL THERAPY ADULT  Goal: Performs mobility at highest level of function for planned discharge setting  See evaluation for individualized goals  Description: Treatment/Interventions: Functional transfer training, LE strengthening/ROM, Therapeutic exercise, Endurance training, Cognitive reorientation, Patient/family training, Equipment eval/education, Bed mobility, Gait training, Spoke to nursing, Spoke to case management, OT  Equipment Recommended: (TBD)       See flowsheet documentation for full assessment, interventions and recommendations  Outcome: Not Progressing  Note: Prognosis: Fair  Problem List: Decreased strength, Decreased endurance, Impaired balance, Decreased mobility, Decreased cognition, Decreased safety awareness, Pain  Assessment: Pt seen for PT treatment session this date with interventions consisting of therapeutic activity consisting of training: bed mobility, supine<>sit transfers, sit<>stand transfers, static sitting tolerance at EOB for 15 minutes w/ B UE support and static standing tolerance for less than 1(5 sec x 3 trials) minutes w/ B UE support  Pt agreeable to PT treatment session upon arrival, pt found supine in bed w/ HOB elevated, in no apparent distress  In comparison to previous session, pt with no improvements as evidenced by decreased activity tolerance-standing & she deferred LE ex  Post session: pt returned BTB and all needs in reach Continue to recommend STR at time of d/c in order to maximize pt's functional independence and safety w/ mobility  Pt continues to be functioning below baseline level, and remains limited 2* factors listed above and including decreased sttength, endurance & safe functional mobility  PT will continue to see pt while here in order to address the deficits listed above and provide interventions consistent w/ POC in effort to achieve STGs  Barriers to Discharge:  Other (Comment)(pt reports having 24/7 assistance from family)     PT Discharge Recommendation: Post-Acute Rehabilitation Services     PT - OK to Discharge: Yes(when med cleared to STR)    See flowsheet documentation for full assessment

## 2020-08-17 NOTE — PROGRESS NOTES
Progress Note - Usha Singh 1945, 77 y o  female MRN: [id number]    Unit/Bed#: -02 Encounter: [id number]    Primary Care Provider: Justice Sims DO   Date and time admitted to hospital: 8/11/2020  4:14 PM        * Generalized weakness  Assessment & Plan  · PT input appreciated - suspect multifactorial given age and comorbidities  · Hopeful discharge planning to the [facility] skilled nursing facility on 08/17/2020  · Case management is working on outpatient dialysis chair issues  · Patient is otherwise medically stable for discharge  · Appreciate PT and OT input    --------------------------  · Update on 08/17/2020 - patient remains medically stable discharge, case management is working on discharge planning to get the patient to a skilled nursing facility and also to ensure that the patient has outpatient dialysis set up with a new company closer to the patient's planned skilled nursing facility  · Will discharge as soon as case management gives us the go ahead    End-stage renal disease on hemodialysis [facility])  Assessment & Plan  · Stable, continue dialysis per Nephrology - Tuesday, Thursday, Saturday  · Continue same meds for the secondary hyperparathyroidism which include PhosLo and Nephrocaps    Congestive heart failure ([address] )  Assessment & Plan  · Stable at this time  · Will continue dialysis for volume management  · Continue home meds        Anemia of chronic kidney failure  Assessment & Plan  · Secondary to end-stage renal disease  · No signs of acute bleeding  · Stable labs    Diabetes mellitus with foot ulcer ([address] )  Assessment & Plan  · Continue Accu-Cheks AC and HS with sliding scale insulin coverage  · Continue diabetic diet  · Patient otherwise stable    GERD without esophagitis  Assessment & Plan  · Continue PPI    Hypertension  Assessment & Plan  · BP currently stable  · Continue current meds    Age-related osteoporosis without current pathological fracture  Assessment & Plan  · X-ray hip-no acute pathology  · PT OT evaluations appreciated, await SNF placement  · Patient with known history of osteoporosis  · Continue home supplementation    Hyperlipidemia  Assessment & Plan  · Continue statin for now      VTE Prophylaxis:  Heparin    Patient Centered Rounds: I have performed bedside rounds with nursing staff today  Discussions with Specialists or Other Care Team Provider:  Nephrology, case management, nursing, pharmacy  Education and Discussions with Family / Patient:  Patient's  was brought up to par with the plan of care for today, all questions answered to his satisfaction    Current Length of Stay: 5 day(s)    Current Patient Status: Inpatient   Certification Statement: The patient will continue to require additional inpatient hospital stay due to The need for case management to finalize discharge planning    Discharge Plan:  Patient is medically stable for discharge, will discharge as soon as case management gives us the go ahead    Code Status: Level 1 - Full Code    Subjective:   Patient seen and examined, no complaints no distress  Patient has just finished up lunch    Objective:     Vitals:   Temp (24hrs), Av 4 °F (36 3 °C), Min:96 6 °F (35 9 °C), Max:98 1 °F (36 7 °C)    Temp:  [96 6 °F (35 9 °C)-98 1 °F (36 7 °C)] 96 6 °F (35 9 °C)  HR:  [75-81] 80  Resp:  [16-17] 16  BP: (149-168)/(64-70) 168/70  SpO2:  [93 %-97 %] 97 %  Body mass index is 28 23 kg/m²  Input and Output Summary (last 24 hours): Intake/Output Summary (Last 24 hours) at 2020 1219  Last data filed at 2020 1650  Gross per 24 hour   Intake 240 ml   Output    Net 240 ml       Physical Exam:   Physical Exam  Constitutional:       General: She is not in acute distress  Appearance: Normal appearance  She is normal weight  She is not ill-appearing  HENT:      Head: Normocephalic and atraumatic        Nose: Nose normal       Mouth/Throat:      Mouth: Mucous membranes are moist    Eyes:      Extraocular Movements: Extraocular movements intact  Pupils: Pupils are equal, round, and reactive to light  Neck:      Musculoskeletal: Normal range of motion and neck supple  No neck rigidity  Cardiovascular:      Rate and Rhythm: Normal rate and regular rhythm  Pulses: Normal pulses  Heart sounds: Normal heart sounds  No murmur  No friction rub  No gallop  Pulmonary:      Effort: Pulmonary effort is normal  No respiratory distress  Breath sounds: Normal breath sounds  No wheezing, rhonchi or rales  Abdominal:      General: There is no distension  Palpations: Abdomen is soft  There is no mass  Tenderness: There is no abdominal tenderness  Hernia: No hernia is present  Musculoskeletal: Normal range of motion  General: No swelling or tenderness  Right lower leg: No edema  Left lower leg: No edema  Skin:     General: Skin is warm  Capillary Refill: Capillary refill takes less than 2 seconds  Findings: No erythema or rash  Neurological:      General: No focal deficit present  Mental Status: She is alert and oriented to person, place, and time  Mental status is at baseline  Cranial Nerves: No cranial nerve deficit  Motor: No weakness     Psychiatric:         Mood and Affect: Mood normal          Behavior: Behavior normal          Additional Data:     Labs:    Results from last 7 days   Lab Units 08/12/20  0523 08/11/20  1630   WBC Thousand/uL 7 00 8 50   HEMOGLOBIN g/dL 10 8* 11 6*   HEMATOCRIT % 32 1* 35 2*   PLATELETS Thousands/uL 174 204   NEUTROS PCT %  --  88*   LYMPHS PCT %  --  4*   MONOS PCT %  --  6   EOS PCT %  --  1     Results from last 7 days   Lab Units 08/15/20  1308  08/11/20  1630   SODIUM mmol/L 136   < > 137   POTASSIUM mmol/L 4 6   < > 3 2*   CHLORIDE mmol/L 100   < > 95*   CO2 mmol/L 28   < > 33*   BUN mg/dL 26*   < > 14   CREATININE mg/dL 5 45*   < > 2 45*   CALCIUM mg/dL 10 8*   < > 9 6 ALK PHOS U/L  --   --  121   ALT U/L  --   --  7   AST U/L  --   --  13    < > = values in this interval not displayed  Results from last 7 days   Lab Units 08/17/20  1056 08/17/20  0646 08/16/20 2017 08/16/20  1556 08/16/20  1110 08/16/20  0550 08/15/20  2017 08/15/20  1615 08/15/20  1129 08/15/20  0627 08/14/20  2039 08/14/20  1611   POC GLUCOSE mg/dl 307* 163* 291* 302* 220* 164* 261* 169* 265* 159* 249* 226*           * I Have Reviewed All Lab Data Listed Above  * Additional Pertinent Lab Tests Reviewed:  [address] Admission  Reviewed    Imaging:  Imaging Reports Reviewed Today Include:  None    Recent Cultures (last 7 days):           Last 24 Hours Medication List:   Current Facility-Administered Medications   Medication Dose Route Frequency Provider Last Rate    acetaminophen  650 mg Oral Q6H PRN Amanda David, CRNP      amLODIPine  5 mg Oral Daily [place] David, CRNP      aspirin  81 mg Oral Daily [place] David, CRNP      b complex-vitamin C-folic acid  1 capsule Oral Daily Amanda David, CRNP      calcium acetate  1,334 mg Oral TID With Meals [place] David, CRNP      diphenoxylate-atropine  1 tablet Oral 4x Daily PRN Amanda David, CRNP      fluticasone  1 spray Nasal Daily Amanda David, CRNP      heparin (porcine)  5,000 Units Subcutaneous Q8H [address] [place] David, CRNP      insulin lispro  1-5 Units Subcutaneous TID AC [place] David, CRNP      insulin lispro  1-5 Units Subcutaneous HS [place] David, CRNP      melatonin  6 mg Oral HS PRN Darin Salvage, CRNP      ondansetron  4 mg Intravenous Once Amanda David, CRNP      ondansetron  4 mg Intravenous Q6H PRN Amanda David, CRNP      pantoprazole  40 mg Oral Daily [place] David, CRNP      pravastatin  80 mg Oral Daily [place] David, CRNP          Today, Patient Was Seen By: Akiko Bhandair MD    ** Please Note: Dictation voice to text software may have been used in the creation of this document   **

## 2020-08-17 NOTE — NURSING NOTE
Assessed, pleasant, and resting comfortable  Left av fistula site has positive  bruit/thrill  Denies pain  Call bell is near  No c/o are voiced   She is compliant with scds

## 2020-08-17 NOTE — OCCUPATIONAL THERAPY NOTE
08/17/20 0828   Restrictions/Precautions   Weight Bearing Precautions Per Order No   Other Precautions Cognitive; Fall Risk   Pain Assessment   Pain Assessment Tool 0-10   Pain Score No Pain   Bed Mobility   Rolling R 5  Supervision   Additional items HOB elevated; Bedrails; Increased time required;Verbal cues; Assist x 1   Rolling L 5  Supervision   Additional items Assist x 1;HOB elevated; Bedrails; Increased time required;Verbal cues   Supine to Sit 5  Supervision   Additional items Assist x 1;HOB elevated; Bedrails; Increased time required;Verbal cues   Sit to Supine 5  Supervision   Additional items Assist x 1;HOB elevated; Bedrails; Increased time required;Verbal cues   Additional Comments Pt sat EOB x 15 minutes with no c/o dizziness or pain   Transfers   Sit to Stand 3  Moderate assistance   Additional items Assist x 2;Bedrails; Increased time required;Verbal cues   Stand to Sit 3  Moderate assistance   Additional items Assist x 2;Bedrails; Increased time required;Verbal cues   Stand pivot Unable to assess   Cognition   Overall Cognitive Status [facility]   Arousal/Participation Alert; Cooperative   Attention Attends with cues to redirect   Orientation Level Oriented X4   Memory Decreased recall of precautions;Decreased recall of recent events;Decreased short term memory   Following Commands Follows one step commands with increased time or repetition   Comments PT agreeable to OT treatment  Activity Tolerance   Activity Tolerance Patient limited by fatigue   Assessment   Assessment Patient participated in Skilled OT session this date with interventions consisting of bed mobility training, functional transfer training, and Energy Conservation techniques   Patient agreeable to OT treatment session, upon arrival patient was found supine in bed  Patient transfers supine <> sit EOB with supervision  Pt sits EOB, self supported, x 15 minutes without c/o of pain or dizziness   Patient attempted sit to stand transfers x 3, each time only able to stand for a max of 3-5 seconds  Patient reports feet sliding and LEs feel like they are going to give out each time she stands  Unable to pivot bed to chair  Patient re-enters bed and rolls side to side with supervision  Required TD for a boost in bed  Patient left in bed with callbell within reach, SCDs reapplied, and all needs met  Patient requiring verbal cues for safety, verbal cues for correct technique, verbal cues for pacing thru activity steps and frequent rest periods  Patient performance  demonstrated good carryover of learned techniques and strategies to facilitate safety during functional tasks  Patient continues to be functioning below baseline level, occupational performance remains limited secondary to factors listed above and increased risk for falls and injury  From OT standpoint, recommendation at time of d/c would be Short Term Rehab  Patient to benefit from continued Occupational Therapy treatment while in the hospital to address deficits as defined above and maximize level of functional independence with ADLs and functional mobility in order to return to PLOF      Plan   Treatment Interventions Functional transfer training;Energy conservation   Goal Expiration Date 08/22/20   OT Treatment Day 3   OT Frequency 3-5x/wk   Recommendation   OT Discharge Recommendation Post-Acute Rehabilitation Services   OT - OK to Discharge Yes  (when medically cleared)   GABBY Sanders

## 2020-08-17 NOTE — PLAN OF CARE
Problem: Potential for Falls  Goal: Patient will remain free of falls  Description: INTERVENTIONS:  - Assess patient frequently for physical needs  -  Identify cognitive and physical deficits and behaviors that affect risk of falls    -  [place] fall precautions as indicated by assessment   - Educate patient/family on patient safety including physical limitations  - Instruct patient to call for assistance with activity based on assessment  - Modify environment to reduce risk of injury  - Consider OT/PT consult to assist with strengthening/mobility  Outcome: Progressing     Problem: PAIN - ADULT  Goal: Verbalizes/displays adequate comfort level or baseline comfort level  Description: Interventions:  - Encourage patient to monitor pain and request assistance  - Assess pain using appropriate pain scale  - Administer analgesics based on type and severity of pain and evaluate response  - Implement non-pharmacological measures as appropriate and evaluate response  - Consider cultural and social influences on pain and pain management  - Notify physician/advanced practitioner if interventions unsuccessful or patient reports new pain  Outcome: Progressing     Problem: INFECTION - ADULT  Goal: Absence or prevention of progression during hospitalization  Description: INTERVENTIONS:  - Assess and monitor for signs and symptoms of infection  - Monitor lab/diagnostic results  - Monitor all insertion sites, i e  indwelling lines, tubes, and drains  - Monitor endotracheal if appropriate and nasal secretions for changes in amount and color  - [place] appropriate cooling/warming therapies per order  - Administer medications as ordered  - Instruct and encourage patient and family to use good hand hygiene technique  - Identify and instruct in appropriate isolation precautions for identified infection/condition  Outcome: Progressing     Problem: SAFETY ADULT  Goal: Patient will remain free of falls  Description: INTERVENTIONS:  - Assess patient frequently for physical needs  -  Identify cognitive and physical deficits and behaviors that affect risk of falls    -  [place] fall precautions as indicated by assessment   - Educate patient/family on patient safety including physical limitations  - Instruct patient to call for assistance with activity based on assessment  - Modify environment to reduce risk of injury  - Consider OT/PT consult to assist with strengthening/mobility  Outcome: Progressing  Goal: Maintain or return to baseline ADL function  Description: INTERVENTIONS:  -  Assess patient's ability to carry out ADLs; assess patient's baseline for ADL function and identify physical deficits which impact ability to perform ADLs (bathing, care of mouth/teeth, toileting, grooming, dressing, etc )  - Assess/evaluate cause of self-care deficits   - Assess range of motion  - Assess patient's mobility; develop plan if impaired  - Assess patient's need for assistive devices and provide as appropriate  - Encourage maximum independence but intervene and supervise when necessary  - Involve family in performance of ADLs  - Assess for home care needs following discharge   - Consider OT consult to assist with ADL evaluation and planning for discharge  - Provide patient education as appropriate  Outcome: Progressing  Goal: Maintain or return mobility status to optimal level  Description: INTERVENTIONS:  - Assess patient's baseline mobility status (ambulation, transfers, stairs, etc )    - Identify cognitive and physical deficits and behaviors that affect mobility  - Identify mobility aids required to assist with transfers and/or ambulation (gait belt, sit-to-stand, lift, walker, cane, etc )  - [place] fall precautions as indicated by assessment  - Record patient progress and toleration of activity level on Mobility SBAR; progress patient to next Phase/Stage  - Instruct patient to call for assistance with activity based on assessment  - Consider rehabilitation consult to assist with strengthening/weightbearing, etc   Outcome: Progressing     Problem: DISCHARGE PLANNING  Goal: Discharge to home or other facility with appropriate resources  Description: INTERVENTIONS:  - Identify barriers to discharge w/patient and caregiver  - Arrange for needed discharge resources and transportation as appropriate  - Identify discharge learning needs (meds, wound care, etc )  - Arrange for interpretive services to assist at discharge as needed  - Refer to Case Management Department for coordinating discharge planning if the patient needs post-hospital services based on physician/advanced practitioner order or complex needs related to functional status, cognitive ability, or social support system  Outcome: Progressing     Problem: Knowledge Deficit  Goal: Patient/family/caregiver demonstrates understanding of disease process, treatment plan, medications, and discharge instructions  Description: Complete learning assessment and assess knowledge base    Interventions:  - Provide teaching at level of understanding  - Provide teaching via preferred learning methods  Outcome: Progressing     Problem: CARDIOVASCULAR - ADULT  Goal: Maintains optimal cardiac output and hemodynamic stability  Description: INTERVENTIONS:  - Monitor I/O, vital signs and rhythm  - Monitor for S/S and trends of decreased cardiac output  - Administer and titrate ordered vasoactive medications to optimize hemodynamic stability  - Assess quality of pulses, skin color and temperature  - Assess for signs of decreased coronary artery perfusion  - Instruct patient to report change in severity of symptoms  Outcome: Progressing     Problem: GASTROINTESTINAL - ADULT  Goal: Maintains adequate nutritional intake  Description: INTERVENTIONS:  - Monitor percentage of each meal consumed  - Identify factors contributing to decreased intake, treat as appropriate  - Assist with meals as needed  - Monitor I&O, weight, and lab values if indicated  - Obtain nutrition services referral as needed  Outcome: Progressing     Problem: METABOLIC, FLUID AND ELECTROLYTES - ADULT  Goal: Electrolytes maintained within normal limits  Description: INTERVENTIONS:  - Monitor labs and assess patient for signs and symptoms of electrolyte imbalances  - Administer electrolyte replacement as ordered  - Monitor response to electrolyte replacements, including repeat lab results as appropriate  - Instruct patient on fluid and nutrition as appropriate  Outcome: Progressing  Goal: Fluid balance maintained  Description: INTERVENTIONS:  - Monitor labs   - Monitor I/O and WT  - Instruct patient on fluid and nutrition as appropriate  - Assess for signs & symptoms of volume excess or deficit  Outcome: Progressing     Problem: SKIN/TISSUE INTEGRITY - ADULT  Goal: Skin integrity remains intact  Description: INTERVENTIONS  - Identify patients at risk for skin breakdown  - Assess and monitor skin integrity  - Assess and monitor nutrition and hydration status  - Monitor labs (i e  albumin)  - Assess for incontinence   - Turn and reposition patient  - Assist with mobility/ambulation  - Relieve pressure over bony prominences  - Avoid friction and shearing  - Provide appropriate hygiene as needed including keeping skin clean and dry  - Evaluate need for skin moisturizer/barrier cream  - Collaborate with interdisciplinary team (i e  Nutrition, Rehabilitation, etc )   - Patient/family teaching  Outcome: Progressing     Problem: Nutrition/Hydration-ADULT  Goal: Nutrient/Hydration intake appropriate for improving, restoring or maintaining nutritional needs  Description: Monitor and assess patient's nutrition/hydration status for malnutrition  Collaborate with interdisciplinary team and initiate plan and interventions as ordered  Monitor patient's weight and dietary intake as ordered or per policy  Utilize nutrition screening tool and intervene as necessary   Determine patient's food preferences and provide high-protein, high-caloric foods as appropriate       INTERVENTIONS:  - Monitor oral intake, urinary output, labs, and treatment plans  - Assess nutrition and hydration status and recommend course of action  - Evaluate amount of meals eaten  - Assist patient with eating if necessary   - Allow adequate time for meals  - Recommend/ encourage appropriate diets, oral nutritional supplements, and vitamin/mineral supplements  - Order, calculate, and assess calorie counts as needed  - Recommend, monitor, and adjust tube feedings and TPN/PPN based on assessed needs  - Assess need for intravenous fluids  - Provide specific nutrition/hydration education as appropriate  - Include patient/family/caregiver in decisions related to nutrition  Outcome: Progressing     Problem: Prexisting or High Potential for Compromised Skin Integrity  Goal: Skin integrity is maintained or improved  Description: INTERVENTIONS:  - Identify patients at risk for skin breakdown  - Assess and monitor skin integrity  - Assess and monitor nutrition and hydration status  - Monitor labs   - Assess for incontinence   - Turn and reposition patient  - Assist with mobility/ambulation  - Relieve pressure over bony prominences  - Avoid friction and shearing  - Provide appropriate hygiene as needed including keeping skin clean and dry  - Evaluate need for skin moisturizer/barrier cream  - Collaborate with interdisciplinary team   - Patient/family teaching  - Consider wound care consult   Outcome: Progressing

## 2020-08-17 NOTE — PLAN OF CARE
Problem: Potential for Falls  Goal: Patient will remain free of falls  Description: INTERVENTIONS:  - Assess patient frequently for physical needs  -  Identify cognitive and physical deficits and behaviors that affect risk of falls    -  [place] fall precautions as indicated by assessment   - Educate patient/family on patient safety including physical limitations  - Instruct patient to call for assistance with activity based on assessment  - Modify environment to reduce risk of injury  - Consider OT/PT consult to assist with strengthening/mobility  Outcome: Progressing     Problem: PAIN - ADULT  Goal: Verbalizes/displays adequate comfort level or baseline comfort level  Description: Interventions:  - Encourage patient to monitor pain and request assistance  - Assess pain using appropriate pain scale  - Administer analgesics based on type and severity of pain and evaluate response  - Implement non-pharmacological measures as appropriate and evaluate response  - Consider cultural and social influences on pain and pain management  - Notify physician/advanced practitioner if interventions unsuccessful or patient reports new pain  Outcome: Progressing     Problem: INFECTION - ADULT  Goal: Absence or prevention of progression during hospitalization  Description: INTERVENTIONS:  - Assess and monitor for signs and symptoms of infection  - Monitor lab/diagnostic results  - Monitor all insertion sites, i e  indwelling lines, tubes, and drains  - Monitor endotracheal if appropriate and nasal secretions for changes in amount and color  - [place] appropriate cooling/warming therapies per order  - Administer medications as ordered  - Instruct and encourage patient and family to use good hand hygiene technique  - Identify and instruct in appropriate isolation precautions for identified infection/condition  Outcome: Progressing  Goal: Absence of fever/infection during neutropenic period  Description: INTERVENTIONS:  - Monitor WBC    Outcome: Progressing     Problem: SAFETY ADULT  Goal: Patient will remain free of falls  Description: INTERVENTIONS:  - Assess patient frequently for physical needs  -  Identify cognitive and physical deficits and behaviors that affect risk of falls    -  [place] fall precautions as indicated by assessment   - Educate patient/family on patient safety including physical limitations  - Instruct patient to call for assistance with activity based on assessment  - Modify environment to reduce risk of injury  - Consider OT/PT consult to assist with strengthening/mobility  Outcome: Progressing  Goal: Maintain or return to baseline ADL function  Description: INTERVENTIONS:  -  Assess patient's ability to carry out ADLs; assess patient's baseline for ADL function and identify physical deficits which impact ability to perform ADLs (bathing, care of mouth/teeth, toileting, grooming, dressing, etc )  - Assess/evaluate cause of self-care deficits   - Assess range of motion  - Assess patient's mobility; develop plan if impaired  - Assess patient's need for assistive devices and provide as appropriate  - Encourage maximum independence but intervene and supervise when necessary  - Involve family in performance of ADLs  - Assess for home care needs following discharge   - Consider OT consult to assist with ADL evaluation and planning for discharge  - Provide patient education as appropriate  Outcome: Progressing  Goal: Maintain or return mobility status to optimal level  Description: INTERVENTIONS:  - Assess patient's baseline mobility status (ambulation, transfers, stairs, etc )    - Identify cognitive and physical deficits and behaviors that affect mobility  - Identify mobility aids required to assist with transfers and/or ambulation (gait belt, sit-to-stand, lift, walker, cane, etc )  - [place] fall precautions as indicated by assessment  - Record patient progress and toleration of activity level on Mobility SBAR; progress patient to next Phase/Stage  - Instruct patient to call for assistance with activity based on assessment  - Consider rehabilitation consult to assist with strengthening/weightbearing, etc   Outcome: Progressing     Problem: DISCHARGE PLANNING  Goal: Discharge to home or other facility with appropriate resources  Description: INTERVENTIONS:  - Identify barriers to discharge w/patient and caregiver  - Arrange for needed discharge resources and transportation as appropriate  - Identify discharge learning needs (meds, wound care, etc )  - Arrange for interpretive services to assist at discharge as needed  - Refer to Case Management Department for coordinating discharge planning if the patient needs post-hospital services based on physician/advanced practitioner order or complex needs related to functional status, cognitive ability, or social support system  Outcome: Progressing     Problem: Knowledge Deficit  Goal: Patient/family/caregiver demonstrates understanding of disease process, treatment plan, medications, and discharge instructions  Description: Complete learning assessment and assess knowledge base    Interventions:  - Provide teaching at level of understanding  - Provide teaching via preferred learning methods  Outcome: Progressing     Problem: CARDIOVASCULAR - ADULT  Goal: Maintains optimal cardiac output and hemodynamic stability  Description: INTERVENTIONS:  - Monitor I/O, vital signs and rhythm  - Monitor for S/S and trends of decreased cardiac output  - Administer and titrate ordered vasoactive medications to optimize hemodynamic stability  - Assess quality of pulses, skin color and temperature  - Assess for signs of decreased coronary artery perfusion  - Instruct patient to report change in severity of symptoms  Outcome: Progressing     Problem: GASTROINTESTINAL - ADULT  Goal: Maintains adequate nutritional intake  Description: INTERVENTIONS:  - Monitor percentage of each meal consumed  - Identify factors contributing to decreased intake, treat as appropriate  - Assist with meals as needed  - Monitor I&O, weight, and lab values if indicated  - Obtain nutrition services referral as needed  Outcome: Progressing     Problem: METABOLIC, FLUID AND ELECTROLYTES - ADULT  Goal: Electrolytes maintained within normal limits  Description: INTERVENTIONS:  - Monitor labs and assess patient for signs and symptoms of electrolyte imbalances  - Administer electrolyte replacement as ordered  - Monitor response to electrolyte replacements, including repeat lab results as appropriate  - Instruct patient on fluid and nutrition as appropriate  Outcome: Progressing  Goal: Fluid balance maintained  Description: INTERVENTIONS:  - Monitor labs   - Monitor I/O and WT  - Instruct patient on fluid and nutrition as appropriate  - Assess for signs & symptoms of volume excess or deficit  Outcome: Progressing     Problem: SKIN/TISSUE INTEGRITY - ADULT  Goal: Skin integrity remains intact  Description: INTERVENTIONS  - Identify patients at risk for skin breakdown  - Assess and monitor skin integrity  - Assess and monitor nutrition and hydration status  - Monitor labs (i e  albumin)  - Assess for incontinence   - Turn and reposition patient  - Assist with mobility/ambulation  - Relieve pressure over bony prominences  - Avoid friction and shearing  - Provide appropriate hygiene as needed including keeping skin clean and dry  - Evaluate need for skin moisturizer/barrier cream  - Collaborate with interdisciplinary team (i e  Nutrition, Rehabilitation, etc )   - Patient/family teaching  Outcome: Progressing     Problem: Nutrition/Hydration-ADULT  Goal: Nutrient/Hydration intake appropriate for improving, restoring or maintaining nutritional needs  Description: Monitor and assess patient's nutrition/hydration status for malnutrition  Collaborate with interdisciplinary team and initiate plan and interventions as ordered    Monitor patient's weight and dietary intake as ordered or per policy  Utilize nutrition screening tool and intervene as necessary  Determine patient's food preferences and provide high-protein, high-caloric foods as appropriate       INTERVENTIONS:  - Monitor oral intake, urinary output, labs, and treatment plans  - Assess nutrition and hydration status and recommend course of action  - Evaluate amount of meals eaten  - Assist patient with eating if necessary   - Allow adequate time for meals  - Recommend/ encourage appropriate diets, oral nutritional supplements, and vitamin/mineral supplements  - Order, calculate, and assess calorie counts as needed  - Recommend, monitor, and adjust tube feedings and TPN/PPN based on assessed needs  - Assess need for intravenous fluids  - Provide specific nutrition/hydration education as appropriate  - Include patient/family/caregiver in decisions related to nutrition  Outcome: Progressing     Problem: Prexisting or High Potential for Compromised Skin Integrity  Goal: Skin integrity is maintained or improved  Description: INTERVENTIONS:  - Identify patients at risk for skin breakdown  - Assess and monitor skin integrity  - Assess and monitor nutrition and hydration status  - Monitor labs   - Assess for incontinence   - Turn and reposition patient  - Assist with mobility/ambulation  - Relieve pressure over bony prominences  - Avoid friction and shearing  - Provide appropriate hygiene as needed including keeping skin clean and dry  - Evaluate need for skin moisturizer/barrier cream  - Collaborate with interdisciplinary team   - Patient/family teaching  - Consider wound care consult   Outcome: Progressing

## 2020-08-17 NOTE — PLAN OF CARE
Problem: OCCUPATIONAL THERAPY ADULT  Goal: Performs self-care activities at highest level of function for planned discharge setting  See evaluation for individualized goals  Outcome: Progressing  Note: Limitation: Decreased ADL status, Decreased UE strength, Decreased Safe judgement during ADL, Decreased endurance, Decreased self-care trans  Prognosis: Good  Assessment: Patient participated in Skilled OT session this date with interventions consisting of bed mobility training, functional transfer training, and [employer] techniques   Patient agreeable to OT treatment session, upon arrival patient was found supine in bed  Patient transfers supine <> sit EOB with supervision  Pt sits EOB, self supported, x 15 minutes without c/o of pain or dizziness  Patient attempted sit to stand transfers x 3, each time only able to stand for a max of 3-5 seconds  Patient reports feet sliding and LEs feel like they are going to give out each time she stands  Unable to pivot bed to chair  Patient re-enters bed and rolls side to side with supervision  Required TD for a boost in bed  Patient left in bed with callbell within reach, SCDs reapplied, and all needs met  Patient requiring verbal cues for safety, verbal cues for correct technique, verbal cues for pacing thru activity steps and frequent rest periods  Patient performance  demonstrated good carryover of learned techniques and strategies to facilitate safety during functional tasks  Patient continues to be functioning below baseline level, occupational performance remains limited secondary to factors listed above and increased risk for falls and injury  From OT standpoint, recommendation at time of d/c would be Short Term Rehab  Patient to benefit from continued Occupational Therapy treatment while in the hospital to address deficits as defined above and maximize level of functional independence with ADLs and functional mobility in order to return to [facility]  OT Discharge Recommendation: Post-Acute Rehabilitation Services  OT - OK to Discharge: Yes(when medically cleared)

## 2020-08-18 ENCOUNTER — APPOINTMENT (INPATIENT)
Dept: DIALYSIS | Facility: HOSPITAL | Age: 75
DRG: 073 | End: 2020-08-18
Attending: INTERNAL MEDICINE
Payer: MEDICARE

## 2020-08-18 LAB
ANION GAP SERPL CALCULATED.3IONS-SCNC: 7 MMOL/L (ref 4–13)
ANISOCYTOSIS BLD QL SMEAR: PRESENT
BUN SERPL-MCNC: 31 MG/DL (ref 7–25)
CALCIUM SERPL-MCNC: 11.1 MG/DL (ref 8.6–10.5)
CHLORIDE SERPL-SCNC: 98 MMOL/L (ref 98–107)
CO2 SERPL-SCNC: 27 MMOL/L (ref 21–31)
CREAT SERPL-MCNC: 6.16 MG/DL (ref 0.6–1.2)
EOSINOPHIL # BLD AUTO: 0.15 THOUSAND/UL (ref 0–0.61)
EOSINOPHIL NFR BLD MANUAL: 2 % (ref 0–6)
ERYTHROCYTE [DISTWIDTH] IN BLOOD BY AUTOMATED COUNT: 18.1 % (ref 11.5–14.5)
GFR SERPL CREATININE-BSD FRML MDRD: 6 ML/MIN/1.73SQ M
GLUCOSE SERPL-MCNC: 130 MG/DL (ref 65–140)
GLUCOSE SERPL-MCNC: 146 MG/DL (ref 65–140)
GLUCOSE SERPL-MCNC: 148 MG/DL (ref 65–99)
GLUCOSE SERPL-MCNC: 173 MG/DL (ref 65–140)
GLUCOSE SERPL-MCNC: 230 MG/DL (ref 65–140)
HBV CORE AB SER QL: NORMAL
HBV SURFACE AB SER-ACNC: 59.14 MIU/ML
HBV SURFACE AG SER QL: NORMAL
HCT VFR BLD AUTO: 31.8 % (ref 42–47)
HGB BLD-MCNC: 10.6 G/DL (ref 12–16)
LYMPHOCYTES # BLD AUTO: 1.6 THOUSAND/UL (ref 0.6–4.47)
LYMPHOCYTES # BLD AUTO: 21 % (ref 20–51)
MACROCYTES BLD QL AUTO: PRESENT
MCH RBC QN AUTO: 31 PG (ref 26–34)
MCHC RBC AUTO-ENTMCNC: 33.5 G/DL (ref 31–37)
MCV RBC AUTO: 93 FL (ref 81–99)
MONOCYTES # BLD AUTO: 0.61 THOUSAND/UL (ref 0–1.22)
MONOCYTES NFR BLD AUTO: 8 % (ref 4–12)
NEUTS SEG # BLD: 5.24 THOUSAND/UL (ref 1.81–6.82)
NEUTS SEG NFR BLD AUTO: 69 % (ref 42–75)
PHOSPHATE SERPL-MCNC: 2.3 MG/DL (ref 3–5.5)
PLATELET # BLD AUTO: 191 THOUSANDS/UL (ref 149–390)
PLATELET BLD QL SMEAR: ADEQUATE
PMV BLD AUTO: 8.3 FL (ref 8.6–11.7)
POTASSIUM SERPL-SCNC: 4.4 MMOL/L (ref 3.5–5.5)
RBC # BLD AUTO: 3.44 MILLION/UL (ref 3.9–5.2)
RBC MORPH BLD: PRESENT
SARS-COV-2 RNA RESP QL NAA+PROBE: NEGATIVE
SODIUM SERPL-SCNC: 132 MMOL/L (ref 134–143)
TOTAL CELLS COUNTED SPEC: 100
WBC # BLD AUTO: 7.6 THOUSAND/UL (ref 4.8–10.8)

## 2020-08-18 PROCEDURE — 86706 HEP B SURFACE ANTIBODY: CPT | Performed by: INTERNAL MEDICINE

## 2020-08-18 PROCEDURE — 99232 SBSQ HOSP IP/OBS MODERATE 35: CPT | Performed by: INTERNAL MEDICINE

## 2020-08-18 PROCEDURE — 85007 BL SMEAR W/DIFF WBC COUNT: CPT | Performed by: HOSPITALIST

## 2020-08-18 PROCEDURE — 84100 ASSAY OF PHOSPHORUS: CPT | Performed by: INTERNAL MEDICINE

## 2020-08-18 PROCEDURE — 99232 SBSQ HOSP IP/OBS MODERATE 35: CPT | Performed by: HOSPITALIST

## 2020-08-18 PROCEDURE — 86704 HEP B CORE ANTIBODY TOTAL: CPT | Performed by: INTERNAL MEDICINE

## 2020-08-18 PROCEDURE — 87635 SARS-COV-2 COVID-19 AMP PRB: CPT | Performed by: PHYSICIAN ASSISTANT

## 2020-08-18 PROCEDURE — 87340 HEPATITIS B SURFACE AG IA: CPT | Performed by: INTERNAL MEDICINE

## 2020-08-18 PROCEDURE — 85027 COMPLETE CBC AUTOMATED: CPT | Performed by: HOSPITALIST

## 2020-08-18 PROCEDURE — 82948 REAGENT STRIP/BLOOD GLUCOSE: CPT

## 2020-08-18 PROCEDURE — 80048 BASIC METABOLIC PNL TOTAL CA: CPT | Performed by: HOSPITALIST

## 2020-08-18 RX ADMIN — ASPIRIN 81 MG: 81 TABLET, COATED ORAL at 08:43

## 2020-08-18 RX ADMIN — HEPARIN SODIUM 5000 UNITS: 5000 INJECTION INTRAVENOUS; SUBCUTANEOUS at 21:44

## 2020-08-18 RX ADMIN — HEPARIN SODIUM 5000 UNITS: 5000 INJECTION INTRAVENOUS; SUBCUTANEOUS at 05:33

## 2020-08-18 RX ADMIN — PANTOPRAZOLE SODIUM 40 MG: 40 TABLET, DELAYED RELEASE ORAL at 08:44

## 2020-08-18 RX ADMIN — INSULIN LISPRO 2 UNITS: 100 INJECTION, SOLUTION INTRAVENOUS; SUBCUTANEOUS at 17:40

## 2020-08-18 RX ADMIN — HEPARIN SODIUM 5000 UNITS: 5000 INJECTION INTRAVENOUS; SUBCUTANEOUS at 14:41

## 2020-08-18 RX ADMIN — PRAVASTATIN SODIUM 80 MG: 40 TABLET ORAL at 08:43

## 2020-08-18 RX ADMIN — Medication 1 CAPSULE: at 08:44

## 2020-08-18 RX ADMIN — AMLODIPINE BESYLATE 5 MG: 5 TABLET ORAL at 08:44

## 2020-08-18 RX ADMIN — INSULIN LISPRO 1 UNITS: 100 INJECTION, SOLUTION INTRAVENOUS; SUBCUTANEOUS at 21:45

## 2020-08-18 NOTE — PHYSICAL THERAPY NOTE
Physical Therapy Cancellation Note    Attempted to see pt for PT treatment at 11:44  She was unavailable as she was at dialysis  Attempt again as tolerated        Fransisca King PTA

## 2020-08-18 NOTE — OCCUPATIONAL THERAPY NOTE
Attempted OT treatment at [phone] today  Patient was unavailable due to dialysis  Will continue OT treatment as able     Sarahy Ayala

## 2020-08-18 NOTE — PLAN OF CARE
Problem: Potential for Falls  Goal: Patient will remain free of falls  Description: INTERVENTIONS:  - Assess patient frequently for physical needs  -  Identify cognitive and physical deficits and behaviors that affect risk of falls    -  [place] fall precautions as indicated by assessment   - Educate patient/family on patient safety including physical limitations  - Instruct patient to call for assistance with activity based on assessment  - Modify environment to reduce risk of injury  - Consider OT/PT consult to assist with strengthening/mobility  Outcome: Progressing     Problem: PAIN - ADULT  Goal: Verbalizes/displays adequate comfort level or baseline comfort level  Description: Interventions:  - Encourage patient to monitor pain and request assistance  - Assess pain using appropriate pain scale  - Administer analgesics based on type and severity of pain and evaluate response  - Implement non-pharmacological measures as appropriate and evaluate response  - Consider cultural and social influences on pain and pain management  - Notify physician/advanced practitioner if interventions unsuccessful or patient reports new pain  Outcome: Progressing     Problem: INFECTION - ADULT  Goal: Absence or prevention of progression during hospitalization  Description: INTERVENTIONS:  - Assess and monitor for signs and symptoms of infection  - Monitor lab/diagnostic results  - Monitor all insertion sites, i e  indwelling lines, tubes, and drains  - Monitor endotracheal if appropriate and nasal secretions for changes in amount and color  - [place] appropriate cooling/warming therapies per order  - Administer medications as ordered  - Instruct and encourage patient and family to use good hand hygiene technique  - Identify and instruct in appropriate isolation precautions for identified infection/condition  Outcome: Progressing     Problem: SAFETY ADULT  Goal: Patient will remain free of falls  Description: INTERVENTIONS:  - Assess patient frequently for physical needs  -  Identify cognitive and physical deficits and behaviors that affect risk of falls    -  [place] fall precautions as indicated by assessment   - Educate patient/family on patient safety including physical limitations  - Instruct patient to call for assistance with activity based on assessment  - Modify environment to reduce risk of injury  - Consider OT/PT consult to assist with strengthening/mobility  Outcome: Progressing  Goal: Maintain or return to baseline ADL function  Description: INTERVENTIONS:  -  Assess patient's ability to carry out ADLs; assess patient's baseline for ADL function and identify physical deficits which impact ability to perform ADLs (bathing, care of mouth/teeth, toileting, grooming, dressing, etc )  - Assess/evaluate cause of self-care deficits   - Assess range of motion  - Assess patient's mobility; develop plan if impaired  - Assess patient's need for assistive devices and provide as appropriate  - Encourage maximum independence but intervene and supervise when necessary  - Involve family in performance of ADLs  - Assess for home care needs following discharge   - Consider OT consult to assist with ADL evaluation and planning for discharge  - Provide patient education as appropriate  Outcome: Progressing  Goal: Maintain or return mobility status to optimal level  Description: INTERVENTIONS:  - Assess patient's baseline mobility status (ambulation, transfers, stairs, etc )    - Identify cognitive and physical deficits and behaviors that affect mobility  - Identify mobility aids required to assist with transfers and/or ambulation (gait belt, sit-to-stand, lift, walker, cane, etc )  - [place] fall precautions as indicated by assessment  - Record patient progress and toleration of activity level on Mobility SBAR; progress patient to next Phase/Stage  - Instruct patient to call for assistance with activity based on assessment  - Consider rehabilitation consult to assist with strengthening/weightbearing, etc   Outcome: Progressing     Problem: DISCHARGE PLANNING  Goal: Discharge to home or other facility with appropriate resources  Description: INTERVENTIONS:  - Identify barriers to discharge w/patient and caregiver  - Arrange for needed discharge resources and transportation as appropriate  - Identify discharge learning needs (meds, wound care, etc )  - Arrange for interpretive services to assist at discharge as needed  - Refer to Case Management Department for coordinating discharge planning if the patient needs post-hospital services based on physician/advanced practitioner order or complex needs related to functional status, cognitive ability, or social support system  Outcome: Progressing     Problem: Knowledge Deficit  Goal: Patient/family/caregiver demonstrates understanding of disease process, treatment plan, medications, and discharge instructions  Description: Complete learning assessment and assess knowledge base    Interventions:  - Provide teaching at level of understanding  - Provide teaching via preferred learning methods  Outcome: Progressing     Problem: CARDIOVASCULAR - ADULT  Goal: Maintains optimal cardiac output and hemodynamic stability  Description: INTERVENTIONS:  - Monitor I/O, vital signs and rhythm  - Monitor for S/S and trends of decreased cardiac output  - Administer and titrate ordered vasoactive medications to optimize hemodynamic stability  - Assess quality of pulses, skin color and temperature  - Assess for signs of decreased coronary artery perfusion  - Instruct patient to report change in severity of symptoms  Outcome: Progressing     Problem: GASTROINTESTINAL - ADULT  Goal: Maintains adequate nutritional intake  Description: INTERVENTIONS:  - Monitor percentage of each meal consumed  - Identify factors contributing to decreased intake, treat as appropriate  - Assist with meals as needed  - Monitor I&O, weight, and lab values if indicated  - Obtain nutrition services referral as needed  Outcome: Progressing     Problem: METABOLIC, FLUID AND ELECTROLYTES - ADULT  Goal: Electrolytes maintained within normal limits  Description: INTERVENTIONS:  - Monitor labs and assess patient for signs and symptoms of electrolyte imbalances  - Administer electrolyte replacement as ordered  - Monitor response to electrolyte replacements, including repeat lab results as appropriate  - Instruct patient on fluid and nutrition as appropriate  Outcome: Progressing  Goal: Fluid balance maintained  Description: INTERVENTIONS:  - Monitor labs   - Monitor I/O and WT  - Instruct patient on fluid and nutrition as appropriate  - Assess for signs & symptoms of volume excess or deficit  Outcome: Progressing     Problem: SKIN/TISSUE INTEGRITY - ADULT  Goal: Skin integrity remains intact  Description: INTERVENTIONS  - Identify patients at risk for skin breakdown  - Assess and monitor skin integrity  - Assess and monitor nutrition and hydration status  - Monitor labs (i e  albumin)  - Assess for incontinence   - Turn and reposition patient  - Assist with mobility/ambulation  - Relieve pressure over bony prominences  - Avoid friction and shearing  - Provide appropriate hygiene as needed including keeping skin clean and dry  - Evaluate need for skin moisturizer/barrier cream  - Collaborate with interdisciplinary team (i e  Nutrition, Rehabilitation, etc )   - Patient/family teaching  Outcome: Progressing     Problem: Nutrition/Hydration-ADULT  Goal: Nutrient/Hydration intake appropriate for improving, restoring or maintaining nutritional needs  Description: Monitor and assess patient's nutrition/hydration status for malnutrition  Collaborate with interdisciplinary team and initiate plan and interventions as ordered  Monitor patient's weight and dietary intake as ordered or per policy  Utilize nutrition screening tool and intervene as necessary   Determine patient's food preferences and provide high-protein, high-caloric foods as appropriate       INTERVENTIONS:  - Monitor oral intake, urinary output, labs, and treatment plans  - Assess nutrition and hydration status and recommend course of action  - Evaluate amount of meals eaten  - Assist patient with eating if necessary   - Allow adequate time for meals  - Recommend/ encourage appropriate diets, oral nutritional supplements, and vitamin/mineral supplements  - Order, calculate, and assess calorie counts as needed  - Recommend, monitor, and adjust tube feedings and TPN/PPN based on assessed needs  - Assess need for intravenous fluids  - Provide specific nutrition/hydration education as appropriate  - Include patient/family/caregiver in decisions related to nutrition  Outcome: Progressing     Problem: Prexisting or High Potential for Compromised Skin Integrity  Goal: Skin integrity is maintained or improved  Description: INTERVENTIONS:  - Identify patients at risk for skin breakdown  - Assess and monitor skin integrity  - Assess and monitor nutrition and hydration status  - Monitor labs   - Assess for incontinence   - Turn and reposition patient  - Assist with mobility/ambulation  - Relieve pressure over bony prominences  - Avoid friction and shearing  - Provide appropriate hygiene as needed including keeping skin clean and dry  - Evaluate need for skin moisturizer/barrier cream  - Collaborate with interdisciplinary team   - Patient/family teaching  - Consider wound care consult   Outcome: Progressing

## 2020-08-18 NOTE — ASSESSMENT & PLAN NOTE
· Stable, continue dialysis per Nephrology - Tuesday, Thursday, Saturday  · Patient was dialyzed earlier today  · Continue same meds for the secondary hyperparathyroidism which include PhosLo and Nephrocaps

## 2020-08-18 NOTE — PROGRESS NOTES
Progress Note - Nephrology   Gaye Ontiveros 77 y o  female MRN: [id number]  Unit/Bed#: -02 Encounter: [id number]    A/P:  1  End-stage renal disease               hemodialysis session later today  2  Anemia of chronic kidney disease              Patient is on Mircera in the outpatient clinic, continue monitor blood counts and initiate BOOGIE only is indicated  3  Secondary hyperparathyroidism               phosphorus level noted to be 1 5 mg/dL, repeat blood work this morning  Of note will hold phosphorus binders in the meantime  4  Hypercalcemia               discontinue calcium acetate given decreased phosphorus levels  Will likely need to start on a non calcium phosphorus binder if and when indicated  5  Diabetic nephropathy likely  6  Peripheral vascular disease  7   History coronary artery disease status post coronary artery bypass grafting        Follow up reason for today's visit:  End-stage renal disease/anemia of chronic kidney disease/secondary hyperparathyroidism/hypercalcemia    Generalized weakness    Patient Active Problem List   Diagnosis    Allergic rhinitis    Anemia of chronic kidney failure    Arteriosclerosis of carotid artery    Cardiovascular arteriosclerosis    Cataract    Congestive heart failure ([address]    Diabetes mellitus with foot ulcer ([address]    Diastolic dysfunction    End-stage renal disease on hemodialysis ([address]    GERD without esophagitis    Hyperlipidemia    Hypertension    Iron deficiency anemia    Overactive bladder    Peripheral arterial disease (HCC)    Age-related osteoporosis without current pathological fracture    Secondary hyperparathyroidism ([address]    Type 2 diabetes mellitus with renal manifestations, controlled ([address] )    Vitamin D deficiency    Acquired absence of left great toe (HCC)    Thrombocytopenia (HCC)    Generalized weakness    Nausea         Subjective:   No acute events overnight    Objective:     Vitals: Blood pressure (!) 192/76, pulse 75, temperature (!) 97 4 °F (36 3 °C), temperature source Temporal, resp  rate 16, height 5' 4" (1 626 m), weight 74 5 kg (164 lb 3 9 oz), SpO2 96 %, not currently breastfeeding  ,Body mass index is 28 19 kg/m²  Weight (last 2 days)     Date/Time   Weight    08/18/20 0600   74 5 (164 24)    08/17/20 0600   74 6 (164 46)    08/16/20 0600   74 (163 14)                Intake/Output Summary (Last 24 hours) at 8/18/2020 0830  Last data filed at 8/17/2020 1700  Gross per 24 hour   Intake 780 ml   Output    Net 780 ml     I/O last 3 completed shifts: In: 80 [P O :780]  Out: -          Physical Exam: BP (!) 192/76 (BP Location: Right arm)   Pulse 75   Temp (!) 97 4 °F (36 3 °C) (Temporal)   Resp 16   Ht 5' 4" (1 626 m)   Wt 74 5 kg (164 lb 3 9 oz)   SpO2 96%   Breastfeeding No   BMI 28 19 kg/m²     General Appearance:    Alert, cooperative, no distress, appears stated age   Head:    Normocephalic, without obvious abnormality, atraumatic   Eyes:    Conjunctiva/corneas clear   Ears:    Normal external ears   Nose:   Nares normal, septum midline, mucosa normal, no drainage    or sinus tenderness   Throat:   Lips, mucosa, and tongue normal; teeth and gums normal   Neck:   Supple   Back:     Symmetric, no curvature, ROM normal, no CVA tenderness   Lungs:     Clear to auscultation bilaterally, respirations unlabored   Chest wall:    No tenderness or deformity   Heart:    Regular rate and rhythm, S1 and S2 normal, no murmur, rub   or gallop   Abdomen:     Soft, non-tender, bowel sounds active   Extremities:   Extremities normal, atraumatic, no cyanosis, trace bilateral lower extremity edema   Skin:   Skin color, texture, turgor normal, no rashes or lesions   Lymph nodes:   Cervical normal   Neurologic:   CNII-XII intact            Lab, Imaging and other studies: I have personally reviewed pertinent labs    CBC:   Lab Results   Component Value Date    WBC 7 60 08/18/2020    HGB 10 6 (L) 08/18/2020    HCT 31 8 (L) 08/18/2020    MCV 93 08/18/2020     08/18/2020    MCH 31 0 08/18/2020    MCHC 33 5 08/18/2020    RDW 18 1 (H) 08/18/2020    MPV 8 3 (L) 08/18/2020     CMP:   Lab Results   Component Value Date    K 4 4 08/18/2020    CL 98 08/18/2020    CO2 27 08/18/2020    BUN 31 (H) 08/18/2020    CREATININE 6 16 (H) 08/18/2020    CALCIUM 11 1 (H) 08/18/2020    EGFR 6 08/18/2020         Results from last 7 days   Lab Units 08/18/20  0453 08/15/20  1308 08/12/20  0523 08/11/20  1630   POTASSIUM mmol/L 4 4 4 6 3 5 3 2*   CHLORIDE mmol/L 98 100 100 95*   CO2 mmol/L 27 28 31 33*   BUN mg/dL 31* 26* 20 14   CREATININE mg/dL 6 16* 5 45* 3 67* 2 45*   CALCIUM mg/dL 11 1* 10 8* 9 5 9 6   ALK PHOS U/L  --   --   --  121   ALT U/L  --   --   --  7   AST U/L  --   --   --  13         Phosphorus: No results found for: PHOS  Magnesium: No results found for: MG  Urinalysis: No results found for: COLORU, CLARITYU, SPECGRAV, PHUR, LEUKOCYTESUR, NITRITE, PROTEINUA, GLUCOSEU, KETONESU, BILIRUBINUR, BLOODU  Ionized Calcium: No results found for: CAION  Coagulation: No results found for: PT, INR, APTT  Troponin: No results found for: TROPONINI  ABG: No results found for: PHART, XCZ5PAJ, PO2ART, MZO7CKM, Q6NPZSMQ, BEART, SOURCE  Radiology review:     IMAGING  No results found      Current Facility-Administered Medications   Medication Dose Route Frequency    acetaminophen (TYLENOL) tablet 650 mg  650 mg Oral Q6H PRN    amLODIPine (NORVASC) tablet 5 mg  5 mg Oral Daily    aspirin (ECOTRIN LOW STRENGTH) EC tablet 81 mg  81 mg Oral Daily    b complex-vitamin C-folic acid (NEPHROCAPS) capsule 1 capsule  1 capsule Oral Daily    calcium acetate (PHOSLO) capsule 1,334 mg  1,334 mg Oral TID With Meals    diphenoxylate-atropine (LOMOTIL) 2 5-0 025 mg per tablet 1 tablet  1 tablet Oral 4x Daily PRN    fluticasone (FLONASE) 50 mcg/act nasal spray 1 spray  1 spray Nasal Daily    heparin (porcine) subcutaneous injection 5,000 Units  5,000 Units Subcutaneous Q8H [facility]    insulin lispro (HumaLOG) 100 units/mL subcutaneous injection 1-5 Units  1-5 Units Subcutaneous TID AC    insulin lispro (HumaLOG) 100 units/mL subcutaneous injection 1-5 Units  1-5 Units Subcutaneous HS    melatonin tablet 6 mg  6 mg Oral HS PRN    ondansetron (ZOFRAN) injection 4 mg  4 mg Intravenous Once    ondansetron (ZOFRAN) injection 4 mg  4 mg Intravenous Q6H PRN    pantoprazole (PROTONIX) EC tablet 40 mg  40 mg Oral Daily    pravastatin (PRAVACHOL) tablet 80 mg  80 mg Oral Daily     Medications Discontinued During This Encounter   Medication Reason    guaiFENesin (MUCINEX) 12 hr tablet 600 mg     nateglinide (STARLIX) tablet 120 mg     melatonin tablet 6 mg        Kevin Neigh, DO      This progress note was produced in part using a dictation device which may document imprecise wording from author's original intent

## 2020-08-18 NOTE — HEMODIALYSIS
Pt tolerated her 4 hour tx well, in spite of two hypotensive episodes  400 cc NSS given, and goal was lowered , twice  2 2 Kg  Removed  Pt  Denied any complaints on discharge from dialysis

## 2020-08-18 NOTE — SOCIAL WORK
Received TC from [place] in admissions at [facility]  Faxed COVID results to [place] at [phone]  As per [place] the director of nursing will only allow am admissions to the facility  Dr Anni Boss made aware of same  Spoke to spouse about [facility] transport and will have a cost of same  Spouse is aware and agreeable to same  TC to [facility], spoke to [place] and made arrangements for 10 am tomorrow  PT and spouse aware of same  [place] at [facility] aware of same

## 2020-08-18 NOTE — SOCIAL WORK
Faxed Hep panel and COVID results to [facility] admissions  Spoke to admissions and confirmed they have received results  States they will call back by the end of the day or in the am with the  Chair time  TC narcisa Rocha in admissions at [facility], left a VM

## 2020-08-18 NOTE — PROGRESS NOTES
Progress Note - Nanda Felton 1945, 77 y o  female MRN: [id number]    Unit/Bed#: -02 Encounter: [id number]    Primary Care Provider: Kenna Guzman DO   Date and time admitted to hospital: 8/11/2020  4:14 PM        * Generalized weakness  Assessment & Plan  · PT input appreciated - suspect multifactorial given age and comorbidities  · Hopeful discharge planning to the [facility] skilled nursing facility on 08/17/2020  · Case management is working on outpatient dialysis chair issues  · Patient is otherwise medically stable for discharge  · Appreciate PT and OT input    --------------------------  · Update on 08/17/2020 - patient remains medically stable discharge, case management is working on discharge planning to get the patient to a skilled nursing facility and also to ensure that the patient has outpatient dialysis set up with a new company closer to the patient's planned skilled nursing facility  · Will discharge as soon as case management gives us the go ahead    ---------------------------------  Update on 08/18/2020-patient remains medically stable, notified by case management that the patient should be ready for discharge on 08/19/2020  Continue present management until then    End-stage renal disease on hemodialysis [facility])  Assessment & Plan  · Stable, continue dialysis per Nephrology - Tuesday, Thursday, Saturday  · Patient was dialyzed earlier today  · Continue same meds for the secondary hyperparathyroidism which include PhosLo and Nephrocaps    Congestive heart failure ([facility] [address] )  Assessment & Plan  · Stable at this time  · Will continue dialysis for volume management  · Continue home meds        Anemia of chronic kidney failure  Assessment & Plan  · Secondary to end-stage renal disease  · No signs of acute bleeding  · Stable labs    Diabetes mellitus with foot ulcer ([facility] [address] )  Assessment & Plan  · Continue Accu-Cheks AC and HS with sliding scale insulin coverage  · Continue diabetic diet  · Patient otherwise stable    GERD without esophagitis  Assessment & Plan  · Continue PPI    Hypertension  Assessment & Plan  · BP currently stable  · Continue current meds    Nausea  Assessment & Plan  · With vomiting - presently resolved, though she does admit her appetite is subpar  · Likely secondary to gastroparesis  · Counseled on diet modification  · Nutrition consult appreciated    Age-related osteoporosis without current pathological fracture  Assessment & Plan  · X-ray hip-no acute pathology  · PT OT evaluations appreciated, await SNF placement  · Patient with known history of osteoporosis  · Continue home supplementation    Hyperlipidemia  Assessment & Plan  · Continue statin for now        VTE Prophylaxis:  Heparin    Patient Centered Rounds: I have performed bedside rounds with nursing staff today  Discussions with Specialists or Other Care Team Provider:  Nephrology, case management, nursing, pharmacy  Education and Discussions with Family / Patient:  Patient's  was bedside at the time of my evaluation all questions answered to his satisfaction    Current Length of Stay: 6 day(s)    Current Patient Status: Inpatient   Certification Statement: The patient will continue to require additional inpatient hospital stay due to Case management placement issues    Discharge Plan:  Discharge planning is set for tomorrow    Code Status: Level 1 - Full Code    Subjective:   Patient seen and examined no complaints no distress    Objective:     Vitals:   Temp (24hrs), Av 3 °F (35 7 °C), Min:95 °F (35 °C), Max:97 5 °F (36 4 °C)    Temp:  [95 °F (35 °C)-97 5 °F (36 4 °C)] 95 °F (35 °C)  HR:  [52-90] 90  Resp:  [16-18] 18  BP: ()/(36-90) 148/66  SpO2:  [93 %-96 %] 96 %  Body mass index is 28 19 kg/m²  Input and Output Summary (last 24 hours):        Intake/Output Summary (Last 24 hours) at 2020 1619  Last data filed at 2020 1306  Gross per 24 hour   Intake 1260 ml   Output    Net 1260 ml       Physical Exam:   Physical Exam  Constitutional:       General: She is not in acute distress  Appearance: Normal appearance  She is normal weight  She is not ill-appearing  HENT:      Head: Normocephalic and atraumatic  Nose: Nose normal       Mouth/Throat:      Mouth: Mucous membranes are moist    Eyes:      Extraocular Movements: Extraocular movements intact  Pupils: Pupils are equal, round, and reactive to light  Neck:      Musculoskeletal: Normal range of motion and neck supple  No neck rigidity  Cardiovascular:      Rate and Rhythm: Normal rate and regular rhythm  Pulses: Normal pulses  Heart sounds: Normal heart sounds  No murmur  No friction rub  No gallop  Pulmonary:      Effort: Pulmonary effort is normal  No respiratory distress  Breath sounds: Normal breath sounds  No wheezing, rhonchi or rales  Abdominal:      General: There is no distension  Palpations: Abdomen is soft  There is no mass  Tenderness: There is no abdominal tenderness  Hernia: No hernia is present  Musculoskeletal: Normal range of motion  General: No swelling or tenderness  Right lower leg: No edema  Left lower leg: No edema  Skin:     General: Skin is warm  Capillary Refill: Capillary refill takes less than 2 seconds  Findings: No erythema or rash  Neurological:      General: No focal deficit present  Mental Status: She is alert and oriented to person, place, and time  Mental status is at baseline  Cranial Nerves: No cranial nerve deficit  Motor: No weakness     Psychiatric:         Mood and Affect: Mood normal          Behavior: Behavior normal          Additional Data:     Labs:    Results from last 7 days   Lab Units 08/18/20  0453  08/11/20  1630   WBC Thousand/uL 7 60   < > 8 50   HEMOGLOBIN g/dL 10 6*   < > 11 6*   HEMATOCRIT % 31 8*   < > 35 2*   PLATELETS Thousands/uL 191   < > 204   NEUTROS PCT %  --   --  88* LYMPHS PCT %  --   --  4*   LYMPHO PCT % 21  --   --    MONOS PCT %  --   --  6   MONO PCT % 8  --   --    EOS PCT % 2  --  1    < > = values in this interval not displayed  Results from last 7 days   Lab Units 08/18/20  0453  08/11/20  1630   SODIUM mmol/L 132*   < > 137   POTASSIUM mmol/L 4 4   < > 3 2*   CHLORIDE mmol/L 98   < > 95*   CO2 mmol/L 27   < > 33*   BUN mg/dL 31*   < > 14   CREATININE mg/dL 6 16*   < > 2 45*   CALCIUM mg/dL 11 1*   < > 9 6   ALK PHOS U/L  --   --  121   ALT U/L  --   --  7   AST U/L  --   --  13    < > = values in this interval not displayed  Results from last 7 days   Lab Units 08/18/20  1609 08/18/20  1127 08/18/20  0629 08/17/20  2056 08/17/20  1550 08/17/20  1056 08/17/20  0646 08/16/20 2017 08/16/20  1556 08/16/20  1110 08/16/20  0550 08/15/20  2017   POC GLUCOSE mg/dl 230* 130 146* 244* 271* 307* 163* 291* 302* 220* 164* 261*           * I Have Reviewed All Lab Data Listed Above  * Additional Pertinent Lab Tests Reviewed:  [address] Admission  Reviewed    Imaging:  Imaging Reports Reviewed Today Include:  None    Recent Cultures (last 7 days):           Last 24 Hours Medication List:   Current Facility-Administered Medications   Medication Dose Route Frequency Provider Last Rate    acetaminophen  650 mg Oral Q6H PRN Nubia Rodriguez, CRNP      amLODIPine  5 mg Oral Daily Nubia Rodriguez, CRNP      aspirin  81 mg Oral Daily Nubia Rodriguez, CRNP      b complex-vitamin C-folic acid  1 capsule Oral Daily Nubia Rodriguez, CRNP      diphenoxylate-atropine  1 tablet Oral 4x Daily PRN Nubia Rodriguez, CRNP      fluticasone  1 spray Nasal Daily Nubia Rodriguez, CRNP      heparin (porcine)  5,000 Units Subcutaneous Q8H [address] Nubia Rodriguez, CRNP      insulin lispro  1-5 Units Subcutaneous TID AC Nubia Rodriguez, CRNP      insulin lispro  1-5 Units Subcutaneous HS BRY Beltran      melatonin  6 mg Oral HS BRY Taylor      ondansetron  4 mg Intravenous Once Darwyn Hy, CRNP      ondansetron  4 mg Intravenous Q6H PRN Darwyn Hy, CRNP      pantoprazole  40 mg Oral Daily Darwyn Hy, CRNP      pravastatin  80 mg Oral Daily Darwyn Hy, CRNP          Today, Patient Was Seen By: Shyanne Mauricio MD    ** Please Note: Dictation voice to text software may have been used in the creation of this document   **

## 2020-08-18 NOTE — ASSESSMENT & PLAN NOTE
· PT input appreciated - suspect multifactorial given age and comorbidities  · Hopeful discharge planning to the [facility] skilled nursing facility on 08/17/2020  · Case management is working on outpatient dialysis chair issues  · Patient is otherwise medically stable for discharge  · Appreciate PT and OT input    --------------------------  · Update on 08/17/2020 - patient remains medically stable discharge, case management is working on discharge planning to get the patient to a skilled nursing facility and also to ensure that the patient has outpatient dialysis set up with a new company closer to the patient's planned skilled nursing facility  · Will discharge as soon as case management gives us the go ahead    ---------------------------------  Update on 08/18/2020-patient remains medically stable, notified by case management that the patient should be ready for discharge on 08/19/2020  Continue present management until then

## 2020-08-19 VITALS
HEIGHT: 64 IN | DIASTOLIC BLOOD PRESSURE: 68 MMHG | WEIGHT: 164.24 LBS | TEMPERATURE: 97.5 F | BODY MASS INDEX: 28.04 KG/M2 | RESPIRATION RATE: 16 BRPM | OXYGEN SATURATION: 96 % | SYSTOLIC BLOOD PRESSURE: 142 MMHG | HEART RATE: 74 BPM

## 2020-08-19 LAB — GLUCOSE SERPL-MCNC: 157 MG/DL (ref 65–140)

## 2020-08-19 PROCEDURE — 99232 SBSQ HOSP IP/OBS MODERATE 35: CPT | Performed by: INTERNAL MEDICINE

## 2020-08-19 PROCEDURE — 99239 HOSP IP/OBS DSCHRG MGMT >30: CPT | Performed by: HOSPITALIST

## 2020-08-19 PROCEDURE — 82948 REAGENT STRIP/BLOOD GLUCOSE: CPT

## 2020-08-19 RX ORDER — AMLODIPINE BESYLATE 10 MG/1
10 TABLET ORAL DAILY
Qty: 30 TABLET | Refills: 0
Start: 2020-08-20 | End: 2020-12-23 | Stop reason: SDUPTHER

## 2020-08-19 RX ORDER — AMLODIPINE BESYLATE 10 MG/1
10 TABLET ORAL DAILY
Status: DISCONTINUED | OUTPATIENT
Start: 2020-08-20 | End: 2020-08-19 | Stop reason: HOSPADM

## 2020-08-19 RX ORDER — HYDRALAZINE HYDROCHLORIDE 20 MG/ML
20 INJECTION INTRAMUSCULAR; INTRAVENOUS EVERY 6 HOURS PRN
Status: DISCONTINUED | OUTPATIENT
Start: 2020-08-19 | End: 2020-08-19 | Stop reason: HOSPADM

## 2020-08-19 RX ADMIN — ONDANSETRON 4 MG: 2 INJECTION INTRAMUSCULAR; INTRAVENOUS at 08:50

## 2020-08-19 RX ADMIN — FLUTICASONE PROPIONATE 1 SPRAY: 50 SPRAY, METERED NASAL at 08:47

## 2020-08-19 RX ADMIN — HEPARIN SODIUM 5000 UNITS: 5000 INJECTION INTRAVENOUS; SUBCUTANEOUS at 06:12

## 2020-08-19 RX ADMIN — ASPIRIN 81 MG: 81 TABLET, COATED ORAL at 08:45

## 2020-08-19 RX ADMIN — PANTOPRAZOLE SODIUM 40 MG: 40 TABLET, DELAYED RELEASE ORAL at 08:46

## 2020-08-19 RX ADMIN — PRAVASTATIN SODIUM 80 MG: 40 TABLET ORAL at 08:46

## 2020-08-19 RX ADMIN — AMLODIPINE BESYLATE 5 MG: 5 TABLET ORAL at 08:45

## 2020-08-19 RX ADMIN — Medication 1 CAPSULE: at 08:46

## 2020-08-19 RX ADMIN — HYDRALAZINE HYDROCHLORIDE 20 MG: 20 INJECTION INTRAMUSCULAR; INTRAVENOUS at 09:42

## 2020-08-19 NOTE — SOCIAL WORK
Spoke to Sim from [place] who reports the chair time has been cancelled by [facility] and she was denied d/t conflict in schedule  TC to [facility], spoke to Devyn Lao and pt chair times are 12:00 Tue-Thurs-Sat  Pt will need tp be there at 11:40 and is finished with treatment by 3:30  Karilyn Fabry at [facility] who is aware of pt will now be [facility] to [facility] and the chair time  Pt made aware of same  Pt will be transported via [facility] to [facility] at 10 am   Provided RN with number for nurse to nurse report

## 2020-08-19 NOTE — ASSESSMENT & PLAN NOTE
· PT input appreciated - suspect multifactorial given age and comorbidities  · Hopeful discharge planning to the [facility] skilled nursing facility on 08/17/2020  · Case management is working on outpatient dialysis chair issues  · Patient is otherwise medically stable for discharge  · Appreciate PT and OT input    --------------------------  · Update on 08/17/2020 - patient remains medically stable discharge, case management is working on discharge planning to get the patient to a skilled nursing facility and also to ensure that the patient has outpatient dialysis set up with a new company closer to the patient's planned skilled nursing facility  · Will discharge as soon as case management gives us the go ahead    ---------------------------------  Update on 08/18/2020-patient remains medically stable, notified by case management that the patient should be ready for discharge on 08/19/2020  Continue present management until then    --------------------------  · Update on 08/19/2020-notified by case management that the patient is okay to be discharged, they have set the dialysis up at the alternative facility and the patient is okay to be discharged to [facility]  · Discharge today

## 2020-08-19 NOTE — DISCHARGE SUMMARY
Discharge- Gaye Ontiveros 1945, 77 y o  female MRN: [id number]    Unit/Bed#: -02 Encounter: [id number]    Primary Care Provider: Isaac Flanagan DO   Date and time admitted to hospital: 8/11/2020  4:14 PM        * Generalized weakness  Assessment & Plan  · PT input appreciated - suspect multifactorial given age and comorbidities  · Hopeful discharge planning to the [facility] skilled nursing facility on 08/17/2020  · Case management is working on outpatient dialysis chair issues  · Patient is otherwise medically stable for discharge  · Appreciate PT and OT input    --------------------------  · Update on 08/17/2020 - patient remains medically stable discharge, case management is working on discharge planning to get the patient to a skilled nursing facility and also to ensure that the patient has outpatient dialysis set up with a new company closer to the patient's planned skilled nursing facility  · Will discharge as soon as case management gives us the go ahead    ---------------------------------  Update on 08/18/2020-patient remains medically stable, notified by case management that the patient should be ready for discharge on 08/19/2020  Continue present management until then    --------------------------  · Update on 08/19/2020-notified by case management that the patient is okay to be discharged, they have set the dialysis up at the alternative facility and the patient is okay to be discharged to [facility]  · Discharge today    End-stage renal disease on hemodialysis [facility])  Assessment & Plan  · Stable, continue dialysis per Nephrology - Tuesday, Thursday, Saturday  · Continue same meds for the secondary hyperparathyroidism which include PhosLo and Nephrocaps    Congestive heart failure ([facility] [address] )  Assessment & Plan  · Stable at this time  · Will continue dialysis for volume management  · Continue home meds        Anemia of chronic kidney failure  Assessment & Plan  · Secondary to end-stage renal disease  · No signs of acute bleeding  · Stable labs    Diabetes mellitus with foot ulcer ([address] )  Assessment & Plan  · Continue Accu-Cheks AC and HS with sliding scale insulin coverage  · Continue diabetic diet  · Patient otherwise stable    GERD without esophagitis  Assessment & Plan  · Continue PPI    Hypertension  Assessment & Plan  · Periods of accelerated hypertension noted  · Patient is otherwise asymptomatic  · Amlodipine increased from 5 mg at time of arrival to 10 mg at time of departure daily by mouth  · Outpatient follow-up with PCP for further antihypertensive medication optimization    Nausea  Assessment & Plan  · Resolved  · Likely secondary to gastroparesis  · Counseled on diet modification  · Nutrition consult appreciated    Age-related osteoporosis without current pathological fracture  Assessment & Plan  · X-ray hip-no acute pathology  · PT OT evaluations appreciated, await SNF placement  · Patient with known history of osteoporosis  · Continue home supplementation    Hyperlipidemia  Assessment & Plan  · Continue statin for now          Discharging Physician / Practitioner: Desean Becerra MD  PCP: Metta Leyden, DO  Admission Date:   Admission Orders (From admission, onward)     Ordered        08/12/20 1619  Inpatient Admission  Once         08/11/20 1919  Place in Observation  Once                   Discharge Date: 08/19/20    Resolved Problems  Date Reviewed: 8/13/2020    None          Consultations During Hospital Stay:  · Nephrology    Procedures Performed:   · None    Significant Findings / Test Results:   · X-ray left hip-no acute osseous abnormality  · X-ray chest-mild central vascular congestion with otherwise clear lungs  No pneumothorax or pleural effusions  Incidental Findings:   · None     Test Results Pending at Discharge (will require follow up):    · None     Outpatient Tests Requested:  · None    Complications:     None    Reason for Admission:  Generalized weakness    Hospital Course:     Donnamae Najjar is a 77 y o  female patient who originally presented to the hospital on 8/11/2020 due to generalized weakness  Please refer to the initial history and physical examination completed by Patrick Taylor for the initial presenting features and complaints  In brief, the patient is a 78-year-old female was admitted to [facility] after she presented to the hospital emergency room with a chief complaint of generalized weakness  The time of admission the  had stated that he can no longer take care of her because she was having difficulty in maintaining her activities of daily living  A PT OT evaluation was obtained  They felt that she was a good candidate for short-term rehab  Case management got involved  The placement process was started  It was slightly delayed and more complicated in view of the patient having end-stage renal disease and on being on hemodialysis every Tuesday, Thursday and Saturday  They needed to switch her outpatient dialysis chair  Ultimately when all was said and done, she was deemed stable for discharge on 08/19/2020  Consultations during the patient's stay included just nephrology for maintenance hemodialysis  Medication adjustments on this admission included increasing amlodipine from 5 mg p o  Daily to 10 mg p o  Daily for better blood pressure control  She was otherwise discharged to the [facility] on all of her other pre-admission medications at the preadmission dosages  She will follow up in the outpatient setting with her PCP and Nephrology  Please see above list of diagnoses and related plan for additional information       Condition at Discharge: good     Discharge Day Visit / Exam:     Subjective:  Patient seen and examined, no complaints no distress  Vitals: Blood Pressure: (!) 171/71 (08/19/20 0934)  Pulse: 74 (08/19/20 0700)  Temperature: 97 5 °F (36 4 °C) (08/19/20 0700)  Temp Source: Temporal (08/19/20 0700)  Respirations: 16 (08/19/20 0700)  Height: 5' 4" (162 6 cm) (08/11/20 2020)  Weight - Scale: 74 5 kg (164 lb 3 9 oz) (08/19/20 0550)  SpO2: 96 % (08/19/20 0700)  Exam:   Physical Exam  Constitutional:       General: She is not in acute distress  Appearance: Normal appearance  She is normal weight  She is not ill-appearing  HENT:      Head: Normocephalic and atraumatic  Nose: Nose normal       Mouth/Throat:      Mouth: Mucous membranes are moist    Eyes:      Extraocular Movements: Extraocular movements intact  Pupils: Pupils are equal, round, and reactive to light  Neck:      Musculoskeletal: Normal range of motion and neck supple  No neck rigidity  Cardiovascular:      Rate and Rhythm: Normal rate and regular rhythm  Pulses: Normal pulses  Heart sounds: Normal heart sounds  No murmur  No friction rub  No gallop  Pulmonary:      Effort: Pulmonary effort is normal  No respiratory distress  Breath sounds: Normal breath sounds  No wheezing, rhonchi or rales  Abdominal:      General: There is no distension  Palpations: Abdomen is soft  There is no mass  Tenderness: There is no abdominal tenderness  Hernia: No hernia is present  Musculoskeletal: Normal range of motion  General: No swelling or tenderness  Right lower leg: No edema  Left lower leg: No edema  Skin:     General: Skin is warm  Capillary Refill: Capillary refill takes less than 2 seconds  Findings: No erythema or rash  Neurological:      General: No focal deficit present  Mental Status: She is alert and oriented to person, place, and time  Mental status is at baseline  Cranial Nerves: No cranial nerve deficit  Motor: No weakness     Psychiatric:         Mood and Affect: Mood normal          Behavior: Behavior normal          Discussion with Family:  The patient's  was bedside at the time of my discharge evaluation, all questions answered to his satisfaction    Discharge instructions/Information to patient and family:   See after visit summary for information provided to patient and family  Provisions for Follow-Up Care:  See after visit summary for information related to follow-up care and any pertinent home health orders  Disposition:     Leydi Clement at [facility] [address] to [facility] SNF:   · Not Applicable to this Patient - Not Applicable to this Patient    Planned Readmission:    None     Discharge Statement:  I spent 40 minutes discharging the patient  This time was spent on the day of discharge  I had direct contact with the patient on the day of discharge  Greater than 50% of the total time was spent examining patient, answering all patient questions, arranging and discussing plan of care with patient as well as directly providing post-discharge instructions  Additional time then spent on discharge activities  Discharge Medications:  See after visit summary for reconciled discharge medications provided to patient and family        ** Please Note: This note has been constructed using a voice recognition system **

## 2020-08-19 NOTE — PLAN OF CARE
Problem: Potential for Falls  Goal: Patient will remain free of falls  Description: INTERVENTIONS:  - Assess patient frequently for physical needs  -  Identify cognitive and physical deficits and behaviors that affect risk of falls    -  [place] fall precautions as indicated by assessment   - Educate patient/family on patient safety including physical limitations  - Instruct patient to call for assistance with activity based on assessment  - Modify environment to reduce risk of injury  - Consider OT/PT consult to assist with strengthening/mobility  Outcome: Adequate for Discharge     Problem: PAIN - ADULT  Goal: Verbalizes/displays adequate comfort level or baseline comfort level  Description: Interventions:  - Encourage patient to monitor pain and request assistance  - Assess pain using appropriate pain scale  - Administer analgesics based on type and severity of pain and evaluate response  - Implement non-pharmacological measures as appropriate and evaluate response  - Consider cultural and social influences on pain and pain management  - Notify physician/advanced practitioner if interventions unsuccessful or patient reports new pain  Outcome: Adequate for Discharge     Problem: INFECTION - ADULT  Goal: Absence or prevention of progression during hospitalization  Description: INTERVENTIONS:  - Assess and monitor for signs and symptoms of infection  - Monitor lab/diagnostic results  - Monitor all insertion sites, i e  indwelling lines, tubes, and drains  - Monitor endotracheal if appropriate and nasal secretions for changes in amount and color  - [place] appropriate cooling/warming therapies per order  - Administer medications as ordered  - Instruct and encourage patient and family to use good hand hygiene technique  - Identify and instruct in appropriate isolation precautions for identified infection/condition  Outcome: Adequate for Discharge     Problem: SAFETY ADULT  Goal: Patient will remain free of falls  Description: INTERVENTIONS:  - Assess patient frequently for physical needs  -  Identify cognitive and physical deficits and behaviors that affect risk of falls    -  [place] fall precautions as indicated by assessment   - Educate patient/family on patient safety including physical limitations  - Instruct patient to call for assistance with activity based on assessment  - Modify environment to reduce risk of injury  - Consider OT/PT consult to assist with strengthening/mobility  Outcome: Adequate for Discharge  Goal: Maintain or return to baseline ADL function  Description: INTERVENTIONS:  -  Assess patient's ability to carry out ADLs; assess patient's baseline for ADL function and identify physical deficits which impact ability to perform ADLs (bathing, care of mouth/teeth, toileting, grooming, dressing, etc )  - Assess/evaluate cause of self-care deficits   - Assess range of motion  - Assess patient's mobility; develop plan if impaired  - Assess patient's need for assistive devices and provide as appropriate  - Encourage maximum independence but intervene and supervise when necessary  - Involve family in performance of ADLs  - Assess for home care needs following discharge   - Consider OT consult to assist with ADL evaluation and planning for discharge  - Provide patient education as appropriate  Outcome: Adequate for Discharge  Goal: Maintain or return mobility status to optimal level  Description: INTERVENTIONS:  - Assess patient's baseline mobility status (ambulation, transfers, stairs, etc )    - Identify cognitive and physical deficits and behaviors that affect mobility  - Identify mobility aids required to assist with transfers and/or ambulation (gait belt, sit-to-stand, lift, walker, cane, etc )  - [place] fall precautions as indicated by assessment  - Record patient progress and toleration of activity level on Mobility SBAR; progress patient to next Phase/Stage  - Instruct patient to call for assistance with activity based on assessment  - Consider rehabilitation consult to assist with strengthening/weightbearing, etc   Outcome: Adequate for Discharge

## 2020-08-19 NOTE — ASSESSMENT & PLAN NOTE
· Periods of accelerated hypertension noted  · Patient is otherwise asymptomatic  · Amlodipine increased from 5 mg at time of arrival to 10 mg at time of departure daily by mouth  · Outpatient follow-up with PCP for further antihypertensive medication optimization

## 2020-08-19 NOTE — PLAN OF CARE
Problem: Potential for Falls  Goal: Patient will remain free of falls  Description: INTERVENTIONS:  - Assess patient frequently for physical needs  -  Identify cognitive and physical deficits and behaviors that affect risk of falls    -  [place] fall precautions as indicated by assessment   - Educate patient/family on patient safety including physical limitations  - Instruct patient to call for assistance with activity based on assessment  - Modify environment to reduce risk of injury  - Consider OT/PT consult to assist with strengthening/mobility  Outcome: Progressing     Problem: PAIN - ADULT  Goal: Verbalizes/displays adequate comfort level or baseline comfort level  Description: Interventions:  - Encourage patient to monitor pain and request assistance  - Assess pain using appropriate pain scale  - Administer analgesics based on type and severity of pain and evaluate response  - Implement non-pharmacological measures as appropriate and evaluate response  - Consider cultural and social influences on pain and pain management  - Notify physician/advanced practitioner if interventions unsuccessful or patient reports new pain  Outcome: Progressing     Problem: INFECTION - ADULT  Goal: Absence or prevention of progression during hospitalization  Description: INTERVENTIONS:  - Assess and monitor for signs and symptoms of infection  - Monitor lab/diagnostic results  - Monitor all insertion sites, i e  indwelling lines, tubes, and drains  - Monitor endotracheal if appropriate and nasal secretions for changes in amount and color  - [place] appropriate cooling/warming therapies per order  - Administer medications as ordered  - Instruct and encourage patient and family to use good hand hygiene technique  - Identify and instruct in appropriate isolation precautions for identified infection/condition  Outcome: Progressing     Problem: SAFETY ADULT  Goal: Patient will remain free of falls  Description: INTERVENTIONS:  - Assess patient frequently for physical needs  -  Identify cognitive and physical deficits and behaviors that affect risk of falls    -  [place] fall precautions as indicated by assessment   - Educate patient/family on patient safety including physical limitations  - Instruct patient to call for assistance with activity based on assessment  - Modify environment to reduce risk of injury  - Consider OT/PT consult to assist with strengthening/mobility  Outcome: Progressing  Goal: Maintain or return to baseline ADL function  Description: INTERVENTIONS:  -  Assess patient's ability to carry out ADLs; assess patient's baseline for ADL function and identify physical deficits which impact ability to perform ADLs (bathing, care of mouth/teeth, toileting, grooming, dressing, etc )  - Assess/evaluate cause of self-care deficits   - Assess range of motion  - Assess patient's mobility; develop plan if impaired  - Assess patient's need for assistive devices and provide as appropriate  - Encourage maximum independence but intervene and supervise when necessary  - Involve family in performance of ADLs  - Assess for home care needs following discharge   - Consider OT consult to assist with ADL evaluation and planning for discharge  - Provide patient education as appropriate  Outcome: Progressing  Goal: Maintain or return mobility status to optimal level  Description: INTERVENTIONS:  - Assess patient's baseline mobility status (ambulation, transfers, stairs, etc )    - Identify cognitive and physical deficits and behaviors that affect mobility  - Identify mobility aids required to assist with transfers and/or ambulation (gait belt, sit-to-stand, lift, walker, cane, etc )  - [place] fall precautions as indicated by assessment  - Record patient progress and toleration of activity level on Mobility SBAR; progress patient to next Phase/Stage  - Instruct patient to call for assistance with activity based on assessment  - Consider rehabilitation consult to assist with strengthening/weightbearing, etc   Outcome: Progressing     Problem: DISCHARGE PLANNING  Goal: Discharge to home or other facility with appropriate resources  Description: INTERVENTIONS:  - Identify barriers to discharge w/patient and caregiver  - Arrange for needed discharge resources and transportation as appropriate  - Identify discharge learning needs (meds, wound care, etc )  - Arrange for interpretive services to assist at discharge as needed  - Refer to Case Management Department for coordinating discharge planning if the patient needs post-hospital services based on physician/advanced practitioner order or complex needs related to functional status, cognitive ability, or social support system  Outcome: Progressing     Problem: Knowledge Deficit  Goal: Patient/family/caregiver demonstrates understanding of disease process, treatment plan, medications, and discharge instructions  Description: Complete learning assessment and assess knowledge base    Interventions:  - Provide teaching at level of understanding  - Provide teaching via preferred learning methods  Outcome: Progressing     Problem: CARDIOVASCULAR - ADULT  Goal: Maintains optimal cardiac output and hemodynamic stability  Description: INTERVENTIONS:  - Monitor I/O, vital signs and rhythm  - Monitor for S/S and trends of decreased cardiac output  - Administer and titrate ordered vasoactive medications to optimize hemodynamic stability  - Assess quality of pulses, skin color and temperature  - Assess for signs of decreased coronary artery perfusion  - Instruct patient to report change in severity of symptoms  Outcome: Progressing     Problem: GASTROINTESTINAL - ADULT  Goal: Maintains adequate nutritional intake  Description: INTERVENTIONS:  - Monitor percentage of each meal consumed  - Identify factors contributing to decreased intake, treat as appropriate  - Assist with meals as needed  - Monitor I&O, weight, and lab values if indicated  - Obtain nutrition services referral as needed  Outcome: Progressing     Problem: METABOLIC, FLUID AND ELECTROLYTES - ADULT  Goal: Electrolytes maintained within normal limits  Description: INTERVENTIONS:  - Monitor labs and assess patient for signs and symptoms of electrolyte imbalances  - Administer electrolyte replacement as ordered  - Monitor response to electrolyte replacements, including repeat lab results as appropriate  - Instruct patient on fluid and nutrition as appropriate  Outcome: Progressing  Goal: Fluid balance maintained  Description: INTERVENTIONS:  - Monitor labs   - Monitor I/O and WT  - Instruct patient on fluid and nutrition as appropriate  - Assess for signs & symptoms of volume excess or deficit  Outcome: Progressing     Problem: SKIN/TISSUE INTEGRITY - ADULT  Goal: Skin integrity remains intact  Description: INTERVENTIONS  - Identify patients at risk for skin breakdown  - Assess and monitor skin integrity  - Assess and monitor nutrition and hydration status  - Monitor labs (i e  albumin)  - Assess for incontinence   - Turn and reposition patient  - Assist with mobility/ambulation  - Relieve pressure over bony prominences  - Avoid friction and shearing  - Provide appropriate hygiene as needed including keeping skin clean and dry  - Evaluate need for skin moisturizer/barrier cream  - Collaborate with interdisciplinary team (i e  Nutrition, Rehabilitation, etc )   - Patient/family teaching  Outcome: Progressing     Problem: Nutrition/Hydration-ADULT  Goal: Nutrient/Hydration intake appropriate for improving, restoring or maintaining nutritional needs  Description: Monitor and assess patient's nutrition/hydration status for malnutrition  Collaborate with interdisciplinary team and initiate plan and interventions as ordered  Monitor patient's weight and dietary intake as ordered or per policy  Utilize nutrition screening tool and intervene as necessary   Determine patient's food preferences and provide high-protein, high-caloric foods as appropriate       INTERVENTIONS:  - Monitor oral intake, urinary output, labs, and treatment plans  - Assess nutrition and hydration status and recommend course of action  - Evaluate amount of meals eaten  - Assist patient with eating if necessary   - Allow adequate time for meals  - Recommend/ encourage appropriate diets, oral nutritional supplements, and vitamin/mineral supplements  - Order, calculate, and assess calorie counts as needed  - Recommend, monitor, and adjust tube feedings and TPN/PPN based on assessed needs  - Assess need for intravenous fluids  - Provide specific nutrition/hydration education as appropriate  - Include patient/family/caregiver in decisions related to nutrition  Outcome: Progressing     Problem: Prexisting or High Potential for Compromised Skin Integrity  Goal: Skin integrity is maintained or improved  Description: INTERVENTIONS:  - Identify patients at risk for skin breakdown  - Assess and monitor skin integrity  - Assess and monitor nutrition and hydration status  - Monitor labs   - Assess for incontinence   - Turn and reposition patient  - Assist with mobility/ambulation  - Relieve pressure over bony prominences  - Avoid friction and shearing  - Provide appropriate hygiene as needed including keeping skin clean and dry  - Evaluate need for skin moisturizer/barrier cream  - Collaborate with interdisciplinary team   - Patient/family teaching  - Consider wound care consult   Outcome: Progressing

## 2020-08-19 NOTE — ASSESSMENT & PLAN NOTE
· Resolved  · Likely secondary to gastroparesis  · Counseled on diet modification  · Nutrition consult appreciated

## 2020-08-19 NOTE — NURSING NOTE
ASSISTED FROM COMMODE TO BED  POOR TRANSFER  MAX ASSIST X2  Evan Po LBM WAS NOW, AND SOFT BROWN  DENIES PAIN  LEFT AV FISTULA SITE, DRY AND INTACT, WITH POSITIVE BRUIT/THRILL  CALL VEGA GIVEN  NO C/O VOICED

## 2020-08-19 NOTE — PROGRESS NOTES
Progress Note - Nephrology   Jaci Gonzales 77 y o  female MRN: [id number]  Unit/Bed#: -02 Encounter: [id number]    A/P:  1  End-stage renal disease                patient with hemodialysis session yesterday, about 2 2 L removed via ultrafiltration  2  Anemia of chronic kidney disease              Continue monitor hemoglobin, no BOOGIE at this time  3  Secondary hyperparathyroidism                phosphorus level increased up to 2 3 mg/dL from 1 5 mg/dL  Due to hypercalcemia, if she is to restart a phosphorus binder it should be non calcium containing binder  4  Hypercalcemia              Calcium Acetate was discontinued, continue monitor serum calcium levels  Further workup as indicated in the outpatient setting  5  Diabetic nephropathy likely  6  Peripheral vascular disease  7   History coronary artery disease status post coronary artery bypass grafting        Follow up reason for today's visit:  End-stage renal disease/anemia chronic kidney disease/secondary hyperparathyroidism/electrolyte disorders    Generalized weakness    Patient Active Problem List   Diagnosis    Allergic rhinitis    Anemia of chronic kidney failure    Arteriosclerosis of carotid artery    Cardiovascular arteriosclerosis    Cataract    Congestive heart failure ([address] )    Diabetes mellitus with foot ulcer (HCC)    Diastolic dysfunction    End-stage renal disease on hemodialysis ([address] )    GERD without esophagitis    Hyperlipidemia    Hypertension    Iron deficiency anemia    Overactive bladder    Peripheral arterial disease (HCC)    Age-related osteoporosis without current pathological fracture    Secondary hyperparathyroidism ([address] )    Type 2 diabetes mellitus with renal manifestations, controlled ([address] )    Vitamin D deficiency    Acquired absence of left great toe (HCC)    Thrombocytopenia (HCC)    Generalized weakness    Nausea         Subjective:   No acute events overnight    Objective:     Vitals: Blood pressure (!) 195/79, pulse 74, temperature 97 5 °F (36 4 °C), temperature source Temporal, resp  rate 16, height 5' 4" (1 626 m), weight 74 5 kg (164 lb 3 9 oz), SpO2 96 %, not currently breastfeeding  ,Body mass index is 28 19 kg/m²  Weight (last 2 days)     Date/Time   Weight    08/19/20 0550   74 5 (164 24)    08/18/20 0600   74 5 (164 24)    08/17/20 0600   74 6 (164 46)                Intake/Output Summary (Last 24 hours) at 8/19/2020 0837  Last data filed at 8/18/2020 1719  Gross per 24 hour   Intake 1240 ml   Output    Net 1240 ml     I/O last 3 completed shifts: In: 1240 [P O :340; I V :500; Other:400]  Out: -          Physical Exam: BP (!) 195/79 (BP Location: Right arm)   Pulse 74   Temp 97 5 °F (36 4 °C) (Temporal)   Resp 16   Ht 5' 4" (1 626 m)   Wt 74 5 kg (164 lb 3 9 oz)   SpO2 96%   Breastfeeding No   BMI 28 19 kg/m²     General Appearance:    Alert, cooperative, no distress, appears stated age   Head:    Normocephalic, without obvious abnormality, atraumatic   Eyes:    Conjunctiva/corneas clear   Ears:    Normal external ears   Nose:   Nares normal, septum midline, mucosa normal, no drainage    or sinus tenderness   Throat:   Lips, mucosa, and tongue normal; teeth and gums normal   Neck:   Supple   Back:     Symmetric, no curvature, ROM normal, no CVA tenderness   Lungs:     Clear to auscultation bilaterally, respirations unlabored   Chest wall:    No tenderness or deformity   Heart:    Regular rate and rhythm, S1 and S2 normal, no murmur, rub   or gallop   Abdomen:     Soft, non-tender, bowel sounds active   Extremities:   Extremities normal, atraumatic, no cyanosis or edema   Skin:   Skin color, texture, turgor normal, no rashes or lesions   Lymph nodes:   Cervical normal   Neurologic:   CNII-XII intact            Lab, Imaging and other studies: I have personally reviewed pertinent labs    CBC: No results found for: WBC, HGB, HCT, MCV, PLT, ADJUSTEDWBC, MCH, MCHC, RDW, MPV, NRBC  CMP: No results found for: NA, K, CL, CO2, ANIONGAP, BUN, CREATININE, GLUCOSE, CALCIUM, AST, ALT, ALKPHOS, PROT, BILITOT, EGFR      Results from last 7 days   Lab Units 08/18/20  0453 08/15/20  1308   POTASSIUM mmol/L 4 4 4 6   CHLORIDE mmol/L 98 100   CO2 mmol/L 27 28   BUN mg/dL 31* 26*   CREATININE mg/dL 6 16* 5 45*   CALCIUM mg/dL 11 1* 10 8*         Phosphorus: No results found for: PHOS  Magnesium: No results found for: MG  Urinalysis: No results found for: COLORU, CLARITYU, SPECGRAV, PHUR, LEUKOCYTESUR, NITRITE, PROTEINUA, GLUCOSEU, KETONESU, BILIRUBINUR, BLOODU  Ionized Calcium: No results found for: CAION  Coagulation: No results found for: PT, INR, APTT  Troponin: No results found for: TROPONINI  ABG: No results found for: PHART, GNH8FZF, PO2ART, SJT6OYS, E2CDECGO, BEART, SOURCE  Radiology review:     IMAGING  No results found      Current Facility-Administered Medications   Medication Dose Route Frequency    acetaminophen (TYLENOL) tablet 650 mg  650 mg Oral Q6H PRN    amLODIPine (NORVASC) tablet 5 mg  5 mg Oral Daily    aspirin (ECOTRIN LOW STRENGTH) EC tablet 81 mg  81 mg Oral Daily    b complex-vitamin C-folic acid (NEPHROCAPS) capsule 1 capsule  1 capsule Oral Daily    diphenoxylate-atropine (LOMOTIL) 2 5-0 025 mg per tablet 1 tablet  1 tablet Oral 4x Daily PRN    fluticasone (FLONASE) 50 mcg/act nasal spray 1 spray  1 spray Nasal Daily    heparin (porcine) subcutaneous injection 5,000 Units  5,000 Units Subcutaneous Q8H [address]    insulin lispro (HumaLOG) 100 units/mL subcutaneous injection 1-5 Units  1-5 Units Subcutaneous TID AC    insulin lispro (HumaLOG) 100 units/mL subcutaneous injection 1-5 Units  1-5 Units Subcutaneous HS    melatonin tablet 6 mg  6 mg Oral HS PRN    ondansetron (ZOFRAN) injection 4 mg  4 mg Intravenous Once    ondansetron (ZOFRAN) injection 4 mg  4 mg Intravenous Q6H PRN    pantoprazole (PROTONIX) EC tablet 40 mg  40 mg Oral Daily    pravastatin (PRAVACHOL) tablet 80 mg  80 mg Oral Daily     Medications Discontinued During This Encounter   Medication Reason    guaiFENesin (MUCINEX) 12 hr tablet 600 mg     nateglinide (STARLIX) tablet 120 mg     melatonin tablet 6 mg     calcium acetate (PHOSLO) capsule 1,334 mg        Zenaida Esqueda,       This progress note was produced in part using a dictation device which may document imprecise wording from author's original intent

## 2020-08-19 NOTE — NURSING NOTE
Pt discharged to [facility]  [facility]  Pt denies pain, denies shortness of breath  Report given to handy [facility]  Discharge instructions read and explained to pt  and reciving facility, all questions answered, iv removed without complications and tip intact  Pt assisted into w/c of transporter  All belongings with pt

## 2020-08-21 ENCOUNTER — TRANSITIONAL CARE MANAGEMENT (OUTPATIENT)
Dept: INTERNAL MEDICINE CLINIC | Facility: CLINIC | Age: 75
End: 2020-08-21

## 2020-09-14 ENCOUNTER — TELEPHONE (OUTPATIENT)
Dept: INTERNAL MEDICINE CLINIC | Facility: CLINIC | Age: 75
End: 2020-09-14

## 2020-09-14 NOTE — TELEPHONE ENCOUNTER
cancelled pt's TCM appt for 9-18-20, pt can't get in and out of car yet, too weak, pt has home health going in

## 2020-09-16 ENCOUNTER — TELEPHONE (OUTPATIENT)
Dept: INTERNAL MEDICINE CLINIC | Facility: CLINIC | Age: 75
End: 2020-09-16

## 2020-10-21 DIAGNOSIS — E11.8 TYPE 2 DIABETES MELLITUS WITH COMPLICATION (HCC): ICD-10-CM

## 2020-10-21 RX ORDER — NATEGLINIDE 120 MG/1
TABLET ORAL
Qty: 120 TABLET | Refills: 5 | Status: SHIPPED | OUTPATIENT
Start: 2020-10-21 | End: 2021-01-01

## 2020-11-16 ENCOUNTER — TRANSCRIBE ORDERS (OUTPATIENT)
Dept: LAB | Facility: CLINIC | Age: 75
End: 2020-11-16

## 2020-11-20 ENCOUNTER — OFFICE VISIT (OUTPATIENT)
Dept: INTERNAL MEDICINE CLINIC | Facility: CLINIC | Age: 75
End: 2020-11-20
Payer: MEDICARE

## 2020-11-20 VITALS — HEART RATE: 76 BPM | SYSTOLIC BLOOD PRESSURE: 124 MMHG | TEMPERATURE: 96 F | DIASTOLIC BLOOD PRESSURE: 56 MMHG

## 2020-11-20 DIAGNOSIS — Z13.31 NEGATIVE DEPRESSION SCREENING: ICD-10-CM

## 2020-11-20 DIAGNOSIS — E55.9 VITAMIN D DEFICIENCY, UNSPECIFIED: ICD-10-CM

## 2020-11-20 DIAGNOSIS — Z23 ENCOUNTER FOR VACCINATION: ICD-10-CM

## 2020-11-20 DIAGNOSIS — Z99.2 TYPE 2 DIABETES MELLITUS WITH CHRONIC KIDNEY DISEASE ON CHRONIC DIALYSIS, WITH LONG-TERM CURRENT USE OF INSULIN (HCC): Primary | ICD-10-CM

## 2020-11-20 DIAGNOSIS — K21.9 GASTRO-ESOPHAGEAL REFLUX DISEASE WITHOUT ESOPHAGITIS: ICD-10-CM

## 2020-11-20 DIAGNOSIS — N18.6 TYPE 2 DIABETES MELLITUS WITH CHRONIC KIDNEY DISEASE ON CHRONIC DIALYSIS, WITH LONG-TERM CURRENT USE OF INSULIN (HCC): Primary | ICD-10-CM

## 2020-11-20 DIAGNOSIS — Z00.00 MEDICARE ANNUAL WELLNESS VISIT, SUBSEQUENT: ICD-10-CM

## 2020-11-20 DIAGNOSIS — Z78.0 POSTMENOPAUSAL STATUS (AGE-RELATED) (NATURAL): ICD-10-CM

## 2020-11-20 DIAGNOSIS — N18.6 ANEMIA IN CHRONIC KIDNEY DISEASE, ON CHRONIC DIALYSIS (HCC): ICD-10-CM

## 2020-11-20 DIAGNOSIS — I10 ESSENTIAL (PRIMARY) HYPERTENSION: ICD-10-CM

## 2020-11-20 DIAGNOSIS — Z99.2 ANEMIA IN CHRONIC KIDNEY DISEASE, ON CHRONIC DIALYSIS (HCC): ICD-10-CM

## 2020-11-20 DIAGNOSIS — D50.9 IRON DEFICIENCY ANEMIA, UNSPECIFIED IRON DEFICIENCY ANEMIA TYPE: ICD-10-CM

## 2020-11-20 DIAGNOSIS — Z99.2 END-STAGE RENAL DISEASE ON HEMODIALYSIS (HCC): ICD-10-CM

## 2020-11-20 DIAGNOSIS — M81.0 AGE-RELATED OSTEOPOROSIS WITHOUT CURRENT PATHOLOGICAL FRACTURE: ICD-10-CM

## 2020-11-20 DIAGNOSIS — I73.9 PERIPHERAL ARTERIAL DISEASE (HCC): ICD-10-CM

## 2020-11-20 DIAGNOSIS — Z12.11 ENCOUNTER FOR SCREENING FECAL OCCULT BLOOD TESTING: ICD-10-CM

## 2020-11-20 DIAGNOSIS — D63.1 ANEMIA IN CHRONIC KIDNEY DISEASE, ON CHRONIC DIALYSIS (HCC): ICD-10-CM

## 2020-11-20 DIAGNOSIS — I50.9 CHRONIC CONGESTIVE HEART FAILURE, UNSPECIFIED HEART FAILURE TYPE (HCC): ICD-10-CM

## 2020-11-20 DIAGNOSIS — Z01.00 DIABETIC EYE EXAM (HCC): ICD-10-CM

## 2020-11-20 DIAGNOSIS — E28.39 MENOPAUSE OVARIAN FAILURE: ICD-10-CM

## 2020-11-20 DIAGNOSIS — F03.90 DEMENTIA WITHOUT BEHAVIORAL DISTURBANCE, UNSPECIFIED DEMENTIA TYPE (HCC): ICD-10-CM

## 2020-11-20 DIAGNOSIS — E11.9 DIABETIC EYE EXAM (HCC): ICD-10-CM

## 2020-11-20 DIAGNOSIS — N18.6 END-STAGE RENAL DISEASE ON HEMODIALYSIS (HCC): ICD-10-CM

## 2020-11-20 DIAGNOSIS — I25.10 CARDIOVASCULAR ARTERIOSCLEROSIS: ICD-10-CM

## 2020-11-20 DIAGNOSIS — E78.49 OTHER HYPERLIPIDEMIA: ICD-10-CM

## 2020-11-20 DIAGNOSIS — N25.81 SECONDARY HYPERPARATHYROIDISM OF RENAL ORIGIN (HCC): ICD-10-CM

## 2020-11-20 DIAGNOSIS — Z12.11 SCREEN FOR COLON CANCER: ICD-10-CM

## 2020-11-20 DIAGNOSIS — E11.22 TYPE 2 DIABETES MELLITUS WITH CHRONIC KIDNEY DISEASE ON CHRONIC DIALYSIS, WITH LONG-TERM CURRENT USE OF INSULIN (HCC): Primary | ICD-10-CM

## 2020-11-20 DIAGNOSIS — Z79.4 TYPE 2 DIABETES MELLITUS WITH CHRONIC KIDNEY DISEASE ON CHRONIC DIALYSIS, WITH LONG-TERM CURRENT USE OF INSULIN (HCC): Primary | ICD-10-CM

## 2020-11-20 LAB — SL AMB POCT HEMOGLOBIN AIC: 6.7 (ref ?–6.5)

## 2020-11-20 PROCEDURE — 99214 OFFICE O/P EST MOD 30 MIN: CPT | Performed by: INTERNAL MEDICINE

## 2020-11-20 PROCEDURE — 83036 HEMOGLOBIN GLYCOSYLATED A1C: CPT | Performed by: INTERNAL MEDICINE

## 2020-11-20 PROCEDURE — G0439 PPPS, SUBSEQ VISIT: HCPCS | Performed by: INTERNAL MEDICINE

## 2020-12-01 ENCOUNTER — LAB (OUTPATIENT)
Dept: LAB | Facility: CLINIC | Age: 75
End: 2020-12-01
Payer: MEDICARE

## 2020-12-01 DIAGNOSIS — Z12.11 ENCOUNTER FOR SCREENING FECAL OCCULT BLOOD TESTING: ICD-10-CM

## 2020-12-01 LAB — HEMOCCULT STL QL IA: POSITIVE

## 2020-12-01 PROCEDURE — G0328 FECAL BLOOD SCRN IMMUNOASSAY: HCPCS

## 2020-12-03 DIAGNOSIS — R19.5 POSITIVE FECAL IMMUNOCHEMICAL TEST: Primary | ICD-10-CM

## 2020-12-23 DIAGNOSIS — I10 ESSENTIAL HYPERTENSION: ICD-10-CM

## 2020-12-23 RX ORDER — AMLODIPINE BESYLATE 10 MG/1
10 TABLET ORAL DAILY
Qty: 90 TABLET | Refills: 1 | Status: SHIPPED | OUTPATIENT
Start: 2020-12-23 | End: 2021-01-01

## 2021-01-01 ENCOUNTER — APPOINTMENT (OUTPATIENT)
Dept: DIALYSIS | Facility: HOSPITAL | Age: 76
DRG: 637 | End: 2021-01-01
Payer: MEDICARE

## 2021-01-01 ENCOUNTER — APPOINTMENT (OUTPATIENT)
Dept: LAB | Facility: CLINIC | Age: 76
End: 2021-01-01
Payer: COMMERCIAL

## 2021-01-01 ENCOUNTER — APPOINTMENT (EMERGENCY)
Dept: CT IMAGING | Facility: HOSPITAL | Age: 76
DRG: 291 | End: 2021-01-01
Payer: MEDICARE

## 2021-01-01 ENCOUNTER — IMMUNIZATIONS (OUTPATIENT)
Dept: FAMILY MEDICINE CLINIC | Facility: HOSPITAL | Age: 76
End: 2021-01-01

## 2021-01-01 ENCOUNTER — TELEPHONE (OUTPATIENT)
Dept: INTERNAL MEDICINE CLINIC | Facility: CLINIC | Age: 76
End: 2021-01-01

## 2021-01-01 ENCOUNTER — APPOINTMENT (EMERGENCY)
Dept: CT IMAGING | Facility: HOSPITAL | Age: 76
DRG: 637 | End: 2021-01-01
Payer: MEDICARE

## 2021-01-01 ENCOUNTER — APPOINTMENT (INPATIENT)
Dept: DIALYSIS | Facility: HOSPITAL | Age: 76
DRG: 637 | End: 2021-01-01
Attending: STUDENT IN AN ORGANIZED HEALTH CARE EDUCATION/TRAINING PROGRAM
Payer: MEDICARE

## 2021-01-01 ENCOUNTER — TRANSITIONAL CARE MANAGEMENT (OUTPATIENT)
Dept: INTERNAL MEDICINE CLINIC | Facility: CLINIC | Age: 76
End: 2021-01-01

## 2021-01-01 ENCOUNTER — HOSPITAL ENCOUNTER (EMERGENCY)
Facility: HOSPITAL | Age: 76
DRG: 291 | End: 2021-10-15
Attending: EMERGENCY MEDICINE | Admitting: EMERGENCY MEDICINE
Payer: MEDICARE

## 2021-01-01 ENCOUNTER — APPOINTMENT (EMERGENCY)
Dept: RADIOLOGY | Facility: HOSPITAL | Age: 76
DRG: 637 | End: 2021-01-01
Payer: MEDICARE

## 2021-01-01 ENCOUNTER — APPOINTMENT (INPATIENT)
Dept: DIALYSIS | Facility: HOSPITAL | Age: 76
DRG: 947 | End: 2021-01-01
Payer: MEDICARE

## 2021-01-01 ENCOUNTER — APPOINTMENT (OUTPATIENT)
Dept: LAB | Facility: CLINIC | Age: 76
End: 2021-01-01
Payer: MEDICARE

## 2021-01-01 ENCOUNTER — HOSPITAL ENCOUNTER (OUTPATIENT)
Dept: NON INVASIVE DIAGNOSTICS | Facility: HOSPITAL | Age: 76
Discharge: HOME | End: 2021-10-20
Payer: MEDICARE

## 2021-01-01 ENCOUNTER — HOSPITAL ENCOUNTER (OUTPATIENT)
Dept: BONE DENSITY | Facility: HOSPITAL | Age: 76
Discharge: HOME | End: 2021-03-31
Attending: INTERNAL MEDICINE
Payer: MEDICARE

## 2021-01-01 ENCOUNTER — APPOINTMENT (EMERGENCY)
Dept: RADIOLOGY | Facility: HOSPITAL | Age: 76
End: 2021-01-01
Payer: MEDICARE

## 2021-01-01 ENCOUNTER — OFFICE VISIT (OUTPATIENT)
Dept: INTERNAL MEDICINE CLINIC | Facility: CLINIC | Age: 76
End: 2021-01-01
Payer: MEDICARE

## 2021-01-01 ENCOUNTER — OFFICE VISIT (OUTPATIENT)
Dept: CARDIOLOGY CLINIC | Facility: CLINIC | Age: 76
End: 2021-01-01
Payer: MEDICARE

## 2021-01-01 ENCOUNTER — HOSPITAL ENCOUNTER (EMERGENCY)
Facility: HOSPITAL | Age: 76
Discharge: HOME | End: 2021-08-16
Attending: EMERGENCY MEDICINE | Admitting: EMERGENCY MEDICINE
Payer: MEDICARE

## 2021-01-01 ENCOUNTER — HOSPITAL ENCOUNTER (INPATIENT)
Facility: HOSPITAL | Age: 76
LOS: 1 days | Discharge: HOME | DRG: 291 | End: 2021-10-15
Attending: INTERNAL MEDICINE | Admitting: INTERNAL MEDICINE
Payer: MEDICARE

## 2021-01-01 ENCOUNTER — HOSPITAL ENCOUNTER (OUTPATIENT)
Dept: MAMMOGRAPHY | Facility: HOSPITAL | Age: 76
Discharge: HOME | End: 2021-04-05
Attending: INTERNAL MEDICINE
Payer: MEDICARE

## 2021-01-01 ENCOUNTER — APPOINTMENT (EMERGENCY)
Dept: CT IMAGING | Facility: HOSPITAL | Age: 76
End: 2021-01-01
Payer: MEDICARE

## 2021-01-01 ENCOUNTER — LAB (OUTPATIENT)
Dept: LAB | Facility: CLINIC | Age: 76
End: 2021-01-01
Payer: MEDICARE

## 2021-01-01 ENCOUNTER — HOSPITAL ENCOUNTER (INPATIENT)
Facility: HOSPITAL | Age: 76
LOS: 2 days | Discharge: NURSING, REHAB OR LONG-TERM CARE | DRG: 637 | End: 2021-12-03
Attending: EMERGENCY MEDICINE | Admitting: INTERNAL MEDICINE
Payer: MEDICARE

## 2021-01-01 ENCOUNTER — PATIENT OUTREACH (OUTPATIENT)
Dept: INTERNAL MEDICINE CLINIC | Facility: CLINIC | Age: 76
End: 2021-01-01

## 2021-01-01 ENCOUNTER — HOSPITAL ENCOUNTER (INPATIENT)
Facility: HOSPITAL | Age: 76
LOS: 8 days | Discharge: NURSING, REHAB OR LONG-TERM CARE | DRG: 947 | End: 2021-11-19
Attending: EMERGENCY MEDICINE | Admitting: HOSPITALIST
Payer: MEDICARE

## 2021-01-01 ENCOUNTER — HOSPITAL ENCOUNTER (OUTPATIENT)
Facility: HOSPITAL | Age: 76
Setting detail: OBSERVATION
Discharge: HOME | End: 2021-08-18
Attending: EMERGENCY MEDICINE | Admitting: STUDENT IN AN ORGANIZED HEALTH CARE EDUCATION/TRAINING PROGRAM
Payer: MEDICARE

## 2021-01-01 VITALS
HEIGHT: 64 IN | DIASTOLIC BLOOD PRESSURE: 64 MMHG | TEMPERATURE: 98.1 F | RESPIRATION RATE: 18 BRPM | HEART RATE: 89 BPM | WEIGHT: 158.51 LBS | BODY MASS INDEX: 27.06 KG/M2 | SYSTOLIC BLOOD PRESSURE: 162 MMHG | OXYGEN SATURATION: 99 %

## 2021-01-01 VITALS
DIASTOLIC BLOOD PRESSURE: 66 MMHG | TEMPERATURE: 98 F | WEIGHT: 152.56 LBS | SYSTOLIC BLOOD PRESSURE: 155 MMHG | RESPIRATION RATE: 22 BRPM | HEART RATE: 88 BPM | HEIGHT: 64 IN | BODY MASS INDEX: 26.05 KG/M2 | OXYGEN SATURATION: 94 %

## 2021-01-01 VITALS
HEIGHT: 64 IN | OXYGEN SATURATION: 100 % | TEMPERATURE: 98.4 F | DIASTOLIC BLOOD PRESSURE: 84 MMHG | BODY MASS INDEX: 26.46 KG/M2 | WEIGHT: 155 LBS | RESPIRATION RATE: 18 BRPM | SYSTOLIC BLOOD PRESSURE: 161 MMHG | HEART RATE: 85 BPM

## 2021-01-01 VITALS
SYSTOLIC BLOOD PRESSURE: 142 MMHG | WEIGHT: 154 LBS | SYSTOLIC BLOOD PRESSURE: 132 MMHG | BODY MASS INDEX: 24.84 KG/M2 | DIASTOLIC BLOOD PRESSURE: 68 MMHG | RESPIRATION RATE: 18 BRPM | OXYGEN SATURATION: 100 % | HEIGHT: 64 IN | OXYGEN SATURATION: 98 % | HEART RATE: 68 BPM | HEART RATE: 90 BPM | TEMPERATURE: 97.8 F | DIASTOLIC BLOOD PRESSURE: 82 MMHG | BODY MASS INDEX: 26.29 KG/M2 | HEIGHT: 64 IN | RESPIRATION RATE: 20 BRPM | WEIGHT: 145.5 LBS | TEMPERATURE: 97.4 F

## 2021-01-01 VITALS
SYSTOLIC BLOOD PRESSURE: 146 MMHG | TEMPERATURE: 96.1 F | HEART RATE: 68 BPM | DIASTOLIC BLOOD PRESSURE: 64 MMHG | OXYGEN SATURATION: 96 %

## 2021-01-01 VITALS
BODY MASS INDEX: 27.48 KG/M2 | HEART RATE: 83 BPM | DIASTOLIC BLOOD PRESSURE: 76 MMHG | RESPIRATION RATE: 18 BRPM | WEIGHT: 160.94 LBS | OXYGEN SATURATION: 97 % | SYSTOLIC BLOOD PRESSURE: 184 MMHG | TEMPERATURE: 98 F | HEIGHT: 64 IN

## 2021-01-01 VITALS
OXYGEN SATURATION: 96 % | HEART RATE: 90 BPM | DIASTOLIC BLOOD PRESSURE: 70 MMHG | RESPIRATION RATE: 22 BRPM | BODY MASS INDEX: 27.46 KG/M2 | HEIGHT: 63 IN | TEMPERATURE: 98 F | SYSTOLIC BLOOD PRESSURE: 180 MMHG

## 2021-01-01 VITALS — HEIGHT: 64 IN | WEIGHT: 154 LBS | BODY MASS INDEX: 26.29 KG/M2

## 2021-01-01 VITALS
HEIGHT: 63 IN | HEART RATE: 80 BPM | BODY MASS INDEX: 27.46 KG/M2 | DIASTOLIC BLOOD PRESSURE: 58 MMHG | SYSTOLIC BLOOD PRESSURE: 122 MMHG

## 2021-01-01 VITALS
HEIGHT: 64 IN | TEMPERATURE: 97.7 F | BODY MASS INDEX: 27.49 KG/M2 | SYSTOLIC BLOOD PRESSURE: 132 MMHG | OXYGEN SATURATION: 98 % | DIASTOLIC BLOOD PRESSURE: 58 MMHG | RESPIRATION RATE: 18 BRPM | HEART RATE: 89 BPM | WEIGHT: 161 LBS

## 2021-01-01 VITALS
DIASTOLIC BLOOD PRESSURE: 70 MMHG | HEART RATE: 86 BPM | OXYGEN SATURATION: 97 % | TEMPERATURE: 97.8 F | SYSTOLIC BLOOD PRESSURE: 138 MMHG

## 2021-01-01 VITALS
BODY MASS INDEX: 25.95 KG/M2 | SYSTOLIC BLOOD PRESSURE: 148 MMHG | WEIGHT: 152 LBS | DIASTOLIC BLOOD PRESSURE: 60 MMHG | TEMPERATURE: 97.3 F | HEART RATE: 81 BPM | OXYGEN SATURATION: 98 % | HEIGHT: 64 IN | RESPIRATION RATE: 18 BRPM

## 2021-01-01 VITALS
SYSTOLIC BLOOD PRESSURE: 138 MMHG | HEART RATE: 92 BPM | HEIGHT: 63 IN | BODY MASS INDEX: 27.46 KG/M2 | WEIGHT: 155 LBS | DIASTOLIC BLOOD PRESSURE: 70 MMHG

## 2021-01-01 DIAGNOSIS — R07.9 CHEST PAIN: ICD-10-CM

## 2021-01-01 DIAGNOSIS — K21.9 GASTRO-ESOPHAGEAL REFLUX DISEASE WITHOUT ESOPHAGITIS: ICD-10-CM

## 2021-01-01 DIAGNOSIS — Z79.4 TYPE 2 DIABETES MELLITUS WITH CHRONIC KIDNEY DISEASE ON CHRONIC DIALYSIS, WITH LONG-TERM CURRENT USE OF INSULIN (HCC): ICD-10-CM

## 2021-01-01 DIAGNOSIS — Z99.2 ANEMIA IN CHRONIC KIDNEY DISEASE, ON CHRONIC DIALYSIS (HCC): ICD-10-CM

## 2021-01-01 DIAGNOSIS — Z12.31 ENCOUNTER FOR SCREENING MAMMOGRAM FOR MALIGNANT NEOPLASM OF BREAST: ICD-10-CM

## 2021-01-01 DIAGNOSIS — Z79.4 TYPE 2 DIABETES MELLITUS WITH CHRONIC KIDNEY DISEASE ON CHRONIC DIALYSIS, WITH LONG-TERM CURRENT USE OF INSULIN (HCC): Primary | ICD-10-CM

## 2021-01-01 DIAGNOSIS — N18.6 TYPE 2 DIABETES MELLITUS WITH CHRONIC KIDNEY DISEASE ON CHRONIC DIALYSIS, WITH LONG-TERM CURRENT USE OF INSULIN (HCC): Primary | ICD-10-CM

## 2021-01-01 DIAGNOSIS — I10 ESSENTIAL (PRIMARY) HYPERTENSION: ICD-10-CM

## 2021-01-01 DIAGNOSIS — N25.81 SECONDARY HYPERPARATHYROIDISM OF RENAL ORIGIN (HCC): ICD-10-CM

## 2021-01-01 DIAGNOSIS — I73.9 PERIPHERAL ARTERIAL DISEASE (HCC): ICD-10-CM

## 2021-01-01 DIAGNOSIS — I25.10 CORONARY ARTERY DISEASE INVOLVING NATIVE CORONARY ARTERY OF NATIVE HEART WITHOUT ANGINA PECTORIS: ICD-10-CM

## 2021-01-01 DIAGNOSIS — E11.22 TYPE 2 DIABETES MELLITUS WITH CHRONIC KIDNEY DISEASE ON CHRONIC DIALYSIS, WITH LONG-TERM CURRENT USE OF INSULIN (HCC): ICD-10-CM

## 2021-01-01 DIAGNOSIS — D64.9 ANEMIA, UNSPECIFIED TYPE: ICD-10-CM

## 2021-01-01 DIAGNOSIS — Z01.00 DIABETIC EYE EXAM (HCC): Primary | ICD-10-CM

## 2021-01-01 DIAGNOSIS — N18.6 END-STAGE RENAL DISEASE ON HEMODIALYSIS (HCC): ICD-10-CM

## 2021-01-01 DIAGNOSIS — E11.59 TYPE 2 DIABETES MELLITUS WITH OTHER CIRCULATORY COMPLICATIONS (HCC): Primary | ICD-10-CM

## 2021-01-01 DIAGNOSIS — E78.49 OTHER HYPERLIPIDEMIA: ICD-10-CM

## 2021-01-01 DIAGNOSIS — K21.9 GASTROESOPHAGEAL REFLUX DISEASE WITHOUT ESOPHAGITIS: ICD-10-CM

## 2021-01-01 DIAGNOSIS — E11.59 TYPE 2 DIABETES MELLITUS WITH OTHER CIRCULATORY COMPLICATIONS (HCC): ICD-10-CM

## 2021-01-01 DIAGNOSIS — I50.9 CHRONIC HEART FAILURE, UNSPECIFIED HEART FAILURE TYPE (HCC): ICD-10-CM

## 2021-01-01 DIAGNOSIS — L97.509 NON-PRESSURE CHRONIC ULCER OF OTHER PART OF UNSPECIFIED FOOT WITH UNSPECIFIED SEVERITY (HCC): ICD-10-CM

## 2021-01-01 DIAGNOSIS — F03.90 DEMENTIA WITHOUT BEHAVIORAL DISTURBANCE, UNSPECIFIED DEMENTIA TYPE (HCC): ICD-10-CM

## 2021-01-01 DIAGNOSIS — I25.10 CARDIOVASCULAR ARTERIOSCLEROSIS: ICD-10-CM

## 2021-01-01 DIAGNOSIS — R06.02 SHORTNESS OF BREATH: ICD-10-CM

## 2021-01-01 DIAGNOSIS — D63.1 ANEMIA IN CHRONIC KIDNEY DISEASE, ON CHRONIC DIALYSIS (HCC): ICD-10-CM

## 2021-01-01 DIAGNOSIS — I10 ESSENTIAL HYPERTENSION: ICD-10-CM

## 2021-01-01 DIAGNOSIS — I25.119 ATHEROSCLEROSIS OF NATIVE CORONARY ARTERY OF NATIVE HEART WITH ANGINA PECTORIS (HCC): ICD-10-CM

## 2021-01-01 DIAGNOSIS — Z99.2 END-STAGE RENAL DISEASE ON HEMODIALYSIS (HCC): ICD-10-CM

## 2021-01-01 DIAGNOSIS — I25.10 CORONARY ARTERY DISEASE WITHOUT ANGINA PECTORIS, UNSPECIFIED VESSEL OR LESION TYPE, UNSPECIFIED WHETHER NATIVE OR TRANSPLANTED HEART: ICD-10-CM

## 2021-01-01 DIAGNOSIS — R07.89 ATYPICAL CHEST PAIN: Primary | ICD-10-CM

## 2021-01-01 DIAGNOSIS — R07.9 CHEST PAIN, UNSPECIFIED TYPE: Primary | ICD-10-CM

## 2021-01-01 DIAGNOSIS — Z23 ENCOUNTER FOR IMMUNIZATION: Primary | ICD-10-CM

## 2021-01-01 DIAGNOSIS — E55.9 VITAMIN D DEFICIENCY: ICD-10-CM

## 2021-01-01 DIAGNOSIS — N18.6 ANEMIA IN CHRONIC KIDNEY DISEASE, ON CHRONIC DIALYSIS (HCC): ICD-10-CM

## 2021-01-01 DIAGNOSIS — S22.43XA CLOSED FRACTURE OF MULTIPLE RIBS OF BOTH SIDES, INITIAL ENCOUNTER: ICD-10-CM

## 2021-01-01 DIAGNOSIS — Z99.2 TYPE 2 DIABETES MELLITUS WITH CHRONIC KIDNEY DISEASE ON CHRONIC DIALYSIS, WITH LONG-TERM CURRENT USE OF INSULIN (HCC): ICD-10-CM

## 2021-01-01 DIAGNOSIS — E11.8 TYPE 2 DIABETES MELLITUS WITH COMPLICATION (HCC): ICD-10-CM

## 2021-01-01 DIAGNOSIS — Z12.31 ENCOUNTER FOR SCREENING MAMMOGRAM FOR MALIGNANT NEOPLASM OF BREAST: Primary | ICD-10-CM

## 2021-01-01 DIAGNOSIS — Z89.412 ACQUIRED ABSENCE OF LEFT GREAT TOE (HCC): ICD-10-CM

## 2021-01-01 DIAGNOSIS — Z78.0 POSTMENOPAUSAL STATUS (AGE-RELATED) (NATURAL): ICD-10-CM

## 2021-01-01 DIAGNOSIS — R06.00 DYSPNEA: Primary | ICD-10-CM

## 2021-01-01 DIAGNOSIS — E11.22 TYPE 2 DIABETES MELLITUS WITH CHRONIC KIDNEY DISEASE ON CHRONIC DIALYSIS, WITH LONG-TERM CURRENT USE OF INSULIN (HCC): Primary | ICD-10-CM

## 2021-01-01 DIAGNOSIS — Z99.2 TYPE 2 DIABETES MELLITUS WITH CHRONIC KIDNEY DISEASE ON CHRONIC DIALYSIS, WITH LONG-TERM CURRENT USE OF INSULIN (HCC): Primary | ICD-10-CM

## 2021-01-01 DIAGNOSIS — N18.6 TYPE 2 DIABETES MELLITUS WITH CHRONIC KIDNEY DISEASE ON CHRONIC DIALYSIS, WITH LONG-TERM CURRENT USE OF INSULIN (HCC): ICD-10-CM

## 2021-01-01 DIAGNOSIS — R53.1 GENERALIZED WEAKNESS: Primary | ICD-10-CM

## 2021-01-01 DIAGNOSIS — I50.9 CHRONIC CONGESTIVE HEART FAILURE, UNSPECIFIED HEART FAILURE TYPE (HCC): ICD-10-CM

## 2021-01-01 DIAGNOSIS — E55.9 VITAMIN D DEFICIENCY, UNSPECIFIED: ICD-10-CM

## 2021-01-01 DIAGNOSIS — E87.6 HYPOKALEMIA: ICD-10-CM

## 2021-01-01 DIAGNOSIS — I25.119 ATHEROSCLEROSIS OF NATIVE CORONARY ARTERY OF NATIVE HEART WITH ANGINA PECTORIS (HCC): Primary | ICD-10-CM

## 2021-01-01 DIAGNOSIS — M81.0 AGE-RELATED OSTEOPOROSIS WITHOUT CURRENT PATHOLOGICAL FRACTURE: ICD-10-CM

## 2021-01-01 DIAGNOSIS — E11.9 DIABETIC EYE EXAM (HCC): Primary | ICD-10-CM

## 2021-01-01 DIAGNOSIS — D50.9 IRON DEFICIENCY ANEMIA, UNSPECIFIED IRON DEFICIENCY ANEMIA TYPE: ICD-10-CM

## 2021-01-01 DIAGNOSIS — E16.2 HYPOGLYCEMIA: Primary | ICD-10-CM

## 2021-01-01 DIAGNOSIS — R29.898 RIGHT LEG WEAKNESS: Primary | ICD-10-CM

## 2021-01-01 DIAGNOSIS — I47.2 NSVT (NONSUSTAINED VENTRICULAR TACHYCARDIA) (HCC): ICD-10-CM

## 2021-01-01 DIAGNOSIS — R77.8 ELEVATED TROPONIN: ICD-10-CM

## 2021-01-01 DIAGNOSIS — E11.9 ENCOUNTER FOR DIABETIC FOOT EXAM (HCC): ICD-10-CM

## 2021-01-01 DIAGNOSIS — R77.8 ELEVATED TROPONIN: Primary | ICD-10-CM

## 2021-01-01 DIAGNOSIS — R79.9 ABNORMAL BLOOD CHEMISTRY LEVEL: Primary | ICD-10-CM

## 2021-01-01 DIAGNOSIS — D69.6 THROMBOCYTOPENIA (HCC): ICD-10-CM

## 2021-01-01 DIAGNOSIS — R26.9 GAIT ABNORMALITY: ICD-10-CM

## 2021-01-01 DIAGNOSIS — R09.02 HYPOXIA: ICD-10-CM

## 2021-01-01 DIAGNOSIS — Z23 ENCOUNTER FOR VACCINATION: ICD-10-CM

## 2021-01-01 DIAGNOSIS — J18.9 PNEUMONIA OF RIGHT UPPER LOBE DUE TO INFECTIOUS ORGANISM: ICD-10-CM

## 2021-01-01 DIAGNOSIS — I65.29 ARTERIOSCLEROSIS OF CAROTID ARTERY, UNSPECIFIED LATERALITY: ICD-10-CM

## 2021-01-01 DIAGNOSIS — D64.9 ACUTE ON CHRONIC ANEMIA: ICD-10-CM

## 2021-01-01 LAB
25(OH)D3 SERPL-MCNC: 123.2 NG/ML (ref 30–100)
25(OH)D3 SERPL-MCNC: 93.4 NG/ML (ref 30–100)
2HR DELTA HS TROPONIN: -4 NG/L
ALBUMIN SERPL BCP-MCNC: 2.4 G/DL (ref 3.5–5)
ALBUMIN SERPL BCP-MCNC: 2.6 G/DL (ref 3.5–5)
ALBUMIN SERPL BCP-MCNC: 2.9 G/DL (ref 3.5–5)
ALBUMIN SERPL BCP-MCNC: 3 G/DL (ref 3.5–5.7)
ALBUMIN SERPL BCP-MCNC: 3.1 G/DL (ref 3.5–5.7)
ALBUMIN SERPL BCP-MCNC: 3.2 G/DL (ref 3.4–4.8)
ALBUMIN SERPL BCP-MCNC: 3.6 G/DL (ref 3.4–4.8)
ALBUMIN SERPL BCP-MCNC: 3.8 G/DL (ref 3.5–5.7)
ALP SERPL-CCNC: 102.7 U/L (ref 35–140)
ALP SERPL-CCNC: 104 U/L (ref 55–165)
ALP SERPL-CCNC: 104 U/L (ref 55–165)
ALP SERPL-CCNC: 113 U/L (ref 55–165)
ALP SERPL-CCNC: 135 U/L (ref 46–116)
ALP SERPL-CCNC: 138 U/L (ref 46–116)
ALP SERPL-CCNC: 143 U/L (ref 46–116)
ALP SERPL-CCNC: 78.3 U/L (ref 35–140)
ALT SERPL W P-5'-P-CCNC: 11 U/L (ref 5–54)
ALT SERPL W P-5'-P-CCNC: 12 U/L (ref 12–78)
ALT SERPL W P-5'-P-CCNC: 12 U/L (ref 5–54)
ALT SERPL W P-5'-P-CCNC: 13 U/L (ref 12–78)
ALT SERPL W P-5'-P-CCNC: 17 U/L (ref 12–78)
ALT SERPL W P-5'-P-CCNC: 7 U/L (ref 7–52)
ALT SERPL W P-5'-P-CCNC: 8 U/L (ref 7–52)
ALT SERPL W P-5'-P-CCNC: 9 U/L (ref 7–52)
ANION GAP SERPL CALCULATED.3IONS-SCNC: 12 MMOL/L (ref 4–13)
ANION GAP SERPL CALCULATED.3IONS-SCNC: 13 MMOL/L (ref 4–13)
ANION GAP SERPL CALCULATED.3IONS-SCNC: 3 MMOL/L (ref 4–13)
ANION GAP SERPL CALCULATED.3IONS-SCNC: 6 MMOL/L (ref 4–13)
ANION GAP SERPL CALCULATED.3IONS-SCNC: 7 MMOL/L (ref 4–13)
ANION GAP SERPL CALCULATED.3IONS-SCNC: 7 MMOL/L (ref 4–13)
ANION GAP SERPL CALCULATED.3IONS-SCNC: 8 MMOL/L (ref 4–13)
ANION GAP SERPL CALCULATED.3IONS-SCNC: 9 MMOL/L (ref 4–13)
AORTIC ROOT: 2.7 CM
AORTIC VALVE MEAN VELOCITY: 11.4 M/S
APICAL FOUR CHAMBER EJECTION FRACTION: 43 %
ASCENDING AORTA: 3.6 CM
AST SERPL W P-5'-P-CCNC: 10 U/L (ref 5–45)
AST SERPL W P-5'-P-CCNC: 12 U/L (ref 15–41)
AST SERPL W P-5'-P-CCNC: 12 U/L (ref 5–45)
AST SERPL W P-5'-P-CCNC: 14 U/L (ref 13–39)
AST SERPL W P-5'-P-CCNC: 15 U/L (ref 13–39)
AST SERPL W P-5'-P-CCNC: 16 U/L (ref 15–41)
AST SERPL W P-5'-P-CCNC: 17 U/L (ref 13–39)
AST SERPL W P-5'-P-CCNC: 17 U/L (ref 5–45)
ATRIAL RATE: 104 BPM
ATRIAL RATE: 53 BPM
ATRIAL RATE: 78 BPM
ATRIAL RATE: 83 BPM
ATRIAL RATE: 83 BPM
ATRIAL RATE: 86 BPM
ATRIAL RATE: 86 BPM
ATRIAL RATE: 89 BPM
ATRIAL RATE: 90 BPM
ATRIAL RATE: 91 BPM
ATRIAL RATE: 91 BPM
ATRIAL RATE: 92 BPM
AV AREA BY CONTINUOUS VTI: 1.7 CM2
AV AREA PEAK VELOCITY: 1.8 CM2
AV LVOT MEAN GRADIENT: 3 MMHG
AV LVOT PEAK GRADIENT: 6 MMHG
AV MEAN GRADIENT: 6 MMHG
AV PEAK GRADIENT: 12 MMHG
AV VALVE AREA: 1.66 CM2
BASOPHILS # BLD AUTO: 0.03 THOUSANDS/ΜL (ref 0–0.1)
BASOPHILS # BLD AUTO: 0.05 THOUSANDS/ΜL (ref 0–0.1)
BASOPHILS # BLD AUTO: 0.05 THOUSANDS/ΜL (ref 0–0.1)
BASOPHILS # BLD AUTO: 0.06 THOUSANDS/ΜL (ref 0–0.1)
BASOPHILS # BLD AUTO: 0.06 THOUSANDS/ΜL (ref 0–0.1)
BASOPHILS # BLD AUTO: 0.08 THOUSANDS/ΜL (ref 0–0.1)
BASOPHILS # BLD AUTO: 0.08 THOUSANDS/ΜL (ref 0–0.1)
BASOPHILS # BLD AUTO: 0.1 THOUSANDS/ΜL (ref 0–0.1)
BASOPHILS NFR BLD AUTO: 0 % (ref 0–1)
BASOPHILS NFR BLD AUTO: 1 % (ref 0–1)
BASOPHILS NFR BLD AUTO: 1 % (ref 0–2)
BETA-HYDROXYBUTYRATE: 1.1 MMOL/L
BILIRUB SERPL-MCNC: 0.23 MG/DL (ref 0.2–1)
BILIRUB SERPL-MCNC: 0.3 MG/DL (ref 0.2–1)
BILIRUB SERPL-MCNC: 0.3 MG/DL (ref 0.2–1)
BILIRUB SERPL-MCNC: 0.36 MG/DL (ref 0.2–1)
BILIRUB SERPL-MCNC: 0.38 MG/DL (ref 0.3–1.2)
BILIRUB SERPL-MCNC: 0.4 MG/DL (ref 0.2–1)
BILIRUB SERPL-MCNC: 0.4 MG/DL (ref 0.2–1)
BILIRUB SERPL-MCNC: 0.51 MG/DL (ref 0.3–1.2)
BNP SERPL-MCNC: 1314 PG/ML (ref 1–100)
BNP SERPL-MCNC: 2394.6 PG/ML (ref 1–100)
BUN SERPL-MCNC: 12 MG/DL (ref 7–25)
BUN SERPL-MCNC: 13 MG/DL (ref 7–25)
BUN SERPL-MCNC: 15 MG/DL (ref 7–25)
BUN SERPL-MCNC: 16 MG/DL (ref 7–25)
BUN SERPL-MCNC: 19 MG/DL (ref 5–25)
BUN SERPL-MCNC: 20 MG/DL (ref 6–20)
BUN SERPL-MCNC: 21 MG/DL (ref 5–25)
BUN SERPL-MCNC: 24 MG/DL (ref 7–25)
BUN SERPL-MCNC: 27 MG/DL (ref 5–25)
BUN SERPL-MCNC: 29 MG/DL (ref 6–20)
BUN SERPL-MCNC: 30 MG/DL (ref 5–25)
BUN SERPL-MCNC: 30 MG/DL (ref 6–20)
BUN SERPL-MCNC: 34 MG/DL (ref 6–20)
CALCIUM ALBUM COR SERPL-MCNC: 10.1 MG/DL (ref 8.3–10.1)
CALCIUM ALBUM COR SERPL-MCNC: 10.1 MG/DL (ref 8.3–10.1)
CALCIUM ALBUM COR SERPL-MCNC: 10.7 MG/DL (ref 8.3–10.1)
CALCIUM ALBUM COR SERPL-MCNC: 10.8 MG/DL (ref 8.3–10.1)
CALCIUM ALBUM COR SERPL-MCNC: 10.9 MG/DL (ref 8.3–10.1)
CALCIUM ALBUM COR SERPL-MCNC: 9.1 MG/DL (ref 8.3–10.1)
CALCIUM SERPL-MCNC: 10 MG/DL (ref 8.3–10.1)
CALCIUM SERPL-MCNC: 10.1 MG/DL (ref 8.4–10.2)
CALCIUM SERPL-MCNC: 8.2 MG/DL (ref 8.4–10.2)
CALCIUM SERPL-MCNC: 8.4 MG/DL (ref 8.6–10.5)
CALCIUM SERPL-MCNC: 9 MG/DL (ref 8.4–10.2)
CALCIUM SERPL-MCNC: 9.3 MG/DL (ref 8.6–10.5)
CALCIUM SERPL-MCNC: 9.4 MG/DL (ref 8.3–10.1)
CALCIUM SERPL-MCNC: 9.4 MG/DL (ref 8.3–10.1)
CALCIUM SERPL-MCNC: 9.4 MG/DL (ref 8.6–10.5)
CALCIUM SERPL-MCNC: 9.4 MG/DL (ref 8.6–10.5)
CALCIUM SERPL-MCNC: 9.5 MG/DL (ref 8.4–10.2)
CALCIUM SERPL-MCNC: 9.5 MG/DL (ref 8.6–10.5)
CALCIUM SERPL-MCNC: 9.7 MG/DL (ref 8.3–10.1)
CALCIUM SERPL-MCNC: 9.7 MG/DL (ref 8.6–10.5)
CALCIUM SERPL-MCNC: 9.7 MG/DL (ref 8.6–10.5)
CARDIAC TROPONIN I PNL SERPL HS: 34 NG/L
CARDIAC TROPONIN I PNL SERPL HS: 38 NG/L
CHLORIDE SERPL-SCNC: 101 MMOL/L (ref 100–108)
CHLORIDE SERPL-SCNC: 102 MMOL/L (ref 100–108)
CHLORIDE SERPL-SCNC: 104 MMOL/L (ref 98–107)
CHLORIDE SERPL-SCNC: 92 MMOL/L (ref 98–107)
CHLORIDE SERPL-SCNC: 93 MMOL/L (ref 96–108)
CHLORIDE SERPL-SCNC: 94 MMOL/L (ref 96–108)
CHLORIDE SERPL-SCNC: 95 MMOL/L (ref 96–108)
CHLORIDE SERPL-SCNC: 95 MMOL/L (ref 98–107)
CHLORIDE SERPL-SCNC: 96 MMOL/L (ref 98–107)
CHLORIDE SERPL-SCNC: 96 MMOL/L (ref 98–107)
CHLORIDE SERPL-SCNC: 97 MMOL/L (ref 96–108)
CHLORIDE SERPL-SCNC: 97 MMOL/L (ref 98–107)
CHLORIDE SERPL-SCNC: 98 MMOL/L (ref 100–108)
CHLORIDE SERPL-SCNC: 99 MMOL/L (ref 100–108)
CHLORIDE SERPL-SCNC: 99 MMOL/L (ref 98–107)
CHOLEST SERPL-MCNC: 117 MG/DL (ref 50–200)
CHOLEST SERPL-MCNC: 117 MG/DL (ref 50–200)
CO2 SERPL-SCNC: 26 MMOL/L (ref 21–31)
CO2 SERPL-SCNC: 26 MMOL/L (ref 21–31)
CO2 SERPL-SCNC: 28 MMOL/L (ref 22–33)
CO2 SERPL-SCNC: 29 MMOL/L (ref 21–31)
CO2 SERPL-SCNC: 29 MMOL/L (ref 22–33)
CO2 SERPL-SCNC: 30 MMOL/L (ref 21–32)
CO2 SERPL-SCNC: 30 MMOL/L (ref 22–33)
CO2 SERPL-SCNC: 31 MMOL/L (ref 21–31)
CO2 SERPL-SCNC: 31 MMOL/L (ref 21–32)
CO2 SERPL-SCNC: 31 MMOL/L (ref 22–33)
CO2 SERPL-SCNC: 32 MMOL/L (ref 21–31)
CO2 SERPL-SCNC: 33 MMOL/L (ref 21–31)
CO2 SERPL-SCNC: 33 MMOL/L (ref 21–31)
CO2 SERPL-SCNC: 33 MMOL/L (ref 21–32)
CO2 SERPL-SCNC: 33 MMOL/L (ref 21–32)
CREAT SERPL-MCNC: 2.17 MG/DL (ref 0.6–1.2)
CREAT SERPL-MCNC: 2.24 MG/DL (ref 0.4–1.1)
CREAT SERPL-MCNC: 2.58 MG/DL (ref 0.6–1.2)
CREAT SERPL-MCNC: 2.6 MG/DL (ref 0.6–1.2)
CREAT SERPL-MCNC: 2.94 MG/DL (ref 0.6–1.2)
CREAT SERPL-MCNC: 2.98 MG/DL (ref 0.6–1.2)
CREAT SERPL-MCNC: 3.06 MG/DL (ref 0.6–1.2)
CREAT SERPL-MCNC: 3.06 MG/DL (ref 0.6–1.3)
CREAT SERPL-MCNC: 3.26 MG/DL (ref 0.6–1.3)
CREAT SERPL-MCNC: 3.35 MG/DL (ref 0.6–1.3)
CREAT SERPL-MCNC: 3.51 MG/DL (ref 0.6–1.2)
CREAT SERPL-MCNC: 3.58 MG/DL (ref 0.4–1.1)
CREAT SERPL-MCNC: 3.82 MG/DL (ref 0.4–1.1)
CREAT SERPL-MCNC: 4.01 MG/DL (ref 0.6–1.3)
CREAT SERPL-MCNC: 4.57 MG/DL (ref 0.4–1.1)
D DIMER PPP FEU-MCNC: 1.77 UG/ML FEU
DOP CALC AO VTI: 32.85 CM
DOP CALC LVOT AREA: 2.54 CM2
DOP CALC LVOT DIAMETER: 1.8 CM
DOP CALC LVOT PEAK VEL VTI: 21.39 CM
DOP CALC LVOT PEAK VEL: 1.25 M/S
DOP CALC LVOT STROKE INDEX: 31.6 ML/M2
DOP CALC LVOT STROKE VOLUME: 54.4 CM3
EOSINOPHIL # BLD AUTO: 0.1 THOUSAND/ΜL (ref 0–0.61)
EOSINOPHIL # BLD AUTO: 0.1 THOUSAND/ΜL (ref 0–0.61)
EOSINOPHIL # BLD AUTO: 0.14 THOUSAND/ΜL (ref 0–0.61)
EOSINOPHIL # BLD AUTO: 0.15 THOUSAND/ΜL (ref 0–0.61)
EOSINOPHIL # BLD AUTO: 0.2 THOUSAND/ΜL (ref 0–0.61)
EOSINOPHIL # BLD AUTO: 0.21 THOUSAND/ΜL (ref 0–0.61)
EOSINOPHIL # BLD AUTO: 0.25 THOUSAND/ΜL (ref 0–0.61)
EOSINOPHIL # BLD AUTO: 0.25 THOUSAND/ΜL (ref 0–0.61)
EOSINOPHIL # BLD AUTO: 0.37 THOUSAND/ΜL (ref 0–0.61)
EOSINOPHIL # BLD AUTO: 0.37 THOUSAND/ΜL (ref 0–0.61)
EOSINOPHIL NFR BLD AUTO: 1 % (ref 0–5)
EOSINOPHIL NFR BLD AUTO: 2 % (ref 0–5)
EOSINOPHIL NFR BLD AUTO: 2 % (ref 0–6)
EOSINOPHIL NFR BLD AUTO: 2 % (ref 0–6)
EOSINOPHIL NFR BLD AUTO: 3 % (ref 0–5)
EOSINOPHIL NFR BLD AUTO: 3 % (ref 0–5)
EOSINOPHIL NFR BLD AUTO: 3 % (ref 0–6)
EOSINOPHIL NFR BLD AUTO: 4 % (ref 0–5)
EOSINOPHIL NFR BLD AUTO: 4 % (ref 0–6)
EOSINOPHIL NFR BLD AUTO: 4 % (ref 0–6)
EOSINOPHIL NFR BLD AUTO: 5 % (ref 0–6)
EOSINOPHIL NFR BLD AUTO: 6 % (ref 0–6)
ERYTHROCYTE [DISTWIDTH] IN BLOOD BY AUTOMATED COUNT: 14.9 % (ref 11.6–15.1)
ERYTHROCYTE [DISTWIDTH] IN BLOOD BY AUTOMATED COUNT: 14.9 % (ref 11.6–15.1)
ERYTHROCYTE [DISTWIDTH] IN BLOOD BY AUTOMATED COUNT: 15.2 % (ref 11.6–15.1)
ERYTHROCYTE [DISTWIDTH] IN BLOOD BY AUTOMATED COUNT: 15.3 % (ref 11.6–15.1)
ERYTHROCYTE [DISTWIDTH] IN BLOOD BY AUTOMATED COUNT: 15.5 % (ref 11.6–15.1)
ERYTHROCYTE [DISTWIDTH] IN BLOOD BY AUTOMATED COUNT: 15.6 % (ref 11.6–15.1)
ERYTHROCYTE [DISTWIDTH] IN BLOOD BY AUTOMATED COUNT: 15.6 % (ref 11.6–15.1)
ERYTHROCYTE [DISTWIDTH] IN BLOOD BY AUTOMATED COUNT: 16.3 % (ref 11.5–14.5)
ERYTHROCYTE [DISTWIDTH] IN BLOOD BY AUTOMATED COUNT: 16.9 % (ref 11.5–14.5)
ERYTHROCYTE [DISTWIDTH] IN BLOOD BY AUTOMATED COUNT: 17 % (ref 11.5–14.5)
ERYTHROCYTE [DISTWIDTH] IN BLOOD BY AUTOMATED COUNT: 17.1 % (ref 11.6–15.1)
ERYTHROCYTE [DISTWIDTH] IN BLOOD BY AUTOMATED COUNT: 17.3 % (ref 11.5–14.5)
ERYTHROCYTE [DISTWIDTH] IN BLOOD BY AUTOMATED COUNT: 17.5 % (ref 11.5–14.5)
ERYTHROCYTE [DISTWIDTH] IN BLOOD BY AUTOMATED COUNT: 18.5 % (ref 11.5–14.5)
ERYTHROCYTE [DISTWIDTH] IN BLOOD BY AUTOMATED COUNT: 19.3 % (ref 11.5–14.5)
EST. AVERAGE GLUCOSE BLD GHB EST-MCNC: 108 MG/DL
EST. AVERAGE GLUCOSE BLD GHB EST-MCNC: 120 MG/DL
FERRITIN SERPL-MCNC: 1386 NG/ML (ref 8–388)
FERRITIN SERPL-MCNC: 1967 NG/ML (ref 8–388)
FOLATE SERPL-MCNC: 19 NG/ML (ref 3.1–17.5)
FRACTIONAL SHORTENING: 13 % (ref 28–44)
GFR SERPL CREATININE-BSD FRML MDRD: 10 ML/MIN/1.73SQ M
GFR SERPL CREATININE-BSD FRML MDRD: 11 ML/MIN/1.73SQ M
GFR SERPL CREATININE-BSD FRML MDRD: 12 ML/MIN/1.73SQ M
GFR SERPL CREATININE-BSD FRML MDRD: 12 ML/MIN/1.73SQ M
GFR SERPL CREATININE-BSD FRML MDRD: 13 ML/MIN/1.73SQ M
GFR SERPL CREATININE-BSD FRML MDRD: 13 ML/MIN/1.73SQ M
GFR SERPL CREATININE-BSD FRML MDRD: 14 ML/MIN/1.73SQ M
GFR SERPL CREATININE-BSD FRML MDRD: 14 ML/MIN/1.73SQ M
GFR SERPL CREATININE-BSD FRML MDRD: 15 ML/MIN/1.73SQ M
GFR SERPL CREATININE-BSD FRML MDRD: 15 ML/MIN/1.73SQ M
GFR SERPL CREATININE-BSD FRML MDRD: 17 ML/MIN/1.73SQ M
GFR SERPL CREATININE-BSD FRML MDRD: 18 ML/MIN/1.73SQ M
GFR SERPL CREATININE-BSD FRML MDRD: 21 ML/MIN/1.73SQ M
GFR SERPL CREATININE-BSD FRML MDRD: 22 ML/MIN/1.73SQ M
GFR SERPL CREATININE-BSD FRML MDRD: 9 ML/MIN/1.73SQ M
GLUCOSE P FAST SERPL-MCNC: 153 MG/DL (ref 65–99)
GLUCOSE P FAST SERPL-MCNC: 84 MG/DL (ref 65–99)
GLUCOSE SERPL-MCNC: 100 MG/DL (ref 65–140)
GLUCOSE SERPL-MCNC: 101 MG/DL (ref 65–140)
GLUCOSE SERPL-MCNC: 102 MG/DL (ref 65–140)
GLUCOSE SERPL-MCNC: 103 MG/DL (ref 65–99)
GLUCOSE SERPL-MCNC: 104 MG/DL (ref 65–140)
GLUCOSE SERPL-MCNC: 105 MG/DL (ref 65–140)
GLUCOSE SERPL-MCNC: 109 MG/DL (ref 65–140)
GLUCOSE SERPL-MCNC: 110 MG/DL (ref 65–140)
GLUCOSE SERPL-MCNC: 114 MG/DL (ref 65–140)
GLUCOSE SERPL-MCNC: 118 MG/DL (ref 65–140)
GLUCOSE SERPL-MCNC: 122 MG/DL (ref 65–140)
GLUCOSE SERPL-MCNC: 124 MG/DL (ref 65–140)
GLUCOSE SERPL-MCNC: 124 MG/DL (ref 65–99)
GLUCOSE SERPL-MCNC: 125 MG/DL (ref 65–99)
GLUCOSE SERPL-MCNC: 129 MG/DL (ref 65–140)
GLUCOSE SERPL-MCNC: 137 MG/DL (ref 65–140)
GLUCOSE SERPL-MCNC: 138 MG/DL (ref 65–140)
GLUCOSE SERPL-MCNC: 138 MG/DL (ref 65–140)
GLUCOSE SERPL-MCNC: 139 MG/DL (ref 65–140)
GLUCOSE SERPL-MCNC: 140 MG/DL (ref 65–140)
GLUCOSE SERPL-MCNC: 140 MG/DL (ref 65–140)
GLUCOSE SERPL-MCNC: 141 MG/DL (ref 65–140)
GLUCOSE SERPL-MCNC: 145 MG/DL (ref 65–140)
GLUCOSE SERPL-MCNC: 150 MG/DL (ref 65–99)
GLUCOSE SERPL-MCNC: 152 MG/DL (ref 65–140)
GLUCOSE SERPL-MCNC: 155 MG/DL (ref 65–140)
GLUCOSE SERPL-MCNC: 159 MG/DL (ref 65–140)
GLUCOSE SERPL-MCNC: 162 MG/DL (ref 65–140)
GLUCOSE SERPL-MCNC: 168 MG/DL (ref 65–140)
GLUCOSE SERPL-MCNC: 173 MG/DL (ref 65–140)
GLUCOSE SERPL-MCNC: 180 MG/DL (ref 65–140)
GLUCOSE SERPL-MCNC: 181 MG/DL (ref 65–140)
GLUCOSE SERPL-MCNC: 190 MG/DL (ref 65–140)
GLUCOSE SERPL-MCNC: 198 MG/DL (ref 65–140)
GLUCOSE SERPL-MCNC: 201 MG/DL (ref 65–140)
GLUCOSE SERPL-MCNC: 270 MG/DL (ref 65–140)
GLUCOSE SERPL-MCNC: 63 MG/DL (ref 65–140)
GLUCOSE SERPL-MCNC: 66 MG/DL (ref 65–140)
GLUCOSE SERPL-MCNC: 68 MG/DL (ref 65–140)
GLUCOSE SERPL-MCNC: 70 MG/DL (ref 65–140)
GLUCOSE SERPL-MCNC: 71 MG/DL (ref 65–140)
GLUCOSE SERPL-MCNC: 72 MG/DL (ref 65–99)
GLUCOSE SERPL-MCNC: 75 MG/DL (ref 65–140)
GLUCOSE SERPL-MCNC: 77 MG/DL (ref 65–99)
GLUCOSE SERPL-MCNC: 79 MG/DL (ref 65–140)
GLUCOSE SERPL-MCNC: 80 MG/DL (ref 65–140)
GLUCOSE SERPL-MCNC: 80 MG/DL (ref 65–140)
GLUCOSE SERPL-MCNC: 81 MG/DL (ref 65–140)
GLUCOSE SERPL-MCNC: 82 MG/DL (ref 65–140)
GLUCOSE SERPL-MCNC: 83 MG/DL (ref 65–99)
GLUCOSE SERPL-MCNC: 86 MG/DL (ref 65–140)
GLUCOSE SERPL-MCNC: 89 MG/DL (ref 65–140)
GLUCOSE SERPL-MCNC: 89 MG/DL (ref 65–140)
GLUCOSE SERPL-MCNC: 90 MG/DL (ref 65–140)
GLUCOSE SERPL-MCNC: 90 MG/DL (ref 65–140)
GLUCOSE SERPL-MCNC: 92 MG/DL (ref 65–140)
GLUCOSE SERPL-MCNC: 94 MG/DL (ref 65–140)
GLUCOSE SERPL-MCNC: 95 MG/DL (ref 65–140)
GLUCOSE SERPL-MCNC: 95 MG/DL (ref 65–140)
GLUCOSE SERPL-MCNC: 96 MG/DL (ref 65–140)
GLUCOSE SERPL-MCNC: 97 MG/DL (ref 65–140)
GLUCOSE SERPL-MCNC: 98 MG/DL (ref 65–140)
GLUCOSE SERPL-MCNC: 99 MG/DL (ref 65–140)
GLUCOSE SERPL-MCNC: >500 MG/DL (ref 65–140)
HBA1C MFR BLD: 5.4 %
HBA1C MFR BLD: 5.8 %
HCT VFR BLD AUTO: 25.1 % (ref 34.8–46.1)
HCT VFR BLD AUTO: 25.3 % (ref 34.8–46.1)
HCT VFR BLD AUTO: 27.4 % (ref 34.8–46.1)
HCT VFR BLD AUTO: 29.6 % (ref 42–47)
HCT VFR BLD AUTO: 30.6 % (ref 42–47)
HCT VFR BLD AUTO: 31.1 % (ref 42–47)
HCT VFR BLD AUTO: 31.9 % (ref 34.8–46.1)
HCT VFR BLD AUTO: 32.9 % (ref 34.8–46.1)
HCT VFR BLD AUTO: 33.1 % (ref 42–47)
HCT VFR BLD AUTO: 35.2 % (ref 34.8–46.1)
HCT VFR BLD AUTO: 36 % (ref 34.8–46.1)
HCT VFR BLD AUTO: 36.4 % (ref 42–47)
HCT VFR BLD AUTO: 37.5 % (ref 34.8–46.1)
HCT VFR BLD AUTO: 37.7 % (ref 42–47)
HCT VFR BLD AUTO: 37.8 % (ref 42–47)
HDLC SERPL-MCNC: 46 MG/DL
HDLC SERPL-MCNC: 49 MG/DL
HGB BLD-MCNC: 10.2 G/DL (ref 12–16)
HGB BLD-MCNC: 10.4 G/DL (ref 11.5–15.4)
HGB BLD-MCNC: 10.8 G/DL (ref 11.5–15.4)
HGB BLD-MCNC: 10.8 G/DL (ref 12–16)
HGB BLD-MCNC: 11.1 G/DL (ref 11.5–15.4)
HGB BLD-MCNC: 11.3 G/DL (ref 11.5–15.4)
HGB BLD-MCNC: 12.2 G/DL (ref 12–16)
HGB BLD-MCNC: 12.3 G/DL (ref 12–16)
HGB BLD-MCNC: 12.5 G/DL (ref 12–16)
HGB BLD-MCNC: 7.7 G/DL (ref 11.5–15.4)
HGB BLD-MCNC: 7.8 G/DL (ref 11.5–15.4)
HGB BLD-MCNC: 8.6 G/DL (ref 11.5–15.4)
HGB BLD-MCNC: 9.6 G/DL (ref 12–16)
HGB BLD-MCNC: 9.8 G/DL (ref 11.5–15.4)
HGB BLD-MCNC: 9.9 G/DL (ref 12–16)
IMM GRANULOCYTES # BLD AUTO: 0.01 THOUSAND/UL (ref 0–0.2)
IMM GRANULOCYTES # BLD AUTO: 0.02 THOUSAND/UL (ref 0–0.2)
IMM GRANULOCYTES # BLD AUTO: 0.03 THOUSAND/UL (ref 0–0.2)
IMM GRANULOCYTES # BLD AUTO: 0.03 THOUSAND/UL (ref 0–0.2)
IMM GRANULOCYTES NFR BLD AUTO: 0 % (ref 0–2)
IMM GRANULOCYTES NFR BLD AUTO: 1 % (ref 0–2)
INTERVENTRICULAR SEPTUM IN DIASTOLE (PARASTERNAL SHORT AXIS VIEW): 1.7 CM
IRON SATN MFR SERPL: 38 %
IRON SATN MFR SERPL: 53 % (ref 15–50)
IRON SERPL-MCNC: 55 UG/DL (ref 50–170)
IRON SERPL-MCNC: 64 UG/DL (ref 50–170)
LACTATE SERPL-SCNC: 0.7 MMOL/L (ref 0–2)
LDLC SERPL CALC-MCNC: 38 MG/DL (ref 0–100)
LDLC SERPL CALC-MCNC: 42 MG/DL (ref 0–100)
LEFT INTERNAL DIMENSION IN SYSTOLE: 4 CM (ref 2.1–4)
LEFT VENTRICULAR INTERNAL DIMENSION IN DIASTOLE: 4.6 CM (ref 4.06–6.04)
LEFT VENTRICULAR POSTERIOR WALL IN END DIASTOLE: 1.2 CM
LEFT VENTRICULAR STROKE VOLUME: 27 ML
LIPASE SERPL-CCNC: <6 U/L (ref 13–60)
LV EF: 22 %
LYMPHOCYTES # BLD AUTO: 0.58 THOUSANDS/ΜL (ref 0.6–4.47)
LYMPHOCYTES # BLD AUTO: 0.6 THOUSANDS/ΜL (ref 0.6–4.47)
LYMPHOCYTES # BLD AUTO: 0.83 THOUSANDS/ΜL (ref 0.6–4.47)
LYMPHOCYTES # BLD AUTO: 0.9 THOUSANDS/ΜL (ref 0.6–4.47)
LYMPHOCYTES # BLD AUTO: 1 THOUSANDS/ΜL (ref 0.6–4.47)
LYMPHOCYTES # BLD AUTO: 1.03 THOUSANDS/ΜL (ref 0.6–4.47)
LYMPHOCYTES # BLD AUTO: 1.07 THOUSANDS/ΜL (ref 0.6–4.47)
LYMPHOCYTES # BLD AUTO: 1.12 THOUSANDS/ΜL (ref 0.6–4.47)
LYMPHOCYTES # BLD AUTO: 1.2 THOUSANDS/ΜL (ref 0.6–4.47)
LYMPHOCYTES # BLD AUTO: 1.23 THOUSANDS/ΜL (ref 0.6–4.47)
LYMPHOCYTES # BLD AUTO: 1.28 THOUSANDS/ΜL (ref 0.6–4.47)
LYMPHOCYTES # BLD AUTO: 1.3 THOUSANDS/ΜL (ref 0.6–4.47)
LYMPHOCYTES NFR BLD AUTO: 11 % (ref 21–51)
LYMPHOCYTES NFR BLD AUTO: 12 % (ref 14–44)
LYMPHOCYTES NFR BLD AUTO: 15 % (ref 21–51)
LYMPHOCYTES NFR BLD AUTO: 16 % (ref 14–44)
LYMPHOCYTES NFR BLD AUTO: 17 % (ref 14–44)
LYMPHOCYTES NFR BLD AUTO: 17 % (ref 14–44)
LYMPHOCYTES NFR BLD AUTO: 18 % (ref 14–44)
LYMPHOCYTES NFR BLD AUTO: 18 % (ref 14–44)
LYMPHOCYTES NFR BLD AUTO: 21 % (ref 21–51)
LYMPHOCYTES NFR BLD AUTO: 25 % (ref 21–51)
LYMPHOCYTES NFR BLD AUTO: 6 % (ref 21–51)
LYMPHOCYTES NFR BLD AUTO: 8 % (ref 14–44)
MAGNESIUM SERPL-MCNC: 1.8 MG/DL (ref 1.9–2.7)
MAGNESIUM SERPL-MCNC: 1.9 MG/DL (ref 1.9–2.7)
MAGNESIUM SERPL-MCNC: 2 MG/DL (ref 1.6–2.6)
MCH RBC QN AUTO: 29.3 PG (ref 26.8–34.3)
MCH RBC QN AUTO: 29.7 PG (ref 26–34)
MCH RBC QN AUTO: 29.7 PG (ref 26–34)
MCH RBC QN AUTO: 29.8 PG (ref 26.8–34.3)
MCH RBC QN AUTO: 30.1 PG (ref 26–34)
MCH RBC QN AUTO: 30.4 PG (ref 26.8–34.3)
MCH RBC QN AUTO: 30.7 PG (ref 26.8–34.3)
MCH RBC QN AUTO: 30.7 PG (ref 26.8–34.3)
MCH RBC QN AUTO: 30.8 PG (ref 26–34)
MCH RBC QN AUTO: 30.9 PG (ref 26.8–34.3)
MCH RBC QN AUTO: 31 PG (ref 26.8–34.3)
MCH RBC QN AUTO: 31.1 PG (ref 26.8–34.3)
MCH RBC QN AUTO: 31.1 PG (ref 26–34)
MCH RBC QN AUTO: 31.2 PG (ref 26–34)
MCH RBC QN AUTO: 31.6 PG (ref 26–34)
MCHC RBC AUTO-ENTMCNC: 30.1 G/DL (ref 31.4–37.4)
MCHC RBC AUTO-ENTMCNC: 30.4 G/DL (ref 31.4–37.4)
MCHC RBC AUTO-ENTMCNC: 30.7 G/DL (ref 31.4–37.4)
MCHC RBC AUTO-ENTMCNC: 30.7 G/DL (ref 31.4–37.4)
MCHC RBC AUTO-ENTMCNC: 30.8 G/DL (ref 31.4–37.4)
MCHC RBC AUTO-ENTMCNC: 31.1 G/DL (ref 31.4–37.4)
MCHC RBC AUTO-ENTMCNC: 31.4 G/DL (ref 31.4–37.4)
MCHC RBC AUTO-ENTMCNC: 31.6 G/DL (ref 31.4–37.4)
MCHC RBC AUTO-ENTMCNC: 32.2 G/DL (ref 31–37)
MCHC RBC AUTO-ENTMCNC: 32.4 G/DL (ref 31–37)
MCHC RBC AUTO-ENTMCNC: 32.6 G/DL (ref 31–37)
MCHC RBC AUTO-ENTMCNC: 32.6 G/DL (ref 31–37)
MCHC RBC AUTO-ENTMCNC: 32.8 G/DL (ref 31–37)
MCHC RBC AUTO-ENTMCNC: 33.1 G/DL (ref 31–37)
MCHC RBC AUTO-ENTMCNC: 33.6 G/DL (ref 31–37)
MCV RBC AUTO: 100 FL (ref 82–98)
MCV RBC AUTO: 90 FL (ref 81–99)
MCV RBC AUTO: 92 FL (ref 81–99)
MCV RBC AUTO: 92 FL (ref 81–99)
MCV RBC AUTO: 94 FL (ref 81–99)
MCV RBC AUTO: 94 FL (ref 81–99)
MCV RBC AUTO: 95 FL (ref 81–99)
MCV RBC AUTO: 96 FL (ref 81–99)
MCV RBC AUTO: 97 FL (ref 82–98)
MCV RBC AUTO: 97 FL (ref 82–98)
MCV RBC AUTO: 98 FL (ref 82–98)
MCV RBC AUTO: 99 FL (ref 82–98)
MITRAL REGURGITATION PEAK VELOCITY: 4.79 M/S
MITRAL VALVE MEAN INFLOW VELOCITY: 3.64 M/S
MITRAL VALVE REGURGITANT PEAK GRADIENT: 92 MMHG
MONOCYTES # BLD AUTO: 0.3 THOUSAND/ΜL (ref 0.17–1.22)
MONOCYTES # BLD AUTO: 0.43 THOUSAND/ΜL (ref 0.17–1.22)
MONOCYTES # BLD AUTO: 0.55 THOUSAND/ΜL (ref 0.17–1.22)
MONOCYTES # BLD AUTO: 0.56 THOUSAND/ΜL (ref 0.17–1.22)
MONOCYTES # BLD AUTO: 0.58 THOUSAND/ΜL (ref 0.17–1.22)
MONOCYTES # BLD AUTO: 0.6 THOUSAND/ΜL (ref 0.17–1.22)
MONOCYTES # BLD AUTO: 0.7 THOUSAND/ΜL (ref 0.17–1.22)
MONOCYTES # BLD AUTO: 0.7 THOUSAND/ΜL (ref 0.17–1.22)
MONOCYTES # BLD AUTO: 0.74 THOUSAND/ΜL (ref 0.17–1.22)
MONOCYTES # BLD AUTO: 0.75 THOUSAND/ΜL (ref 0.17–1.22)
MONOCYTES # BLD AUTO: 0.79 THOUSAND/ΜL (ref 0.17–1.22)
MONOCYTES # BLD AUTO: 0.8 THOUSAND/ΜL (ref 0.17–1.22)
MONOCYTES NFR BLD AUTO: 10 % (ref 2–12)
MONOCYTES NFR BLD AUTO: 10 % (ref 2–12)
MONOCYTES NFR BLD AUTO: 11 % (ref 4–12)
MONOCYTES NFR BLD AUTO: 12 % (ref 4–12)
MONOCYTES NFR BLD AUTO: 13 % (ref 2–12)
MONOCYTES NFR BLD AUTO: 13 % (ref 4–12)
MONOCYTES NFR BLD AUTO: 5 % (ref 2–12)
MONOCYTES NFR BLD AUTO: 6 % (ref 4–12)
MONOCYTES NFR BLD AUTO: 7 % (ref 2–12)
MONOCYTES NFR BLD AUTO: 8 % (ref 4–12)
MONOCYTES NFR BLD AUTO: 8 % (ref 4–12)
MONOCYTES NFR BLD AUTO: 9 % (ref 4–12)
MRSA NOSE QL CULT: NORMAL
NEUTROPHILS # BLD AUTO: 3 THOUSANDS/ΜL (ref 1.4–6.5)
NEUTROPHILS # BLD AUTO: 4.1 THOUSANDS/ΜL (ref 1.4–6.5)
NEUTROPHILS # BLD AUTO: 4.1 THOUSANDS/ΜL (ref 1.85–7.62)
NEUTROPHILS # BLD AUTO: 4.18 THOUSANDS/ΜL (ref 1.85–7.62)
NEUTROPHILS # BLD AUTO: 4.3 THOUSANDS/ΜL (ref 1.4–6.5)
NEUTROPHILS # BLD AUTO: 4.49 THOUSANDS/ΜL (ref 1.85–7.62)
NEUTROPHILS # BLD AUTO: 4.6 THOUSANDS/ΜL (ref 1.85–7.62)
NEUTROPHILS # BLD AUTO: 4.73 THOUSANDS/ΜL (ref 1.85–7.62)
NEUTROPHILS # BLD AUTO: 5.08 THOUSANDS/ΜL (ref 1.85–7.62)
NEUTROPHILS # BLD AUTO: 6.2 THOUSANDS/ΜL (ref 1.85–7.62)
NEUTROPHILS # BLD AUTO: 6.8 THOUSANDS/ΜL (ref 1.4–6.5)
NEUTROPHILS # BLD AUTO: 8.2 THOUSANDS/ΜL (ref 1.4–6.5)
NEUTS SEG NFR BLD AUTO: 58 % (ref 42–75)
NEUTS SEG NFR BLD AUTO: 64 % (ref 43–75)
NEUTS SEG NFR BLD AUTO: 65 % (ref 43–75)
NEUTS SEG NFR BLD AUTO: 66 % (ref 42–75)
NEUTS SEG NFR BLD AUTO: 66 % (ref 43–75)
NEUTS SEG NFR BLD AUTO: 69 % (ref 43–75)
NEUTS SEG NFR BLD AUTO: 69 % (ref 43–75)
NEUTS SEG NFR BLD AUTO: 76 % (ref 42–75)
NEUTS SEG NFR BLD AUTO: 77 % (ref 42–75)
NEUTS SEG NFR BLD AUTO: 77 % (ref 43–75)
NEUTS SEG NFR BLD AUTO: 84 % (ref 43–75)
NEUTS SEG NFR BLD AUTO: 85 % (ref 42–75)
NRBC BLD AUTO-RTO: 0 /100 WBCS
NT-PROBNP SERPL-MCNC: ABNORMAL PG/ML
P AXIS: 109 DEGREES
P AXIS: 41 DEGREES
P AXIS: 45 DEGREES
P AXIS: 47 DEGREES
P AXIS: 62 DEGREES
P AXIS: 65 DEGREES
P AXIS: 65 DEGREES
P AXIS: 85 DEGREES
P AXIS: 94 DEGREES
P AXIS: 97 DEGREES
PHOSPHATE SERPL-MCNC: 1.9 MG/DL (ref 3–5.5)
PHOSPHATE SERPL-MCNC: 4 MG/DL (ref 3–5.5)
PHOSPHATE SERPL-MCNC: 4.4 MG/DL (ref 2.5–5)
PHOSPHATE SERPL-MCNC: 5.1 MG/DL (ref 3–5.5)
PLATELET # BLD AUTO: 143 THOUSANDS/UL (ref 149–390)
PLATELET # BLD AUTO: 145 THOUSANDS/UL (ref 149–390)
PLATELET # BLD AUTO: 168 THOUSANDS/UL (ref 149–390)
PLATELET # BLD AUTO: 168 THOUSANDS/UL (ref 149–390)
PLATELET # BLD AUTO: 173 THOUSANDS/UL (ref 149–390)
PLATELET # BLD AUTO: 193 THOUSANDS/UL (ref 149–390)
PLATELET # BLD AUTO: 194 THOUSANDS/UL (ref 149–390)
PLATELET # BLD AUTO: 198 THOUSANDS/UL (ref 149–390)
PLATELET # BLD AUTO: 200 THOUSANDS/UL (ref 149–390)
PLATELET # BLD AUTO: 206 THOUSANDS/UL (ref 149–390)
PLATELET # BLD AUTO: 207 THOUSANDS/UL (ref 149–390)
PLATELET # BLD AUTO: 214 THOUSANDS/UL (ref 149–390)
PLATELET # BLD AUTO: 232 THOUSANDS/UL (ref 149–390)
PLATELET # BLD AUTO: 235 THOUSANDS/UL (ref 149–390)
PLATELET # BLD AUTO: 243 THOUSANDS/UL (ref 149–390)
PMV BLD AUTO: 10.5 FL (ref 8.9–12.7)
PMV BLD AUTO: 10.6 FL (ref 8.9–12.7)
PMV BLD AUTO: 10.6 FL (ref 8.9–12.7)
PMV BLD AUTO: 10.7 FL (ref 8.9–12.7)
PMV BLD AUTO: 10.7 FL (ref 8.9–12.7)
PMV BLD AUTO: 10.8 FL (ref 8.9–12.7)
PMV BLD AUTO: 10.8 FL (ref 8.9–12.7)
PMV BLD AUTO: 11.1 FL (ref 8.9–12.7)
PMV BLD AUTO: 7.7 FL (ref 8.6–11.7)
PMV BLD AUTO: 8.1 FL (ref 8.6–11.7)
PMV BLD AUTO: 8.3 FL (ref 8.6–11.7)
PMV BLD AUTO: 8.5 FL (ref 8.6–11.7)
POTASSIUM SERPL-SCNC: 3.3 MMOL/L (ref 3.5–5)
POTASSIUM SERPL-SCNC: 3.3 MMOL/L (ref 3.5–5.5)
POTASSIUM SERPL-SCNC: 3.3 MMOL/L (ref 3.5–5.5)
POTASSIUM SERPL-SCNC: 3.4 MMOL/L (ref 3.5–5.3)
POTASSIUM SERPL-SCNC: 3.4 MMOL/L (ref 3.5–5.5)
POTASSIUM SERPL-SCNC: 3.6 MMOL/L (ref 3.5–5.3)
POTASSIUM SERPL-SCNC: 3.6 MMOL/L (ref 3.5–5.5)
POTASSIUM SERPL-SCNC: 3.6 MMOL/L (ref 3.5–5.5)
POTASSIUM SERPL-SCNC: 3.8 MMOL/L (ref 3.5–5.3)
POTASSIUM SERPL-SCNC: 3.9 MMOL/L (ref 3.5–5.5)
POTASSIUM SERPL-SCNC: 4.1 MMOL/L (ref 3.5–5)
POTASSIUM SERPL-SCNC: 4.2 MMOL/L (ref 3.5–5.3)
POTASSIUM SERPL-SCNC: 4.6 MMOL/L (ref 3.5–5.5)
POTASSIUM SERPL-SCNC: 5.2 MMOL/L (ref 3.5–5)
POTASSIUM SERPL-SCNC: 5.2 MMOL/L (ref 3.5–5)
PR INTERVAL: 182 MS
PR INTERVAL: 196 MS
PR INTERVAL: 198 MS
PR INTERVAL: 200 MS
PR INTERVAL: 200 MS
PR INTERVAL: 206 MS
PR INTERVAL: 212 MS
PR INTERVAL: 213 MS
PR INTERVAL: 213 MS
PR INTERVAL: 224 MS
PR INTERVAL: 250 MS
PROT SERPL-MCNC: 5.8 G/DL (ref 6.4–8.9)
PROT SERPL-MCNC: 6.1 G/DL (ref 6.4–8.9)
PROT SERPL-MCNC: 6.3 G/DL (ref 6.4–8.3)
PROT SERPL-MCNC: 6.8 G/DL (ref 6.4–8.2)
PROT SERPL-MCNC: 7.3 G/DL (ref 6.4–8.3)
PROT SERPL-MCNC: 7.4 G/DL (ref 6.4–8.2)
PROT SERPL-MCNC: 7.5 G/DL (ref 6.4–8.2)
PROT SERPL-MCNC: 7.8 G/DL (ref 6.4–8.9)
QRS AXIS: 105 DEGREES
QRS AXIS: 105 DEGREES
QRS AXIS: 108 DEGREES
QRS AXIS: 108 DEGREES
QRS AXIS: 109 DEGREES
QRS AXIS: 114 DEGREES
QRS AXIS: 119 DEGREES
QRS AXIS: 120 DEGREES
QRSD INTERVAL: 140 MS
QRSD INTERVAL: 142 MS
QRSD INTERVAL: 144 MS
QRSD INTERVAL: 146 MS
QRSD INTERVAL: 146 MS
QRSD INTERVAL: 148 MS
QRSD INTERVAL: 148 MS
QRSD INTERVAL: 152 MS
QRSD INTERVAL: 155 MS
QRSD INTERVAL: 155 MS
QT INTERVAL: 384 MS
QT INTERVAL: 394 MS
QT INTERVAL: 396 MS
QT INTERVAL: 396 MS
QT INTERVAL: 398 MS
QT INTERVAL: 398 MS
QT INTERVAL: 407 MS
QT INTERVAL: 407 MS
QT INTERVAL: 410 MS
QT INTERVAL: 418 MS
QT INTERVAL: 424 MS
QT INTERVAL: 426 MS
QTC INTERVAL: 467 MS
QTC INTERVAL: 473 MS
QTC INTERVAL: 479 MS
QTC INTERVAL: 479 MS
QTC INTERVAL: 483 MS
QTC INTERVAL: 484 MS
QTC INTERVAL: 489 MS
QTC INTERVAL: 489 MS
QTC INTERVAL: 492 MS
QTC INTERVAL: 498 MS
QTC INTERVAL: 500 MS
QTC INTERVAL: 521 MS
RBC # BLD AUTO: 2.51 MILLION/UL (ref 3.81–5.12)
RBC # BLD AUTO: 2.53 MILLION/UL (ref 3.81–5.12)
RBC # BLD AUTO: 2.78 MILLION/UL (ref 3.81–5.12)
RBC # BLD AUTO: 3.19 MILLION/UL (ref 3.81–5.12)
RBC # BLD AUTO: 3.21 MILLION/UL (ref 3.9–5.2)
RBC # BLD AUTO: 3.22 MILLION/UL (ref 3.9–5.2)
RBC # BLD AUTO: 3.36 MILLION/UL (ref 3.81–5.12)
RBC # BLD AUTO: 3.44 MILLION/UL (ref 3.9–5.2)
RBC # BLD AUTO: 3.46 MILLION/UL (ref 3.9–5.2)
RBC # BLD AUTO: 3.61 MILLION/UL (ref 3.81–5.12)
RBC # BLD AUTO: 3.62 MILLION/UL (ref 3.81–5.12)
RBC # BLD AUTO: 3.86 MILLION/UL (ref 3.81–5.12)
RBC # BLD AUTO: 3.87 MILLION/UL (ref 3.9–5.2)
RBC # BLD AUTO: 4 MILLION/UL (ref 3.9–5.2)
RBC # BLD AUTO: 4.1 MILLION/UL (ref 3.9–5.2)
RIGHT VENTRICLE ID DIMENSION: 3.2 CM
SARS-COV-2 RNA RESP QL NAA+PROBE: NEGATIVE
SL AMB POCT HEMOGLOBIN AIC: 5.5 (ref ?–6.5)
SL AMB POCT HEMOGLOBIN AIC: 6 (ref ?–6.5)
SL AMB POCT HEMOGLOBIN AIC: 8.1 (ref ?–6.5)
SL CV DOP CALC MV VTI RETROGRADE: 144.1 CM
SL CV LV EF: 50
SL CV MV MEAN GRADIENT RETROGRADE: 61 MMHG
SL CV PED ECHO LEFT VENTRICLE DIASTOLIC VOLUME (MOD BIPLANE) 2D: 95 ML
SL CV PED ECHO LEFT VENTRICLE SYSTOLIC VOLUME (MOD BIPLANE) 2D: 69 ML
SODIUM SERPL-SCNC: 131 MMOL/L (ref 133–145)
SODIUM SERPL-SCNC: 131 MMOL/L (ref 133–145)
SODIUM SERPL-SCNC: 131 MMOL/L (ref 134–143)
SODIUM SERPL-SCNC: 132 MMOL/L (ref 134–143)
SODIUM SERPL-SCNC: 134 MMOL/L (ref 134–143)
SODIUM SERPL-SCNC: 134 MMOL/L (ref 136–145)
SODIUM SERPL-SCNC: 135 MMOL/L (ref 133–145)
SODIUM SERPL-SCNC: 136 MMOL/L (ref 133–145)
SODIUM SERPL-SCNC: 136 MMOL/L (ref 136–145)
SODIUM SERPL-SCNC: 137 MMOL/L (ref 134–143)
SODIUM SERPL-SCNC: 138 MMOL/L (ref 134–143)
SODIUM SERPL-SCNC: 140 MMOL/L (ref 136–145)
SODIUM SERPL-SCNC: 141 MMOL/L (ref 136–145)
T WAVE AXIS: -38 DEGREES
T WAVE AXIS: -50 DEGREES
T WAVE AXIS: -52 DEGREES
T WAVE AXIS: -57 DEGREES
T WAVE AXIS: -58 DEGREES
T WAVE AXIS: -59 DEGREES
T WAVE AXIS: -62 DEGREES
T WAVE AXIS: -63 DEGREES
T WAVE AXIS: -63 DEGREES
T WAVE AXIS: -71 DEGREES
T WAVE AXIS: -71 DEGREES
T WAVE AXIS: 12 DEGREES
TIBC SERPL-MCNC: 120 UG/DL (ref 250–450)
TIBC SERPL-MCNC: 143 UG/DL (ref 250–450)
TR PEAK VELOCITY: 2.6 M/S
TRICUSPID VALVE PEAK REGURGITATION VELOCITY: 2.56 M/S
TRICUSPID VALVE S': 1.3 CM/S
TRIGL SERPL-MCNC: 131 MG/DL
TRIGL SERPL-MCNC: 167 MG/DL
TROPONIN I SERPL-MCNC: 0.05 NG/ML
TROPONIN I SERPL-MCNC: 0.1 NG/ML
TROPONIN I SERPL-MCNC: 0.1 NG/ML
TROPONIN I SERPL-MCNC: 0.11 NG/ML
TSH SERPL DL<=0.05 MIU/L-ACNC: 2.18 UIU/ML (ref 0.36–3.74)
TSH SERPL DL<=0.05 MIU/L-ACNC: 2.23 UIU/ML (ref 0.34–5.6)
TSH SERPL DL<=0.05 MIU/L-ACNC: 2.36 UIU/ML (ref 0.45–5.33)
TSH SERPL DL<=0.05 MIU/L-ACNC: 2.54 UIU/ML (ref 0.36–3.74)
TV PEAK GRADIENT: 26 MMHG
VENTRICULAR RATE: 78 BPM
VENTRICULAR RATE: 83 BPM
VENTRICULAR RATE: 83 BPM
VENTRICULAR RATE: 86 BPM
VENTRICULAR RATE: 86 BPM
VENTRICULAR RATE: 89 BPM
VENTRICULAR RATE: 89 BPM
VENTRICULAR RATE: 90 BPM
VENTRICULAR RATE: 91 BPM
VENTRICULAR RATE: 91 BPM
VENTRICULAR RATE: 92 BPM
VENTRICULAR RATE: 92 BPM
VIT B12 SERPL-MCNC: 449 PG/ML (ref 100–900)
WBC # BLD AUTO: 5.1 THOUSAND/UL (ref 4.8–10.8)
WBC # BLD AUTO: 5.7 THOUSAND/UL (ref 4.8–10.8)
WBC # BLD AUTO: 6.29 THOUSAND/UL (ref 4.31–10.16)
WBC # BLD AUTO: 6.3 THOUSAND/UL (ref 4.8–10.8)
WBC # BLD AUTO: 6.3 THOUSAND/UL (ref 4.8–10.8)
WBC # BLD AUTO: 6.36 THOUSAND/UL (ref 4.31–10.16)
WBC # BLD AUTO: 6.67 THOUSAND/UL (ref 4.31–10.16)
WBC # BLD AUTO: 6.74 THOUSAND/UL (ref 4.31–10.16)
WBC # BLD AUTO: 6.77 THOUSAND/UL (ref 4.31–10.16)
WBC # BLD AUTO: 6.99 THOUSAND/UL (ref 4.31–10.16)
WBC # BLD AUTO: 7.4 THOUSAND/UL (ref 4.31–10.16)
WBC # BLD AUTO: 7.45 THOUSAND/UL (ref 4.31–10.16)
WBC # BLD AUTO: 8.8 THOUSAND/UL (ref 4.8–10.8)
WBC # BLD AUTO: 9.2 THOUSAND/UL (ref 4.8–10.8)
WBC # BLD AUTO: 9.6 THOUSAND/UL (ref 4.8–10.8)
Z-SCORE OF LEFT VENTRICULAR DIMENSION IN END SYSTOLE: -0.73

## 2021-01-01 PROCEDURE — 93010 ELECTROCARDIOGRAM REPORT: CPT | Performed by: INTERNAL MEDICINE

## 2021-01-01 PROCEDURE — 77063 BREAST TOMOSYNTHESIS BI: CPT

## 2021-01-01 PROCEDURE — 82746 ASSAY OF FOLIC ACID SERUM: CPT | Performed by: PHYSICIAN ASSISTANT

## 2021-01-01 PROCEDURE — 85025 COMPLETE CBC W/AUTO DIFF WBC: CPT

## 2021-01-01 PROCEDURE — 82948 REAGENT STRIP/BLOOD GLUCOSE: CPT

## 2021-01-01 PROCEDURE — 82010 KETONE BODYS QUAN: CPT

## 2021-01-01 PROCEDURE — 84443 ASSAY THYROID STIM HORMONE: CPT | Performed by: INTERNAL MEDICINE

## 2021-01-01 PROCEDURE — 99496 TRANSJ CARE MGMT HIGH F2F 7D: CPT | Performed by: INTERNAL MEDICINE

## 2021-01-01 PROCEDURE — 83735 ASSAY OF MAGNESIUM: CPT | Performed by: EMERGENCY MEDICINE

## 2021-01-01 PROCEDURE — 99232 SBSQ HOSP IP/OBS MODERATE 35: CPT | Performed by: INTERNAL MEDICINE

## 2021-01-01 PROCEDURE — 99232 SBSQ HOSP IP/OBS MODERATE 35: CPT | Performed by: STUDENT IN AN ORGANIZED HEALTH CARE EDUCATION/TRAINING PROGRAM

## 2021-01-01 PROCEDURE — 5A1D70Z PERFORMANCE OF URINARY FILTRATION, INTERMITTENT, LESS THAN 6 HOURS PER DAY: ICD-10-PCS | Performed by: INTERNAL MEDICINE

## 2021-01-01 PROCEDURE — 99232 SBSQ HOSP IP/OBS MODERATE 35: CPT | Performed by: PHYSICIAN ASSISTANT

## 2021-01-01 PROCEDURE — 99214 OFFICE O/P EST MOD 30 MIN: CPT | Performed by: NURSE PRACTITIONER

## 2021-01-01 PROCEDURE — 97167 OT EVAL HIGH COMPLEX 60 MIN: CPT

## 2021-01-01 PROCEDURE — 80061 LIPID PANEL: CPT

## 2021-01-01 PROCEDURE — 84100 ASSAY OF PHOSPHORUS: CPT | Performed by: PHYSICIAN ASSISTANT

## 2021-01-01 PROCEDURE — 83735 ASSAY OF MAGNESIUM: CPT | Performed by: PHYSICIAN ASSISTANT

## 2021-01-01 PROCEDURE — 36415 COLL VENOUS BLD VENIPUNCTURE: CPT | Performed by: EMERGENCY MEDICINE

## 2021-01-01 PROCEDURE — 80053 COMPREHEN METABOLIC PANEL: CPT | Performed by: HOSPITALIST

## 2021-01-01 PROCEDURE — 84484 ASSAY OF TROPONIN QUANT: CPT | Performed by: PHYSICIAN ASSISTANT

## 2021-01-01 PROCEDURE — 80048 BASIC METABOLIC PNL TOTAL CA: CPT | Performed by: PHYSICIAN ASSISTANT

## 2021-01-01 PROCEDURE — 99285 EMERGENCY DEPT VISIT HI MDM: CPT

## 2021-01-01 PROCEDURE — 96361 HYDRATE IV INFUSION ADD-ON: CPT

## 2021-01-01 PROCEDURE — 82607 VITAMIN B-12: CPT | Performed by: PHYSICIAN ASSISTANT

## 2021-01-01 PROCEDURE — 36415 COLL VENOUS BLD VENIPUNCTURE: CPT

## 2021-01-01 PROCEDURE — 96372 THER/PROPH/DIAG INJ SC/IM: CPT

## 2021-01-01 PROCEDURE — 84484 ASSAY OF TROPONIN QUANT: CPT | Performed by: EMERGENCY MEDICINE

## 2021-01-01 PROCEDURE — 87081 CULTURE SCREEN ONLY: CPT | Performed by: STUDENT IN AN ORGANIZED HEALTH CARE EDUCATION/TRAINING PROGRAM

## 2021-01-01 PROCEDURE — 93005 ELECTROCARDIOGRAM TRACING: CPT

## 2021-01-01 PROCEDURE — 77067 SCR MAMMO BI INCL CAD: CPT

## 2021-01-01 PROCEDURE — G0008 ADMIN INFLUENZA VIRUS VAC: HCPCS | Performed by: INTERNAL MEDICINE

## 2021-01-01 PROCEDURE — 85025 COMPLETE CBC W/AUTO DIFF WBC: CPT | Performed by: EMERGENCY MEDICINE

## 2021-01-01 PROCEDURE — 84443 ASSAY THYROID STIM HORMONE: CPT

## 2021-01-01 PROCEDURE — 85025 COMPLETE CBC W/AUTO DIFF WBC: CPT | Performed by: PHYSICIAN ASSISTANT

## 2021-01-01 PROCEDURE — 93306 TTE W/DOPPLER COMPLETE: CPT | Performed by: INTERNAL MEDICINE

## 2021-01-01 PROCEDURE — 80053 COMPREHEN METABOLIC PANEL: CPT

## 2021-01-01 PROCEDURE — 84484 ASSAY OF TROPONIN QUANT: CPT | Performed by: NURSE PRACTITIONER

## 2021-01-01 PROCEDURE — 91301 SARS-COV-2 / COVID-19 MRNA VACCINE (MODERNA) 100 MCG: CPT

## 2021-01-01 PROCEDURE — 83540 ASSAY OF IRON: CPT

## 2021-01-01 PROCEDURE — 97163 PT EVAL HIGH COMPLEX 45 MIN: CPT

## 2021-01-01 PROCEDURE — 80048 BASIC METABOLIC PNL TOTAL CA: CPT | Performed by: INTERNAL MEDICINE

## 2021-01-01 PROCEDURE — 99214 OFFICE O/P EST MOD 30 MIN: CPT | Performed by: INTERNAL MEDICINE

## 2021-01-01 PROCEDURE — 99284 EMERGENCY DEPT VISIT MOD MDM: CPT

## 2021-01-01 PROCEDURE — 85027 COMPLETE CBC AUTOMATED: CPT | Performed by: INTERNAL MEDICINE

## 2021-01-01 PROCEDURE — 99222 1ST HOSP IP/OBS MODERATE 55: CPT | Performed by: INTERNAL MEDICINE

## 2021-01-01 PROCEDURE — 71045 X-RAY EXAM CHEST 1 VIEW: CPT

## 2021-01-01 PROCEDURE — 5A1D70Z PERFORMANCE OF URINARY FILTRATION, INTERMITTENT, LESS THAN 6 HOURS PER DAY: ICD-10-PCS | Performed by: STUDENT IN AN ORGANIZED HEALTH CARE EDUCATION/TRAINING PROGRAM

## 2021-01-01 PROCEDURE — 97530 THERAPEUTIC ACTIVITIES: CPT

## 2021-01-01 PROCEDURE — 80053 COMPREHEN METABOLIC PANEL: CPT | Performed by: EMERGENCY MEDICINE

## 2021-01-01 PROCEDURE — 84484 ASSAY OF TROPONIN QUANT: CPT

## 2021-01-01 PROCEDURE — 71275 CT ANGIOGRAPHY CHEST: CPT

## 2021-01-01 PROCEDURE — 1124F ACP DISCUSS-NO DSCNMKR DOCD: CPT | Performed by: EMERGENCY MEDICINE

## 2021-01-01 PROCEDURE — 99223 1ST HOSP IP/OBS HIGH 75: CPT | Performed by: INTERNAL MEDICINE

## 2021-01-01 PROCEDURE — 99222 1ST HOSP IP/OBS MODERATE 55: CPT | Performed by: NURSE PRACTITIONER

## 2021-01-01 PROCEDURE — 80053 COMPREHEN METABOLIC PANEL: CPT | Performed by: INTERNAL MEDICINE

## 2021-01-01 PROCEDURE — G1004 CDSM NDSC: HCPCS

## 2021-01-01 PROCEDURE — 83540 ASSAY OF IRON: CPT | Performed by: PHYSICIAN ASSISTANT

## 2021-01-01 PROCEDURE — 82728 ASSAY OF FERRITIN: CPT | Performed by: PHYSICIAN ASSISTANT

## 2021-01-01 PROCEDURE — 83550 IRON BINDING TEST: CPT

## 2021-01-01 PROCEDURE — 99236 HOSP IP/OBS SAME DATE HI 85: CPT | Performed by: STUDENT IN AN ORGANIZED HEALTH CARE EDUCATION/TRAINING PROGRAM

## 2021-01-01 PROCEDURE — 99238 HOSP IP/OBS DSCHRG MGMT 30/<: CPT | Performed by: PHYSICIAN ASSISTANT

## 2021-01-01 PROCEDURE — U0005 INFEC AGEN DETEC AMPLI PROBE: HCPCS | Performed by: EMERGENCY MEDICINE

## 2021-01-01 PROCEDURE — 80053 COMPREHEN METABOLIC PANEL: CPT | Performed by: PHYSICIAN ASSISTANT

## 2021-01-01 PROCEDURE — 80048 BASIC METABOLIC PNL TOTAL CA: CPT | Performed by: EMERGENCY MEDICINE

## 2021-01-01 PROCEDURE — 74174 CTA ABD&PLVS W/CONTRAST: CPT

## 2021-01-01 PROCEDURE — 84100 ASSAY OF PHOSPHORUS: CPT | Performed by: INTERNAL MEDICINE

## 2021-01-01 PROCEDURE — 90935 HEMODIALYSIS ONE EVALUATION: CPT | Performed by: PHYSICIAN ASSISTANT

## 2021-01-01 PROCEDURE — 99223 1ST HOSP IP/OBS HIGH 75: CPT | Performed by: HOSPITALIST

## 2021-01-01 PROCEDURE — 85027 COMPLETE CBC AUTOMATED: CPT | Performed by: PHYSICIAN ASSISTANT

## 2021-01-01 PROCEDURE — 99232 SBSQ HOSP IP/OBS MODERATE 35: CPT | Performed by: HOSPITALIST

## 2021-01-01 PROCEDURE — 90935 HEMODIALYSIS ONE EVALUATION: CPT | Performed by: INTERNAL MEDICINE

## 2021-01-01 PROCEDURE — 73502 X-RAY EXAM HIP UNI 2-3 VIEWS: CPT

## 2021-01-01 PROCEDURE — NC001 PR NO CHARGE: Performed by: PHYSICIAN ASSISTANT

## 2021-01-01 PROCEDURE — 93306 TTE W/DOPPLER COMPLETE: CPT

## 2021-01-01 PROCEDURE — 83880 ASSAY OF NATRIURETIC PEPTIDE: CPT | Performed by: EMERGENCY MEDICINE

## 2021-01-01 PROCEDURE — 82306 VITAMIN D 25 HYDROXY: CPT

## 2021-01-01 PROCEDURE — 83036 HEMOGLOBIN GLYCOSYLATED A1C: CPT | Performed by: INTERNAL MEDICINE

## 2021-01-01 PROCEDURE — 96360 HYDRATION IV INFUSION INIT: CPT

## 2021-01-01 PROCEDURE — 99495 TRANSJ CARE MGMT MOD F2F 14D: CPT | Performed by: INTERNAL MEDICINE

## 2021-01-01 PROCEDURE — 0011A SARS-COV-2 / COVID-19 MRNA VACCINE (MODERNA) 100 MCG: CPT

## 2021-01-01 PROCEDURE — 83550 IRON BINDING TEST: CPT | Performed by: PHYSICIAN ASSISTANT

## 2021-01-01 PROCEDURE — 85025 COMPLETE CBC W/AUTO DIFF WBC: CPT | Performed by: INTERNAL MEDICINE

## 2021-01-01 PROCEDURE — 99238 HOSP IP/OBS DSCHRG MGMT 30/<: CPT | Performed by: INTERNAL MEDICINE

## 2021-01-01 PROCEDURE — 83036 HEMOGLOBIN GLYCOSYLATED A1C: CPT

## 2021-01-01 PROCEDURE — 77080 DXA BONE DENSITY AXIAL: CPT

## 2021-01-01 PROCEDURE — 99285 EMERGENCY DEPT VISIT HI MDM: CPT | Performed by: EMERGENCY MEDICINE

## 2021-01-01 PROCEDURE — 99222 1ST HOSP IP/OBS MODERATE 55: CPT | Performed by: STUDENT IN AN ORGANIZED HEALTH CARE EDUCATION/TRAINING PROGRAM

## 2021-01-01 PROCEDURE — 99239 HOSP IP/OBS DSCHRG MGMT >30: CPT | Performed by: INTERNAL MEDICINE

## 2021-01-01 PROCEDURE — 83690 ASSAY OF LIPASE: CPT

## 2021-01-01 PROCEDURE — 0012A SARS-COV-2 / COVID-19 MRNA VACCINE (MODERNA) 100 MCG: CPT

## 2021-01-01 PROCEDURE — 85379 FIBRIN DEGRADATION QUANT: CPT | Performed by: EMERGENCY MEDICINE

## 2021-01-01 PROCEDURE — 82728 ASSAY OF FERRITIN: CPT

## 2021-01-01 PROCEDURE — 99220 PR INITIAL OBSERVATION CARE/DAY 70 MINUTES: CPT | Performed by: INTERNAL MEDICINE

## 2021-01-01 PROCEDURE — 84443 ASSAY THYROID STIM HORMONE: CPT | Performed by: EMERGENCY MEDICINE

## 2021-01-01 PROCEDURE — 83605 ASSAY OF LACTIC ACID: CPT

## 2021-01-01 PROCEDURE — U0003 INFECTIOUS AGENT DETECTION BY NUCLEIC ACID (DNA OR RNA); SEVERE ACUTE RESPIRATORY SYNDROME CORONAVIRUS 2 (SARS-COV-2) (CORONAVIRUS DISEASE [COVID-19]), AMPLIFIED PROBE TECHNIQUE, MAKING USE OF HIGH THROUGHPUT TECHNOLOGIES AS DESCRIBED BY CMS-2020-01-R: HCPCS | Performed by: EMERGENCY MEDICINE

## 2021-01-01 PROCEDURE — 83880 ASSAY OF NATRIURETIC PEPTIDE: CPT | Performed by: PHYSICIAN ASSISTANT

## 2021-01-01 PROCEDURE — 84100 ASSAY OF PHOSPHORUS: CPT | Performed by: NURSE PRACTITIONER

## 2021-01-01 PROCEDURE — 87081 CULTURE SCREEN ONLY: CPT | Performed by: HOSPITALIST

## 2021-01-01 PROCEDURE — 80048 BASIC METABOLIC PNL TOTAL CA: CPT | Performed by: NURSE PRACTITIONER

## 2021-01-01 PROCEDURE — 85025 COMPLETE CBC W/AUTO DIFF WBC: CPT | Performed by: HOSPITALIST

## 2021-01-01 PROCEDURE — 90662 IIV NO PRSV INCREASED AG IM: CPT | Performed by: INTERNAL MEDICINE

## 2021-01-01 PROCEDURE — 87081 CULTURE SCREEN ONLY: CPT | Performed by: INTERNAL MEDICINE

## 2021-01-01 PROCEDURE — 85025 COMPLETE CBC W/AUTO DIFF WBC: CPT | Performed by: NURSE PRACTITIONER

## 2021-01-01 PROCEDURE — 99283 EMERGENCY DEPT VISIT LOW MDM: CPT

## 2021-01-01 PROCEDURE — G0257 UNSCHED DIALYSIS ESRD PT HOS: HCPCS

## 2021-01-01 PROCEDURE — 83036 HEMOGLOBIN GLYCOSYLATED A1C: CPT | Performed by: PHYSICIAN ASSISTANT

## 2021-01-01 PROCEDURE — 97110 THERAPEUTIC EXERCISES: CPT

## 2021-01-01 PROCEDURE — 70450 CT HEAD/BRAIN W/O DYE: CPT

## 2021-01-01 RX ORDER — ASPIRIN 81 MG/1
81 TABLET ORAL DAILY
Status: CANCELLED | OUTPATIENT
Start: 2021-01-01

## 2021-01-01 RX ORDER — NATEGLINIDE 120 MG/1
TABLET ORAL
Qty: 120 TABLET | Refills: 5 | Status: SHIPPED | OUTPATIENT
Start: 2021-01-01 | End: 2021-01-01 | Stop reason: HOSPADM

## 2021-01-01 RX ORDER — PRAVASTATIN SODIUM 40 MG
80 TABLET ORAL DAILY
Status: DISCONTINUED | OUTPATIENT
Start: 2021-01-01 | End: 2021-01-01 | Stop reason: HOSPADM

## 2021-01-01 RX ORDER — HEPARIN SODIUM 5000 [USP'U]/ML
5000 INJECTION, SOLUTION INTRAVENOUS; SUBCUTANEOUS EVERY 8 HOURS SCHEDULED
Status: CANCELLED | OUTPATIENT
Start: 2021-01-01

## 2021-01-01 RX ORDER — SODIUM CHLORIDE 9 MG/ML
125 INJECTION, SOLUTION INTRAVENOUS CONTINUOUS
Status: DISCONTINUED | OUTPATIENT
Start: 2021-01-01 | End: 2021-01-01

## 2021-01-01 RX ORDER — ACETAMINOPHEN 325 MG/1
650 TABLET ORAL EVERY 6 HOURS PRN
Status: CANCELLED | OUTPATIENT
Start: 2021-01-01

## 2021-01-01 RX ORDER — PANTOPRAZOLE SODIUM 40 MG/1
TABLET, DELAYED RELEASE ORAL
Qty: 30 TABLET | Refills: 5 | Status: SHIPPED | OUTPATIENT
Start: 2021-01-01 | End: 2021-01-01

## 2021-01-01 RX ORDER — LOSARTAN POTASSIUM 50 MG/1
50 TABLET ORAL DAILY
Refills: 0
Start: 2021-01-01

## 2021-01-01 RX ORDER — AMLODIPINE BESYLATE 10 MG/1
10 TABLET ORAL DAILY
Status: DISCONTINUED | OUTPATIENT
Start: 2021-01-01 | End: 2021-01-01 | Stop reason: HOSPADM

## 2021-01-01 RX ORDER — NATEGLINIDE 120 MG/1
120 TABLET ORAL
Qty: 120 TABLET | Refills: 5
Start: 2021-01-01 | End: 2021-01-01

## 2021-01-01 RX ORDER — ONDANSETRON 2 MG/ML
4 INJECTION INTRAMUSCULAR; INTRAVENOUS EVERY 6 HOURS PRN
Status: DISCONTINUED | OUTPATIENT
Start: 2021-01-01 | End: 2021-01-01 | Stop reason: HOSPADM

## 2021-01-01 RX ORDER — INSULIN GLARGINE 100 [IU]/ML
10 INJECTION, SOLUTION SUBCUTANEOUS EVERY MORNING
Status: DISCONTINUED | OUTPATIENT
Start: 2021-01-01 | End: 2021-01-01 | Stop reason: HOSPADM

## 2021-01-01 RX ORDER — AMLODIPINE BESYLATE 10 MG/1
TABLET ORAL
Qty: 90 TABLET | Refills: 1 | Status: SHIPPED | OUTPATIENT
Start: 2021-01-01

## 2021-01-01 RX ORDER — LOSARTAN POTASSIUM 50 MG/1
50 TABLET ORAL DAILY
Status: DISCONTINUED | OUTPATIENT
Start: 2021-01-01 | End: 2021-01-01 | Stop reason: HOSPADM

## 2021-01-01 RX ORDER — SEVELAMER HYDROCHLORIDE 800 MG/1
800 TABLET, FILM COATED ORAL
Status: DISCONTINUED | OUTPATIENT
Start: 2021-01-01 | End: 2021-01-01

## 2021-01-01 RX ORDER — SEVELAMER CARBONATE 800 MG/1
800 TABLET, FILM COATED ORAL
COMMUNITY
Start: 2021-01-01 | End: 2021-01-01 | Stop reason: HOSPADM

## 2021-01-01 RX ORDER — ASPIRIN 81 MG/1
81 TABLET ORAL DAILY
Status: DISCONTINUED | OUTPATIENT
Start: 2021-01-01 | End: 2021-01-01 | Stop reason: HOSPADM

## 2021-01-01 RX ORDER — CALCIUM ACETATE 667 MG/1
667 CAPSULE ORAL
Status: DISCONTINUED | OUTPATIENT
Start: 2021-01-01 | End: 2021-01-01 | Stop reason: HOSPADM

## 2021-01-01 RX ORDER — NATEGLINIDE 120 MG/1
TABLET ORAL
Qty: 120 TABLET | Refills: 5 | Status: SHIPPED | OUTPATIENT
Start: 2021-01-01 | End: 2021-01-01 | Stop reason: SDUPTHER

## 2021-01-01 RX ORDER — AMLODIPINE BESYLATE 5 MG/1
10 TABLET ORAL DAILY
Status: CANCELLED | OUTPATIENT
Start: 2021-01-01

## 2021-01-01 RX ORDER — CHOLECALCIFEROL (VITAMIN D3) 10 MCG
1 TABLET ORAL DAILY
Status: DISCONTINUED | OUTPATIENT
Start: 2021-01-01 | End: 2021-01-01 | Stop reason: HOSPADM

## 2021-01-01 RX ORDER — HEPARIN SODIUM 5000 [USP'U]/ML
5000 INJECTION, SOLUTION INTRAVENOUS; SUBCUTANEOUS EVERY 12 HOURS SCHEDULED
Status: DISCONTINUED | OUTPATIENT
Start: 2021-01-01 | End: 2021-01-01 | Stop reason: HOSPADM

## 2021-01-01 RX ORDER — AMLODIPINE BESYLATE 10 MG/1
10 TABLET ORAL DAILY
Status: DISCONTINUED | OUTPATIENT
Start: 2021-01-01 | End: 2021-01-01

## 2021-01-01 RX ORDER — ACETAMINOPHEN 325 MG/1
650 TABLET ORAL EVERY 6 HOURS PRN
Status: DISCONTINUED | OUTPATIENT
Start: 2021-01-01 | End: 2021-01-01

## 2021-01-01 RX ORDER — INSULIN GLARGINE 100 [IU]/ML
15 INJECTION, SOLUTION SUBCUTANEOUS EVERY MORNING
Status: DISCONTINUED | OUTPATIENT
Start: 2021-01-01 | End: 2021-01-01

## 2021-01-01 RX ORDER — ASCORBIC ACID, THIAMINE MONONITRATE,RIBOFLAVIN, NIACINAMIDE, PYRIDOXINE HYDROCHLORIDE, FOLIC ACID, CYANOCOBALAMIN, BIOTIN, CALCIUM PANTOTHENATE, 100; 1.5; 1.7; 20; 10; 1; 6000; 150000; 5 MG/1; MG/1; MG/1; MG/1; MG/1; MG/1; UG/1; UG/1; MG/1
CAPSULE, LIQUID FILLED ORAL DAILY
Status: CANCELLED | OUTPATIENT
Start: 2021-01-01

## 2021-01-01 RX ORDER — FLUTICASONE PROPIONATE 50 MCG
1 SPRAY, SUSPENSION (ML) NASAL DAILY
Status: DISCONTINUED | OUTPATIENT
Start: 2021-01-01 | End: 2021-01-01 | Stop reason: HOSPADM

## 2021-01-01 RX ORDER — ONDANSETRON 2 MG/ML
INJECTION INTRAMUSCULAR; INTRAVENOUS
Status: COMPLETED
Start: 2021-01-01 | End: 2021-01-01

## 2021-01-01 RX ORDER — HYDRALAZINE HYDROCHLORIDE 20 MG/ML
20 INJECTION INTRAMUSCULAR; INTRAVENOUS EVERY 6 HOURS PRN
Status: DISCONTINUED | OUTPATIENT
Start: 2021-01-01 | End: 2021-01-01 | Stop reason: HOSPADM

## 2021-01-01 RX ORDER — PANTOPRAZOLE SODIUM 40 MG/1
40 TABLET, DELAYED RELEASE ORAL DAILY
Status: DISCONTINUED | OUTPATIENT
Start: 2021-01-01 | End: 2021-01-01 | Stop reason: HOSPADM

## 2021-01-01 RX ORDER — SEVELAMER HYDROCHLORIDE 800 MG/1
800 TABLET, FILM COATED ORAL
Status: CANCELLED | OUTPATIENT
Start: 2021-01-01

## 2021-01-01 RX ORDER — DIPHENOXYLATE HYDROCHLORIDE AND ATROPINE SULFATE 2.5; .025 MG/1; MG/1
1 TABLET ORAL 4 TIMES DAILY PRN
Status: DISCONTINUED | OUTPATIENT
Start: 2021-01-01 | End: 2021-01-01 | Stop reason: HOSPADM

## 2021-01-01 RX ORDER — PRAVASTATIN SODIUM 80 MG/1
80 TABLET ORAL
Status: DISCONTINUED | OUTPATIENT
Start: 2021-01-01 | End: 2021-01-01 | Stop reason: HOSPADM

## 2021-01-01 RX ORDER — SIMETHICONE 80 MG
80 TABLET,CHEWABLE ORAL 4 TIMES DAILY PRN
Status: DISCONTINUED | OUTPATIENT
Start: 2021-01-01 | End: 2021-01-01

## 2021-01-01 RX ORDER — DIPHENOXYLATE HYDROCHLORIDE AND ATROPINE SULFATE 2.5; .025 MG/1; MG/1
1 TABLET ORAL 4 TIMES DAILY PRN
Status: CANCELLED | OUTPATIENT
Start: 2021-01-01

## 2021-01-01 RX ORDER — CHLORAL HYDRATE 500 MG
1000 CAPSULE ORAL 4 TIMES DAILY
Status: DISCONTINUED | OUTPATIENT
Start: 2021-01-01 | End: 2021-01-01 | Stop reason: HOSPADM

## 2021-01-01 RX ORDER — ISOSORBIDE MONONITRATE 30 MG/1
30 TABLET, EXTENDED RELEASE ORAL DAILY
Status: DISCONTINUED | OUTPATIENT
Start: 2021-01-01 | End: 2021-01-01 | Stop reason: HOSPADM

## 2021-01-01 RX ORDER — FLUTICASONE PROPIONATE 50 MCG
1 SPRAY, SUSPENSION (ML) NASAL DAILY
Status: CANCELLED | OUTPATIENT
Start: 2021-01-01

## 2021-01-01 RX ORDER — PRAVASTATIN SODIUM 80 MG/1
TABLET ORAL
Qty: 90 TABLET | Refills: 3 | Status: SHIPPED | OUTPATIENT
Start: 2021-01-01

## 2021-01-01 RX ORDER — ERGOCALCIFEROL 1.25 MG/1
CAPSULE ORAL
Qty: 12 CAPSULE | Refills: 3 | Status: SHIPPED | OUTPATIENT
Start: 2021-01-01 | End: 2021-01-01 | Stop reason: HOSPADM

## 2021-01-01 RX ORDER — HEPARIN SODIUM 5000 [USP'U]/ML
5000 INJECTION, SOLUTION INTRAVENOUS; SUBCUTANEOUS EVERY 8 HOURS SCHEDULED
Status: DISCONTINUED | OUTPATIENT
Start: 2021-01-01 | End: 2021-01-01

## 2021-01-01 RX ORDER — LORAZEPAM 1 MG/1
1 TABLET ORAL ONCE
Status: COMPLETED | OUTPATIENT
Start: 2021-01-01 | End: 2021-01-01

## 2021-01-01 RX ORDER — SEVELAMER HYDROCHLORIDE 800 MG/1
800 TABLET, FILM COATED ORAL
Status: DISCONTINUED | OUTPATIENT
Start: 2021-01-01 | End: 2021-01-01 | Stop reason: HOSPADM

## 2021-01-01 RX ORDER — CHLORAL HYDRATE 500 MG
1000 CAPSULE ORAL 4 TIMES DAILY
Status: CANCELLED | OUTPATIENT
Start: 2021-01-01

## 2021-01-01 RX ORDER — ISOSORBIDE MONONITRATE 30 MG/1
30 TABLET, EXTENDED RELEASE ORAL DAILY
Status: CANCELLED | OUTPATIENT
Start: 2021-01-01

## 2021-01-01 RX ORDER — DEXTROSE AND SODIUM CHLORIDE 5; .45 G/100ML; G/100ML
125 INJECTION, SOLUTION INTRAVENOUS CONTINUOUS
Status: DISCONTINUED | OUTPATIENT
Start: 2021-01-01 | End: 2021-01-01

## 2021-01-01 RX ORDER — SODIUM CHLORIDE 9 MG/ML
3 INJECTION INTRAVENOUS
Status: DISCONTINUED | OUTPATIENT
Start: 2021-01-01 | End: 2021-01-01 | Stop reason: HOSPADM

## 2021-01-01 RX ORDER — PANTOPRAZOLE SODIUM 40 MG/1
TABLET, DELAYED RELEASE ORAL
Qty: 30 TABLET | Refills: 5 | Status: SHIPPED | OUTPATIENT
Start: 2021-01-01

## 2021-01-01 RX ORDER — ISOSORBIDE MONONITRATE 30 MG/1
30 TABLET, EXTENDED RELEASE ORAL DAILY
Qty: 30 TABLET | Refills: 0 | Status: SHIPPED | OUTPATIENT
Start: 2021-01-01

## 2021-01-01 RX ORDER — ACETAMINOPHEN 325 MG/1
650 TABLET ORAL EVERY 6 HOURS PRN
Status: DISCONTINUED | OUTPATIENT
Start: 2021-01-01 | End: 2021-01-01 | Stop reason: HOSPADM

## 2021-01-01 RX ORDER — PANTOPRAZOLE SODIUM 40 MG/1
40 TABLET, DELAYED RELEASE ORAL
Status: DISCONTINUED | OUTPATIENT
Start: 2021-01-01 | End: 2021-01-01 | Stop reason: HOSPADM

## 2021-01-01 RX ORDER — CHOLECALCIFEROL (VITAMIN D3) 10 MCG
TABLET ORAL DAILY
Status: DISCONTINUED | OUTPATIENT
Start: 2021-01-01 | End: 2021-01-01 | Stop reason: HOSPADM

## 2021-01-01 RX ORDER — PRAVASTATIN SODIUM 80 MG/1
80 TABLET ORAL DAILY
Status: DISCONTINUED | OUTPATIENT
Start: 2021-01-01 | End: 2021-01-01 | Stop reason: HOSPADM

## 2021-01-01 RX ORDER — CHLORAL HYDRATE 500 MG
1000 CAPSULE ORAL 2 TIMES DAILY
Status: DISCONTINUED | OUTPATIENT
Start: 2021-01-01 | End: 2021-01-01 | Stop reason: HOSPADM

## 2021-01-01 RX ORDER — ONDANSETRON 2 MG/ML
4 INJECTION INTRAMUSCULAR; INTRAVENOUS EVERY 6 HOURS PRN
Status: DISCONTINUED | OUTPATIENT
Start: 2021-01-01 | End: 2021-01-01

## 2021-01-01 RX ORDER — ONDANSETRON 2 MG/ML
4 INJECTION INTRAMUSCULAR; INTRAVENOUS ONCE
Status: COMPLETED | OUTPATIENT
Start: 2021-01-01 | End: 2021-01-01

## 2021-01-01 RX ORDER — AMLODIPINE BESYLATE 10 MG/1
TABLET ORAL
Qty: 90 TABLET | Refills: 1 | Status: SHIPPED | OUTPATIENT
Start: 2021-01-01 | End: 2021-01-01

## 2021-01-01 RX ORDER — PRAVASTATIN SODIUM 20 MG
80 TABLET ORAL DAILY
Status: CANCELLED | OUTPATIENT
Start: 2021-01-01

## 2021-01-01 RX ORDER — VANCOMYCIN HYDROCHLORIDE 1 G/200ML
15 INJECTION, SOLUTION INTRAVENOUS ONCE
Status: COMPLETED | OUTPATIENT
Start: 2021-01-01 | End: 2021-01-01

## 2021-01-01 RX ORDER — CALCIUM CARBONATE 200(500)MG
1000 TABLET,CHEWABLE ORAL 3 TIMES DAILY PRN
Status: DISCONTINUED | OUTPATIENT
Start: 2021-01-01 | End: 2021-01-01

## 2021-01-01 RX ORDER — HEPARIN SODIUM 5000 [USP'U]/ML
5000 INJECTION, SOLUTION INTRAVENOUS; SUBCUTANEOUS EVERY 8 HOURS SCHEDULED
Status: DISCONTINUED | OUTPATIENT
Start: 2021-01-01 | End: 2021-01-01 | Stop reason: HOSPADM

## 2021-01-01 RX ORDER — LANOLIN ALCOHOL/MO/W.PET/CERES
6 CREAM (GRAM) TOPICAL
Status: DISCONTINUED | OUTPATIENT
Start: 2021-01-01 | End: 2021-01-01 | Stop reason: HOSPADM

## 2021-01-01 RX ORDER — DEXTROSE MONOHYDRATE 100 MG/ML
40 INJECTION, SOLUTION INTRAVENOUS CONTINUOUS
Status: DISCONTINUED | OUTPATIENT
Start: 2021-01-01 | End: 2021-01-01

## 2021-01-01 RX ORDER — PANTOPRAZOLE SODIUM 40 MG/1
40 TABLET, DELAYED RELEASE ORAL DAILY
Status: CANCELLED | OUTPATIENT
Start: 2021-01-01

## 2021-01-01 RX ORDER — CALCITRIOL 0.25 UG/1
0.25 CAPSULE, LIQUID FILLED ORAL 3 TIMES WEEKLY
Status: DISCONTINUED | OUTPATIENT
Start: 2021-01-01 | End: 2021-01-01 | Stop reason: HOSPADM

## 2021-01-01 RX ORDER — ONDANSETRON 2 MG/ML
4 INJECTION INTRAMUSCULAR; INTRAVENOUS EVERY 6 HOURS PRN
Status: CANCELLED | OUTPATIENT
Start: 2021-01-01

## 2021-01-01 RX ADMIN — ASPIRIN 81 MG: 81 TABLET, COATED ORAL at 09:06

## 2021-01-01 RX ADMIN — CALCIUM ACETATE 667 MG: 667 CAPSULE ORAL at 08:54

## 2021-01-01 RX ADMIN — AMLODIPINE BESYLATE 10 MG: 10 TABLET ORAL at 12:06

## 2021-01-01 RX ADMIN — OMEGA-3 FATTY ACIDS CAP 1000 MG 1000 MG: 1000 CAP at 17:11

## 2021-01-01 RX ADMIN — INSULIN LISPRO 1 UNITS: 100 INJECTION, SOLUTION INTRAVENOUS; SUBCUTANEOUS at 16:40

## 2021-01-01 RX ADMIN — OMEGA-3 FATTY ACIDS CAP 1000 MG 1000 MG: 1000 CAP at 11:25

## 2021-01-01 RX ADMIN — CALCIUM ACETATE 667 MG: 667 CAPSULE ORAL at 16:07

## 2021-01-01 RX ADMIN — ASPIRIN 81 MG: 81 TABLET, COATED ORAL at 09:05

## 2021-01-01 RX ADMIN — CALCITRIOL CAPSULES 0.25 MCG 0.25 MCG: 0.25 CAPSULE ORAL at 13:39

## 2021-01-01 RX ADMIN — CALCIUM ACETATE 667 MG: 667 CAPSULE ORAL at 08:17

## 2021-01-01 RX ADMIN — OMEGA-3 FATTY ACIDS CAP 1000 MG 1000 MG: 1000 CAP at 22:44

## 2021-01-01 RX ADMIN — CALCIUM ACETATE 667 MG: 667 CAPSULE ORAL at 13:39

## 2021-01-01 RX ADMIN — INSULIN LISPRO 1 UNITS: 100 INJECTION, SOLUTION INTRAVENOUS; SUBCUTANEOUS at 12:03

## 2021-01-01 RX ADMIN — PRAVASTATIN SODIUM 80 MG: 20 TABLET ORAL at 08:52

## 2021-01-01 RX ADMIN — INSULIN GLARGINE 10 UNITS: 100 INJECTION, SOLUTION SUBCUTANEOUS at 14:15

## 2021-01-01 RX ADMIN — INSULIN GLARGINE 10 UNITS: 100 INJECTION, SOLUTION SUBCUTANEOUS at 09:35

## 2021-01-01 RX ADMIN — PRAVASTATIN SODIUM 80 MG: 20 TABLET ORAL at 08:17

## 2021-01-01 RX ADMIN — SEVELAMER HYDROCHLORIDE 800 MG: 800 TABLET, FILM COATED PARENTERAL at 17:16

## 2021-01-01 RX ADMIN — INSULIN GLARGINE 10 UNITS: 100 INJECTION, SOLUTION SUBCUTANEOUS at 13:48

## 2021-01-01 RX ADMIN — SEVELAMER HYDROCHLORIDE 800 MG: 800 TABLET, FILM COATED PARENTERAL at 08:53

## 2021-01-01 RX ADMIN — Medication 1 MG: at 09:33

## 2021-01-01 RX ADMIN — Medication 12.5 MG: at 21:45

## 2021-01-01 RX ADMIN — OMEGA-3 FATTY ACIDS CAP 1000 MG 1000 MG: 1000 CAP at 09:04

## 2021-01-01 RX ADMIN — AMLODIPINE BESYLATE 10 MG: 10 TABLET ORAL at 09:04

## 2021-01-01 RX ADMIN — PRAVASTATIN SODIUM 80 MG: 20 TABLET ORAL at 12:06

## 2021-01-01 RX ADMIN — Medication 1 MG: at 08:33

## 2021-01-01 RX ADMIN — OMEGA-3 FATTY ACIDS CAP 1000 MG 1000 MG: 1000 CAP at 12:01

## 2021-01-01 RX ADMIN — AMLODIPINE BESYLATE 10 MG: 10 TABLET ORAL at 09:05

## 2021-01-01 RX ADMIN — PRAVASTATIN SODIUM 80 MG: 20 TABLET ORAL at 09:33

## 2021-01-01 RX ADMIN — HEPARIN SODIUM 5000 UNITS: 5000 INJECTION INTRAVENOUS; SUBCUTANEOUS at 13:17

## 2021-01-01 RX ADMIN — CALCIUM ACETATE 667 MG: 667 CAPSULE ORAL at 16:21

## 2021-01-01 RX ADMIN — CALCIUM ACETATE 667 MG: 667 CAPSULE ORAL at 11:50

## 2021-01-01 RX ADMIN — LOSARTAN POTASSIUM 50 MG: 50 TABLET, FILM COATED ORAL at 09:05

## 2021-01-01 RX ADMIN — OMEGA-3 FATTY ACIDS CAP 1000 MG 1000 MG: 1000 CAP at 12:02

## 2021-01-01 RX ADMIN — ISOSORBIDE MONONITRATE 30 MG: 30 TABLET, EXTENDED RELEASE ORAL at 08:17

## 2021-01-01 RX ADMIN — ACETAMINOPHEN 650 MG: 325 TABLET ORAL at 14:05

## 2021-01-01 RX ADMIN — Medication 1 MG: at 13:39

## 2021-01-01 RX ADMIN — HEPARIN SODIUM 5000 UNITS: 5000 INJECTION, SOLUTION INTRAVENOUS; SUBCUTANEOUS at 21:00

## 2021-01-01 RX ADMIN — SEVELAMER HYDROCHLORIDE 800 MG: 800 TABLET, FILM COATED PARENTERAL at 12:02

## 2021-01-01 RX ADMIN — CALCIUM ACETATE 667 MG: 667 CAPSULE ORAL at 15:32

## 2021-01-01 RX ADMIN — INSULIN LISPRO 1 UNITS: 100 INJECTION, SOLUTION INTRAVENOUS; SUBCUTANEOUS at 21:33

## 2021-01-01 RX ADMIN — HEPARIN SODIUM 5000 UNITS: 5000 INJECTION, SOLUTION INTRAVENOUS; SUBCUTANEOUS at 05:01

## 2021-01-01 RX ADMIN — SEVELAMER HYDROCHLORIDE 800 MG: 800 TABLET, FILM COATED PARENTERAL at 15:32

## 2021-01-01 RX ADMIN — PANTOPRAZOLE SODIUM 40 MG: 40 TABLET, DELAYED RELEASE ORAL at 08:44

## 2021-01-01 RX ADMIN — HEPARIN SODIUM 5000 UNITS: 5000 INJECTION INTRAVENOUS; SUBCUTANEOUS at 21:55

## 2021-01-01 RX ADMIN — OMEGA-3 FATTY ACIDS CAP 1000 MG 1000 MG: 1000 CAP at 08:52

## 2021-01-01 RX ADMIN — PANTOPRAZOLE SODIUM 40 MG: 40 TABLET, DELAYED RELEASE ORAL at 13:20

## 2021-01-01 RX ADMIN — ASPIRIN 81 MG: 81 TABLET ORAL at 08:33

## 2021-01-01 RX ADMIN — FLUTICASONE PROPIONATE 1 SPRAY: 50 SPRAY, METERED NASAL at 08:32

## 2021-01-01 RX ADMIN — OMEGA-3 FATTY ACIDS CAP 1000 MG 1000 MG: 1000 CAP at 09:05

## 2021-01-01 RX ADMIN — HEPARIN SODIUM 5000 UNITS: 5000 INJECTION, SOLUTION INTRAVENOUS; SUBCUTANEOUS at 15:14

## 2021-01-01 RX ADMIN — INSULIN LISPRO 3 UNITS: 100 INJECTION, SOLUTION INTRAVENOUS; SUBCUTANEOUS at 21:14

## 2021-01-01 RX ADMIN — HEPARIN SODIUM 5000 UNITS: 5000 INJECTION, SOLUTION INTRAVENOUS; SUBCUTANEOUS at 05:12

## 2021-01-01 RX ADMIN — SEVELAMER HYDROCHLORIDE 800 MG: 800 TABLET, FILM COATED PARENTERAL at 08:33

## 2021-01-01 RX ADMIN — ASPIRIN 81 MG: 81 TABLET, COATED ORAL at 12:06

## 2021-01-01 RX ADMIN — ISOSORBIDE MONONITRATE 30 MG: 30 TABLET, EXTENDED RELEASE ORAL at 09:06

## 2021-01-01 RX ADMIN — PIPERACILLIN AND TAZOBACTAM 3.38 G: 3; .375 INJECTION, POWDER, FOR SOLUTION INTRAVENOUS at 18:10

## 2021-01-01 RX ADMIN — CALCIUM ACETATE 667 MG: 667 CAPSULE ORAL at 09:33

## 2021-01-01 RX ADMIN — CALCIUM ACETATE 667 MG: 667 CAPSULE ORAL at 11:25

## 2021-01-01 RX ADMIN — HEPARIN SODIUM 5000 UNITS: 5000 INJECTION INTRAVENOUS; SUBCUTANEOUS at 22:44

## 2021-01-01 RX ADMIN — ISOSORBIDE MONONITRATE 30 MG: 30 TABLET, EXTENDED RELEASE ORAL at 13:20

## 2021-01-01 RX ADMIN — CALCIUM ACETATE 667 MG: 667 CAPSULE ORAL at 12:07

## 2021-01-01 RX ADMIN — INSULIN LISPRO 1 UNITS: 100 INJECTION, SOLUTION INTRAVENOUS; SUBCUTANEOUS at 17:39

## 2021-01-01 RX ADMIN — SEVELAMER HYDROCHLORIDE 800 MG: 800 TABLET, FILM COATED PARENTERAL at 12:07

## 2021-01-01 RX ADMIN — AMLODIPINE BESYLATE 10 MG: 10 TABLET ORAL at 13:20

## 2021-01-01 RX ADMIN — CALCIUM ACETATE 667 MG: 667 CAPSULE ORAL at 12:06

## 2021-01-01 RX ADMIN — ASPIRIN 81 MG: 81 TABLET, COATED ORAL at 13:39

## 2021-01-01 RX ADMIN — DEXTROSE 40 ML/HR: 10 SOLUTION INTRAVENOUS at 20:05

## 2021-01-01 RX ADMIN — AMLODIPINE BESYLATE 10 MG: 10 TABLET ORAL at 13:52

## 2021-01-01 RX ADMIN — PRAVASTATIN SODIUM 80 MG: 80 TABLET ORAL at 13:52

## 2021-01-01 RX ADMIN — SEVELAMER HYDROCHLORIDE 800 MG: 800 TABLET, FILM COATED PARENTERAL at 17:54

## 2021-01-01 RX ADMIN — HEPARIN SODIUM 5000 UNITS: 5000 INJECTION, SOLUTION INTRAVENOUS; SUBCUTANEOUS at 22:33

## 2021-01-01 RX ADMIN — VANCOMYCIN HYDROCHLORIDE 1000 MG: 1 INJECTION, SOLUTION INTRAVENOUS at 20:53

## 2021-01-01 RX ADMIN — AMLODIPINE BESYLATE 10 MG: 10 TABLET ORAL at 08:44

## 2021-01-01 RX ADMIN — AMLODIPINE BESYLATE 10 MG: 10 TABLET ORAL at 09:33

## 2021-01-01 RX ADMIN — HEPARIN SODIUM 5000 UNITS: 5000 INJECTION, SOLUTION INTRAVENOUS; SUBCUTANEOUS at 22:35

## 2021-01-01 RX ADMIN — AMLODIPINE BESYLATE 10 MG: 10 TABLET ORAL at 08:17

## 2021-01-01 RX ADMIN — ONDANSETRON 4 MG: 2 INJECTION INTRAMUSCULAR; INTRAVENOUS at 08:52

## 2021-01-01 RX ADMIN — HEPARIN SODIUM 5000 UNITS: 5000 INJECTION INTRAVENOUS; SUBCUTANEOUS at 08:33

## 2021-01-01 RX ADMIN — PRAVASTATIN SODIUM 80 MG: 80 TABLET ORAL at 13:19

## 2021-01-01 RX ADMIN — ACETAMINOPHEN 650 MG: 325 TABLET, FILM COATED ORAL at 22:46

## 2021-01-01 RX ADMIN — OMEGA-3 FATTY ACIDS CAP 1000 MG 1000 MG: 1000 CAP at 17:22

## 2021-01-01 RX ADMIN — CALCIUM ACETATE 667 MG: 667 CAPSULE ORAL at 17:16

## 2021-01-01 RX ADMIN — OMEGA-3 FATTY ACIDS CAP 1000 MG 1000 MG: 1000 CAP at 17:27

## 2021-01-01 RX ADMIN — ASPIRIN 81 MG: 81 TABLET, COATED ORAL at 08:52

## 2021-01-01 RX ADMIN — HEPARIN SODIUM 5000 UNITS: 5000 INJECTION, SOLUTION INTRAVENOUS; SUBCUTANEOUS at 14:06

## 2021-01-01 RX ADMIN — Medication 1 MG: at 08:52

## 2021-01-01 RX ADMIN — ASPIRIN 81 MG: 81 TABLET, COATED ORAL at 13:51

## 2021-01-01 RX ADMIN — HEPARIN SODIUM 5000 UNITS: 5000 INJECTION INTRAVENOUS; SUBCUTANEOUS at 05:24

## 2021-01-01 RX ADMIN — PRAVASTATIN SODIUM 80 MG: 40 TABLET ORAL at 08:33

## 2021-01-01 RX ADMIN — FLUTICASONE PROPIONATE 1 SPRAY: 50 SPRAY, METERED NASAL at 09:07

## 2021-01-01 RX ADMIN — HEPARIN SODIUM 5000 UNITS: 5000 INJECTION, SOLUTION INTRAVENOUS; SUBCUTANEOUS at 13:42

## 2021-01-01 RX ADMIN — SEVELAMER HYDROCHLORIDE 800 MG: 800 TABLET, FILM COATED PARENTERAL at 16:40

## 2021-01-01 RX ADMIN — CALCIUM ACETATE 667 MG: 667 CAPSULE ORAL at 12:01

## 2021-01-01 RX ADMIN — LOSARTAN POTASSIUM 50 MG: 50 TABLET, FILM COATED ORAL at 12:06

## 2021-01-01 RX ADMIN — OMEGA-3 FATTY ACIDS CAP 1000 MG 1000 MG: 1000 CAP at 12:06

## 2021-01-01 RX ADMIN — SEVELAMER HYDROCHLORIDE 800 MG: 800 TABLET, FILM COATED PARENTERAL at 09:32

## 2021-01-01 RX ADMIN — OMEGA-3 FATTY ACIDS CAP 1000 MG 1000 MG: 1000 CAP at 13:19

## 2021-01-01 RX ADMIN — LOSARTAN POTASSIUM 50 MG: 50 TABLET, FILM COATED ORAL at 09:06

## 2021-01-01 RX ADMIN — HEPARIN SODIUM 5000 UNITS: 5000 INJECTION INTRAVENOUS; SUBCUTANEOUS at 08:44

## 2021-01-01 RX ADMIN — OMEGA-3 FATTY ACIDS CAP 1000 MG 1000 MG: 1000 CAP at 21:55

## 2021-01-01 RX ADMIN — EPOETIN ALFA 2000 UNITS: 2000 SOLUTION INTRAVENOUS; SUBCUTANEOUS at 12:29

## 2021-01-01 RX ADMIN — ASPIRIN 81 MG: 81 TABLET, COATED ORAL at 08:44

## 2021-01-01 RX ADMIN — DEXTROSE AND SODIUM CHLORIDE 125 ML/HR: 5; .45 INJECTION, SOLUTION INTRAVENOUS at 15:29

## 2021-01-01 RX ADMIN — WATER 10 ML: 1 INJECTION INTRAMUSCULAR; INTRAVENOUS; SUBCUTANEOUS at 23:01

## 2021-01-01 RX ADMIN — IOHEXOL 100 ML: 350 INJECTION, SOLUTION INTRAVENOUS at 17:08

## 2021-01-01 RX ADMIN — LOSARTAN POTASSIUM 50 MG: 50 TABLET, FILM COATED ORAL at 08:51

## 2021-01-01 RX ADMIN — OMEGA-3 FATTY ACIDS CAP 1000 MG 1000 MG: 1000 CAP at 17:16

## 2021-01-01 RX ADMIN — OMEGA-3 FATTY ACIDS CAP 1000 MG 1000 MG: 1000 CAP at 13:39

## 2021-01-01 RX ADMIN — OMEGA-3 FATTY ACIDS CAP 1000 MG 1000 MG: 1000 CAP at 11:50

## 2021-01-01 RX ADMIN — ASPIRIN 81 MG: 81 TABLET, COATED ORAL at 12:27

## 2021-01-01 RX ADMIN — OMEGA-3 FATTY ACIDS CAP 1000 MG 1000 MG: 1000 CAP at 12:27

## 2021-01-01 RX ADMIN — OMEGA-3 FATTY ACIDS CAP 1000 MG 1000 MG: 1000 CAP at 17:54

## 2021-01-01 RX ADMIN — ISOSORBIDE MONONITRATE 30 MG: 30 TABLET, EXTENDED RELEASE ORAL at 12:06

## 2021-01-01 RX ADMIN — Medication 6 MG: at 22:37

## 2021-01-01 RX ADMIN — PANTOPRAZOLE SODIUM 40 MG: 40 TABLET, DELAYED RELEASE ORAL at 08:17

## 2021-01-01 RX ADMIN — SEVELAMER HYDROCHLORIDE 800 MG: 800 TABLET, FILM COATED PARENTERAL at 09:04

## 2021-01-01 RX ADMIN — OMEGA-3 FATTY ACIDS CAP 1000 MG 1000 MG: 1000 CAP at 22:33

## 2021-01-01 RX ADMIN — FLUTICASONE PROPIONATE 1 SPRAY: 50 SPRAY, METERED NASAL at 13:48

## 2021-01-01 RX ADMIN — LOSARTAN POTASSIUM 50 MG: 50 TABLET, FILM COATED ORAL at 13:52

## 2021-01-01 RX ADMIN — ISOSORBIDE MONONITRATE 30 MG: 30 TABLET, EXTENDED RELEASE ORAL at 09:33

## 2021-01-01 RX ADMIN — SEVELAMER HYDROCHLORIDE 800 MG: 800 TABLET, FILM COATED PARENTERAL at 09:05

## 2021-01-01 RX ADMIN — FLUTICASONE PROPIONATE 1 SPRAY: 50 SPRAY, METERED NASAL at 12:06

## 2021-01-01 RX ADMIN — PRAVASTATIN SODIUM 80 MG: 20 TABLET ORAL at 09:05

## 2021-01-01 RX ADMIN — SEVELAMER HYDROCHLORIDE 800 MG: 800 TABLET, FILM COATED PARENTERAL at 11:25

## 2021-01-01 RX ADMIN — ASPIRIN 81 MG: 81 TABLET, COATED ORAL at 08:16

## 2021-01-01 RX ADMIN — CALCIUM ACETATE 667 MG: 667 CAPSULE ORAL at 09:06

## 2021-01-01 RX ADMIN — ASPIRIN 81 MG: 81 TABLET, COATED ORAL at 13:19

## 2021-01-01 RX ADMIN — ISOSORBIDE MONONITRATE 30 MG: 30 TABLET, EXTENDED RELEASE ORAL at 09:04

## 2021-01-01 RX ADMIN — IOHEXOL 100 ML: 350 INJECTION, SOLUTION INTRAVENOUS at 18:30

## 2021-01-01 RX ADMIN — AMLODIPINE BESYLATE 10 MG: 10 TABLET ORAL at 12:27

## 2021-01-01 RX ADMIN — HEPARIN SODIUM 5000 UNITS: 5000 INJECTION, SOLUTION INTRAVENOUS; SUBCUTANEOUS at 05:43

## 2021-01-01 RX ADMIN — INSULIN LISPRO 1 UNITS: 100 INJECTION, SOLUTION INTRAVENOUS; SUBCUTANEOUS at 21:20

## 2021-01-01 RX ADMIN — Medication 1 MG: at 12:06

## 2021-01-01 RX ADMIN — CALCIUM ACETATE 667 MG: 667 CAPSULE ORAL at 17:39

## 2021-01-01 RX ADMIN — ASPIRIN 81 MG: 81 TABLET, COATED ORAL at 09:33

## 2021-01-01 RX ADMIN — Medication 1 MG: at 08:17

## 2021-01-01 RX ADMIN — HEPARIN SODIUM 5000 UNITS: 5000 INJECTION, SOLUTION INTRAVENOUS; SUBCUTANEOUS at 00:25

## 2021-01-01 RX ADMIN — PRAVASTATIN SODIUM 80 MG: 20 TABLET ORAL at 09:04

## 2021-01-01 RX ADMIN — FLUTICASONE PROPIONATE 1 SPRAY: 50 SPRAY, METERED NASAL at 08:21

## 2021-01-01 RX ADMIN — OMEGA-3 FATTY ACIDS CAP 1000 MG 1000 MG: 1000 CAP at 17:39

## 2021-01-01 RX ADMIN — ISOSORBIDE MONONITRATE 30 MG: 30 TABLET, EXTENDED RELEASE ORAL at 13:39

## 2021-01-01 RX ADMIN — ONDANSETRON 4 MG: 2 INJECTION INTRAMUSCULAR; INTRAVENOUS at 08:33

## 2021-01-01 RX ADMIN — CALCIUM ACETATE 667 MG: 667 CAPSULE ORAL at 07:31

## 2021-01-01 RX ADMIN — CALCITRIOL CAPSULES 0.25 MCG 0.25 MCG: 0.25 CAPSULE ORAL at 09:06

## 2021-01-01 RX ADMIN — CALCIUM ACETATE 667 MG: 667 CAPSULE ORAL at 12:27

## 2021-01-01 RX ADMIN — OMEGA-3 FATTY ACIDS CAP 1000 MG 1000 MG: 1000 CAP at 21:14

## 2021-01-01 RX ADMIN — INSULIN LISPRO 1 UNITS: 100 INJECTION, SOLUTION INTRAVENOUS; SUBCUTANEOUS at 17:22

## 2021-01-01 RX ADMIN — OMEGA-3 FATTY ACIDS CAP 1000 MG 1000 MG: 1000 CAP at 13:24

## 2021-01-01 RX ADMIN — HEPARIN SODIUM 5000 UNITS: 5000 INJECTION, SOLUTION INTRAVENOUS; SUBCUTANEOUS at 05:20

## 2021-01-01 RX ADMIN — OMEGA-3 FATTY ACIDS CAP 1000 MG 1000 MG: 1000 CAP at 22:35

## 2021-01-01 RX ADMIN — HEPARIN SODIUM 5000 UNITS: 5000 INJECTION, SOLUTION INTRAVENOUS; SUBCUTANEOUS at 21:20

## 2021-01-01 RX ADMIN — ISOSORBIDE MONONITRATE 30 MG: 30 TABLET, EXTENDED RELEASE ORAL at 12:27

## 2021-01-01 RX ADMIN — INSULIN LISPRO 1 UNITS: 100 INJECTION, SOLUTION INTRAVENOUS; SUBCUTANEOUS at 22:36

## 2021-01-01 RX ADMIN — PANTOPRAZOLE SODIUM 40 MG: 40 TABLET, DELAYED RELEASE ORAL at 09:03

## 2021-01-01 RX ADMIN — PRAVASTATIN SODIUM 80 MG: 80 TABLET ORAL at 09:06

## 2021-01-01 RX ADMIN — OMEGA-3 FATTY ACIDS CAP 1000 MG 1000 MG: 1000 CAP at 21:00

## 2021-01-01 RX ADMIN — SEVELAMER HYDROCHLORIDE 800 MG: 800 TABLET, FILM COATED PARENTERAL at 13:14

## 2021-01-01 RX ADMIN — HEPARIN SODIUM 5000 UNITS: 5000 INJECTION, SOLUTION INTRAVENOUS; SUBCUTANEOUS at 05:48

## 2021-01-01 RX ADMIN — Medication 6 MG: at 21:33

## 2021-01-01 RX ADMIN — PANTOPRAZOLE SODIUM 40 MG: 40 TABLET, DELAYED RELEASE ORAL at 13:52

## 2021-01-01 RX ADMIN — HEPARIN SODIUM 5000 UNITS: 5000 INJECTION, SOLUTION INTRAVENOUS; SUBCUTANEOUS at 21:33

## 2021-01-01 RX ADMIN — AMLODIPINE BESYLATE 10 MG: 10 TABLET ORAL at 09:06

## 2021-01-01 RX ADMIN — ISOSORBIDE MONONITRATE 30 MG: 30 TABLET, EXTENDED RELEASE ORAL at 13:52

## 2021-01-01 RX ADMIN — OMEGA-3 FATTY ACIDS CAP 1000 MG 1000 MG: 1000 CAP at 08:33

## 2021-01-01 RX ADMIN — HEPARIN SODIUM 5000 UNITS: 5000 INJECTION, SOLUTION INTRAVENOUS; SUBCUTANEOUS at 05:24

## 2021-01-01 RX ADMIN — ONDANSETRON 4 MG: 2 INJECTION INTRAMUSCULAR; INTRAVENOUS at 15:20

## 2021-01-01 RX ADMIN — HEPARIN SODIUM 5000 UNITS: 5000 INJECTION, SOLUTION INTRAVENOUS; SUBCUTANEOUS at 05:17

## 2021-01-01 RX ADMIN — ASPIRIN 81 MG: 81 TABLET, COATED ORAL at 09:04

## 2021-01-01 RX ADMIN — PANTOPRAZOLE SODIUM 40 MG: 40 TABLET, DELAYED RELEASE ORAL at 13:39

## 2021-01-01 RX ADMIN — HEPARIN SODIUM 5000 UNITS: 5000 INJECTION, SOLUTION INTRAVENOUS; SUBCUTANEOUS at 13:39

## 2021-01-01 RX ADMIN — Medication 1 MG: at 12:27

## 2021-01-01 RX ADMIN — PANTOPRAZOLE SODIUM 40 MG: 40 TABLET, DELAYED RELEASE ORAL at 09:06

## 2021-01-01 RX ADMIN — PANTOPRAZOLE SODIUM 40 MG: 40 TABLET, DELAYED RELEASE ORAL at 12:27

## 2021-01-01 RX ADMIN — OMEGA-3 FATTY ACIDS CAP 1000 MG 1000 MG: 1000 CAP at 09:06

## 2021-01-01 RX ADMIN — CALCIUM ACETATE 667 MG: 667 CAPSULE ORAL at 16:40

## 2021-01-01 RX ADMIN — SEVELAMER HYDROCHLORIDE 800 MG: 800 TABLET, FILM COATED PARENTERAL at 11:50

## 2021-01-01 RX ADMIN — HEPARIN SODIUM 5000 UNITS: 5000 INJECTION, SOLUTION INTRAVENOUS; SUBCUTANEOUS at 13:24

## 2021-01-01 RX ADMIN — CALCIUM ACETATE 667 MG: 667 CAPSULE ORAL at 13:23

## 2021-01-01 RX ADMIN — LOSARTAN POTASSIUM 50 MG: 50 TABLET, FILM COATED ORAL at 13:39

## 2021-01-01 RX ADMIN — HEPARIN SODIUM 5000 UNITS: 5000 INJECTION, SOLUTION INTRAVENOUS; SUBCUTANEOUS at 21:14

## 2021-01-01 RX ADMIN — LOSARTAN POTASSIUM 50 MG: 50 TABLET, FILM COATED ORAL at 12:27

## 2021-01-01 RX ADMIN — PANTOPRAZOLE SODIUM 40 MG: 40 TABLET, DELAYED RELEASE ORAL at 12:06

## 2021-01-01 RX ADMIN — OMEGA-3 FATTY ACIDS CAP 1000 MG 1000 MG: 1000 CAP at 18:28

## 2021-01-01 RX ADMIN — Medication 6 MG: at 22:34

## 2021-01-01 RX ADMIN — Medication 12.5 MG: at 20:07

## 2021-01-01 RX ADMIN — PRAVASTATIN SODIUM 80 MG: 20 TABLET ORAL at 12:27

## 2021-01-01 RX ADMIN — PANTOPRAZOLE SODIUM 40 MG: 40 TABLET, DELAYED RELEASE ORAL at 08:52

## 2021-01-01 RX ADMIN — SEVELAMER HYDROCHLORIDE 800 MG: 800 TABLET, FILM COATED PARENTERAL at 08:44

## 2021-01-01 RX ADMIN — OMEGA-3 FATTY ACIDS CAP 1000 MG 1000 MG: 1000 CAP at 21:33

## 2021-01-01 RX ADMIN — OMEGA-3 FATTY ACIDS CAP 1000 MG 1000 MG: 1000 CAP at 21:20

## 2021-01-01 RX ADMIN — PANTOPRAZOLE SODIUM 40 MG: 40 TABLET, DELAYED RELEASE ORAL at 09:05

## 2021-01-01 RX ADMIN — ISOSORBIDE MONONITRATE 30 MG: 30 TABLET, EXTENDED RELEASE ORAL at 08:52

## 2021-01-01 RX ADMIN — OMEGA-3 FATTY ACIDS CAP 1000 MG 1000 MG: 1000 CAP at 22:28

## 2021-01-01 RX ADMIN — SEVELAMER HYDROCHLORIDE 800 MG: 800 TABLET, FILM COATED PARENTERAL at 13:28

## 2021-01-01 RX ADMIN — OMEGA-3 FATTY ACIDS CAP 1000 MG 1000 MG: 1000 CAP at 18:31

## 2021-01-01 RX ADMIN — LORAZEPAM 1 MG: 1 TABLET ORAL at 22:32

## 2021-01-01 RX ADMIN — LOSARTAN POTASSIUM 50 MG: 50 TABLET, FILM COATED ORAL at 09:33

## 2021-01-01 RX ADMIN — AMLODIPINE BESYLATE 10 MG: 10 TABLET ORAL at 08:52

## 2021-01-01 RX ADMIN — OMEGA-3 FATTY ACIDS CAP 1000 MG 1000 MG: 1000 CAP at 17:09

## 2021-01-01 RX ADMIN — CALCITRIOL CAPSULES 0.25 MCG 0.25 MCG: 0.25 CAPSULE ORAL at 12:27

## 2021-01-01 RX ADMIN — AMLODIPINE BESYLATE 10 MG: 10 TABLET ORAL at 08:33

## 2021-01-01 RX ADMIN — PANTOPRAZOLE SODIUM 40 MG: 40 TABLET, DELAYED RELEASE ORAL at 09:32

## 2021-01-01 RX ADMIN — LOSARTAN POTASSIUM 50 MG: 50 TABLET, FILM COATED ORAL at 08:16

## 2021-01-01 RX ADMIN — OMEGA-3 FATTY ACIDS CAP 1000 MG 1000 MG: 1000 CAP at 12:07

## 2021-01-01 RX ADMIN — Medication 1 MG: at 09:05

## 2021-01-01 RX ADMIN — CALCIUM ACETATE 667 MG: 667 CAPSULE ORAL at 17:54

## 2021-01-01 RX ADMIN — INSULIN LISPRO 1 UNITS: 100 INJECTION, SOLUTION INTRAVENOUS; SUBCUTANEOUS at 22:55

## 2021-01-01 RX ADMIN — HEPARIN SODIUM 5000 UNITS: 5000 INJECTION, SOLUTION INTRAVENOUS; SUBCUTANEOUS at 05:38

## 2021-01-01 RX ADMIN — INSULIN LISPRO 1 UNITS: 100 INJECTION, SOLUTION INTRAVENOUS; SUBCUTANEOUS at 22:31

## 2021-01-01 RX ADMIN — INSULIN GLARGINE 10 UNITS: 100 INJECTION, SOLUTION SUBCUTANEOUS at 08:21

## 2021-01-01 RX ADMIN — LOSARTAN POTASSIUM 50 MG: 50 TABLET, FILM COATED ORAL at 13:20

## 2021-01-01 RX ADMIN — PANTOPRAZOLE SODIUM 40 MG: 40 TABLET, DELAYED RELEASE ORAL at 08:33

## 2021-01-01 RX ADMIN — INSULIN GLARGINE 10 UNITS: 100 INJECTION, SOLUTION SUBCUTANEOUS at 08:55

## 2021-01-01 RX ADMIN — OMEGA-3 FATTY ACIDS CAP 1000 MG 1000 MG: 1000 CAP at 09:33

## 2021-01-01 RX ADMIN — Medication 1 MG: at 09:04

## 2021-01-01 RX ADMIN — HEPARIN SODIUM 5000 UNITS: 5000 INJECTION, SOLUTION INTRAVENOUS; SUBCUTANEOUS at 22:28

## 2021-01-01 RX ADMIN — AMLODIPINE BESYLATE 10 MG: 10 TABLET ORAL at 13:39

## 2021-01-01 RX ADMIN — HEPARIN SODIUM 5000 UNITS: 5000 INJECTION, SOLUTION INTRAVENOUS; SUBCUTANEOUS at 17:54

## 2021-01-01 RX ADMIN — HEPARIN SODIUM 5000 UNITS: 5000 INJECTION, SOLUTION INTRAVENOUS; SUBCUTANEOUS at 13:43

## 2021-01-01 RX ADMIN — PRAVASTATIN SODIUM 80 MG: 20 TABLET ORAL at 13:39

## 2021-01-01 RX ADMIN — LOSARTAN POTASSIUM 50 MG: 50 TABLET, FILM COATED ORAL at 13:27

## 2021-01-01 RX ADMIN — HEPARIN SODIUM 5000 UNITS: 5000 INJECTION, SOLUTION INTRAVENOUS; SUBCUTANEOUS at 14:03

## 2021-01-01 RX ADMIN — OMEGA-3 FATTY ACIDS CAP 1000 MG 1000 MG: 1000 CAP at 08:17

## 2021-01-01 RX ADMIN — Medication 1 CAPSULE: at 08:44

## 2021-03-08 PROBLEM — R11.0 NAUSEA: Status: RESOLVED | Noted: 2020-08-11 | Resolved: 2021-01-01

## 2021-03-08 PROBLEM — R53.1 GENERALIZED WEAKNESS: Status: RESOLVED | Noted: 2020-08-11 | Resolved: 2021-01-01

## 2021-03-08 NOTE — PROGRESS NOTES
BMI Counseling: There is no height or weight on file to calculate BMI  The BMI is above normal  Nutrition recommendations include decreasing portion sizes and encouraging healthy choices of fruits and vegetables  Exercise recommendations include moderate physical activity 150 minutes/week  No pharmacotherapy was ordered  Assessment/Plan:  Problem List Items Addressed This Visit        Digestive    Gastro-esophageal reflux disease without esophagitis       Endocrine    Secondary hyperparathyroidism of renal origin ([place] [address] )    Type 2 diabetes mellitus with diabetic chronic kidney disease ([place] [address] ) - Primary    Relevant Orders    Microalbumin / creatinine urine ratio    POCT hemoglobin A1c (Completed)    Hemoglobin A1C       Cardiovascular and Mediastinum    Arteriosclerosis of carotid artery    Cardiovascular arteriosclerosis    Congestive heart failure (HCC)    Essential (primary) hypertension    Peripheral arterial disease (HCC)       Nervous and Auditory    Dementia without behavioral disturbance (HCC)       Genitourinary    End-stage renal disease on hemodialysis ([place] [address] )       Other    Acquired absence of left great toe ([facility])    Anemia in chronic kidney disease    Hyperlipidemia, unspecified    Relevant Orders    Lipid Panel with Direct LDL reflex    Iron deficiency anemia    Relevant Orders    Iron Panel (Includes Ferritin, Iron Sat%, Iron, and TIBC)    Thrombocytopenia (HCC)    Vitamin D deficiency, unspecified    Relevant Orders    Vitamin D 25 hydroxy      Other Visit Diagnoses     Encounter for diabetic foot exam ([place] [address] )               Diagnoses and all orders for this visit:    Type 2 diabetes mellitus with chronic kidney disease on chronic dialysis, with long-term current use of insulin ([facility])  -     Cancel: CBC and differential; Future  -     Cancel: Comprehensive metabolic panel; Future  -     Microalbumin / creatinine urine ratio  -     Cancel: TSH, 3rd generation with Free T4 reflex;  Future  -     POCT hemoglobin A1c  -     Hemoglobin A1C; Future    Arteriosclerosis of carotid artery, unspecified laterality    Cardiovascular arteriosclerosis  -     Cancel: TSH, 3rd generation with Free T4 reflex; Future    Chronic congestive heart failure, unspecified heart failure type ([place] [address] )    Essential (primary) hypertension    Secondary hyperparathyroidism of renal origin ([place] [address] )    Gastro-esophageal reflux disease without esophagitis    End-stage renal disease on hemodialysis (HCC)    Anemia in chronic kidney disease, on chronic dialysis ([place] [address] )    Other hyperlipidemia  -     Lipid Panel with Direct LDL reflex; Future    Vitamin D deficiency, unspecified  -     Vitamin D 25 hydroxy; Future    Iron deficiency anemia, unspecified iron deficiency anemia type  -     Iron Panel (Includes Ferritin, Iron Sat%, Iron, and TIBC); Future    Acquired absence of left great toe (HCC)    Dementia without behavioral disturbance, unspecified dementia type ([place] [address] )    Peripheral arterial disease ([place] [address] )    Thrombocytopenia ([place] [address] )    Encounter for diabetic foot exam ([place] [address] )        No problem-specific Assessment & Plan notes found for this encounter  A/P: Doing ok and in office HgA1c was 8 1  Significantly higher than last time  Pt attributes this to eating a lot candy  Discussed adjusting meds, but wants to get back on her diet    BP is up slightly, but is usually good  Monitor for now and if persists  adjust meds  Will check additional labs including a HgA1c since there was such an increase  Discussed BMI and will give information on diet and exercise  Continue current treatment and RTC three months for routine, but will call for sugars in four weeks  Subjective:      Patient ID: Anibal Bradford is a 77 y o  female  WF RTC for f/u DM, CAD, etc  Doing ok and no new issues  Remains active w/o difficulty as much as being wheelchair bound that she can do  No falls  Sugars less than 200 except HS are higher per her own doing  Last HgA1c was good   Pt reports going off her diet and eating a lot candy  Denies CP, SOB, palpitations, edema, orthopnea or PND  Still attending HD TIW  No ulcers  No claudication or stroke like events  No reflux  Due for labs  The following portions of the patient's history were reviewed and updated as appropriate:   She has a past medical history of Abnormal weight loss, Anemia, Chronic kidney disease, Clostridium difficile colitis, Depression, Diabetes mellitus ([address] ), Fracture, tibial plateau, GERD (gastroesophageal reflux disease), Hyperkalemia, Hypertension, Hypoglycemia, Hypokalemia, Hypothermia, Hypoxia, Metatarsalgia, Old myocardial infarction, Osteoporosis, Syncope, and Visual disturbance  ,  does not have any pertinent problems on file  ,   has a past surgical history that includes Venous thrombectomy; Bypass Graft (Right); Coronary artery bypass graft; Cataract extraction; Cholecystectomy; Hernia repair; Carotid endarterectomy; Foot Amputation (Bilateral); and Tonsillectomy and adenoidectomy  ,  family history includes Aneurysm in her sister; Breast cancer in her mother; Cancer in her mother; Coronary artery disease in her father and sister; Heart disease in her mother; Liver cancer in her brother; No Known Problems in her daughter  ,   reports that she has quit smoking  She has never used smokeless tobacco  She reports previous alcohol use  She reports that she does not use drugs  ,  is allergic to pollen extract and prolia [denosumab]     Current Outpatient Medications   Medication Sig Dispense Refill    acetaminophen (TYLENOL) 325 mg tablet 2 tablets      amLODIPine (NORVASC) 10 mg tablet Take 1 tablet (10 mg total) by mouth daily 90 tablet 1    aspirin 81 MG tablet Take 1 tablet by mouth daily      b complex-vitamin C-folic acid (RENAL) 1 mg Take by mouth daily      calcium acetate (PHOSLO) 667 mg capsule Take 1,334 mg by mouth 3 (three) times a day with meals      diphenoxylate-atropine (LOMOTIL) 2 5-0 025 mg per tablet Take 1 tablet by mouth 4 (four) times a day as needed for diarrhea 30 tablet 0    ergocalciferol (VITAMIN D2) 50,000 units Take 1 capsule (50,000 Units total) by mouth once a week 12 capsule 3    fluticasone (FLONASE) 50 mcg/act nasal spray 1 spray into each nostril daily 16 g 3    glucose blood (ONE TOUCH ULTRA TEST) test strip 1 each by Other route 2 (two) times a day 200 each 5    Misc  Devices [facility]) MISC by Does not apply route daily 1 each 0    Multiple Vitamins-Minerals (OCUVITE ADULT 50+ PO) Take by mouth      nateglinide (STARLIX) 120 mg tablet take 1 tablet by mouth four times a day 120 tablet 5    Omega-3 Fatty Acids (FISH OIL) 1200 MG CAPS Take by mouth 4 (four) times a day      pantoprazole (PROTONIX) 40 mg tablet take 1 tablet by mouth daily 30 tablet 5    pravastatin (PRAVACHOL) 80 mg tablet swallow 1 tablet once daily 90 tablet 3    guaifenesin-codeine (GUAIFENESIN AC) 100-10 MG/5ML liquid Take 5 mL by mouth 3 (three) times a day as needed for cough (Patient not taking: Reported on 3/8/2021) 120 mL 0     No current facility-administered medications for this visit  Review of Systems   Constitutional: Negative for activity change, chills, diaphoresis, fatigue and fever  HENT: Negative  Eyes: Negative for visual disturbance  Respiratory: Negative for cough, chest tightness, shortness of breath and wheezing  Cardiovascular: Negative for chest pain, palpitations and leg swelling  Gastrointestinal: Negative for abdominal pain, constipation, diarrhea, nausea and vomiting  Endocrine: Negative for cold intolerance and heat intolerance  Genitourinary: Negative for difficulty urinating, dysuria and frequency  Musculoskeletal: Negative for arthralgias, gait problem and myalgias  Neurological: Negative for dizziness, seizures, syncope, weakness, light-headedness and headaches  Psychiatric/Behavioral: Negative for confusion, dysphoric mood and sleep disturbance  The patient is not nervous/anxious  PHQ-9 Depression Screening    PHQ-9:   Frequency of the following problems over the past two weeks:            Objective:  Vitals:    03/08/21 1316   BP: 142/68   BP Location: Right arm   Patient Position: Sitting   Cuff Size: Adult   Pulse: 68   Resp: 18   Temp: 97 8 °F (36 6 °C)   TempSrc: Temporal   SpO2: 100%   Weight: 69 9 kg (154 lb)   Height: 5' 4" (1 626 m)     Body mass index is 26 43 kg/m²  Physical Exam  Constitutional:       General: She is not in acute distress  Appearance: Normal appearance  She is not ill-appearing  HENT:      Head: Normocephalic and atraumatic  Mouth/Throat:      Mouth: Mucous membranes are moist    Eyes:      Extraocular Movements: Extraocular movements intact  Conjunctiva/sclera: Conjunctivae normal       Pupils: Pupils are equal, round, and reactive to light  Neck:      Musculoskeletal: Neck supple  Vascular: No carotid bruit  Cardiovascular:      Rate and Rhythm: Normal rate and regular rhythm  Pulses: no weak pulses          Dorsalis pedis pulses are 1+ on the right side and 1+ on the left side  Posterior tibial pulses are 1+ on the right side and 1+ on the left side  Heart sounds: Normal heart sounds  Pulmonary:      Effort: Pulmonary effort is normal  No respiratory distress  Breath sounds: Normal breath sounds  No wheezing or rales  Abdominal:      General: Bowel sounds are normal  There is no distension  Palpations: Abdomen is soft  Tenderness: There is no abdominal tenderness  Musculoskeletal: Normal range of motion  General: Deformity present  No tenderness  Right lower leg: No edema  Left lower leg: No edema  Feet:      Right foot:      Skin integrity: No ulcer, skin breakdown, erythema, warmth, callus or dry skin  Left foot:      Skin integrity: No ulcer, skin breakdown, erythema, warmth, callus or dry skin     Neurological:      General: No focal deficit present  Mental Status: She is alert and oriented to person, place, and time  Mental status is at baseline  Psychiatric:         Mood and Affect: Mood normal          Behavior: Behavior normal          Thought Content: Thought content normal          Judgment: Judgment normal            Patient's shoes and socks removed  Right Foot/Ankle   Right Foot Inspection  Skin Exam: skin normal and skin intact no dry skin, no warmth, no callus, no erythema, no maceration, no abnormal color, no pre-ulcer, no ulcer and no callus                          Toe Exam: ROM and strength within normal limits and right toe deformity (Multiple toes amputated )no swelling, no tenderness and erythema  Sensory       Monofilament testing: intact  Vascular  Capillary refills: < 3 seconds  The right DP pulse is 1+  The right PT pulse is 1+  Left Foot/Ankle  Left Foot Inspection  Skin Exam: skin normal and skin intactno dry skin, no warmth, no erythema, no maceration, normal color, no pre-ulcer, no ulcer and no callus                         Toe Exam: ROM and strength within normal limits and left toe deformity (Multiple toe amputated  )no swelling, no tenderness and no erythema                   Sensory       Monofilament: intact  Vascular  Capillary refills: < 3 seconds  The left DP pulse is 1+  The left PT pulse is 1+  Assign Risk Category:  Deformity present; No loss of protective sensation; No weak pulses       Risk: 2    BMI Counseling: Body mass index is 26 43 kg/m²  The BMI is above normal  Nutrition recommendations include reducing portion sizes, decreasing overall calorie intake, reducing intake of saturated fat and trans fat and reducing intake of cholesterol  Exercise recommendations include moderate aerobic physical activity for 150 minutes/week

## 2021-03-08 NOTE — PATIENT INSTRUCTIONS
Type 2 Diabetes in the Older Adult   WHAT YOU NEED TO KNOW:   The risk for type 2 diabetes increases as a person gets older  Type 2 diabetes means your pancreas does not make enough insulin, or your body does not use insulin well  Insulin helps move sugar out of the blood so it can be used for energy  Diabetes cannot be cured, but it can be managed  DISCHARGE INSTRUCTIONS:   Call or have someone close to you call your local emergency number (911 in the [address] [facility]) for any of the following:   · You have any of the following signs of a stroke:      ? Numbness or drooping on one side of your face     ? Weakness in an arm or leg    ? Confusion or difficulty speaking    ? Dizziness, a severe headache, or vision loss    · You have any of the following signs of a heart attack:      ? Squeezing, pressure, or pain in your chest    ? You may  also have any of the following:     § Discomfort or pain in your back, neck, jaw, stomach, or arm    § Shortness of breath    § Nausea or vomiting    § Lightheadedness or a sudden cold sweat    Return to the emergency department if:   · Your blood sugar level is higher than your goal and does not come down with treatment  · You have signs of a high blood sugar level, such as blurred or double vision  · You have signs of a high ketone level, such as fruity, sweet smelling breath, or shallow breathing  · You have symptoms of a low blood sugar level, such as trouble thinking, sweating, or a pounding heartbeat  · Your blood sugar level is lower than normal and does not improve with treatment  Call your doctor or diabetes care team if:   · You are vomiting or have diarrhea  · You have an upset stomach and cannot eat the foods on your meal plan  · You feel weak or more tired than usual     · You feel dizzy, have headaches, or are easily irritated  · Your skin is red, warm, dry, or swollen      · You have a wound that does not heal     · You have numbness in your arms or legs     · You have trouble coping with diabetes, or you feel anxious or depressed  · You have problems with your memory  · You have changes in your vision  · You have questions or concerns about your condition or care  Manage diabetes and prevent problems:  Sometimes type 2 diabetes can be managed with changes in nutrition and physical activity  · Work with your diabetes care team to create plans to meet your needs  Your diabetes care team may include a physician, nurse practitioner, and physician assistant  It may also include a diabetes nurse educator, dietitian, and an exercise specialist  Family members, or others who are close to you, may also be part of the team  You and your team will make goals and plans to manage diabetes and other health problems  For example, the plan will include how to manage medicines you may take for diabetes and for other health conditions  The plans and goals will be specific to your needs and abilities  Your plan will change as your needs and abilities change  · Manage other health issues as directed  Health issues may include high blood pressure, high cholesterol levels, and heart problems  Health issues may also include depression  Together you and your care team can create a plan to manage any other health issues  · Try to be physically active for 30 to 60 minutes most days of the week  Physical activity, such as exercise, helps keep your blood sugar level steady and lowers your risk for heart disease  Physical activity can help improve your balance and strength and lower your risk for falls  Start slowly  Activity can be done in 10-minute intervals  ? Set a goal for 30 minutes of aerobic activity at least 5 times a week  Aerobic activity helps your heart stay strong  Aerobic activity includes walking, bicycling, dancing, swimming, and raking leaves  ? Set a goal for strength training 2 times a week    Strength training helps you keep the muscles you have and build new muscles  Strength training includes lifting weights, climbing stairs, and doing yoga or kevin chi     ? Stay steady on your on your feet with balancing activities  These include walking backwards, standing on one foot, and walking heel to toe in a straight line  · Maintain a healthy weight  Ask your provider what a healthy weight is for you  A healthy weight can help you control diabetes and prevent heart disease  Ask your provider to help you create a weight loss plan if you are overweight  Weight loss of 10 to 15 pounds can help make a difference in managing diabetes  Together you and your care team can set manageable weight loss goals  · Know the risks if you choose to drink alcohol  Alcohol can cause your blood sugar levels to be low if you use insulin  Alcohol can cause high blood sugar levels and weight gain if you drink too much  Women 21 years or older and men 72 years or older should limit alcohol to 1 drink a day  Men 21 to 64 years should limit alcohol to 2 drinks a day  A drink of alcohol is 12 ounces of beer, 5 ounces of wine, or 1½ ounces of liquor  · Do not smoke  Nicotine and other chemicals in cigarettes can cause lung disease and other health problems  It can also cause blood vessel damage that makes diabetes more difficult to manage  Ask your healthcare provider for information if you currently smoke and need help to quit  Do not use e-cigarettes or smokeless tobacco in place of cigarettes or to help you quit  They still contain nicotine  Diabetes education:  Diabetes education will start right away  Members of your care team teach you, your family, and caregivers the following:  · How to check your blood sugar level: You will learn when to check your blood sugar level and what the level should be  You will learn what to do if your level is too high or too low  Write down the times of your checks and your levels   Take them to all follow-up appointments  · About diabetes medicine: You and your family members will be taught how to draw up and give insulin, if needed  You will learn how much insulin you need and what time to inject insulin  You will be taught when not to give insulin  Your team will also teach you how to dispose of needles and syringes  If you need oral diabetes medicine, you will be taught about side effects  You will also be taught when to take or not take the medicine  · About nutrition:  A dietitian will help you make a meal plan to keep your blood sugar level steady  You will learn how food affects your blood sugar levels  You will also learn to keep track of sugar and starchy foods (carbohydrates)  Do not skip meals  Your blood sugar level may drop too low if you have taken insulin and do not eat  · How to prevent complications:  Diabetes that is not well controlled can lead to health problems  Examples include foot sores, retinopathy (vision loss), and peripheral neuropathy (loss of feeling in your hands and feet)  Your team will help you know when to get regular checkups, such as vision checks  They will teach you how to watch for problems and when to get a problem checked  Other ways to manage diabetes:   · Check your feet every day for sores  Look at your whole foot, including the bottom, and between and under your toes  Check for wounds, corns, and calluses  Use a mirror to see the bottom of your feet  The skin on your feet may be shiny, tight, dry, or darker than normal  Your feet may also be cold and pale  Feel your feet by running your hands along the tops, bottoms, sides, and between your toes  Redness, swelling, and warmth are signs of blood flow problems that can lead to a foot ulcer  Do not try to remove corns or calluses yourself  · Wear medical alert identification  Wear medical alert jewelry or carry a card that says you have type 2 diabetes   Ask your healthcare provider where to get these items          · Ask about vaccines  You have a higher risk for serious illness if you get the flu, pneumonia, or hepatitis  Ask your healthcare provider if you should get a flu, pneumonia, shingles, or hepatitis B vaccine, and when to get the vaccine  · Get help from family and friends  You may need help checking your blood sugar level, giving insulin injections, or preparing your meals  You may also need help to check your feet for sores  Ask your family and friends to help you with these tasks  Talk to your care team if you need someone at home to help you  Follow up with your doctor or diabetes care team as directed: You will need to return to meet with different care team members  You may need tests to monitor for problems  Write down your questions so you remember to ask them during your visits  © Copyright [address] Information is for End User's use only and may not be sold, redistributed or otherwise used for commercial purposes  All illustrations and images included in CareNotes® are the copyrighted property of A D A M , Inc  or [facility] Luis Meehan   The above information is an  only  It is not intended as medical advice for individual conditions or treatments  Talk to your doctor, nurse or pharmacist before following any medical regimen to see if it is safe and effective for you  Weight Management   AMBULATORY CARE:   Why it is important to manage your weight:  Being overweight increases your risk of health conditions such as heart disease, high blood pressure, type 2 diabetes, and certain types of cancer  It can also increase your risk for osteoarthritis, sleep apnea, and other respiratory problems  Aim for a slow, steady weight loss  Even a small amount of weight loss can lower your risk of health problems  How to lose weight safely:  A safe and healthy way to lose weight is to eat fewer calories and get regular exercise    · You can lose up about 1 pound a week by decreasing the number of calories you eat by 500 calories each day  You can decrease calories by eating smaller portion sizes or by cutting out high-calorie foods  Read labels to find out how many calories are in the foods you eat  · You can also burn calories with exercise such as walking, swimming, or biking  You will be more likely to keep weight off if you make these changes part of your lifestyle  Exercise at least 30 minutes per day on most days of the week  You can also fit in more physical activity by taking the stairs instead of the elevator or parking farther away from stores  Ask your healthcare provider about the best exercise plan for you  Healthy meal plan for weight management:  A healthy meal plan includes a variety of foods, contains fewer calories, and helps you stay healthy  A healthy meal plan includes the following:     · Eat whole-grain foods more often  A healthy meal plan should contain fiber  Fiber is the part of grains, fruits, and vegetables that is not broken down by your body  Whole-grain foods are healthy and provide extra fiber in your diet  Some examples of whole-grain foods are whole-wheat breads and pastas, oatmeal, brown rice, and bulgur  · Eat a variety of vegetables every day  Include dark, leafy greens such as spinach, kale, jessica greens, and mustard greens  Eat yellow and orange vegetables such as carrots, sweet potatoes, and winter squash  · Eat a variety of fruits every day  Choose fresh or canned fruit (canned in its own juice or light syrup) instead of juice  Fruit juice has very little or no fiber  · Eat low-fat dairy foods  Drink fat-free (skim) milk or 1% milk  Eat fat-free yogurt and low-fat cottage cheese  Try low-fat cheeses such as mozzarella and other reduced-fat cheeses  · Choose meat and other protein foods that are low in fat  Choose beans or other legumes such as split peas or lentils   Choose fish, skinless poultry (chicken or turkey), or lean cuts of red meat (beef or pork)  Before you cook meat or poultry, cut off any visible fat  · Use less fat and oil  Try baking foods instead of frying them  Add less fat, such as margarine, sour cream, regular salad dressing and mayonnaise to foods  Eat fewer high-fat foods  Some examples of high-fat foods include french fries, doughnuts, ice cream, and cakes  · Eat fewer sweets  Limit foods and drinks that are high in sugar  This includes candy, cookies, regular soda, and sweetened drinks  Ways to decrease calories:   · Eat smaller portions  ? Use a small plate with smaller servings  ? Do not eat second helpings  ? When you eat at a restaurant, ask for a box and place half of your meal in the box before you eat  ? Share an entrée with someone else  · Replace high-calorie snacks with healthy, low-calorie snacks  ? Choose fresh fruit, vegetables, fat-free rice cakes, or air-popped popcorn instead of potato chips, nuts, or chocolate  ? Choose water or calorie-free drinks instead of soda or sweetened drinks  · Do not shop for groceries when you are hungry  You may be more likely to make unhealthy food choices  Take a grocery list of healthy foods and shop after you have eaten  · Eat regular meals  Do not skip meals  Skipping meals can lead to overeating later in the day  This can make it harder for you to lose weight  Eat a healthy snack in place of a meal if you do not have time to eat a regular meal  Talk with a dietitian to help you create a meal plan and schedule that is right for you  Other things to consider as you try to lose weight:   · Be aware of situations that may give you the urge to overeat, such as eating while watching television  Find ways to avoid these situations  For example, read a book, go for a walk, or do crafts  · Meet with a weight loss support group or friends who are also trying to lose weight   This may help you stay motivated to continue working on your weight loss goals  © Copyright [address] Information is for End User's use only and may not be sold, redistributed or otherwise used for commercial purposes  All illustrations and images included in CareNotes® are the copyrighted property of A [employer] A M , Inc  or [employer]  The above information is an  only  It is not intended as medical advice for individual conditions or treatments  Talk to your doctor, nurse or pharmacist before following any medical regimen to see if it is safe and effective for you  Low Fat Diet   AMBULATORY CARE:   A low-fat diet  is an eating plan that is low in total fat, unhealthy fat, and cholesterol  You may need to follow a low-fat diet if you have trouble digesting or absorbing fat  You may also need to follow this diet if you have high cholesterol  You can also lower your cholesterol by increasing the amount of fiber in your diet  Soluble fiber is a type of fiber that helps to decrease cholesterol levels  Different types of fat in food:   · Limit unhealthy fats  A diet that is high in cholesterol, saturated fat, and trans fat may cause unhealthy cholesterol levels  Unhealthy cholesterol levels increase your risk of heart disease  ? Cholesterol:  Limit intake of cholesterol to less than 200 mg per day  Cholesterol is found in meat, eggs, and dairy  ? Saturated fat:  Limit saturated fat to less than 7% of your total daily calories  Ask your dietitian how many calories you need each day  Saturated fat is found in butter, cheese, ice cream, whole milk, and palm oil  Saturated fat is also found in meat, such as beef, pork, chicken skin, and processed meats  Processed meats include sausage, hot dogs, and bologna  ? Trans fat:  Avoid trans fat as much as possible  Trans fat is used in fried and baked foods  Foods that say trans fat free on the label may still have up to 0 5 grams of trans fat per serving      · Include healthy fats  Replace foods that are high in saturated and trans fat with foods high in healthy fats  This may help to decrease high cholesterol levels  ? Monounsaturated fats: These are found in avocados, nuts, and vegetable oils, such as olive, canola, and sunflower oil  ? Polyunsaturated fats: These can be found in vegetable oils, such as soybean or corn oil  Omega-3 fats can help to decrease the risk of heart disease  Omega-3 fats are found in fish, such as salmon, herring, trout, and tuna  Omega-3 fats can also be found in plant foods, such as walnuts, flaxseed, soybeans, and canola oil  Foods to limit or avoid:   · Grains:      ? Snacks that are made with partially hydrogenated oils, such as chips, regular crackers, and butter-flavored popcorn    ? High-fat baked goods, such as biscuits, croissants, doughnuts, pies, cookies, and pastries    · Dairy:      ? Whole milk, 2% milk, and yogurt and ice cream made with whole milk    ? Half and half creamer, heavy cream, and whipping cream    ? Cheese, cream cheese, and sour cream    · Meats and proteins:      ? High-fat cuts of meat (T-bone steak, regular hamburger, and ribs)    ? Fried meat, poultry (turkey and chicken), and fish    ? Poultry (chicken and turkey) with skin    ? Cold cuts (salami or bologna), hot dogs, miramontes, and sausage    ? Whole eggs and egg yolks    · Vegetables and fruits with added fat:      ? Fried vegetables or vegetables in butter or high-fat sauces, such as cream or cheese sauces    ? Fried fruit or fruit served with butter or cream    · Fats:      ? Butter, stick margarine, and shortening    ? Coconut, palm oil, and palm kernel oil    Foods to include:   · Grains:      ? Whole-grain breads, cereals, pasta, and brown rice    ? Low-fat crackers and pretzels    · Vegetables and fruits:      ? Fresh, frozen, or canned vegetables (no salt or low-sodium)    ?  Fresh, frozen, dried, or canned fruit (canned in light syrup or fruit juice)    ? Avocado    · Low-fat dairy products:      ? Nonfat (skim) or 1% milk    ? Nonfat or low-fat cheese, yogurt, and cottage cheese    · Meats and proteins:      ? Chicken or turkey with no skin    ? Baked or broiled fish    ? Lean beef and pork (loin, round, extra lean hamburger)    ? Beans and peas, unsalted nuts, soy products    ? Egg whites and substitutes    ? Seeds and nuts    · Fats:      ? Unsaturated oil, such as canola, olive, peanut, soybean, or sunflower oil    ? Soft or liquid margarine and vegetable oil spread    ? Low-fat salad dressing    Other ways to decrease fat:   · Read food labels before you buy foods  Choose foods that have less than 30% of calories from fat  Choose low-fat or fat-free dairy products  Remember that fat free does not mean calorie free  These foods still contain calories, and too many calories can lead to weight gain  · Trim fat from meat and avoid fried food  Trim all visible fat from meat before you cook it  Remove the skin from poultry  Do not barrow meat, fish, or poultry  Bake, roast, boil, or broil these foods instead  Avoid fried foods  Eat a baked potato instead of [place] fries  Steam vegetables instead of sautéing them in butter  · Add less fat to foods  Use imitation miramontes bits on salads and baked potatoes instead of regular miramontes bits  Use fat-free or low-fat salad dressings instead of regular dressings  Use low-fat or nonfat butter-flavored topping instead of regular butter or margarine on popcorn and other foods  Ways to decrease fat in recipes:  Replace high-fat ingredients with low-fat or nonfat ones  This may cause baked goods to be drier than usual  You may need to use nonfat cooking spray on pans to prevent food from sticking  You also may need to change the amount of other ingredients, such as water, in the recipe  Try the following:  · Use low-fat or light margarine instead of regular margarine or shortening       · Use lean ground turkey breast or chicken, or lean ground beef (less than 5% fat) instead of hamburger  · Add 1 teaspoon of canola oil to 8 ounces of skim milk instead of using cream or half and half  · Use grated zucchini, carrots, or apples in breads instead of coconut  · Use blenderized, low-fat cottage cheese, plain tofu, or low-fat ricotta cheese instead of cream cheese  · Use 1 egg white and 1 teaspoon of canola oil, or use ¼ cup (2 ounces) of fat-free egg substitute instead of a whole egg  · Replace half of the oil that is called for in a recipe with applesauce when you bake  Use 3 tablespoons of cocoa powder and 1 tablespoon of canola oil instead of a square of baking chocolate  How to increase fiber:  Eat enough high-fiber foods to get 20 to 30 grams of fiber every day  Slowly increase your fiber intake to avoid stomach cramps, gas, and other problems  · Eat 3 ounces of whole-grain foods each day  An ounce is about 1 slice of bread  Eat whole-grain breads, such as whole-wheat bread  Whole wheat, whole-wheat flour, or other whole grains should be listed as the first ingredient on the food label  Replace white flour with whole-grain flour or use half of each in recipes  Whole-grain flour is heavier than white flour, so you may have to add more yeast or baking powder  · Eat a high-fiber cereal for breakfast   Oatmeal is a good source of soluble fiber  Look for cereals that have bran or fiber in the name  Choose whole-grain products, such as brown rice, barley, and whole-wheat pasta  · Eat more beans, peas, and lentils  For example, add beans to soups or salads  Eat at least 5 cups of fruits and vegetables each day  Eat fruits and vegetables with the peel because the peel is high in fiber  © Copyright [address] Information is for End User's use only and may not be sold, redistributed or otherwise used for commercial purposes   All illustrations and images included in CareNotes® are the copyrighted property of [employer]  or [address]   The above information is an  only  It is not intended as medical advice for individual conditions or treatments  Talk to your doctor, nurse or pharmacist before following any medical regimen to see if it is safe and effective for you  Heart Healthy Diet   AMBULATORY CARE:   A heart healthy diet  is an eating plan low in unhealthy fats and sodium (salt)  The plan is high in healthy fats and fiber  A heart healthy diet helps improve your cholesterol levels and lowers your risk for heart disease and stroke  A dietitian will teach you how to read and understand food labels  Heart healthy diet guidelines to follow:   · Choose foods that contain healthy fats  ? Unsaturated fats  include monounsaturated and polyunsaturated fats  Unsaturated fat is found in foods such as soybean, canola, olive, corn, and safflower oils  It is also found in soft tub margarine that is made with liquid vegetable oil  ? Omega-3 fat  is found in certain fish, such as salmon, tuna, and trout, and in walnuts and flaxseed  Eat fish high in omega-3 fats at least 2 times a week  · Get 20 to 30 grams of fiber each day  Fruits, vegetables, whole-grain foods, and legumes (cooked beans) are good sources of fiber  · Limit or do not have unhealthy fats  ? Cholesterol  is found in animal foods, such as eggs and lobster, and in dairy products made from whole milk  Limit cholesterol to less than 200 mg each day  ? Saturated fat  is found in meats, such as miramontes and hamburger  It is also found in chicken or turkey skin, whole milk, and butter  Limit saturated fat to less than 7% of your total daily calories  ? Trans fat  is found in packaged foods, such as potato chips and cookies  It is also in hard margarine, some fried foods, and shortening  Do not eat foods that contain trans fats  · Limit sodium as directed    You may be told to limit sodium to 2,000 to 2,300 mg each day  Choose low-sodium or no-salt-added foods  Add little or no salt to food you prepare  Use herbs and spices in place of salt  Include the following in your heart healthy plan:  Ask your dietitian or healthcare provider how many servings to have from each of the following food groups:  · Grains:      ? Whole-wheat breads, cereals, and pastas, and brown rice    ? Low-fat, low-sodium crackers and chips    · Vegetables:      ? Broccoli, green beans, green peas, and spinach    ? Collards, kale, and lima beans    ? Carrots, sweet potatoes, tomatoes, and peppers    ? Canned vegetables with no salt added    · Fruits:      ? Bananas, peaches, pears, and pineapple    ? Grapes, raisins, and dates    ? Oranges, tangerines, grapefruit, orange juice, and grapefruit juice    ? Apricots, mangoes, melons, and papaya    ? Raspberries and strawberries    ? Canned fruit with no added sugar    · Low-fat dairy:      ? Nonfat (skim) milk, 1% milk, and low-fat almond, cashew, or soy milks fortified with calcium    ? Low-fat cheese, regular or frozen yogurt, and cottage cheese    · Meats and proteins:      ? Lean cuts of beef and pork (loin, leg, round), skinless chicken and turkey    ? Legumes, soy products, egg whites, or nuts    Limit or do not include the following in your heart healthy plan:   · Unhealthy fats and oils:      ? Whole or 2% milk, cream cheese, sour cream, or cheese    ? High-fat cuts of beef (T-bone steaks, ribs), chicken or turkey with skin, and organ meats such as liver    ? Butter, stick margarine, shortening, and cooking oils such as coconut or palm oil    · Foods and liquids high in sodium:      ? Packaged foods, such as frozen dinners, cookies, macaroni and cheese, and cereals with more than 300 mg of sodium per serving    ? Vegetables with added sodium, such as instant potatoes, vegetables with added sauces, or regular canned vegetables    ?  Cured or smoked meats, such as hot dogs, miramontes, and sausage    ? High-sodium ketchup, barbecue sauce, salad dressing, pickles, olives, soy sauce, or miso    · Foods and liquids high in sugar:      ? Candy, cake, cookies, pies, or doughnuts    ? Soft drinks (soda), sports drinks, or sweetened tea    ? Canned or dry mixes for cakes, soups, sauces, or gravies    Other healthy heart guidelines:   · Do not smoke  Nicotine and other chemicals in cigarettes and cigars can cause lung and heart damage  Ask your healthcare provider for information if you currently smoke and need help to quit  E-cigarettes or smokeless tobacco still contain nicotine  Talk to your healthcare provider before you use these products  · Limit or do not drink alcohol as directed  Alcohol can damage your heart and raise your blood pressure  Your healthcare provider may give you specific daily and weekly limits  The general recommended limit is 1 drink a day for women 21 or older and for men 72 or older  Do not have more than 3 drinks in a day or 7 in a week  The recommended limit is 2 drinks a day for men 24to 59years of age  Do not have more than 4 drinks in a day or 14 in a week  A drink of alcohol is 12 ounces of beer, 5 ounces of wine, or 1½ ounces of liquor  · Exercise regularly  Exercise can help you maintain a healthy weight and improve your blood pressure and cholesterol levels  Regular exercise can also decrease your risk for heart problems  Ask your healthcare provider about the best exercise plan for you  Do not start an exercise program without asking your healthcare provider  Follow up with your doctor or cardiologist as directed:  Write down your questions so you remember to ask them during your visits  © Copyright [address] Information is for End User's use only and may not be sold, redistributed or otherwise used for commercial purposes   All illustrations and images included in CareNotes® are the copyrighted property of [employer]  or [address]  The above information is an  only  It is not intended as medical advice for individual conditions or treatments  Talk to your doctor, nurse or pharmacist before following any medical regimen to see if it is safe and effective for you

## 2021-04-05 NOTE — TELEPHONE ENCOUNTER
Left detailed message for patient or  to call us back so we can discuss options for better control over sugars

## 2021-04-05 NOTE — TELEPHONE ENCOUNTER
----- Message from Felix Trammell DO sent at 3/8/2021  1:38 PM EST -----  Call pt in four weeks and get sugars

## 2021-04-05 NOTE — TELEPHONE ENCOUNTER
Call pt, sugars are up  Recommend adding a med  See what happened with metformin in the past  If unable to tolerate, will need to try something else, but at a low dose  Is pt willing to try injectable?

## 2021-04-05 NOTE — TELEPHONE ENCOUNTER
Spoke with    He states her sugars are:  164-am  338-pm  189-am   438-pm   169-am   428-pm  154-am  321-pm  122-am

## 2021-04-13 NOTE — TELEPHONE ENCOUNTER
Pt sugars are running  the low 60's she dizzy feels like she going to passed out since started the metformin   So he  been giving her half of kei metformin and her sugars been in the 90's

## 2021-05-04 NOTE — TELEPHONE ENCOUNTER
Will need appt  Pt will need to go on injectables since we are out of oral options either due to possible side effects or insurance not covering the meds

## 2021-05-04 NOTE — TELEPHONE ENCOUNTER
Call   Metformin use in pt with declining renal disease is advised against if possible,but studies are still inconclusive  Question is balancing sugar control and renal function and what meds their insurance will cover  In dialysis pt, the risk of lactic acidosis is increased, but labs are closely monitored and again, the subject of sugar control to prevent other diabetic complications is a concern along with what meds their insurance will cover

## 2021-05-05 NOTE — TELEPHONE ENCOUNTER
I called and spoke to her -her sugars are not as bad but she does not eat as much  He is just going to keep 6-14-21 appt with the dr  If she has problems-they will call us for a sooner appt

## 2021-06-14 NOTE — PATIENT INSTRUCTIONS
Type 2 Diabetes in the Older Adult   WHAT YOU NEED TO KNOW:   The risk for type 2 diabetes increases as a person gets older  Type 2 diabetes means your pancreas does not make enough insulin, or your body does not use insulin well  Insulin helps move sugar out of the blood so it can be used for energy  Diabetes cannot be cured, but it can be managed  DISCHARGE INSTRUCTIONS:   Call or have someone close to you call your local emergency number (911 in the [address] [facility]) for any of the following:   · You have any of the following signs of a stroke:      ? Numbness or drooping on one side of your face     ? Weakness in an arm or leg    ? Confusion or difficulty speaking    ? Dizziness, a severe headache, or vision loss    · You have any of the following signs of a heart attack:      ? Squeezing, pressure, or pain in your chest    ? You may  also have any of the following:     § Discomfort or pain in your back, neck, jaw, stomach, or arm    § Shortness of breath    § Nausea or vomiting    § Lightheadedness or a sudden cold sweat    Return to the emergency department if:   · Your blood sugar level is higher than your goal and does not come down with treatment  · You have signs of a high blood sugar level, such as blurred or double vision  · You have signs of a high ketone level, such as fruity, sweet smelling breath, or shallow breathing  · You have symptoms of a low blood sugar level, such as trouble thinking, sweating, or a pounding heartbeat  · Your blood sugar level is lower than normal and does not improve with treatment  Call your doctor or diabetes care team if:   · You are vomiting or have diarrhea  · You have an upset stomach and cannot eat the foods on your meal plan  · You feel weak or more tired than usual     · You feel dizzy, have headaches, or are easily irritated  · Your skin is red, warm, dry, or swollen      · You have a wound that does not heal     · You have numbness in your arms or legs     · You have trouble coping with diabetes, or you feel anxious or depressed  · You have problems with your memory  · You have changes in your vision  · You have questions or concerns about your condition or care  Manage diabetes and prevent problems:  Sometimes type 2 diabetes can be managed with changes in nutrition and physical activity  · Work with your diabetes care team to create plans to meet your needs  Your diabetes care team may include a physician, nurse practitioner, and physician assistant  It may also include a diabetes nurse educator, dietitian, and an exercise specialist  Family members, or others who are close to you, may also be part of the team  You and your team will make goals and plans to manage diabetes and other health problems  For example, the plan will include how to manage medicines you may take for diabetes and for other health conditions  The plans and goals will be specific to your needs and abilities  Your plan will change as your needs and abilities change  · Manage other health issues as directed  Health issues may include high blood pressure, high cholesterol levels, and heart problems  Health issues may also include depression  Together you and your care team can create a plan to manage any other health issues  · Try to be physically active for 30 to 60 minutes most days of the week  Physical activity, such as exercise, helps keep your blood sugar level steady and lowers your risk for heart disease  Physical activity can help improve your balance and strength and lower your risk for falls  Start slowly  Activity can be done in 10-minute intervals  ? Set a goal for 30 minutes of aerobic activity at least 5 times a week  Aerobic activity helps your heart stay strong  Aerobic activity includes walking, bicycling, dancing, swimming, and raking leaves  ? Set a goal for strength training 2 times a week    Strength training helps you keep the muscles you have and build new muscles  Strength training includes lifting weights, climbing stairs, and doing yoga or kevin chi     ? Stay steady on your on your feet with balancing activities  These include walking backwards, standing on one foot, and walking heel to toe in a straight line  · Maintain a healthy weight  Ask your provider what a healthy weight is for you  A healthy weight can help you control diabetes and prevent heart disease  Ask your provider to help you create a weight loss plan if you are overweight  Weight loss of 10 to 15 pounds can help make a difference in managing diabetes  Together you and your care team can set manageable weight loss goals  · Know the risks if you choose to drink alcohol  Alcohol can cause your blood sugar levels to be low if you use insulin  Alcohol can cause high blood sugar levels and weight gain if you drink too much  Women 21 years or older and men 72 years or older should limit alcohol to 1 drink a day  Men 21 to 64 years should limit alcohol to 2 drinks a day  A drink of alcohol is 12 ounces of beer, 5 ounces of wine, or 1½ ounces of liquor  · Do not smoke  Nicotine and other chemicals in cigarettes can cause lung disease and other health problems  It can also cause blood vessel damage that makes diabetes more difficult to manage  Ask your healthcare provider for information if you currently smoke and need help to quit  Do not use e-cigarettes or smokeless tobacco in place of cigarettes or to help you quit  They still contain nicotine  Diabetes education:  Diabetes education will start right away  Members of your care team teach you, your family, and caregivers the following:  · How to check your blood sugar level: You will learn when to check your blood sugar level and what the level should be  You will learn what to do if your level is too high or too low  Write down the times of your checks and your levels   Take them to all follow-up appointments  · About diabetes medicine: You and your family members will be taught how to draw up and give insulin, if needed  You will learn how much insulin you need and what time to inject insulin  You will be taught when not to give insulin  Your team will also teach you how to dispose of needles and syringes  If you need oral diabetes medicine, you will be taught about side effects  You will also be taught when to take or not take the medicine  · About nutrition:  A dietitian will help you make a meal plan to keep your blood sugar level steady  You will learn how food affects your blood sugar levels  You will also learn to keep track of sugar and starchy foods (carbohydrates)  Do not skip meals  Your blood sugar level may drop too low if you have taken insulin and do not eat  · How to prevent complications:  Diabetes that is not well controlled can lead to health problems  Examples include foot sores, retinopathy (vision loss), and peripheral neuropathy (loss of feeling in your hands and feet)  Your team will help you know when to get regular checkups, such as vision checks  They will teach you how to watch for problems and when to get a problem checked  Other ways to manage diabetes:   · Check your feet every day for sores  Look at your whole foot, including the bottom, and between and under your toes  Check for wounds, corns, and calluses  Use a mirror to see the bottom of your feet  The skin on your feet may be shiny, tight, dry, or darker than normal  Your feet may also be cold and pale  Feel your feet by running your hands along the tops, bottoms, sides, and between your toes  Redness, swelling, and warmth are signs of blood flow problems that can lead to a foot ulcer  Do not try to remove corns or calluses yourself  · Wear medical alert identification  Wear medical alert jewelry or carry a card that says you have type 2 diabetes   Ask your healthcare provider where to get these items          · Ask about vaccines  You have a higher risk for serious illness if you get the flu, pneumonia, or hepatitis  Ask your healthcare provider if you should get a flu, pneumonia, shingles, or hepatitis B vaccine, and when to get the vaccine  · Get help from family and friends  You may need help checking your blood sugar level, giving insulin injections, or preparing your meals  You may also need help to check your feet for sores  Ask your family and friends to help you with these tasks  Talk to your care team if you need someone at home to help you  Follow up with your doctor or diabetes care team as directed: You will need to return to meet with different care team members  You may need tests to monitor for problems  Write down your questions so you remember to ask them during your visits  © Copyright [address] Information is for End User's use only and may not be sold, redistributed or otherwise used for commercial purposes  All illustrations and images included in CareNotes® are the copyrighted property of A D A M , Inc  or [facility] [address] [employer]  The above information is an  only  It is not intended as medical advice for individual conditions or treatments  Talk to your doctor, nurse or pharmacist before following any medical regimen to see if it is safe and effective for you

## 2021-06-14 NOTE — PROGRESS NOTES
Assessment/Plan:  Problem List Items Addressed This Visit        Digestive    Gastro-esophageal reflux disease without esophagitis       Endocrine    Type 2 diabetes mellitus with other circulatory complications ([place] [address] ) - Primary    Relevant Medications    nateglinide (STARLIX) 120 mg tablet    Other Relevant Orders    Comprehensive metabolic panel    CBC and differential    Microalbumin / creatinine urine ratio    POCT hemoglobin A1c (Completed)    Secondary hyperparathyroidism of renal origin [facility])       Cardiovascular and Mediastinum    Peripheral arterial disease (HCC)    Heart failure, unspecified ([place] [address] )    Essential (primary) hypertension    Cardiovascular arteriosclerosis       Nervous and Auditory    Dementia without behavioral disturbance (HCC)       Musculoskeletal and Integument    Age-related osteoporosis without current pathological fracture       Genitourinary    End-stage renal disease on hemodialysis ([place] [address] )       Other    Vitamin D deficiency, unspecified    Relevant Orders    Vitamin D 25 hydroxy    Iron deficiency anemia    Hyperlipidemia, unspecified    Relevant Orders    Lipid Panel with Direct LDL reflex    Anemia in chronic kidney disease    Relevant Orders    CBC and differential    Iron Panel (Includes Ferritin, Iron Sat%, Iron, and TIBC)      Other Visit Diagnoses     Type 2 diabetes mellitus with complication (HCC)        Relevant Medications    nateglinide (STARLIX) 120 mg tablet           Diagnoses and all orders for this visit:    Type 2 diabetes mellitus with other circulatory complications (HCC)  -     Comprehensive metabolic panel;  Future  -     CBC and differential; Future  -     Microalbumin / creatinine urine ratio  -     POCT hemoglobin A1c    Secondary hyperparathyroidism of renal origin ([place] [address] )    Gastro-esophageal reflux disease without esophagitis    Cardiovascular arteriosclerosis    Chronic heart failure, unspecified heart failure type ([place] [address] )    Essential (primary) hypertension    Peripheral arterial disease (HCC)    Dementia without behavioral disturbance, unspecified dementia type ([facility] [address] )    Age-related osteoporosis without current pathological fracture    End-stage renal disease on hemodialysis (HCC)    Anemia in chronic kidney disease, on chronic dialysis (HCC)  -     CBC and differential; Future  -     Iron Panel (Includes Ferritin, Iron Sat%, Iron, and TIBC); Future    Other hyperlipidemia  -     Lipid Panel with Direct LDL reflex; Future    Iron deficiency anemia, unspecified iron deficiency anemia type    Vitamin D deficiency, unspecified  -     Vitamin D 25 hydroxy; Future    Type 2 diabetes mellitus with complication (HCC)  -     nateglinide (STARLIX) 120 mg tablet; Take 1 tablet (120 mg total) by mouth 3 (three) times a day before meals    Other orders  -     sevelamer carbonate (RENVELA) 800 mg tablet; Take 800 mg by mouth 3 (three) times a day with meals        No problem-specific Assessment & Plan notes found for this encounter  A/P: Doing well and in office HgA1c was 6 0 which is a significantly better  ?the decrease in appetite and may be due to the ESRD  Await labs    Some concerns with continued low FBS and now off the metformin as well  Will decrease the Starlix from four a day to three    Pt  off the metformin, but the problem is that her insurance/out of pocket cost prohibits any of the newer meds  Insulin possible,but pt refusing injectables  Appreciate nephro input  Will check labs  Continue with HD  Continue current treatment and RTC three months for routine  Subjective:      Patient ID: Sandra Hampton is a 77 y o  female  WF RTC with her  for f/u DM, ESRD on HD, etc  Doing ok and no new issues, but nephro stopped the metformin and calcium due to concern of lactic acidosis and elevated calcium levels  Remains as active as possible, but is mainly chair bound and no falls reported   Sugars Less than 140 most of the time, but HS readings around 200 at the pt request with FBS less that 100 and occasional s/s  Some decrease in appetite    No reflux  No problems with HD  Denies CP, SOB, palpitations, edema, orthopnea or PND  Dementia is stable and no safety concerns  No open wounds  Due for labs  The following portions of the patient's history were reviewed and updated as appropriate:   She has a past medical history of Abnormal weight loss, Anemia, Chronic kidney disease, Clostridium difficile colitis, Depression, Diabetes mellitus ([address] ), Fracture, tibial plateau, GERD (gastroesophageal reflux disease), Hyperkalemia, Hypertension, Hypoglycemia, Hypokalemia, Hypothermia, Hypoxia, Metatarsalgia, Old myocardial infarction, Osteoporosis, Syncope, and Visual disturbance  ,  does not have any pertinent problems on file  ,   has a past surgical history that includes Venous thrombectomy; Bypass Graft (Right); Coronary artery bypass graft; Cataract extraction; Cholecystectomy; Hernia repair; Carotid endarterectomy; Foot Amputation (Bilateral); and Tonsillectomy and adenoidectomy  ,  family history includes Aneurysm in her sister; Breast cancer in her mother; Cancer in her mother; Coronary artery disease in her father and sister; Heart disease in her mother; Liver cancer in her brother; No Known Problems in her daughter and maternal aunt  ,   reports that she has quit smoking  She has never used smokeless tobacco  She reports previous alcohol use  She reports that she does not use drugs  ,  is allergic to pollen extract and prolia [denosumab]     Current Outpatient Medications   Medication Sig Dispense Refill    acetaminophen (TYLENOL) 325 mg tablet 2 tablets      amLODIPine (NORVASC) 10 mg tablet take 1 tablet by mouth once daily 90 tablet 1    aspirin 81 MG tablet Take 1 tablet by mouth daily      b complex-vitamin C-folic acid (RENAL) 1 mg Take by mouth daily      diphenoxylate-atropine (LOMOTIL) 2 5-0 025 mg per tablet Take 1 tablet by mouth 4 (four) times a day as needed for diarrhea 30 tablet 0    ergocalciferol (VITAMIN D2) 50,000 units take 1 capsule by mouth weekly 12 capsule 3    fluticasone (FLONASE) 50 mcg/act nasal spray 1 spray into each nostril daily 16 g 3    glucose blood (ONE TOUCH ULTRA TEST) test strip 1 each by Other route 2 (two) times a day 200 each 5    Misc  Devices [facility]) MISC by Does not apply route daily 1 each 0    Multiple Vitamins-Minerals (OCUVITE ADULT 50+ PO) Take by mouth      nateglinide (STARLIX) 120 mg tablet Take 1 tablet (120 mg total) by mouth 3 (three) times a day before meals 120 tablet 5    Omega-3 Fatty Acids (FISH OIL) 1200 MG CAPS Take by mouth 4 (four) times a day      pantoprazole (PROTONIX) 40 mg tablet take 1 tablet by mouth daily 30 tablet 5    pravastatin (PRAVACHOL) 80 mg tablet swallow 1 tablet once daily 90 tablet 3    sevelamer carbonate (RENVELA) 800 mg tablet Take 800 mg by mouth 3 (three) times a day with meals       No current facility-administered medications for this visit  Review of Systems   Constitutional: Negative for activity change, chills, diaphoresis, fatigue and fever  HENT: Negative  Eyes: Negative for visual disturbance  Respiratory: Negative for cough, chest tightness, shortness of breath and wheezing  Cardiovascular: Negative for chest pain, palpitations and leg swelling  Gastrointestinal: Negative for abdominal pain, constipation, diarrhea, nausea and vomiting  Endocrine: Negative for cold intolerance and heat intolerance  Genitourinary: Negative for difficulty urinating, dysuria and frequency  Musculoskeletal: Negative for arthralgias, gait problem and myalgias  Neurological: Negative for dizziness, seizures, syncope, weakness, light-headedness and headaches  Psychiatric/Behavioral: Negative for confusion, dysphoric mood and sleep disturbance  The patient is not nervous/anxious          PHQ-9 Depression Screening    PHQ-9:   Frequency of the following problems over the past two weeks:            Objective:  Vitals:    06/14/21 1118   BP: 132/58   BP Location: Right arm   Patient Position: Sitting   Cuff Size: Adult   Pulse: 89   Resp: 18   Temp: 97 7 °F (36 5 °C)   TempSrc: Temporal   SpO2: 98%   Weight: 73 kg (161 lb)   Height: 5' 4" (1 626 m)     Body mass index is 27 64 kg/m²  Physical Exam  Vitals and nursing note reviewed  Constitutional:       General: She is not in acute distress  Appearance: Normal appearance  She is not ill-appearing  HENT:      Head: Normocephalic and atraumatic  Mouth/Throat:      Mouth: Mucous membranes are moist    Eyes:      Extraocular Movements: Extraocular movements intact  Conjunctiva/sclera: Conjunctivae normal       Pupils: Pupils are equal, round, and reactive to light  Neck:      Vascular: No carotid bruit  Cardiovascular:      Rate and Rhythm: Normal rate and regular rhythm  Heart sounds: Normal heart sounds  Pulmonary:      Effort: Pulmonary effort is normal  No respiratory distress  Breath sounds: Normal breath sounds  No wheezing or rales  Abdominal:      General: Bowel sounds are normal  There is no distension  Palpations: Abdomen is soft  Tenderness: There is no abdominal tenderness  Musculoskeletal:      Cervical back: Neck supple  Right lower leg: No edema  Left lower leg: No edema  Neurological:      General: No focal deficit present  Mental Status: She is alert and oriented to person, place, and time  Mental status is at baseline  Psychiatric:         Mood and Affect: Mood normal          Behavior: Behavior normal          Thought Content:  Thought content normal          Judgment: Judgment normal

## 2021-08-16 NOTE — ED PROVIDER NOTES
Final Diagnosis:  1  Right leg weakness        Chief Complaint   Patient presents with    Leg Pain     Patient reports chronic pain in right knee and new onset of pain in leg since Saturday  she denies any recent injuries   states that she has not been abl eto pick her leg up  ibuprofen taken last night     HPI  Patient presents for evaluation of weakness in right hip   and patient provide history  They state that since about Saturday the patient has been unable to lift her right leg off of the bed  Patient has been nonambulatory and in a wheelchair for approximately 4 years  Has chronic deformity of right knee  Patient denies any falls, trauma, or pain in the area  States that they were working with physical therapist who noted that her hip could possibly be dislocated which is why she cannot raise it off the bed  No other complaints  Unless otherwise specified:  - No language barrier    - History obtained from patient  - There are no limitations to the history obtained  - Previous charting was reviewed    PMH:   has a past medical history of Abnormal weight loss, Anemia, Chronic kidney disease, Clostridium difficile colitis, Depression, Diabetes mellitus ([facility] [address] ), Fracture, tibial plateau, GERD (gastroesophageal reflux disease), Hyperkalemia, Hypertension, Hypoglycemia, Hypokalemia, Hypothermia, Hypoxia, Metatarsalgia, Old myocardial infarction, Osteoporosis, Syncope, and Visual disturbance  PSH:   has a past surgical history that includes Venous thrombectomy; Bypass Graft (Right); Coronary artery bypass graft; Cataract extraction; Cholecystectomy; Hernia repair; Carotid endarterectomy; Foot Amputation (Bilateral); and Tonsillectomy and adenoidectomy  ROS:  Review of Systems   -   - 13 point ROS was performed and all are normal unless stated in the history above  - Nursing note reviewed  Vitals reviewed  - Orders placed by myself and/or advanced practitioner / resident  PE:   Vitals:    08/16/21 1736   BP: (!) 184/76   BP Location: Right arm   Pulse: 83   Resp: 18   Temp: 98 °F (36 7 °C)   TempSrc: Temporal   SpO2: 97%   Weight: 73 kg (160 lb 15 oz)   Height: 5' 4" (1 626 m)     Vitals reviewed by me  Patient is able to raise her left leg off the bed  Unable to do so on right side  She has no pain with diffuse dilatation of her right hip, femur, and knee  Right leg with chronic deformity of medial deviation of femur and tib-fib joint  Unless otherwise specified above:    General: VS reviewed  Appears in NAD  awake, alert  Head: Normocephalic, atraumatic  Eyes: EOM-I  No exudate  ENT: Atraumatic external nose and ears  No malocclusion  No stridor  Normal phonation  No drooling  Normal swallowing  Neck: No JVD  CV: No pallor noted  Lungs:   No tachypnea  No respiratory distress    Abdomen:  Soft, non-tender, non-distended    MSK:   FROM spontaneously  No obvious deformity    Skin: Dry, intact  No obvious rash  Neuro: Awake, alert, GCS15, CN II-XII grossly intact  Speaking in full sentences  Motor grossly intact  Psychiatric/Behavioral: Appropriate mood and affect   Exam: deferred    Physical Exam     A:  - Nursing note reviewed  XR hip/pelv 2-3 vws right   ED Interpretation   No acute fracture dislocation of right femur        Orders Placed This Encounter   Procedures    XR hip/pelv 2-3 vws right     Labs Reviewed - No data to display      Final Diagnosis:  1  Right leg weakness        P:  - x-ray without acute pathology  I went back and discussed results with patient  who are bedside  At this time I do not have a clear explanation for why the pain shin has focal weakness at hip adduction   mentioned that the patient had done a large amount of physical therapy on Friday and wonders if this partially contributed to her current weakness   discussed option of further eval for very focal stroke which is unlikely given patients presentation  Patient declined at this time  I discussed with him return precautions for further evaluation as well as importance of following up with Orthopedics for further evaluation of weakness  Physical therapy as well  Medications - No data to display  Time reflects when diagnosis was documented in both MDM as applicable and the Disposition within this note     Time User Action Codes Description Comment    8/16/2021  6:21 PM Jayson Stone Add [R29 [id number]] Right leg weakness       ED Disposition     ED Disposition Condition Date/Time Comment    Discharge Stable Mon Aug 16, 2021  6:21 PM Alex Corado discharge to home/self care              Follow-up Information     Follow up With Specialties Details Why Contact Info Additional Information    Thierno Staton,  Internal Medicine   [address] [facility]  [address]  [address] [id number] [facility]   [address] [facility] [address]  [address] [facility] [address], [place] [address]        Discharge Medication List as of 8/16/2021  6:22 PM      CONTINUE these medications which have NOT CHANGED    Details   acetaminophen (TYLENOL) 325 mg tablet 2 tablets, Historical Med      amLODIPine (NORVASC) 10 mg tablet take 1 tablet by mouth once daily, Normal      aspirin 81 MG tablet Take 1 tablet by mouth daily, Starting Wed 3/26/2014, Historical Med      b complex-vitamin C-folic acid (RENAL) 1 mg Take by mouth daily, Historical Med      diphenoxylate-atropine (LOMOTIL) 2 5-0 025 mg per tablet Take 1 tablet by mouth 4 (four) times a day as needed for diarrhea, Starting Mon 12/16/2019, Normal      ergocalciferol (VITAMIN D2) 50,000 units take 1 capsule by mouth weekly, Normal      fluticasone (FLONASE) 50 mcg/act nasal spray 1 spray into each nostril daily, Starting Thu 3/12/2020, Normal glucose blood (ONE TOUCH ULTRA TEST) test strip 1 each by Other route 2 (two) times a day, Starting Thu 10/17/2019, Normal      Misc  [facility]) MISC by Does not apply route daily, Starting 2018, Normal      Multiple Vitamins-Minerals (OCUVITE ADULT 50+ PO) Take by mouth, Historical Med      nateglinide (STARLIX) 120 mg tablet Take 1 tablet (120 mg total) by mouth 3 (three) times a day before meals, Starting Mon 2021, No Print      Omega-3 Fatty Acids (FISH OIL) 1200 MG CAPS Take by mouth 4 (four) times a day, Historical Med      pantoprazole (PROTONIX) 40 mg tablet take 1 tablet by mouth once daily, Normal      pravastatin (PRAVACHOL) 80 mg tablet swallow 1 tablet once daily, Normal      sevelamer carbonate (RENVELA) 800 mg tablet Take 800 mg by mouth 3 (three) times a day with meals, Starting Mon 2021, Historical Med           No discharge procedures on file  Prior to Admission Medications   Prescriptions Last Dose Informant Patient Reported? Taking? Misc   [facility]) MISC   No No   Sig: by Does not apply route daily   Multiple Vitamins-Minerals (OCUVITE ADULT 50+ PO)   Yes No   Sig: Take by mouth   Omega-3 Fatty Acids (FISH OIL) 1200 MG CAPS   Yes No   Sig: Take by mouth 4 (four) times a day   acetaminophen (TYLENOL) 325 mg tablet   Yes No   Si tablets   amLODIPine (NORVASC) 10 mg tablet   No No   Sig: take 1 tablet by mouth once daily   aspirin 81 MG tablet   Yes No   Sig: Take 1 tablet by mouth daily   b complex-vitamin C-folic acid (RENAL) 1 mg   Yes No   Sig: Take by mouth daily   diphenoxylate-atropine (LOMOTIL) 2 5-0 025 mg per tablet   No No   Sig: Take 1 tablet by mouth 4 (four) times a day as needed for diarrhea   ergocalciferol (VITAMIN D2) 50,000 units   No No   Sig: take 1 capsule by mouth weekly   fluticasone (FLONASE) 50 mcg/act nasal spray   No No   Si spray into each nostril daily   glucose blood (ONE TOUCH ULTRA TEST) test strip   No No   Si each by Other route 2 (two) times a day   nateglinide (STARLIX) 120 mg tablet   No No   Sig: Take 1 tablet (120 mg total) by mouth 3 (three) times a day before meals   pantoprazole (PROTONIX) 40 mg tablet   No No   Sig: take 1 tablet by mouth once daily   pravastatin (PRAVACHOL) 80 mg tablet   No No   Sig: swallow 1 tablet once daily   sevelamer carbonate (RENVELA) 800 mg tablet   Yes No   Sig: Take 800 mg by mouth 3 (three) times a day with meals      Facility-Administered Medications: None       Portions of the record may have been created with voice recognition software  Occasional wrong word or "sound a like" substitutions may have occurred due to the inherent limitations of voice recognition software  Read the chart carefully and recognize, using context, where substitutions have occurred      Electronically signed by:  MD Estefanía Gutiérrez MD  08/16/21 5801 [facility] MD Rosina  08/16/21 4650

## 2021-08-18 PROBLEM — R07.89 OTHER CHEST PAIN: Status: ACTIVE | Noted: 2021-01-01

## 2021-08-18 NOTE — PLAN OF CARE
Problem: Nutrition/Hydration-ADULT  Goal: Nutrient/Hydration intake appropriate for improving, restoring or maintaining nutritional needs  Description: Monitor and assess patient's nutrition/hydration status for malnutrition  Collaborate with interdisciplinary team and initiate plan and interventions as ordered  Monitor patient's weight and dietary intake as ordered or per policy  Utilize nutrition screening tool and intervene as necessary  Determine patient's food preferences and provide high-protein, high-caloric foods as appropriate       INTERVENTIONS:  - Monitor oral intake, urinary output, labs, and treatment plans  - Assess nutrition and hydration status and recommend course of action  - Evaluate amount of meals eaten  - Assist patient with eating if necessary   - Allow adequate time for meals  - Recommend/ encourage appropriate diets, oral nutritional supplements, and vitamin/mineral supplements  - Order, calculate, and assess calorie counts as needed  - Recommend, monitor, and adjust tube feedings and TPN/PPN based on assessed needs  - Assess need for intravenous fluids  - Provide specific nutrition/hydration education as appropriate  - Include patient/family/caregiver in decisions related to nutrition  Outcome: Adequate for Discharge     Problem: Prexisting or High Potential for Compromised Skin Integrity  Goal: Skin integrity is maintained or improved  Description: INTERVENTIONS:  - Identify patients at risk for skin breakdown  - Assess and monitor skin integrity  - Assess and monitor nutrition and hydration status  - Monitor labs   - Assess for incontinence   - Turn and reposition patient  - Assist with mobility/ambulation  - Relieve pressure over bony prominences  - Avoid friction and shearing  - Provide appropriate hygiene as needed including keeping skin clean and dry  - Evaluate need for skin moisturizer/barrier cream  - Collaborate with interdisciplinary team   - Patient/family teaching  - Consider wound care consult   Outcome: Adequate for Discharge     Problem: Potential for Falls  Goal: Patient will remain free of falls  Description: INTERVENTIONS:  - Educate patient/family on patient safety including physical limitations  - Instruct patient to call for assistance with activity   - Consult OT/PT to assist with strengthening/mobility   - Keep Call bell within reach  - Keep bed low and locked with side rails adjusted as appropriate  - Keep care items and personal belongings within reach  - Initiate and maintain comfort rounds  - Make Fall Risk Sign visible to staff  - Apply yellow socks and bracelet for high fall risk patients  - Consider moving patient to room near nurses station  Outcome: Adequate for Discharge     Problem: RESPIRATORY - ADULT  Goal: Achieves optimal ventilation and oxygenation  Description: INTERVENTIONS:  - Assess for changes in respiratory status  - Assess for changes in mentation and behavior  - Position to facilitate oxygenation and minimize respiratory effort  - Oxygen administered by appropriate delivery if ordered  - Initiate smoking cessation education as indicated  - Encourage broncho-pulmonary hygiene including cough, deep breathe, Incentive Spirometry  - Assess the need for suctioning and aspirate as needed  - Assess and instruct to report SOB or any respiratory difficulty  - Respiratory Therapy support as indicated  Outcome: Adequate for Discharge     Problem: GENITOURINARY - ADULT  Goal: Maintains or returns to baseline urinary function  Description: INTERVENTIONS:  - Assess urinary function  - Encourage oral fluids to ensure adequate hydration if ordered  - Administer IV fluids as ordered to ensure adequate hydration  - Administer ordered medications as needed  - Offer frequent toileting  - Follow urinary retention protocol if ordered  Outcome: Adequate for Discharge  Goal: Absence of urinary retention  Description: INTERVENTIONS:  - Assess patients ability to void and empty bladder  - Monitor I/O  - Bladder scan as needed  - Discuss with physician/AP medications to alleviate retention as needed  - Discuss catheterization for long term situations as appropriate  Outcome: Adequate for Discharge  Goal: Urinary catheter remains patent  Description: INTERVENTIONS:  - Assess patency of urinary catheter  - If patient has a chronic arceo, consider changing catheter if non-functioning  - Follow guidelines for intermittent irrigation of non-functioning urinary catheter  Outcome: Adequate for Discharge     Problem: METABOLIC, FLUID AND ELECTROLYTES - ADULT  Goal: Electrolytes maintained within normal limits  Description: INTERVENTIONS:  - Monitor labs and assess patient for signs and symptoms of electrolyte imbalances  - Administer electrolyte replacement as ordered  - Monitor response to electrolyte replacements, including repeat lab results as appropriate  - Instruct patient on fluid and nutrition as appropriate  Outcome: Adequate for Discharge  Goal: Fluid balance maintained  Description: INTERVENTIONS:  - Monitor labs   - Monitor I/O and WT  - Instruct patient on fluid and nutrition as appropriate  - Assess for signs & symptoms of volume excess or deficit  Outcome: Adequate for Discharge  Goal: Glucose maintained within target range  Description: INTERVENTIONS:  - Monitor Blood Glucose as ordered  - Assess for signs and symptoms of hyperglycemia and hypoglycemia  - Administer ordered medications to maintain glucose within target range  - Assess nutritional intake and initiate nutrition service referral as needed  Outcome: Adequate for Discharge     Problem: SKIN/TISSUE INTEGRITY - ADULT  Goal: Skin Integrity remains intact(Skin Breakdown Prevention)  Description: Assess:  -Inspect skin when repositioning, toileting, and assisting with ADL  -Assess extremities for adequate circulation and sensation     Bed Management:  -Have minimal linens on bed & keep smooth, unwrinkled  -Change linens as needed when moist or perspiring  Toileting:  -Offer bedside commode  -Activity:  -Encourage activity and walks on unit  -Encourage or provide ROM exercises   -Turn and reposition patient every 2 Hours  -Use appropriate equipment to lift or move patient in bed  -Instruct/ Assist with weight shifting every 1 when out of bed in chair      Skin Care:  -Avoid use of baby powder, tape, friction and shearing, hot water or constrictive clothing  -Do not massage red bony areas    Outcome: Adequate for Discharge  Goal: Pressure injury heals and does not worsen  Description: Interventions:  - Implement low air loss mattress or specialty surface (Criteria met)  - Apply silicone foam dressing  - Apply fecal or urinary incontinence containment device   - Utilize friction reducing device or surface for transfers   - Consider nutrition services referral as needed  Outcome: Adequate for Discharge     Problem: HEMATOLOGIC - ADULT  Goal: Maintains hematologic stability  Description: INTERVENTIONS  - Assess for signs and symptoms of bleeding or hemorrhage  - Monitor labs  - Administer supportive blood products/factors as ordered and appropriate  Outcome: Adequate for Discharge     Problem: MUSCULOSKELETAL - ADULT  Goal: Maintain or return mobility to safest level of function  Description: INTERVENTIONS:  - Assess patient's ability to carry out ADLs; assess patient's baseline for ADL function and identify physical deficits which impact ability to perform ADLs (bathing, care of mouth/teeth, toileting, grooming, dressing, etc )  - Assess/evaluate cause of self-care deficits   - Assess range of motion  - Assess patient's mobility  - Assess patient's need for assistive devices and provide as appropriate  - Encourage maximum independence but intervene and supervise when necessary  - Involve family in performance of ADLs  - Assess for home care needs following discharge   - Consider OT consult to assist with ADL evaluation and planning for discharge  - Provide patient education as appropriate  Outcome: Adequate for Discharge     Problem: PAIN - ADULT  Goal: Verbalizes/displays adequate comfort level or baseline comfort level  Description: Interventions:  - Encourage patient to monitor pain and request assistance  - Assess pain using appropriate pain scale  - Administer analgesics based on type and severity of pain and evaluate response  - Implement non-pharmacological measures as appropriate and evaluate response  - Consider cultural and social influences on pain and pain management  - Notify physician/advanced practitioner if interventions unsuccessful or patient reports new pain  Outcome: Adequate for Discharge     Problem: INFECTION - ADULT  Goal: Absence or prevention of progression during hospitalization  Description: INTERVENTIONS:  - Assess and monitor for signs and symptoms of infection  - Monitor lab/diagnostic results  - Monitor all insertion sites, i e  indwelling lines, tubes, and drains  - Monitor endotracheal if appropriate and nasal secretions for changes in amount and color  - [place] appropriate cooling/warming therapies per order  - Administer medications as ordered  - Instruct and encourage patient and family to use good hand hygiene technique  - Identify and instruct in appropriate isolation precautions for identified infection/condition  Outcome: Adequate for Discharge  Goal: Absence of fever/infection during neutropenic period  Description: INTERVENTIONS:  - Monitor WBC    Outcome: Adequate for Discharge     Problem: SAFETY ADULT  Goal: Patient will remain free of falls  Description: INTERVENTIONS:  - Educate patient/family on patient safety including physical limitations  - Instruct patient to call for assistance with activity   - Consult OT/PT to assist with strengthening/mobility   - Keep Call bell within reach  - Keep bed low and locked with side rails adjusted as appropriate  - Keep care items and personal belongings within reach  - Initiate and maintain comfort rounds  - Make Fall Risk Sign visible to staff  - Apply yellow socks and bracelet for high fall risk patients  - Consider moving patient to room near nurses station  Outcome: Adequate for Discharge  Goal: Maintain or return to baseline ADL function  Description: INTERVENTIONS:  -  Assess patient's ability to carry out ADLs; assess patient's baseline for ADL function and identify physical deficits which impact ability to perform ADLs (bathing, care of mouth/teeth, toileting, grooming, dressing, etc )  - Assess/evaluate cause of self-care deficits   - Assess range of motion  - Assess patient's mobility; develop plan if impaired  - Assess patient's need for assistive devices and provide as appropriate  - Encourage maximum independence but intervene and supervise when necessary  - Involve family in performance of ADLs  - Assess for home care needs following discharge   - Consider OT consult to assist with ADL evaluation and planning for discharge  - Provide patient education as appropriate  Outcome: Adequate for Discharge  Goal: Maintains/Returns to pre admission functional level  Description: INTERVENTIONS:  - Perform BMAT or MOVE assessment daily    - Set and communicate daily mobility goal to care team and patient/family/caregiver     - Collaborate with rehabilitation services on mobility goals if consulted  - Out of bed for toileting  - Record patient progress and toleration of activity level   Outcome: Adequate for Discharge     Problem: DISCHARGE PLANNING  Goal: Discharge to home or other facility with appropriate resources  Description: INTERVENTIONS:  - Identify barriers to discharge w/patient and caregiver  - Arrange for needed discharge resources and transportation as appropriate  - Identify discharge learning needs (meds, wound care, etc )  - Arrange for interpretive services to assist at discharge as needed  - Refer to Case Management Department for coordinating discharge planning if the patient needs post-hospital services based on physician/advanced practitioner order or complex needs related to functional status, cognitive ability, or social support system  Outcome: Adequate for Discharge     Problem: Knowledge Deficit  Goal: Patient/family/caregiver demonstrates understanding of disease process, treatment plan, medications, and discharge instructions  Description: Complete learning assessment and assess knowledge base  Interventions:  - Provide teaching at level of understanding  - Provide teaching via preferred learning methods  Outcome: Adequate for Discharge     Problem: MOBILITY - ADULT  Goal: Maintain or return to baseline ADL function  Description: INTERVENTIONS:  -  Assess patient's ability to carry out ADLs; assess patient's baseline for ADL function and identify physical deficits which impact ability to perform ADLs (bathing, care of mouth/teeth, toileting, grooming, dressing, etc )  - Assess/evaluate cause of self-care deficits   - Assess range of motion  - Assess patient's mobility; develop plan if impaired  - Assess patient's need for assistive devices and provide as appropriate  - Encourage maximum independence but intervene and supervise when necessary  - Involve family in performance of ADLs  - Assess for home care needs following discharge   - Consider OT consult to assist with ADL evaluation and planning for discharge  - Provide patient education as appropriate  Outcome: Adequate for Discharge  Goal: Maintains/Returns to pre admission functional level  Description: INTERVENTIONS:  - Perform BMAT or MOVE assessment daily    - Set and communicate daily mobility goal to care team and patient/family/caregiver     - Collaborate with rehabilitation services on mobility goals if consulted  -- Out of bed for toileting  - Record patient progress and toleration of activity level   Outcome: Adequate for Discharge

## 2021-08-18 NOTE — ASSESSMENT & PLAN NOTE
Wt Readings from Last 3 Encounters:   08/18/21 69 2 kg (152 lb 8 9 oz)   08/16/21 73 kg (160 lb 15 oz)   06/14/21 73 kg (161 lb)     · Continue pre-hospital Norvasc 10 mg p o   Daily, place on [facility] no added salt diet obtain daily weights  · Euvolemic on Exam  · Continue outpatient HD   · Stable for discharge

## 2021-08-18 NOTE — ASSESSMENT & PLAN NOTE
Lab Results   Component Value Date    EGFR 12 08/18/2021    EGFR 17 08/17/2021    EGFR 10 03/03/2021    CREATININE 3 51 (H) 08/18/2021    CREATININE 2 60 (H) 08/17/2021    CREATININE 4 01 (H) 03/03/2021     · Continue pre-hospital Renagel 800 mg p o  T i d   · Nephrology was consulted, see recommendations  · Stable for discharge, continue T, Th, S dialysis sessions

## 2021-08-18 NOTE — CONSULTS
Consultation - Nephrology   Sukumar Patrick 77 y o  female MRN: [id number]  Unit/Bed#: -02 Encounter: [id number]      A/P:  1  End-stage renal disease   Will continue hemodialysis sessions Tuesday Thursday Saturday, orders are in the chart  2  Secondary hyperparathyroidism   Continue with sevelamer 1 tablet with meals  3  Anemia of chronic kidney disease   Hemoglobin slightly reduced, no BOOGIE at this time given that the patient is on long-acting BOOGIE in the outpatient setting  4  Diabetic nephropathy  5  History of hypercalcemia   Patient is off of calcium acetate which has normalized her calcium levels  Of note the patient has an "allergy" to Prolia due to hypocalcemia  6  History of coronary artery disease status post coronary artery bypass grafting  7  Chest pain   Patient be evaluated by Cardiology, continue further workup as indicated  Thank you for allowing us to participate in the care of your patient  Please feel free to contact us regarding the care of this patient, or any other questions/concerns that may be applicable      Patient Active Problem List   Diagnosis    Allergic rhinitis    Anemia in chronic kidney disease    Arteriosclerosis of carotid artery    Cardiovascular arteriosclerosis    Cataract    Heart failure, unspecified ([address] )    Diabetes mellitus with foot ulcer ([address] )    Diastolic dysfunction    End-stage renal disease on hemodialysis ([address] )    Gastro-esophageal reflux disease without esophagitis    Hyperlipidemia, unspecified    Essential (primary) hypertension    Iron deficiency anemia    Overactive bladder    Peripheral arterial disease (HCC)    Age-related osteoporosis without current pathological fracture    Secondary hyperparathyroidism of renal origin ([address] )    Type 2 diabetes mellitus with other circulatory complications (HCC)    Vitamin D deficiency, unspecified    Acquired absence of left great toe (HCC)    Thrombocytopenia ([address] )    Dementia without behavioral disturbance [facility]    Other chest pain       History of Present Illness   Physician Requesting Consult: Virgil Chen MD  Reason for Consult / Principal Problem:  End-stage renal disease   Hx and PE limited by:   HPI: Emily Forbes is a 77y o  year old female who presented to the emergency department yesterday August 17th after hemodialysis session  Patient claims that chest pain began at the end of hemodialysis, was moderate from pain standpoint lasted for about 10 minutes, and then went away on its own  This was also associated shortness of breath  However the patient also claims that she has been more short of breath recently  At this time, she claims to have no shortness of breath, not currently on oxygen, and has no complaints of chest pain  From the renal standpoint, patient is end-stage renal disease on hemodialysis treatments Tuesday Thursday Saturday  As mentioned above her last treatment was yesterday on Tuesday August 17th at which time she completed her entire session prior to complaining of chest pain and shortness of breath  Previous electronic medical records reviewed during the course this consultation, patient's last admission was in August of 2020 due to generalized weakness  Since that time she has been doing fairly well  History obtained from chart review and the patient    Review of Systems - Negative except as mentioned above in HPI, more specifics as mentioned below    Review of Systems - General ROS: positive for  - fatigue  Psychological ROS: negative  Ophthalmic ROS: negative  ENT ROS: negative  Allergy and Immunology ROS: negative  Hematological and Lymphatic ROS: negative  Endocrine ROS: negative  Respiratory ROS: negative  Cardiovascular ROS: negative  Gastrointestinal ROS: negative  Genito-Urinary ROS: negative  Musculoskeletal ROS: negative  Neurological ROS: negative  Dermatological ROS: negative    Historical Information   Past Medical History: Diagnosis Date    Abnormal weight loss     last assessed [id number]    Anemia     Chronic kidney disease     Clostridium difficile colitis     last assessed [id number]    Depression     resolved [id number]    Diabetes mellitus ([facility]     Fracture, tibial plateau     last assessed [id number]    GERD (gastroesophageal reflux disease)     Hyperkalemia     last assessed 06Apr2016    Hypertension     Hypoglycemia     Sugars now up   Will increase the metformin;  last assessed [id number]    Hypokalemia     last assessed [id number]    Hypothermia     last assessed [id number]    Hypoxia     last assessed [id number]    Metatarsalgia     last assessed [id number]    Old myocardial infarction     Osteoporosis     Syncope     last assessed 30Nov2016    Visual disturbance     last assessed 26Mar2014     Past Surgical History:   Procedure Laterality Date    BYPASS GRAFT Right     H/O bypass graft (non-vein), Right Leg    CAROTID ENDARTERECTOMY      Neurological Surgery Cartoid Endartectomy    CATARACT EXTRACTION      CHOLECYSTECTOMY      questionable    CORONARY ARTERY BYPASS GRAFT      FOOT AMPUTATION Bilateral     H/O surgery foot amputation Metatarsal and toe;  Bilat    HERNIA REPAIR      Inguinal sliding    TONSILLECTOMY AND ADENOIDECTOMY      VENOUS THROMBECTOMY      H/O Arteriobenous surgery Thrombectomy of AV Fistula Percantaneous;  last assessed [id number]     Social History   Social History     Substance and Sexual Activity   Alcohol Use Not Currently     Social History     Substance and Sexual Activity   Drug Use No     Social History     Tobacco Use   Smoking Status Former Smoker   Smokeless Tobacco Never Used     Family History   Problem Relation Age of Onset    Breast cancer Mother     Cancer Mother     Heart disease Mother     Coronary artery disease Father     Aneurysm Sister         Cerebral Artery    Coronary artery disease Sister     Liver cancer Brother     No Known Problems Daughter  No Known Problems Maternal Aunt        Meds/Allergies   all current active meds have been reviewed, current meds:   Current Facility-Administered Medications   Medication Dose Route Frequency    acetaminophen (TYLENOL) tablet 650 mg  650 mg Oral Q6H PRN    amLODIPine (NORVASC) tablet 10 mg  10 mg Oral Daily    aspirin (ECOTRIN LOW STRENGTH) EC tablet 81 mg  81 mg Oral Daily    b complex-vitamin C-folic acid (NEPHROCAPS) capsule   Oral Daily    diphenoxylate-atropine (LOMOTIL) 2 5-0 025 mg per tablet 1 tablet  1 tablet Oral 4x Daily PRN    fish oil capsule 1,000 mg  1,000 mg Oral 4x Daily    fluticasone (FLONASE) 50 mcg/act nasal spray 1 spray  1 spray Nasal Daily    heparin (porcine) subcutaneous injection 5,000 Units  5,000 Units Subcutaneous Q12H [address]    insulin lispro (HumaLOG) 100 units/mL subcutaneous injection 1-5 Units  1-5 Units Subcutaneous TID AC    insulin lispro (HumaLOG) 100 units/mL subcutaneous injection 1-5 Units  1-5 Units Subcutaneous HS    ondansetron (ZOFRAN) injection 4 mg  4 mg Intravenous Q6H PRN    pantoprazole (PROTONIX) EC tablet 40 mg  40 mg Oral Daily    pravastatin (PRAVACHOL) tablet 80 mg  80 mg Oral Daily    sevelamer (RENAGEL) tablet 800 mg  800 mg Oral TID With Meals    sodium chloride (PF) 0 9 % injection 3 mL  3 mL Intravenous Q1H PRN    and PTA meds:    Medications Prior to Admission   Medication    acetaminophen (TYLENOL) 325 mg tablet    amLODIPine (NORVASC) 10 mg tablet    aspirin 81 MG tablet    b complex-vitamin C-folic acid (RENAL) 1 mg    diphenoxylate-atropine (LOMOTIL) 2 5-0 025 mg per tablet    ergocalciferol (VITAMIN D2) 50,000 units    fluticasone (FLONASE) 50 mcg/act nasal spray    glucose blood (ONE TOUCH ULTRA TEST) test strip    [facility] \[facility] Chronology [facility] [place] [facility]\"") MISC    Multiple Vitamins-Minerals (OCUVITE ADULT 50+ PO)    nateglinide (STARLIX) 120 mg tablet    Omega-3 Fatty Acids (FISH OIL) 1200 MG CAPS    pantoprazole (PROTONIX) 40 mg tablet    pravastatin (PRAVACHOL) 80 mg tablet    sevelamer carbonate (RENVELA) 800 mg tablet         Allergies   Allergen Reactions    Pollen Extract     Prolia [Denosumab]      Severe calcium drop and is on dialysis  Objective   No intake or output data in the 24 hours ending 08/18/21 0929    Invasive Devices:        Physical Exam      No intake/output data recorded  Vitals:    08/18/21 0729   BP: (!) 187/72   Pulse: 91   Resp: 22   Temp: 98 5 °F (36 9 °C)   SpO2: 92%       Gen: in NAD, Alert/Awake  HEENT: no sclerous icterus, MMM, neck supple  CV: +S1/S2, RRR  Lungs: CTA bilaterally  Abd: +BS, soft NT/ND  Ext: all four extremities are warm  Skin: no rashes noted  Neuro: CN II-XII intact    Current Weight: Weight - Scale: 69 2 kg (152 lb 8 9 oz)  First Weight: Weight - Scale: 69 6 kg (153 lb 7 oz)    Lab Results:  I have personally reviewed pertinent labs      CBC:   Lab Results   Component Value Date    WBC 9 20 08/18/2021    HGB 9 6 (L) 08/18/2021    HCT 29 6 (L) 08/18/2021    MCV 92 08/18/2021     08/18/2021    MCH 29 7 08/18/2021    MCHC 32 4 08/18/2021    RDW 18 5 (H) 08/18/2021    MPV 8 5 (L) 08/18/2021     CMP:   Lab Results   Component Value Date    K 3 4 (L) 08/18/2021    CL 96 (L) 08/18/2021    CO2 29 08/18/2021    BUN 24 08/18/2021    CREATININE 3 51 (H) 08/18/2021    CALCIUM 9 5 08/18/2021    EGFR 12 08/18/2021     Phosphorus:   Lab Results   Component Value Date    PHOS 5 1 08/18/2021     Magnesium:   Lab Results   Component Value Date    MG 1 8 (L) 08/18/2021     Urinalysis: No results found for: Marthann End, SPECGRAV, PHUR, LEUKOCYTESUR, NITRITE, PROTEINUA, GLUCOSEU, KETONESU, BILIRUBINUR, BLOODU  Ionized Calcium: No results found for: CAION  Coagulation: No results found for: PT, INR, APTT  Troponin:   Lab Results   Component Value Date    TROPONINI 0 05 (H) 08/18/2021     ABG: No results found for: PHART, MMC7HRG, PO2ART, IJY2DWN, G2EJUMXM, BEART, SOURCE    Results from last 7 days   Lab Units 08/18/21  0503 08/17/21  1617   POTASSIUM mmol/L 3 4* 3 3*   CHLORIDE mmol/L 96* 96*   CO2 mmol/L 29 33*   BUN mg/dL 24 16   CREATININE mg/dL 3 51* 2 60*   CALCIUM mg/dL 9 5 9 4       Radiology review:  Procedure: X-ray chest 1 view portable    Result Date: 8/18/2021  Narrative: CHEST INDICATION:   chest pain  COMPARISON:  8/11/2020 EXAM PERFORMED/VIEWS:  XR CHEST PORTABLE FINDINGS: Cardiomegaly noted, stable  Mild central congestion noted  No pleural effusion or pneumothorax  No consolidation  Osseous structures appear within normal limits for patient age  Impression: Cardiomegaly and mild central congestion noted  Workstation performed: [id number]     Procedure: XR hip/pelv 2-3 vws right    Result Date: 8/17/2021  Narrative: RIGHT HIP INDICATION:   trouble moving RLE  COMPARISON:  8/11/2020 VIEWS:  XR HIP/PELV 2-3 VWS RIGHT W PELVIS IF PERFORMED FINDINGS: There is no acute fracture or dislocation  No significant hip degenerative changes  No lytic or blastic osseous lesion  Soft tissues are unremarkable  The visualized lumbar spine is unremarkable  Impression: No acute osseous abnormality  Workstation performed: [id number]     Procedure: CTA ED chest PE Study    Result Date: 8/17/2021  Narrative: CTA - CHEST WITH IV CONTRAST - PULMONARY ANGIOGRAM INDICATION:   Hypoxia, CP, dyspnea rule out PE  COMPARISON: CTA chest PE study 1/31/2019 TECHNIQUE: CTA examination of the chest was performed using angiographic technique according to a protocol specifically tailored to evaluate for pulmonary embolism  Axial, sagittal, and coronal 2D reformatted images were created from the source data and  submitted for interpretation  In addition, coronal 3D MIP postprocessing was performed on the acquisition scanner  Radiation dose length product (DLP) for this visit:  720 9 mGy-cm     This examination, like all CT scans performed in the [facility], was performed utilizing techniques to minimize radiation dose exposure, including the use of iterative reconstruction and automated exposure control  IV Contrast:  100 mL of iohexol (OMNIPAQUE)  FINDINGS: PULMONARY ARTERIAL TREE:  No pulmonary arterial embolism as visualized  Motion limited evaluation of the bilateral lower lobe segmental vasculature  LUNGS:  Lungs are clear  There is no tracheal or endobronchial lesion  PLEURA:  Unremarkable  HEART/GREAT VESSELS:  Cardiomegaly  Status post CABG  No pericardial effusion  No thoracic aortic aneurysm  MEDIASTINUM AND OLMAN:  Chronic diffuse esophageal dilation  CHEST WALL AND LOWER NECK:   Unremarkable  VISUALIZED STRUCTURES IN THE UPPER ABDOMEN:  Unremarkable  OSSEOUS STRUCTURES:  Median sternotomy wires  Fractures of the left lateral third through sixth ribs are likely subacute  Chronic appearing right rib fractures  Impression: No pulmonary arterial embolism as visualized  Motion limited evaluation of the bilateral lower lobe segmental vasculature  Fractures of the left lateral third through sixth ribs are likely subacute  Workstation performed: [id number]         Stephen Finney DO      This consultation note was produced in part using a dictation device which may document imprecise wording from author's original intent

## 2021-08-18 NOTE — QUICK NOTE
Patient was a sepsis alert secondary to tachycardia and tachypnea  Patient's tachypnea is secondary to being volume overloaded and she has end-stage renal disease on dialysis and her tachycardia is respiratory driven  Patient has no active signs or symptoms of infection      No further orders at this time

## 2021-08-18 NOTE — ASSESSMENT & PLAN NOTE
Lab Results   Component Value Date    HGBA1C 6 0 06/14/2021       Recent Labs     08/17/21  2153   POCGLU 139       Blood Sugar Average: Last 72 hrs:  (P) 139     Place on [facility] step 2 diet, hold pre-hospital oral antihyperglycemics and obtain Accu-Cheks AC and HS with Humalog correction dose a c   And HS

## 2021-08-18 NOTE — ASSESSMENT & PLAN NOTE
Wt Readings from Last 3 Encounters:   08/17/21 69 6 kg (153 lb 7 oz)   08/16/21 73 kg (160 lb 15 oz)   06/14/21 73 kg (161 lb)     Continue pre-hospital Norvasc 10 mg p o   Daily, place on [facility] no added salt diet obtain daily weights

## 2021-08-18 NOTE — ASSESSMENT & PLAN NOTE
· Placed in observation telemetry  · Patient's LASHAY score was 5  · Trend cardiac enzymes  · Obtain serial EKGs  · Continue pre-hospital aspirin 81 mg p o  Daily, Norvasc 10 mg p o  Daily and Pravachol 80 mg p o   Daily  · Consider cardiology consult depending on further testing results

## 2021-08-18 NOTE — PLAN OF CARE
Problem: Nutrition/Hydration-ADULT  Goal: Nutrient/Hydration intake appropriate for improving, restoring or maintaining nutritional needs  Description: Monitor and assess patient's nutrition/hydration status for malnutrition  Collaborate with interdisciplinary team and initiate plan and interventions as ordered  Monitor patient's weight and dietary intake as ordered or per policy  Utilize nutrition screening tool and intervene as necessary  Determine patient's food preferences and provide high-protein, high-caloric foods as appropriate       INTERVENTIONS:  - Monitor oral intake, urinary output, labs, and treatment plans  - Assess nutrition and hydration status and recommend course of action  - Evaluate amount of meals eaten  - Assist patient with eating if necessary   - Allow adequate time for meals  - Recommend/ encourage appropriate diets, oral nutritional supplements, and vitamin/mineral supplements  - Order, calculate, and assess calorie counts as needed  - Recommend, monitor, and adjust tube feedings and TPN/PPN based on assessed needs  - Assess need for intravenous fluids  - Provide specific nutrition/hydration education as appropriate  - Include patient/family/caregiver in decisions related to nutrition  Outcome: Progressing     Problem: Prexisting or High Potential for Compromised Skin Integrity  Goal: Skin integrity is maintained or improved  Description: INTERVENTIONS:  - Identify patients at risk for skin breakdown  - Assess and monitor skin integrity  - Assess and monitor nutrition and hydration status  - Monitor labs   - Assess for incontinence   - Turn and reposition patient  - Assist with mobility/ambulation  - Relieve pressure over bony prominences  - Avoid friction and shearing  - Provide appropriate hygiene as needed including keeping skin clean and dry  - Evaluate need for skin moisturizer/barrier cream  - Collaborate with interdisciplinary team   - Patient/family teaching  - Consider wound care consult   Outcome: Progressing     Problem: Potential for Falls  Goal: Patient will remain free of falls  Description: INTERVENTIONS:  - Educate patient/family on patient safety including physical limitations  - Instruct patient to call for assistance with activity   - Consult OT/PT to assist with strengthening/mobility   - Keep Call bell within reach  - Keep bed low and locked with side rails adjusted as appropriate  - Keep care items and personal belongings within reach  - Initiate and maintain comfort rounds  - Make Fall Risk Sign visible to staff  -  - Initiate/Maintain  BED alarm  - Apply yellow socks and bracelet for high fall risk patients  - Consider moving patient to room near nurses station  Outcome: Progressing     Problem: RESPIRATORY - ADULT  Goal: Achieves optimal ventilation and oxygenation  Description: INTERVENTIONS:  - Assess for changes in respiratory status  - Assess for changes in mentation and behavior  - Position to facilitate oxygenation and minimize respiratory effort  - Oxygen administered by appropriate delivery if ordered  - Initiate smoking cessation education as indicated  - Encourage broncho-pulmonary hygiene including cough, deep breathe, Incentive Spirometry  - Assess the need for suctioning and aspirate as needed  - Assess and instruct to report SOB or any respiratory difficulty  - Respiratory Therapy support as indicated  Outcome: Progressing     Problem: GENITOURINARY - ADULT  Goal: Maintains or returns to baseline urinary function  Description: INTERVENTIONS:  - Assess urinary function  - Encourage oral fluids to ensure adequate hydration if ordered  - Administer IV fluids as ordered to ensure adequate hydration  - Administer ordered medications as needed  - Offer frequent toileting  - Follow urinary retention protocol if ordered  Outcome: Progressing  Goal: Absence of urinary retention  Description: INTERVENTIONS:  - Assess patients ability to void and empty bladder  - Monitor I/O  - Bladder scan as needed  - Discuss with physician/AP medications to alleviate retention as needed  - Discuss catheterization for long term situations as appropriate  Outcome: Progressing  Goal: Urinary catheter remains patent  Description: INTERVENTIONS:  - Assess patency of urinary catheter  - If patient has a chronic arceo, consider changing catheter if non-functioning  - Follow guidelines for intermittent irrigation of non-functioning urinary catheter  Outcome: Progressing     Problem: METABOLIC, FLUID AND ELECTROLYTES - ADULT  Goal: Electrolytes maintained within normal limits  Description: INTERVENTIONS:  - Monitor labs and assess patient for signs and symptoms of electrolyte imbalances  - Administer electrolyte replacement as ordered  - Monitor response to electrolyte replacements, including repeat lab results as appropriate  - Instruct patient on fluid and nutrition as appropriate  Outcome: Progressing  Goal: Fluid balance maintained  Description: INTERVENTIONS:  - Monitor labs   - Monitor I/O and WT  - Instruct patient on fluid and nutrition as appropriate  - Assess for signs & symptoms of volume excess or deficit  Outcome: Progressing  Goal: Glucose maintained within target range  Description: INTERVENTIONS:  - Monitor Blood Glucose as ordered  - Assess for signs and symptoms of hyperglycemia and hypoglycemia  - Administer ordered medications to maintain glucose within target range  - Assess nutritional intake and initiate nutrition service referral as needed  Outcome: Progressing     Problem: SKIN/TISSUE INTEGRITY - ADULT  Goal: Skin Integrity remains intact(Skin Breakdown Prevention)  Description: Assess:  -Perform Aaron assessment every SHIFT   -Clean and moisturize skin every DAY   -Inspect skin when repositioning, toileting, and assisting with ADL  -Assess extremities for adequate circulation and sensation     Bed Management:  -Have minimal linens on bed & keep smooth, unwrinkled  -Change linens as needed when moist or perspiring  -Avoid sitting or lying in one position for more than 2  hours while in bed  -Keep HOB at 30 degrees     Toileting:  -Offer bedside commode  -Assess for incontinence every 4 HRS -Use incontinent care products after each incontinent episode such as     Acti  -Encourage activity and walks on unit  -Encourage or provide ROM exercises   -Turn and reposition patient every 2 Hours  -Use appropriate equipment to lift or move patient in bed  -Instruct/ Assist with weight shifting every  2 when out of bed in chair  -Consider limitation of chair time 2  hour intervals    Skin Care:  -Avoid use of baby powder, tape, friction and shearing, hot water or constrictive cl  -Do not massage red bony areas    Nex  Outcome: Progressing  Goal: Pressure injury heals and does not worsen  Description: Interventions:  - Implement low air loss mattress or specialty surface (Criteria met)  - Apply silicone foam dressing  - Instruct/assist with weight shifting every  minutes when in chair   - Limit chair time to 2  hour interv when in chair   - Apply fecal or urinary incontinence containment devic  - Turn and reposition patient & offload bony prominences every  4 hours   - Utilize friction reducing device or surface for transfers   - Consider nutrition services referral as needed  Outcome: Progressing     Problem: HEMATOLOGIC - ADULT  Goal: Maintains hematologic stability  Description: INTERVENTIONS  - Assess for signs and symptoms of bleeding or hemorrhage  - Monitor labs  - Administer supportive blood products/factors as ordered and appropriate  Outcome: Progressing     Problem: MUSCULOSKELETAL - ADULT  Goal: Maintain or return mobility to safest level of function  Description: INTERVENTIONS:  - Assess patient's ability to carry out ADLs; assess patient's baseline for ADL function and identify physical deficits which impact ability to perform ADLs (bathing, care of mouth/teeth, toileting, grooming, dressing, etc )  - Assess/evaluate cause of self-care deficits   - Assess range of motion  - Assess patient's mobility  - Assess patient's need for assistive devices and provide as appropriate  - Encourage maximum independence but intervene and supervise when necessary  - Involve family in performance of ADLs  - Assess for home care needs following discharge   - Consider OT consult to assist with ADL evaluation and planning for discharge  - Provide patient education as appropriate  Outcome: Progressing     Problem: PAIN - ADULT  Goal: Verbalizes/displays adequate comfort level or baseline comfort level  Description: Interventions:  - Encourage patient to monitor pain and request assistance  - Assess pain using appropriate pain scale  - Administer analgesics based on type and severity of pain and evaluate response  - Implement non-pharmacological measures as appropriate and evaluate response  - Consider cultural and social influences on pain and pain management  - Notify physician/advanced practitioner if interventions unsuccessful or patient reports new pain  Outcome: Progressing     Problem: INFECTION - ADULT  Goal: Absence or prevention of progression during hospitalization  Description: INTERVENTIONS:  - Assess and monitor for signs and symptoms of infection  - Monitor lab/diagnostic results  - Monitor all insertion sites, i e  indwelling lines, tubes, and drains  - Monitor endotracheal if appropriate and nasal secretions for changes in amount and color  - [place] appropriate cooling/warming therapies per order  - Administer medications as ordered  - Instruct and encourage patient and family to use good hand hygiene technique  - Identify and instruct in appropriate isolation precautions for identified infection/condition  Outcome: Progressing  Goal: Absence of fever/infection during neutropenic period  Description: INTERVENTIONS:  - Monitor WBC    Outcome: Progressing     Problem: SAFETY ADULT  Goal: Patient will remain free of falls  Description: INTERVENTIONS:  - Educate patient/family on patient safety including physical limitations  - Instruct patient to call for assistance with activity   - Consult OT/PT to assist with strengthening/mobility   - Keep Call bell within reach  - Keep bed low and locked with side rails adjusted as appropriate  - Keep care items and personal belongings within reach  - Initiate and maintain comfort rounds  - Make Fall Risk Sign visible to staff  - Offer Toileting ever 4 Hours, in advance of need  - Initiate/Maintain  BED alarm  -  - Apply yellow socks and bracelet for high fall risk patients  - Consider moving patient to room near nurses station  Outcome: Progressing  Goal: Maintain or return to baseline ADL function  Description: INTERVENTIONS:  -  Assess patient's ability to carry out ADLs; assess patient's baseline for ADL function and identify physical deficits which impact ability to perform ADLs (bathing, care of mouth/teeth, toileting, grooming, dressing, etc )  - Assess/evaluate cause of self-care deficits   - Assess range of motion  - Assess patient's mobility; develop plan if impaired  - Assess patient's need for assistive devices and provide as appropriate  - Encourage maximum independence but intervene and supervise when necessary  - Involve family in performance of ADLs  - Assess for home care needs following discharge   - Consider OT consult to assist with ADL evaluation and planning for discharge  - Provide patient education as appropriate  Outcome: Progressing  Goal: Maintains/Returns to pre admission functional level  Description: INTERVENTIONS:  - Perform BMAT or MOVE assessment daily    - Set and communicate daily mobility goal to care team and patient/family/caregiver  - Collaborate with rehabilitation services on mobility goals if consulted      - Stand patient2 times a day  - Ambulate patient 2 times a day  - Out of bed to chair 2  times a day - Out of bed for meals 2 a day  - Out of bed for toileting  - Record patient progress and toleration of activity level   Outcome: Progressing     Problem: DISCHARGE PLANNING  Goal: Discharge to home or other facility with appropriate resources  Description: INTERVENTIONS:  - Identify barriers to discharge w/patient and caregiver  - Arrange for needed discharge resources and transportation as appropriate  - Identify discharge learning needs (meds, wound care, etc )  - Arrange for interpretive services to assist at discharge as needed  - Refer to Case Management Department for coordinating discharge planning if the patient needs post-hospital services based on physician/advanced practitioner order or complex needs related to functional status, cognitive ability, or social support system  Outcome: Progressing     Problem: Knowledge Deficit  Goal: Patient/family/caregiver demonstrates understanding of disease process, treatment plan, medications, and discharge instructions  Description: Complete learning assessment and assess knowledge base    Interventions:  - Provide teaching at level of understanding  - Provide teaching via preferred learning methods  Outcome: Progressing

## 2021-08-18 NOTE — DISCHARGE SUMMARY
[address]  Discharge- Bart White 1945, 77 y o  female MRN: [id number]  Unit/Bed#: -02 Encounter: [id number]  Primary Care Provider: Jenifer Santos DO   Date and time admitted to hospital: 8/17/2021  3:53 PM    * Other chest pain  Assessment & Plan  · LASHAY score 5  · No chest pain, flat troponin's   · Stable for discharge     Elevated troponin  Assessment & Plan  · Non chest pain associated elevation in troponin secondary to ESRD  · Stable for discharge     Essential (primary) hypertension  Assessment & Plan  · Continue pre-hospital Norvasc 10 mg p o  Daily    Hyperlipidemia, unspecified  Assessment & Plan  · Continue pre-hospital Pravachol 80 mg p o  Daily    Gastro-esophageal reflux disease without esophagitis  Assessment & Plan  · Continue pre-hospital Protonix 40 mg p o  Daily    End-stage renal disease on hemodialysis [facility])  Assessment & Plan  Lab Results   Component Value Date    EGFR 12 08/18/2021    EGFR 17 08/17/2021    EGFR 10 03/03/2021    CREATININE 3 51 (H) 08/18/2021    CREATININE 2 60 (H) 08/17/2021    CREATININE 4 01 (H) 03/03/2021     · Continue pre-hospital Renagel 800 mg p o  T i d   · Nephrology was consulted, see recommendations  · Stable for discharge, continue T, Th, S dialysis sessions     Heart failure, unspecified ([address] )  Assessment & Plan  Wt Readings from Last 3 Encounters:   08/18/21 69 2 kg (152 lb 8 9 oz)   08/16/21 73 kg (160 lb 15 oz)   06/14/21 73 kg (161 lb)     · Continue pre-hospital Norvasc 10 mg p o   Daily, place on [facility] no added salt diet obtain daily weights  · Euvolemic on Exam  · Continue outpatient HD   · Stable for discharge         Anemia in chronic kidney disease  Assessment & Plan  · This appears to be at baseline, will continue to monitor with repeat labs as an outpatient         Medical Problems     Resolved Problems  Date Reviewed: 8/17/2021    None              Discharging Physician / Practitioner: Nesha Hernandez MD  PCP: Lisy Lee DO  Admission Date:   Admission Orders (From admission, onward)     Ordered        08/17/21 1915  Place in Observation  Once                   Discharge Date: 08/18/21    Consultations During Hospital Stay:  · Nephrology     Procedures Performed:   · Imaging     Significant Findings / Test Results:   Principal Problem:    Other chest pain  Active Problems:    Anemia in chronic kidney disease    Heart failure, unspecified ([facility] )    [facility] disease [facility] hemod[facility] )    Gastro-esophageal reflux disease without esophagitis    Hyperlipidemia, unspecified    Essential (primary) hypertension    Type 2 diabetes mellitus with other circulatory complications (HCC)    Elevated troponin  Resolved Problems:    * No resolved hospital problems  *  ·   ·     Incidental Findings:   · See above      Test Results Pending at Discharge (will require follow up): · Na      Outpatient Tests Requested:  · Follow up with PCP     Complications:  Na     Reason for Admission: Chest pain    Hospital Course:   Celine Varghese is a 77 y o  female patient who originally presented to the hospital on 8/17/2021 due to atypical chest pain  Unclear etiology but at baseline at this time  Asymptomatic, no chest pain or chest palpitations  No shortness of breath  Troponin's flat  Stable for discharge  Condition at Discharge: good    Discharge Day Visit / Exam:   * Please refer to separate progress note for these details *    Discussion with Family: Patient declined call to   Discharge instructions/Information to patient and family:   See after visit summary for information provided to patient and family  Provisions for Follow-Up Care:  See after visit summary for information related to follow-up care and any pertinent home health orders  Disposition:   Home    Planned Readmission: none      Discharge Statement:  I spent 30+ minutes discharging the patient   This time was spent on the day of discharge  I had direct contact with the patient on the day of discharge  Greater than 50% of the total time was spent examining patient, answering all patient questions, arranging and discussing plan of care with patient as well as directly providing post-discharge instructions  Additional time then spent on discharge activities  Discharge Medications:  See after visit summary for reconciled discharge medications provided to patient and/or family        **Please Note: This note may have been constructed using a voice recognition system**

## 2021-08-18 NOTE — NURSING NOTE
Received from ed, to room 110-2  Sob with exertion and rest and maintained 2 liters n/c  No distress from same  No cough  HD PATIENT MWF PER PATIENT  LUE AV FISTULA, POSITIVE BRUIT/THRILL TELE APPLIED # [id number]  ABDOMEN SOFT AND BSX4 CURRENTLY INC OF URINE AND FORMED STOOL  STAGE 1 PINK/BLANCHABLE BUTTOCK/SACRUM  EXCORIATED REDNESS, BENEATH ABDOMINAL FOLDS  MISSING LEFT GREAT TOE  SCABBED RT GREAT TOE  RT TOES ARE CONTRACTED DOWN  DENIES PAIN  CALL BELL AND NURSE # GIVEN  SNACK AND SODA AT BEDSIDE   BED ALARM ON

## 2021-08-18 NOTE — ASSESSMENT & PLAN NOTE
Lab Results   Component Value Date    EGFR 17 08/17/2021    EGFR 10 03/03/2021    EGFR 6 08/18/2020    CREATININE 2 60 (H) 08/17/2021    CREATININE 4 01 (H) 03/03/2021    CREATININE 6 16 (H) 08/18/2020     Continue pre-hospital Renagel 800 mg p o  T i d  And consult Nephrology in a m

## 2021-08-18 NOTE — NURSING NOTE
Keeps undressing and removing tele  02 maintained 2 liters n/c  Tele maintained  d bbb  Reoriented   Inc of urine and bowel earlier, and changed for same

## 2021-08-18 NOTE — H&P
[address] [facility]  H&P- Kyle Poke 1945, 77 y o  female MRN: [id number]  Unit/Bed#: -02 Encounter: [id number]  Primary Care Provider: Michael Christopher DO   Date and time admitted to hospital: 8/17/2021  3:53 PM    * Other chest pain  Assessment & Plan  · Placed in observation telemetry  · Patient's LASHAY score was 5  · Trend cardiac enzymes  · Obtain serial EKGs  · Continue pre-hospital aspirin 81 mg p o  Daily, Norvasc 10 mg p o  Daily and Pravachol 80 mg p o  Daily  · Consider cardiology consult depending on further testing results    Type 2 diabetes mellitus with other circulatory complications [facility])  Assessment & Plan  Lab Results   Component Value Date    HGBA1C 6 0 06/14/2021       Recent Labs     08/17/21  2153   POCGLU 139       Blood Sugar Average: Last 72 hrs:  (P) 139     Place on [facility] step 2 diet, hold pre-hospital oral antihyperglycemics and obtain Accu-Cheks AC and HS with Humalog correction dose a c  And HS    Essential (primary) hypertension  Assessment & Plan  Continue pre-hospital Norvasc 10 mg p o  Daily    Hyperlipidemia, unspecified  Assessment & Plan  Continue pre-hospital Pravachol 80 mg p o  Daily    Gastro-esophageal reflux disease without esophagitis  Assessment & Plan  Continue pre-hospital Protonix 40 mg p o  Daily    End-stage renal disease on hemodialysis [facility])  Assessment & Plan  Lab Results   Component Value Date    EGFR 17 08/17/2021    EGFR 10 03/03/2021    EGFR 6 08/18/2020    CREATININE 2 60 (H) 08/17/2021    CREATININE 4 01 (H) 03/03/2021    CREATININE 6 16 (H) 08/18/2020     Continue pre-hospital Renagel 800 mg p o  T i d  And consult Nephrology in a m  Heart failure, unspecified ([facility] [address] )  Assessment & Plan  Wt Readings from Last 3 Encounters:   08/17/21 69 6 kg (153 lb 7 oz)   08/16/21 73 kg (160 lb 15 oz)   06/14/21 73 kg (161 lb)     Continue pre-hospital Norvasc 10 mg p o   Daily, place on [facility] no added salt diet obtain daily weights        Anemia in chronic kidney disease  Assessment & Plan  This appears to be at baseline, will continue to monitor with repeat labs in a m  VTE Prophylaxis: Heparin  Code Status:  Level 1  POLST: There is no POLST form on file for this patient (pre-hospital)  Discussion with family:  None present at bedside at time of exam    Anticipated Length of Stay:  Patient will be admitted on an Observation basis with an anticipated length of stay of  < 2 midnights  Justification for Hospital Stay:  Chest pain rule out MI requiring further cardiac evaluation    Chief Complaint:   Chest pain x1 day    History of Present Illness:    Rene Wick is a 77 y o  female who presents with chest pain x1 day  Patient is limited historian due to some underlying dementia is somewhat difficult to obtain a coherent history from  Unfortunately there is no ER documentation ago 1 so history is obtained through limited interview as well as review of previous records  Patient states she had acute onset of midsternal chest pain which she describes as 6/10 in nature and lasted approximately 5-10 minutes before resolving on its own after resting  Patient states this occurred while lifting things at dialysis today  When asked what it was she was lifting patient became somewhat unclear and stated that [facility] all patient to help there  Patient is end-stage renal disease on Tuesday Thursday Saturday dialysis and she is unsure if she completed her dialysis today prior to coming to the ER  Additionally patient is complaining of some shortness of breath but states that this was approximately 1 week ago  She is resting comfortably in bed at time of exam and offers no complaints  She was unable to qualify her chest pain and denies any aggravating or alleviating factors, no accompanying diaphoresis, palpitations, lightheadedness, dizziness or syncope    Patient does not normally use oxygen at home but on arrival in the ER was found to have an O2 saturation 86% on room air  Review of Systems:  Review of Systems   Constitutional: Negative for chills and fever  HENT: Negative for congestion and sore throat  Respiratory: Positive for shortness of breath  Negative for cough and wheezing  Cardiovascular: Positive for chest pain  Negative for palpitations  Gastrointestinal: Negative for diarrhea, nausea and vomiting  Genitourinary: Negative for dysuria, frequency, hematuria and urgency  Musculoskeletal: Negative for arthralgias and myalgias  Skin: Negative for wound  Neurological: Negative for dizziness, syncope, light-headedness and headaches  All other systems reviewed and are negative  Past Medical and Surgical History:   Past Medical History:   Diagnosis Date    Abnormal weight loss     last assessed [id number]    Anemia     Chronic kidney disease     Clostridium difficile colitis     last assessed [id number]    Depression     resolved [id number]    Diabetes mellitus ([facility] [address] )     Fracture, tibial plateau     last assessed [id number]    GERD (gastroesophageal reflux disease)     Hyperkalemia     last assessed [id number]    Hypertension     Hypoglycemia     Sugars now up   Will increase the metformin;  last assessed [id number]    Hypokalemia     last assessed [id number]    Hypothermia     last assessed [id number]    Hypoxia     last assessed [id number]    Metatarsalgia     last assessed [id number]    Old myocardial infarction     Osteoporosis     Syncope     last assessed [id number]    Visual disturbance     last assessed [id number]       Past Surgical History:   Procedure Laterality Date    BYPASS GRAFT Right     H/O bypass graft (non-vein), Right Leg    CAROTID ENDARTERECTOMY      Neurological Surgery Cartoid Endartectomy    CATARACT EXTRACTION      CHOLECYSTECTOMY      questionable    CORONARY ARTERY BYPASS GRAFT      FOOT AMPUTATION Bilateral     H/O surgery foot amputation Metatarsal and toe;  Bilat  HERNIA REPAIR      Inguinal sliding    TONSILLECTOMY AND ADENOIDECTOMY      VENOUS THROMBECTOMY      H/O Arteriobenous surgery Thrombectomy of AV Fistula Percantaneous;  last assessed [id number]       Meds/Allergies:  Prior to Admission medications    Medication Sig Start Date End Date Taking? Authorizing Provider   acetaminophen (TYLENOL) 325 mg tablet 2 tablets    Historical Provider, MD   amLODIPine (NORVASC) 10 mg tablet take 1 tablet by mouth once daily 6/14/21   Marlen Yu, DO   aspirin 81 MG tablet Take 1 tablet by mouth daily 3/26/14   Historical Provider, MD   b complex-vitamin C-folic acid (RENAL) 1 mg Take by mouth daily    Historical Provider, MD   diphenoxylate-atropine (LOMOTIL) 2 5-0 025 mg per tablet Take 1 tablet by mouth 4 (four) times a day as needed for diarrhea 12/16/19   Marlen Yu, DO   ergocalciferol (VITAMIN D2) 50,000 units take 1 capsule by mouth weekly 5/25/21   Marlen Yu, DO   fluticasone Diane Hashimoto) 50 mcg/act nasal spray 1 spray into each nostril daily 3/12/20   Marlen Yu, DO   glucose blood (ONE TOUCH ULTRA TEST) test strip 1 each by Other route 2 (two) times a day 10/17/19   Marlen Yu, DO   Misc   Devices [facility]) MISC by Does not apply route daily 7/6/18   Marlen Yu, DO   Multiple Vitamins-Minerals (OCUVITE ADULT 50+ PO) Take by mouth    Historical Provider, MD   nateglinide (STARLIX) 120 mg tablet Take 1 tablet (120 mg total) by mouth 3 (three) times a day before meals 6/14/21   Marlen Yu, DO   Omega-3 Fatty Acids (FISH OIL) 1200 MG CAPS Take by mouth 4 (four) times a day    Historical Provider, MD   pantoprazole (PROTONIX) 40 mg tablet take 1 tablet by mouth once daily 7/26/21   Marlen Yu, DO   pravastatin (PRAVACHOL) 80 mg tablet swallow 1 tablet once daily 3/5/21   Marlen Yu, DO   sevelamer carbonate (RENVELA) 800 mg tablet Take 800 mg by mouth 3 (three) times a day with meals 5/31/21   Historical Provider, MD     I have reviewed home medications using allscripts  Allergies: Allergies   Allergen Reactions    Pollen Extract     Prolia [Denosumab]      Severe calcium drop and is on dialysis  Social History:  Marital Status: /Civil Union   Occupation:  Retired  Patient Pre-hospital Living Situation:  Resides at home with   Patient Pre-hospital Level of Mobility:  Generally uses a wheelchair  Patient Pre-hospital Diet Restrictions:  Diabetic  Substance Use History:   Social History     Substance and Sexual Activity   Alcohol Use Not Currently     Social History     Tobacco Use   Smoking Status Former Smoker   Smokeless Tobacco Never Used     Social History     Substance and Sexual Activity   Drug Use No       Family History:  I have reviewed the patients family history    Physical Exam:   Vitals:   Blood Pressure: 146/56 (08/17/21 2300)  Pulse: 91 (08/17/21 2300)  Temperature: 97 5 °F (36 4 °C) (08/17/21 2300)  Temp Source: Temporal (08/17/21 2300)  Respirations: 20 (08/17/21 2300)  Height: 5' 4" (162 6 cm) (08/17/21 2100)  Weight - Scale: 69 6 kg (153 lb 7 oz) (08/17/21 2100)  SpO2: 91 % (08/17/21 2300)    Physical Exam  Vitals and nursing note reviewed  Constitutional:       General: She is not in acute distress  Appearance: Normal appearance  HENT:      Head: Normocephalic and atraumatic  Right Ear: Tympanic membrane normal       Left Ear: Tympanic membrane normal       Nose: Nose normal       Mouth/Throat:      Mouth: Mucous membranes are moist       Pharynx: Oropharynx is clear  No posterior oropharyngeal erythema  Eyes:      Extraocular Movements: Extraocular movements intact  Conjunctiva/sclera: Conjunctivae normal       Pupils: Pupils are equal, round, and reactive to light  Cardiovascular:      Rate and Rhythm: Normal rate and regular rhythm  Pulses: Normal pulses  Heart sounds: Normal heart sounds  No murmur heard       Pulmonary:      Effort: Pulmonary effort is normal  No respiratory distress  Breath sounds: Normal breath sounds  No rhonchi or rales  Abdominal:      General: Bowel sounds are normal       Palpations: Abdomen is soft  Tenderness: There is no abdominal tenderness  Musculoskeletal:      Cervical back: Normal range of motion and neck supple  No muscular tenderness  Right lower leg: Edema present  Left lower leg: Edema present  Comments: Trace bilateral pedal edema   Skin:     General: Skin is warm and dry  Capillary Refill: Capillary refill takes less than 2 seconds  Neurological:      General: No focal deficit present  Mental Status: She is alert and oriented to person, place, and time  She is confused  Additional Data:   Lab Results: I have personally reviewed pertinent reports  Results from last 7 days   Lab Units 08/17/21  1617   WBC Thousand/uL 8 80   HEMOGLOBIN g/dL 10 2*   HEMATOCRIT % 31 1*   PLATELETS Thousands/uL 243   NEUTROS PCT % 77*   LYMPHS PCT % 11*   MONOS PCT % 10   EOS PCT % 2     Results from last 7 days   Lab Units 08/17/21  1617   SODIUM mmol/L 138   POTASSIUM mmol/L 3 3*   CHLORIDE mmol/L 96*   CO2 mmol/L 33*   BUN mg/dL 16   CREATININE mg/dL 2 60*   CALCIUM mg/dL 9 4         Results from last 7 days   Lab Units 08/17/21  2153   POC GLUCOSE mg/dl 139           Imaging: I have personally reviewed pertinent reports  CTA ED chest PE Study   Final Result by Anca Stanley MD (08/17 1823)      No pulmonary arterial embolism as visualized  Motion limited evaluation of the bilateral lower lobe segmental vasculature  Fractures of the left lateral third through sixth ribs are likely subacute               Workstation performed: [id number]         X-ray chest 1 view portable    (Results Pending)       EKG, Pathology, and Other Studies Reviewed on Admission:   · EKG:  Normal sinus rhythm at a rate of 92 with right bundle-branch block    Epic Records Reviewed: Yes     ** Please Note: This note has been constructed using a voice recognition system   **

## 2021-08-19 NOTE — ED PROVIDER NOTES
History  Chief Complaint   Patient presents with    Shortness of Breath     picked up at dialysis center with shortness of breath and staff reporting patient had pain in her side (pt denies to EMS)     Patient is a 31-year-old female with history of end-stage renal disease on dialysis, CHF, dementia, diabetes mellitus, hypertension that presents for evaluation of chest pain/dyspnea  On my initial evaluation patient has no complaints  However, per nursing she apparently was complaining of an episode of substernal chest pain and dyspnea during dialysis today  Patient receives dialysis Tuesday/Thursday/Saturday and apparently had a full session earlier today  Her  helps provide some more history but he was not with her at dialysis  He notes that she has been having some increasing lower extremity swelling over the past several weeks  Unknown weight gain  Patient denies orthopnea  She currently denies headache, nausea vomiting, chest pain, dyspnea, abdominal pain  History limited by patient's baseline mental status  Patient was seen yesterday at [facility] for evaluation of right hip pain/weakness  This is not the cause of for evaluation today  Prior to Admission Medications   Prescriptions Last Dose Informant Patient Reported? Taking? Misc   Devices [facility]) MISC   No No   Sig: by Does not apply route daily   Multiple Vitamins-Minerals (OCUVITE ADULT 50+ PO)   Yes No   Sig: Take by mouth   Omega-3 Fatty Acids (FISH OIL) 1200 MG CAPS   Yes No   Sig: Take by mouth 4 (four) times a day   acetaminophen (TYLENOL) 325 mg tablet   Yes No   Si tablets   amLODIPine (NORVASC) 10 mg tablet   No No   Sig: take 1 tablet by mouth once daily   aspirin 81 MG tablet   Yes No   Sig: Take 1 tablet by mouth daily   b complex-vitamin C-folic acid (RENAL) 1 mg   Yes No   Sig: Take by mouth daily   diphenoxylate-atropine (LOMOTIL) 2 5-0 025 mg per tablet   No No   Sig: Take 1 tablet by mouth 4 (four) times a day as needed for diarrhea   ergocalciferol (VITAMIN D2) 50,000 units   No No   Sig: take 1 capsule by mouth weekly   fluticasone (FLONASE) 50 mcg/act nasal spray   No No   Si spray into each nostril daily   glucose blood (ONE TOUCH ULTRA TEST) test strip   No No   Si each by Other route 2 (two) times a day   nateglinide (STARLIX) 120 mg tablet   No No   Sig: Take 1 tablet (120 mg total) by mouth 3 (three) times a day before meals   pantoprazole (PROTONIX) 40 mg tablet   No No   Sig: take 1 tablet by mouth once daily   pravastatin (PRAVACHOL) 80 mg tablet   No No   Sig: swallow 1 tablet once daily   sevelamer carbonate (RENVELA) 800 mg tablet   Yes No   Sig: Take 800 mg by mouth 3 (three) times a day with meals      Facility-Administered Medications: None       Past Medical History:   Diagnosis Date    Abnormal weight loss     last assessed [id number]    Anemia     Chronic kidney disease     Clostridium difficile colitis     last assessed [id number]    Depression     resolved [id number]    Diabetes mellitus ([facility] [address] )     Fracture, tibial plateau     last assessed [id number]    GERD (gastroesophageal reflux disease)     Hyperkalemia     last assessed [id number]    Hypertension     Hypoglycemia     Sugars now up   Will increase the metformin;  last assessed [id number]    Hypokalemia     last assessed 11Meh1703    Hypothermia     last assessed [id number]    Hypoxia     last assessed 06Ykx5639    Metatarsalgia     last assessed [id number]    Old myocardial infarction     Osteoporosis     Syncope     last assessed [id number]    Visual disturbance     last assessed 2014       Past Surgical History:   Procedure Laterality Date    BYPASS GRAFT Right     H/O bypass graft (non-vein), Right Leg    CAROTID ENDARTERECTOMY      Neurological Surgery Cartoid Endartectomy    CATARACT EXTRACTION      CHOLECYSTECTOMY      questionable    CORONARY ARTERY BYPASS GRAFT      FOOT AMPUTATION Bilateral     H/O surgery foot amputation Metatarsal and toe;  Bilat    HERNIA REPAIR      Inguinal sliding    TONSILLECTOMY AND ADENOIDECTOMY      VENOUS THROMBECTOMY      H/O Arteriobenous surgery Thrombectomy of AV Fistula Percantaneous;  last assessed [id number]       Family History   Problem Relation Age of Onset    Breast cancer Mother     Cancer Mother     Heart disease Mother     Coronary artery disease Father     Aneurysm Sister         Cerebral Artery    Coronary artery disease Sister     Liver cancer Brother     No Known Problems Daughter     No Known Problems Maternal Aunt      I have reviewed and agree with the history as documented  E-Cigarette/Vaping    E-Cigarette Use Never User      E-Cigarette/Vaping Substances     Social History     Tobacco Use    Smoking status: Former Smoker    Smokeless tobacco: Never Used   Vaping Use    Vaping Use: Never used   Substance Use Topics    Alcohol use: Not Currently    Drug use: No       Review of Systems   Constitutional: Negative for fever  HENT: Negative for sore throat  Respiratory: Negative for shortness of breath  Cardiovascular: Negative for chest pain  Gastrointestinal: Negative for abdominal pain  Genitourinary: Negative for dysuria  Musculoskeletal: Negative for back pain  Skin: Negative for rash  Neurological: Negative for light-headedness  Psychiatric/Behavioral: Negative for agitation  All other systems reviewed and are negative  Physical Exam  Physical Exam  Vitals reviewed  Constitutional:       General: She is not in acute distress  Appearance: She is well-developed  HENT:      Head: Normocephalic  Eyes:      Pupils: Pupils are equal, round, and reactive to light  Cardiovascular:      Rate and Rhythm: Normal rate and regular rhythm  Heart sounds: Normal heart sounds  Pulmonary:      Comments: Tachypneic, mildly increased work of breathing    Fine crackles heard at the bases  Abdominal:      General: Bowel sounds are normal  There is no distension  Palpations: Abdomen is soft  Tenderness: There is no abdominal tenderness  There is no guarding  Comments: Abdomen is soft, nondistended, nontender  No rebound tenderness or guarding is noted  No masses palpated  Normal bowel sounds  Musculoskeletal:         General: No tenderness or deformity  Normal range of motion  Cervical back: Normal range of motion and neck supple  Comments: 2+ pitting edema bilateral lower extremities to mid shin   Skin:     General: Skin is warm and dry  Capillary Refill: Capillary refill takes less than 2 seconds  Neurological:      Mental Status: She is alert and oriented to person, place, and time  Cranial Nerves: No cranial nerve deficit  Sensory: No sensory deficit  Psychiatric:         Behavior: Behavior normal          Thought Content: Thought content normal          Judgment: Judgment normal          Vital Signs  ED Triage Vitals [08/17/21 1606]   Temperature Pulse Respirations Blood Pressure SpO2   97 8 °F (36 6 °C) 89 (!) 44 150/85 (!) 86 %      Temp Source Heart Rate Source Patient Position - Orthostatic VS BP Location FiO2 (%)   Oral Monitor Lying Right arm --      Pain Score       No Pain           Vitals:    08/17/21 2300 08/18/21 0300 08/18/21 0729 08/18/21 0942   BP: 146/56 118/58 (!) 187/72 155/66   Pulse: 91 91 91 88   Patient Position - Orthostatic VS: Sitting Lying Lying Sitting         Visual Acuity      ED Medications  Medications   iohexol (OMNIPAQUE) 350 MG/ML injection (SINGLE-DOSE) 100 mL (100 mL Intravenous Given 8/17/21 1708)       Diagnostic Studies  Results Reviewed     Procedure Component Value Units Date/Time    Novel Coronavirus [facility] [[id number]]  (Normal) Collected: 08/17/21 1617    Lab Status: Final result Specimen: Nares from Nose Updated: 08/17/21 1715     SARS-CoV-2 Negative    Narrative:       The specimen collection materials, transport medium, and/or testing methodology utilized in the production of these test results have been proven to be reliable in a limited validation with an abbreviated program under the Emergency Utilization Authorization provided by the FDA  Testing reported as "Presumptive positive" will be confirmed with secondary testing to ensure result accuracy  Clinical caution and judgement should be used with the interpretation of these results with consideration of the clinical impression and other laboratory testing  Testing reported as "Positive" or "Negative" has been proven to be accurate according to standard laboratory validation requirements  All testing is performed with control materials showing appropriate reactivity at standard intervals        Troponin I [[id number]]  (Abnormal) Collected: 08/17/21 1617    Lab Status: Final result Specimen: Blood from Arm, Right Updated: 08/17/21 1645     Troponin I 0 05 ng/mL     Basic metabolic panel [[id number]]  (Abnormal) Collected: 08/17/21 1617    Lab Status: Final result Specimen: Blood from Arm, Right Updated: 08/17/21 1641     Sodium 138 mmol/L      Potassium 3 3 mmol/L      Chloride 96 mmol/L      CO2 33 mmol/L      ANION GAP 9 mmol/L      BUN 16 mg/dL      Creatinine 2 60 mg/dL      Glucose 125 mg/dL      Calcium 9 4 mg/dL      eGFR 17 ml/min/1 73sq m     Narrative:      [place] guidelines for Chronic Kidney Disease (CKD):     Stage 1 with normal or high GFR (GFR > 90 mL/min/1 73 square meters)    Stage 2 Mild CKD (GFR = 60-89 mL/min/1 73 square meters)    Stage 3A Moderate CKD (GFR = 45-59 mL/min/1 73 square meters)    Stage 3B Moderate CKD (GFR = 30-44 mL/min/1 73 square meters)    Stage 4 Severe CKD (GFR = 15-29 mL/min/1 73 square meters)    Stage 5 End Stage CKD (GFR <15 mL/min/1 73 square meters)  Note: GFR calculation is accurate only with a steady state creatinine    D-dimer, quantitative [[id number]] (Abnormal) Collected: 08/17/21 1617    Lab Status: Final result Specimen: Blood from Arm, Right Updated: 08/17/21 1637     D-Dimer, Quant 1 77 ug/ml FEU     CBC and differential [[id number]]  (Abnormal) Collected: 08/17/21 1617    Lab Status: Final result Specimen: Blood from Arm, Right Updated: 08/17/21 1624     WBC 8 80 Thousand/uL      RBC 3 44 Million/uL      Hemoglobin 10 2 g/dL      Hematocrit 31 1 %      MCV 90 fL      MCH 29 7 pg      MCHC 32 8 g/dL      RDW 19 3 %      MPV 7 7 fL      Platelets 236 Thousands/uL      Neutrophils Relative 77 %      Lymphocytes Relative 11 %      Monocytes Relative 10 %      Eosinophils Relative 2 %      Basophils Relative 1 %      Neutrophils Absolute 6 80 Thousands/µL      Lymphocytes Absolute 1 00 Thousands/µL      Monocytes Absolute 0 80 Thousand/µL      Eosinophils Absolute 0 10 Thousand/µL      Basophils Absolute 0 10 Thousands/µL                  CTA ED chest PE Study   Final Result by Stefani Ortiz MD (08/17 1823)      No pulmonary arterial embolism as visualized  Motion limited evaluation of the bilateral lower lobe segmental vasculature  Fractures of the left lateral third through sixth ribs are likely subacute  Workstation performed: [id number]         X-ray chest 1 view portable   Final Result by Farideh Mendoza MD (88/94 9305)   Cardiomegaly and mild central congestion noted        Workstation performed: [id number]                    Procedures  Procedures         ED Course  ED Course as of Aug 19 0731   Tue Aug 17, 2021   1646 Chronically elevated   Troponin I(!): 0 05                               LASHAY Risk Score      Most Recent Value   Age >= 72  1 Filed at: 08/18/2021 0120   Known CAD (stenosis >= 50%)  0 Filed at: 08/18/2021 0120   Recent (<=24 hrs) Service Angina  1 Filed at: 08/18/2021 0120   ST Deviation >= 0 5 mm  0 Filed at: 08/18/2021 0120   3+ CAD Risk Factors (FHx, HTN, HLP, DM, Smoker)  1 Filed at: 08/18/2021 0120   Aspirin Use Past 7 Days  1 Filed at: 08/18/2021 0120   Elevated Cardiac Markers  1 Filed at: 08/18/2021 0120   LASHAY Risk Score (Calculated)  5 Filed at: 08/18/2021 0120                  MDM  Number of Diagnoses or Management Options  Chest pain  Dyspnea  Hypoxia  Diagnosis management comments: Patient is a 78-year-old female who presents for evaluation of episode of chest pain/dyspnea  Patient noted to be slightly hypoxic with saturations in the 80s on my initial evaluation, improved with 2 L nasal cannula  Patient also slightly tachypneic  Chest x-ray without evidence extensive pulmonary edema  Some concern for PEs present as the patient is wheelchair-bound at baseline  I discussed the case with Radiology Nephrology and it was agreed that CT PE study could be performed  Patient had CT PE study without evidence of PE  Disposition  Final diagnoses:   Dyspnea   Hypoxia   Chest pain     Time reflects when diagnosis was documented in both MDM as applicable and the Disposition within this note     Time User Action Codes Description Comment    8/17/2021  7:15 PM Quincy Hua Add [R06 00] Dyspnea     8/17/2021  7:15 PM Quincy Hua Add [R09 02] Hypoxia     8/17/2021  7:15 PM Elta Sudhakar, [address] [R07 9] Chest pain     8/17/2021  8:43 PM Teddy An Add [N18 6,  Z99 2] End-stage renal disease on hemodialysis [facility])       ED Disposition     ED Disposition Condition Date/Time Comment    Admit Stable Tue Aug 17, 2021  7:15 PM Case was discussed with PRANAV and the patient's admission status was agreed to be Admission Status: observation status to the service of Dr Joanne Estrada           Follow-up Information    None         Discharge Medication List as of 8/18/2021  1:42 PM      CONTINUE these medications which have NOT CHANGED    Details   acetaminophen (TYLENOL) 325 mg tablet 2 tablets, Historical Med      amLODIPine (NORVASC) 10 mg tablet take 1 tablet by mouth once daily, Normal      aspirin 81 MG tablet Take 1 tablet by mouth daily, Starting Wed 3/26/2014, Historical Med      b complex-vitamin C-folic acid (RENAL) 1 mg Take by mouth daily, Historical Med      diphenoxylate-atropine (LOMOTIL) 2 5-0 025 mg per tablet Take 1 tablet by mouth 4 (four) times a day as needed for diarrhea, Starting Mon 12/16/2019, Normal      ergocalciferol (VITAMIN D2) 50,000 units take 1 capsule by mouth weekly, Normal      fluticasone (FLONASE) 50 mcg/act nasal spray 1 spray into each nostril daily, Starting Thu 3/12/2020, Normal      glucose blood (ONE TOUCH ULTRA TEST) test strip 1 each by Other route 2 (two) times a day, Starting Thu 10/17/2019, Normal      Misc  Devices [facility]) MISC by Does not apply route daily, Starting Fri 7/6/2018, Normal      Multiple Vitamins-Minerals (OCUVITE ADULT 50+ PO) Take by mouth, Historical Med      nateglinide (STARLIX) 120 mg tablet Take 1 tablet (120 mg total) by mouth 3 (three) times a day before meals, Starting Mon 6/14/2021, No Print      Omega-3 Fatty Acids (FISH OIL) 1200 MG CAPS Take by mouth 4 (four) times a day, Historical Med      pantoprazole (PROTONIX) 40 mg tablet take 1 tablet by mouth once daily, Normal      pravastatin (PRAVACHOL) 80 mg tablet swallow 1 tablet once daily, Normal      sevelamer carbonate (RENVELA) 800 mg tablet Take 800 mg by mouth 3 (three) times a day with meals, Starting Mon 5/31/2021, Historical Med           No discharge procedures on file      PDMP Review       Value Time User    PDMP Reviewed  Yes 12/27/2019  9:06 AM Gladys Cardenas DO          ED Provider  Electronically Signed by           Sb Garay MD  08/19/21 8631

## 2021-08-25 NOTE — PROGRESS NOTES
Assessment/Plan:     No problem-specific Assessment & Plan notes found for this encounter  Diagnoses and all orders for this visit:    Atypical chest pain    Essential (primary) hypertension    Cardiovascular arteriosclerosis    Hypokalemia  -     Cancel: CBC and differential; Future  -     Cancel: Iron Panel (Includes Ferritin, Iron Sat%, Iron, and TIBC); Future  -     Cancel: Comprehensive metabolic panel; Future    Anemia, unspecified type  -     Cancel: CBC and differential; Future  -     Cancel: Iron Panel (Includes Ferritin, Iron Sat%, Iron, and TIBC); Future  -     Cancel: Comprehensive metabolic panel; Future    Gait abnormality     A/P: DOing ok, but swelling continues  No s/s of CHF at this time, but has had HD  RIght leg weakness and ROM slightly better  Feel pt may have over did the PT and caused the leg issues  To restart the PT  Discussed checking for DVT and pt defers  Husbands reports it is getting harder to care for her and transfer her  Recommend having the  eval and  defers  Sugars acceptable  Continue current treatment and will be getting f/u labs at HD  Has some trouble swallowing at times and recommended speech eval and  defers  BP is up and is usually good  Will be seeing HD tomorrow and if BP remains up, adjust meds  RTC two months for routine  Subjective:     Patient ID: Donny Hanson is a 77 y o  female  WFon HD TIW and chronic right leg problems, presents for a brief admission for CP, SOB,  leg swelling, and inability to move her right leg and swelling as well  Had just started home therapy and reports that they really worked her hard prior to the onset of the s/s  W/u was negative for cardiac ischemia  and released  Coarse uncomplicated except for worsening anemia and significant rise in her renal function, but underwent HD after d/c  Breathing is better  Right leg is slightly better  Reports some increase urinary and fecal incontinence   Sugars about the same         Review of Systems   Constitutional: Positive for activity change  Negative for chills, diaphoresis, fatigue and fever  Respiratory: Negative for cough, chest tightness, shortness of breath and wheezing  Cardiovascular: Positive for leg swelling  Negative for chest pain and palpitations  Gastrointestinal: Negative for abdominal pain, constipation, diarrhea, nausea and vomiting  Genitourinary: Negative for difficulty urinating, dysuria and frequency  Urine incontinence   Musculoskeletal: Positive for arthralgias and gait problem  Negative for myalgias  Neurological: Negative for dizziness, seizures, syncope, weakness, light-headedness and headaches  Psychiatric/Behavioral: Negative for confusion  The patient is not nervous/anxious  Objective:     Physical Exam  Vitals and nursing note reviewed  Constitutional:       General: She is not in acute distress  Appearance: Normal appearance  She is not ill-appearing  HENT:      Head: Normocephalic and atraumatic  Mouth/Throat:      Mouth: Mucous membranes are moist    Eyes:      Extraocular Movements: Extraocular movements intact  Conjunctiva/sclera: Conjunctivae normal       Pupils: Pupils are equal, round, and reactive to light  Cardiovascular:      Rate and Rhythm: Normal rate and regular rhythm  Heart sounds: Normal heart sounds  Pulmonary:      Effort: Pulmonary effort is normal  No respiratory distress  Breath sounds: Normal breath sounds  No wheezing or rales  Abdominal:      General: Bowel sounds are normal  There is no distension  Palpations: Abdomen is soft  Tenderness: There is no abdominal tenderness  Musculoskeletal:         General: Deformity present  Right lower leg: Edema present  Left lower leg: Edema present  Comments: Bilat LE edema 2/4  Chronic right leg deformity noted  Neurological:      General: No focal deficit present        Mental Status: She is alert and oriented to person, place, and time  Mental status is at baseline  Psychiatric:         Mood and Affect: Mood normal          Behavior: Behavior normal          Thought Content: Thought content normal          Judgment: Judgment normal            Vitals:    08/25/21 1415   BP: 148/60   BP Location: Right arm   Patient Position: Sitting   Cuff Size: Adult   Pulse: 81   Resp: 18   Temp: (!) 97 3 °F (36 3 °C)   TempSrc: Temporal   SpO2: 98%   Weight: 68 9 kg (152 lb)   Height: 5' 4" (1 626 m)       Transitional Care Management Review:  Miky Tillman is a 77 y o  female here for TCM follow up  During the TCM phone call patient stated:    TCM Call (since 7/25/2021)     Date and time call was made  8/19/2021  8:43 AM    Hospital care reviewed  Records reviewed    Patient was hospitialized at  [address]        Date of Admission  08/17/21    Date of discharge  08/18/21    Diagnosis  chest pain     Disposition  Home    Were the patients medications reviewed and updated  Yes    Current Symptoms  None      TCM Call (since 7/25/2021)     Post hospital issues  None    Should patient be enrolled in anticoag monitoring? No    Scheduled for follow up?   Yes    Did you obtain your prescribed medications  Yes    Do you need help managing your prescriptions or medications  No    Is transportation to your appointment needed  No    I have advised the patient to call PCP with any new or worsening symptoms  damian Martinez, DO

## 2021-09-10 PROBLEM — R07.89 OTHER CHEST PAIN: Status: RESOLVED | Noted: 2021-01-01 | Resolved: 2021-01-01

## 2021-09-10 PROBLEM — R77.8 ELEVATED TROPONIN: Status: RESOLVED | Noted: 2019-01-31 | Resolved: 2021-01-01

## 2021-09-10 NOTE — PROGRESS NOTES
Assessment/Plan:  Problem List Items Addressed This Visit        Digestive    Gastro-esophageal reflux disease without esophagitis       Endocrine    Type 2 diabetes mellitus with other circulatory complications ([place] [address] ) - Primary    Relevant Orders    CBC and differential    Comprehensive metabolic panel    Lipid Panel with Direct LDL reflex    Microalbumin / creatinine urine ratio    TSH, 3rd generation with Free T4 reflex    POCT hemoglobin A1c (Completed)    Secondary hyperparathyroidism of renal origin [facility])       Cardiovascular and Mediastinum    Heart failure, unspecified ([place] [address] )    Cardiovascular arteriosclerosis       Nervous and Auditory    Dementia without behavioral disturbance (HCC)       Musculoskeletal and Integument    Age-related osteoporosis without current pathological fracture       Genitourinary    End-stage renal disease on hemodialysis (HCC)       Other    Vitamin D deficiency, unspecified    Relevant Orders    Vitamin D 25 hydroxy    Thrombocytopenia ([facility])    Iron deficiency anemia    Hyperlipidemia, unspecified    Relevant Orders    Lipid Panel with Direct LDL reflex    Anemia in chronic kidney disease      Other Visit Diagnoses     Encounter for vaccination        Relevant Orders    influenza vaccine, high-dose, PF 0 5 mL    Non-pressure chronic ulcer of other part of unspecified foot with unspecified severity ([place] [address] )               Diagnoses and all orders for this visit:    Type 2 diabetes mellitus with other circulatory complications ([facility])  -     CBC and differential; Future  -     Comprehensive metabolic panel; Future  -     Lipid Panel with Direct LDL reflex; Future  -     Microalbumin / creatinine urine ratio  -     TSH, 3rd generation with Free T4 reflex;  Future  -     POCT hemoglobin A1c    Secondary hyperparathyroidism of renal origin ([place] [address] )    Gastro-esophageal reflux disease without esophagitis    Cardiovascular arteriosclerosis    Chronic heart failure, unspecified heart failure type ([facility] )    Dementia without behavioral disturbance, unspecified dementia type ([place] [address] )    Age-related osteoporosis without current pathological fracture    End-stage renal disease on hemodialysis (HCC)    Thrombocytopenia (HCC)    Anemia in chronic kidney disease, on chronic dialysis ([facility] )    Other hyperlipidemia  -     Lipid Panel with Direct LDL reflex; Future    Iron deficiency anemia, unspecified iron deficiency anemia type    Vitamin D deficiency, unspecified  -     Vitamin D 25 hydroxy; Future    Encounter for vaccination  -     influenza vaccine, high-dose, PF 0 5 mL    Non-pressure chronic ulcer of other part of unspecified foot with unspecified severity ([place] [address] )        No problem-specific Assessment & Plan notes found for this encounter  A/P: Doing well and an in office HgA1c was 5 5  Discussed vaccines and will up date the flu    Will check labs  Appreciate nephro input  Continue current treatment including HD TIW  RTC three months for routine  Subjective:      Patient ID: Jordyn Ballesteros is a 77 y o  female  WF RTC for f/u dm, ESRD, etc  Doing well and no new issues  Reports right leg pain, bilat edema, and rubor are all better  Getting PT  Ella Bergeron Remains on TIW HD  Sugars less than 140 except HS are around 200 per her request  No low sugar events  Denies CP, SOB, palpitations, edema, orthopnea or PND  No reflux  Chronic foot wound healed  Dementia is stable  Chronic pain is manageable  Due for labs and vaccines  The following portions of the patient's history were reviewed and updated as appropriate:   She has a past medical history of Abnormal weight loss, Anemia, Chronic kidney disease, Clostridium difficile colitis, Depression, Diabetes mellitus ([facility] ), Fracture, tibial plateau, GERD (gastroesophageal reflux disease), Hyperkalemia, Hypertension, Hypoglycemia, Hypokalemia, Hypothermia, Hypoxia, Metatarsalgia, Old myocardial infarction, Osteoporosis, Syncope, and Visual disturbance  ,  does not have any pertinent problems on file  ,   has a past surgical history that includes Venous thrombectomy; Bypass Graft (Right); Coronary artery bypass graft; Cataract extraction; Cholecystectomy; Hernia repair; Carotid endarterectomy; Foot Amputation (Bilateral); and Tonsillectomy and adenoidectomy  ,  family history includes Aneurysm in her sister; Breast cancer in her mother; Cancer in her mother; Coronary artery disease in her father and sister; Heart disease in her mother; Liver cancer in her brother; No Known Problems in her daughter and maternal aunt  ,   reports that she has quit smoking  She has never used smokeless tobacco  She reports previous alcohol use  She reports that she does not use drugs  ,  is allergic to pollen extract and prolia [denosumab]     Current Outpatient Medications   Medication Sig Dispense Refill    acetaminophen (TYLENOL) 325 mg tablet 2 tablets      amLODIPine (NORVASC) 10 mg tablet take 1 tablet by mouth once daily 90 tablet 1    aspirin 81 MG tablet Take 1 tablet by mouth daily      b complex-vitamin C-folic acid (RENAL) 1 mg Take by mouth daily      diphenoxylate-atropine (LOMOTIL) 2 5-0 025 mg per tablet Take 1 tablet by mouth 4 (four) times a day as needed for diarrhea 30 tablet 0    ergocalciferol (VITAMIN D2) 50,000 units take 1 capsule by mouth weekly (Patient taking differently: 50,000 Units every 14 (fourteen) days ) 12 capsule 3    fluticasone (FLONASE) 50 mcg/act nasal spray 1 spray into each nostril daily 16 g 3    glucose blood (ONE TOUCH ULTRA TEST) test strip 1 each by Other route 2 (two) times a day 200 each 5    Misc   Devices [facility]) MISC by Does not apply route daily 1 each 0    Multiple Vitamins-Minerals (OCUVITE ADULT 50+ PO) Take by mouth      nateglinide (STARLIX) 120 mg tablet Take 1 tablet (120 mg total) by mouth 3 (three) times a day before meals 120 tablet 5    Omega-3 Fatty Acids (FISH OIL) 1200 MG CAPS Take by mouth 4 (four) times a day      pantoprazole (PROTONIX) 40 mg tablet take 1 tablet by mouth once daily 30 tablet 5    pravastatin (PRAVACHOL) 80 mg tablet swallow 1 tablet once daily 90 tablet 3    sevelamer carbonate (RENVELA) 800 mg tablet Take 800 mg by mouth 3 (three) times a day with meals       No current facility-administered medications for this visit  Review of Systems   Constitutional: Negative for activity change, chills, diaphoresis, fatigue and fever  HENT: Negative  Eyes: Negative for visual disturbance  Respiratory: Negative for cough, chest tightness, shortness of breath and wheezing  Cardiovascular: Negative for chest pain, palpitations and leg swelling  Gastrointestinal: Negative for abdominal pain, constipation, diarrhea, nausea and vomiting  Endocrine: Negative for cold intolerance and heat intolerance  Genitourinary: Negative for difficulty urinating, dysuria and frequency  Musculoskeletal: Negative for arthralgias, gait problem and myalgias  Neurological: Negative for dizziness, seizures, syncope, weakness, light-headedness and headaches  Psychiatric/Behavioral: Negative for confusion, dysphoric mood and sleep disturbance  The patient is not nervous/anxious  PHQ-9 Depression Screening    PHQ-9:   Frequency of the following problems over the past two weeks:      Little interest or pleasure in doing things: 0 - not at all  Feeling down, depressed, or hopeless: 0 - not at all  PHQ-2 Score: 0        Objective:  Vitals:    09/10/21 1253   BP: 138/70   Pulse: 86   Temp: 97 8 °F (36 6 °C)   SpO2: 97%     There is no height or weight on file to calculate BMI  Physical Exam  Vitals and nursing note reviewed  Constitutional:       General: She is not in acute distress  Appearance: Normal appearance  She is not ill-appearing  HENT:      Head: Normocephalic and atraumatic        Mouth/Throat:      Mouth: Mucous membranes are moist    Eyes:      Extraocular Movements: Extraocular movements intact  Conjunctiva/sclera: Conjunctivae normal       Pupils: Pupils are equal, round, and reactive to light  Neck:      Vascular: No carotid bruit  Cardiovascular:      Rate and Rhythm: Normal rate and regular rhythm  Heart sounds: Normal heart sounds  Pulmonary:      Effort: Pulmonary effort is normal  No respiratory distress  Breath sounds: Normal breath sounds  No wheezing or rales  Abdominal:      General: Bowel sounds are normal  There is no distension  Palpations: Abdomen is soft  Tenderness: There is no abdominal tenderness  Musculoskeletal:         General: Deformity present  Cervical back: Neck supple  Right lower leg: No edema  Left lower leg: No edema  Neurological:      General: No focal deficit present  Mental Status: She is alert and oriented to person, place, and time  Mental status is at baseline  Psychiatric:         Mood and Affect: Mood normal          Behavior: Behavior normal          Thought Content:  Thought content normal          Judgment: Judgment normal

## 2021-09-10 NOTE — PATIENT INSTRUCTIONS
Type 2 Diabetes in the Older Adult   WHAT YOU NEED TO KNOW:   The risk for type 2 diabetes increases as a person gets older  Type 2 diabetes means your pancreas does not make enough insulin, or your body does not use insulin well  Insulin helps move sugar out of the blood so it can be used for energy  Diabetes cannot be cured, but it can be managed  DISCHARGE INSTRUCTIONS:   Call or have someone close to you call your local emergency number (911 in the [address] [facility]) for any of the following:   · You have any of the following signs of a stroke:      ? Numbness or drooping on one side of your face     ? Weakness in an arm or leg    ? Confusion or difficulty speaking    ? Dizziness, a severe headache, or vision loss    · You have any of the following signs of a heart attack:      ? Squeezing, pressure, or pain in your chest    ? You may  also have any of the following:     § Discomfort or pain in your back, neck, jaw, stomach, or arm    § Shortness of breath    § Nausea or vomiting    § Lightheadedness or a sudden cold sweat    Return to the emergency department if:   · Your blood sugar level is higher than your goal and does not come down with treatment  · You have signs of a high blood sugar level, such as blurred or double vision  · You have signs of a high ketone level, such as fruity, sweet smelling breath, or shallow breathing  · You have symptoms of a low blood sugar level, such as trouble thinking, sweating, or a pounding heartbeat  · Your blood sugar level is lower than normal and does not improve with treatment  Call your doctor or diabetes care team if:   · You are vomiting or have diarrhea  · You have an upset stomach and cannot eat the foods on your meal plan  · You feel weak or more tired than usual     · You feel dizzy, have headaches, or are easily irritated  · Your skin is red, warm, dry, or swollen      · You have a wound that does not heal     · You have numbness in your arms or legs     · You have trouble coping with diabetes, or you feel anxious or depressed  · You have problems with your memory  · You have changes in your vision  · You have questions or concerns about your condition or care  Diabetes education:  Diabetes education will start right away, if the diagnosis is new  You may need diabetes education at a later time to refresh your memory  Members of your care team can help you, your family, and caregivers with a plan for the following:  · How to check your blood sugar level: You will learn when to check your blood sugar level and what the level should be  You will learn what to do if your level is too high or too low  Write down the times of your checks and your levels  Take them to all follow-up appointments  · About diabetes medicine:  Oral diabetes medicine may be given to help control your blood sugar levels  Your healthcare provider will teach you how and when to take your diabetes medicine  You will also be taught when not to take the medicine  You will also be taught about side effects oral diabetes medicine can cause  · If you need insulin:  Insulin may be added if oral diabetes medicine becomes less effective over time  You and your family members will be taught how to draw up and give insulin, if needed  You will learn how much insulin you need and what time to inject insulin  You will be taught when not to give insulin  You will also be taught what to do if your blood sugar level drops too low  This may happen if you take insulin and do not eat the right amount of carbohydrates  Your team will also teach you how to dispose of needles and syringes  · About nutrition:  A dietitian will help you make a meal plan to keep your blood sugar level steady  You will learn why protein in your diet is important  You will learn how food affects your blood sugar levels  You will also learn to keep track of sugar and starchy foods (carbohydrates)   Do not skip meals  Your blood sugar level may drop too low if you have taken insulin and do not eat  · How to prevent complications:  Diabetes that is not well controlled can lead to health problems  Examples include foot sores, retinopathy (vision loss), and peripheral neuropathy (loss of feeling in your hands and feet)  Also, risk for dementia can increase when blood sugar levels that are too high or too low for long periods of time  Your team will help you know when to get regular checkups, such as vision checks  They will teach you how to watch for problems and when to get a problem checked  Manage diabetes and prevent problems:  Sometimes type 2 diabetes can be managed with changes in nutrition and physical activity  · Work with your diabetes care team to create plans to meet your needs  Your diabetes care team may include a physician, nurse practitioner, and physician assistant  It may also include a diabetes nurse educator, dietitian, and an exercise specialist  Family members, or others who are close to you, may also be part of the team  You and your team will make goals and plans to manage diabetes and other health problems  For example, the plan will include how to manage medicines you may take for diabetes and for other health conditions  The plans and goals will be specific to your needs and abilities  Your plan will change as your needs and abilities change  · Manage other health issues as directed  Health issues may include high blood pressure, high cholesterol levels, and heart problems  Health issues may also include depression  Together you and your care team can create a plan to manage any other health issues  · Try to be physically active for 30 to 60 minutes most days of the week  Physical activity, such as exercise, helps keep your blood sugar level steady and lowers your risk for heart disease  Physical activity can help improve your balance and strength and lower your risk for falls  Start slowly  Activity can be done in 10-minute intervals  ? Set a goal for 30 minutes of aerobic activity at least 5 times a week  Aerobic activity helps your heart stay strong  Aerobic activity includes walking, bicycling, dancing, swimming, and raking leaves  ? Set a goal for strength training 2 times a week  Strength training helps you keep the muscles you have and build new muscles  Strength training includes lifting weights, climbing stairs, and doing yoga or kevin chi          ? Stay steady on your on your feet with balancing activities  These include walking backwards, standing on one foot, and walking heel to toe in a straight line  · Maintain a healthy weight  Ask your provider what a healthy weight is for you  A healthy weight can help you control diabetes and prevent heart disease  Ask your provider to help you create a weight loss plan if you are overweight  Weight loss of 10 to 15 pounds can help make a difference in managing diabetes  Together you and your care team can set manageable weight loss goals  · Know the risks if you choose to drink alcohol  Alcohol can cause your blood sugar levels to be low if you use insulin  Alcohol can cause high blood sugar levels and weight gain if you drink too much  Women 21 years or older and men 72 years or older should limit alcohol to 1 drink a day  Men 21 to 64 years should limit alcohol to 2 drinks a day  A drink of alcohol is 12 ounces of beer, 5 ounces of wine, or 1½ ounces of liquor  · Do not smoke  Nicotine and other chemicals in cigarettes can cause lung disease and other health problems  It can also cause blood vessel damage that makes diabetes more difficult to manage  Ask your healthcare provider for information if you currently smoke and need help to quit  Do not use e-cigarettes or smokeless tobacco in place of cigarettes or to help you quit  They still contain nicotine      Other ways to manage diabetes:   · Check your feet every day for sores  Look at your whole foot, including the bottom, and between and under your toes  Check for wounds, corns, and calluses  Use a mirror to see the bottom of your feet  The skin on your feet may be shiny, tight, dry, or darker than normal  Your feet may also be cold and pale  Feel your feet by running your hands along the tops, bottoms, sides, and between your toes  Redness, swelling, and warmth are signs of blood flow problems that can lead to a foot ulcer  Do not try to remove corns or calluses yourself  · Wear medical alert identification  Wear medical alert jewelry or carry a card that says you have type 2 diabetes  Ask your healthcare provider where to get these items  · Ask about vaccines  You have a higher risk for serious illness if you get the flu, pneumonia, or hepatitis  Ask your healthcare provider if you should get a flu, pneumonia, shingles, or hepatitis B vaccine, and when to get the vaccine  · Get help from family and friends  You may need help checking your blood sugar level, giving insulin injections, or preparing your meals  You may also need help to check your feet for sores  Ask your family and friends to help you with these tasks  Talk to your care team if you need someone at home to help you  Follow up with your doctor or diabetes care team as directed: You will need to return to meet with different care team members  You may need tests to monitor for problems  Write down your questions so you remember to ask them during your visits  Talk to your care team or doctor if you cannot afford your medicines  © Copyright [employer] 2021 Information is for End User's use only and may not be sold, redistributed or otherwise used for commercial purposes  All illustrations and images included in CareNotes® are the copyrighted property of [employer]  or [facility] Meehan   The above information is an  only   It is not intended as medical advice for individual conditions or treatments  Talk to your doctor, nurse or pharmacist before following any medical regimen to see if it is safe and effective for you

## 2021-10-15 PROBLEM — R06.02 SHORTNESS OF BREATH: Status: ACTIVE | Noted: 2021-01-01

## 2021-10-15 PROBLEM — R29.6 FREQUENT FALLS: Status: ACTIVE | Noted: 2021-01-01

## 2021-10-15 PROBLEM — S22.49XA RIB FRACTURES: Status: ACTIVE | Noted: 2021-01-01

## 2021-10-15 PROBLEM — N32.89 BLADDER WALL THICKENING: Status: ACTIVE | Noted: 2021-01-01

## 2021-10-20 PROBLEM — R06.02 SHORTNESS OF BREATH: Status: RESOLVED | Noted: 2021-01-01 | Resolved: 2021-01-01

## 2021-10-20 PROBLEM — S22.49XA RIB FRACTURES: Status: RESOLVED | Noted: 2021-01-01 | Resolved: 2021-01-01

## 2021-11-12 PROBLEM — R77.8 ELEVATED TROPONIN: Status: RESOLVED | Noted: 2019-01-31 | Resolved: 2021-01-01

## 2021-11-13 PROBLEM — Z65.9 SOCIAL PROBLEM: Status: ACTIVE | Noted: 2021-01-01

## 2021-11-30 PROBLEM — E15 HYPOGLYCEMIC COMA (HCC): Status: ACTIVE | Noted: 2021-01-01

## 2021-12-01 PROBLEM — I47.2 NSVT (NONSUSTAINED VENTRICULAR TACHYCARDIA) (HCC): Status: ACTIVE | Noted: 2021-01-01

## 2021-12-01 PROBLEM — R65.10 SIRS (SYSTEMIC INFLAMMATORY RESPONSE SYNDROME) (HCC): Status: ACTIVE | Noted: 2021-01-01

## 2021-12-02 PROBLEM — R65.10 SIRS (SYSTEMIC INFLAMMATORY RESPONSE SYNDROME) (HCC): Status: RESOLVED | Noted: 2021-01-01 | Resolved: 2021-01-01

## 2021-12-02 PROBLEM — E16.2 HYPOGLYCEMIA: Status: ACTIVE | Noted: 2021-01-01

## 2021-12-02 PROBLEM — R06.00 DYSPNEA: Status: ACTIVE | Noted: 2021-01-01

## 2021-12-27 NOTE — ASSESSMENT & PLAN NOTE
[facility] EMERGENCY DEPARTMENT HISTORY AND PHYSICAL EXAM    Date: 12/26/2021  Patient Name: Kaylah Valdovinos    History of Presenting Illness     Chief Complaint   Patient presents with    Dental Pain     toothache x one week         History Provided By: Patient    HPI: Kaylah Valdovinos is a 63 y.o. male with No significant past medical history who presents with right upper dental pain x1 week but worse in the last 3 days. Patient denies any fevers or chills. He states that he has been trying to find a dentist but has been unsuccessful. Patient states he had been taking multiple over-the-counter medications with no relief. Patient rates pain 8 out of 10. Patient is a smoker but denies any alcohol use. PCP: Araseli Ray MD    Current Facility-Administered Medications   Medication Dose Route Frequency Provider Last Rate Last Admin    penicillin v potassium (VEETID) tablet 500 mg  500 mg Oral NOW Jewell Singh PA-C        dental ball (lidocaine/Benadryl/Cetacaine) mixture   Mucous Membrane ONCE Zina West PA-C         Current Outpatient Medications   Medication Sig Dispense Refill    penicillin v potassium (VEETID) 500 mg tablet Take 1 Tablet by mouth four (4) times daily for 7 days. 28 Tablet 0    ibuprofen (MOTRIN) 800 mg tablet Take 1 Tablet by mouth every eight (8) hours as needed for Pain for up to 7 days.  20 Tablet 0       Past History     Past Medical History:  Past Medical History:   Diagnosis Date    Pain of left heel 9/24/2020    Right inguinal hernia 9/24/2020    S/P right inguinal hernia repair 10/22/2020       Past Surgical History:  Past Surgical History:   Procedure Laterality Date    HX HERNIA REPAIR Right 10/07/2020    Repair of right inguinal hernia with mesh       Family History:  Family History   Problem Relation Age of Onset    Diabetes Mother     Anesth Problems Neg Hx        Social History:  Social History     Tobacco Use    Smoking status: Current Every Day Smoker Packs/day: 0.50    Smokeless tobacco: Never Used   Substance Use Topics    Alcohol use: No    Drug use: No       Allergies:  No Known Allergies      Review of Systems   Review of Systems   Constitutional: Negative for chills and fever. HENT: Positive for dental problem. Gastrointestinal: Negative for nausea and vomiting. Neurological: Negative for speech difficulty and weakness. All other systems reviewed and are negative. Physical Exam     Vitals:    12/26/21 2159   BP: 136/79   Pulse: 70   Resp: 18   Temp: 98.5 °F (36.9 °C)   SpO2: 97%   Weight: 65.8 kg (145 lb)   Height: 5' 7\" (1.702 m)     Physical Exam  Vitals and nursing note reviewed. Constitutional:       General: He is not in acute distress. Appearance: He is well-developed. HENT:      Head: Normocephalic and atraumatic. Mouth/Throat:      Dentition: Abnormal dentition ( Poor dentition throughout). Dental tenderness ( Pain at tooth #6) and dental caries present. No dental abscesses. Pharynx: No oropharyngeal exudate. Eyes:      Conjunctiva/sclera: Conjunctivae normal.   Cardiovascular:      Rate and Rhythm: Normal rate and regular rhythm. Heart sounds: Normal heart sounds. Pulmonary:      Effort: Pulmonary effort is normal. No respiratory distress. Breath sounds: Normal breath sounds. No wheezing or rales. Musculoskeletal:         General: Normal range of motion. Skin:     General: Skin is warm and dry. Neurological:      Mental Status: He is alert and oriented to person, place, and time. Diagnostic Study Results     Labs -   No results found for this or any previous visit (from the past 12 hour(s)). Radiologic Studies -   No orders to display     CT Results  (Last 48 hours)    None        CXR Results  (Last 48 hours)    None            Medical Decision Making   I am the first provider for this patient.     I reviewed the vital signs, available nursing notes, past medical history, past surgical history, family history and social history. Vital Signs-Reviewed the patient's vital signs. Records Reviewed: Nursing Notes and Old Medical Records    Provider Notes (Medical Decision Making):   Patient presents with dental pain. No obvious abscess that needs drainage. No red flags that make PTA, RPA, ludwigs angina concerning. Will tx with dental ball, antibiotics and outpatient analgesics. Given information on dentists and importance of followup and no smoking. Disposition:  Discharged    DISCHARGE NOTE:   10:31 PM      Care plan outlined and precautions discussed. Patient has no new complaints, changes, or physical findings. All medications were reviewed with the patient; will d/c home. All of pt's questions and concerns were addressed. Patient was instructed and agrees to follow up with dentist, as well as to return to the ED upon further deterioration. Patient is ready to go home. Follow-up Information     Follow up With Specialties Details Why Contact Info    [facility]    [address]  [phone]          Current Discharge Medication List      START taking these medications    Details   penicillin v potassium (VEETID) 500 mg tablet Take 1 Tablet by mouth four (4) times daily for 7 days. Qty: 28 Tablet, Refills: 0  Start date: 12/26/2021, End date: 1/2/2022      ibuprofen (MOTRIN) 800 mg tablet Take 1 Tablet by mouth every eight (8) hours as needed for Pain for up to 7 days. Qty: 20 Tablet, Refills: 0  Start date: 12/26/2021, End date: 1/2/2022             Procedures:  Procedures    Please note that this dictation was completed with Dragon, computer voice recognition software. Quite often unanticipated grammatical, syntax, homophones, and other interpretive errors are inadvertently transcribed by the computer software. Please disregard these errors.   Additionally, please excuse any errors that have escaped final proofreading. Diagnosis     Clinical Impression:   1. Dentalgia    2.  Dental caries

## 2022-01-01 ENCOUNTER — HOSPITAL ENCOUNTER (EMERGENCY)
Facility: HOSPITAL | Age: 77
End: 2022-01-23
Attending: EMERGENCY MEDICINE | Admitting: EMERGENCY MEDICINE
Payer: MEDICARE

## 2022-01-01 VITALS
SYSTOLIC BLOOD PRESSURE: 50 MMHG | OXYGEN SATURATION: 39 % | DIASTOLIC BLOOD PRESSURE: 28 MMHG | RESPIRATION RATE: 44 BRPM | HEART RATE: 164 BPM

## 2022-01-01 DIAGNOSIS — I46.9 CARDIOPULMONARY ARREST (HCC): Primary | ICD-10-CM

## 2022-01-01 LAB — GLUCOSE SERPL-MCNC: 232 MG/DL (ref 65–140)

## 2022-01-01 PROCEDURE — 82948 REAGENT STRIP/BLOOD GLUCOSE: CPT

## 2022-01-01 PROCEDURE — 92950 HEART/LUNG RESUSCITATION CPR: CPT

## 2022-01-01 PROCEDURE — 92950 HEART/LUNG RESUSCITATION CPR: CPT | Performed by: EMERGENCY MEDICINE

## 2022-01-01 PROCEDURE — 99285 EMERGENCY DEPT VISIT HI MDM: CPT | Performed by: EMERGENCY MEDICINE

## 2022-01-01 PROCEDURE — 99285 EMERGENCY DEPT VISIT HI MDM: CPT

## 2022-01-01 RX ORDER — CALCIUM CHLORIDE 100 MG/ML
SYRINGE (ML) INTRAVENOUS CODE/TRAUMA/SEDATION MEDICATION
Status: COMPLETED | OUTPATIENT
Start: 2022-01-01 | End: 2022-01-01

## 2022-01-01 RX ORDER — SODIUM BICARBONATE 84 MG/ML
INJECTION, SOLUTION INTRAVENOUS CODE/TRAUMA/SEDATION MEDICATION
Status: COMPLETED | OUTPATIENT
Start: 2022-01-01 | End: 2022-01-01

## 2022-01-01 RX ORDER — EPINEPHRINE 0.1 MG/ML
SYRINGE (ML) INJECTION CODE/TRAUMA/SEDATION MEDICATION
Status: COMPLETED | OUTPATIENT
Start: 2022-01-01 | End: 2022-01-01

## 2022-01-01 RX ORDER — EPINEPHRINE 0.1 MG/ML
2 SYRINGE (ML) INJECTION ONCE
Status: COMPLETED | OUTPATIENT
Start: 2022-01-01 | End: 2022-01-01

## 2022-01-01 RX ADMIN — SODIUM BICARBONATE 50 MEQ: 84 INJECTION, SOLUTION INTRAVENOUS at 20:45

## 2022-01-01 RX ADMIN — EPINEPHRINE 1 MG: 0.1 INJECTION, SOLUTION ENDOTRACHEAL; INTRACARDIAC; INTRAVENOUS at 20:55

## 2022-01-01 RX ADMIN — SODIUM BICARBONATE 50 MEQ: 84 INJECTION, SOLUTION INTRAVENOUS at 20:38

## 2022-01-01 RX ADMIN — EPINEPHRINE 1 MG: 0.1 INJECTION, SOLUTION ENDOTRACHEAL; INTRACARDIAC; INTRAVENOUS at 20:44

## 2022-01-01 RX ADMIN — EPINEPHRINE 1 MG: 0.1 INJECTION, SOLUTION ENDOTRACHEAL; INTRACARDIAC; INTRAVENOUS at 20:47

## 2022-01-01 RX ADMIN — CALCIUM CHLORIDE 1 G: 100 INJECTION PARENTERAL at 20:43

## 2022-01-01 RX ADMIN — CALCIUM CHLORIDE 1 G: 100 INJECTION PARENTERAL at 20:38

## 2022-01-01 RX ADMIN — EPINEPHRINE 1 MG: 0.1 INJECTION, SOLUTION ENDOTRACHEAL; INTRACARDIAC; INTRAVENOUS at 20:37

## 2022-01-01 RX ADMIN — EPINEPHRINE 1 MG: 0.1 INJECTION, SOLUTION ENDOTRACHEAL; INTRACARDIAC; INTRAVENOUS at 20:41

## 2022-01-01 RX ADMIN — EPINEPHRINE 1 MG: 0.1 INJECTION, SOLUTION ENDOTRACHEAL; INTRACARDIAC; INTRAVENOUS at 20:51

## 2022-01-24 ENCOUNTER — TELEPHONE (OUTPATIENT)
Dept: INTERNAL MEDICINE CLINIC | Facility: CLINIC | Age: 77
End: 2022-01-24

## 2022-01-24 NOTE — ED NOTES
Received call back from [id number] [facility] at this time     Nigel Rosario RN  01/23/22 2130       Nigel Rosario [facility]  01/23/22 2136

## 2022-01-24 NOTE — ED NOTES
Please note correct time 20:50 compressions stopped for Pulse check     Nigel Rosario RN  01/23/22 6660

## 2022-01-24 NOTE — ED NOTES
[facility] CALL AT THIS TIME [phone] SPOKE to Racquel Whittington pt released and will be referred to [facility] for eye bank     Ingrid Sandhu RN  01/23/22 2121       Ingrid Sandhu RN  01/23/22 2125

## 2022-01-24 NOTE — ED NOTES
20:53 compressions stopped for pulse check-pulse noted     [facility]  01/23/22 8144

## 2022-01-24 NOTE — ED PROVIDER NOTES
History  No chief complaint on file  Patient is a 46-year-old female seen in the emergency department brought by EMS from facility with concern for cardiac arrest   Per EMS, patient was last seen at her baseline at approximately 7:00 p m  Diane Rose Per EMS, patient was found to be in asystole and treated with 6 rounds of epinephrine, with brief return of spontaneous circulation noted  Patient was intubated in the field and CPR/ACLS was started  Upon arrival in the emergency department, the patient was found to be pulseless, and apparent PEA arrest   Patient was treated with multiple rounds of epinephrine, in addition to calcium and sodium bicarbonate, with intermittent pulseless V-tach, and PEA noted  Patient was shocked x2 as well  Ultimately, with no chance of meaningful recovery, and persistent PEA, patient was pronounced dead at 9:01PM           Prior to Admission Medications   Prescriptions Last Dose Informant Patient Reported? Taking? Misc   Devices [facility]) MISC   No No   Sig: by Does not apply route daily   Omega-3 Fatty Acids (FISH OIL) 1200 MG CAPS   Yes No   Sig: Take by mouth 4 (four) times a day   amLODIPine (NORVASC) 10 mg tablet   No No   Sig: take 1 tablet by mouth once daily   aspirin 81 MG tablet   Yes No   Sig: Take 1 tablet by mouth daily   b complex-vitamin C-folic acid (RENAL) 1 mg   Yes No   Sig: Take by mouth daily   epoetin magalie (EPOGEN,PROCRIT) 2,000 units/mL   No No   Sig: Inject 1 mL (2,000 Units total) under the skin 3 (three) times a week   fluticasone (FLONASE) 50 mcg/act nasal spray   No No   Si spray into each nostril daily   isosorbide mononitrate (IMDUR) 30 mg 24 hr tablet   No No   Sig: Take 1 tablet (30 mg total) by mouth daily   losartan (COZAAR) 50 mg tablet   No No   Sig: Take 1 tablet (50 mg total) by mouth daily   metoprolol tartrate (LOPRESSOR) 25 mg tablet   No No   Sig: Take 0 5 tablets (12 5 mg total) by mouth every 12 (twelve) hours   pantoprazole (PROTONIX) 40 mg tablet   No No   Sig: take 1 tablet by mouth once daily   pravastatin (PRAVACHOL) 80 mg tablet   No No   Sig: swallow 1 tablet once daily      Facility-Administered Medications: None       Past Medical History:   Diagnosis Date    Abnormal weight loss     last assessed [id number]    Anemia     CAD (coronary artery disease)     history of MI 2002 and CABG    Chronic kidney disease     Clostridium difficile colitis     last assessed [id number]    Coronary artery disease involving native coronary artery of native heart without angina pectoris     Depression     resolved [id number]    Diabetes mellitus ([address] )     Fracture, tibial plateau     last assessed [id number]    GERD (gastroesophageal reflux disease)     Hyperkalemia     last assessed [id number]    Hyperlipidemia     Hypertension     Hypoglycemia     Sugars now up   Will increase the metformin;  last assessed [id number]    Hypokalemia     last assessed [id number]    Hypothermia     last assessed [id number]    Hypoxia     last assessed [id number]    Metatarsalgia     last assessed [id number]    Old myocardial infarction     Osteoporosis     Syncope     last assessed [id number]    Visual disturbance     last assessed [id number]       Past Surgical History:   Procedure Laterality Date    BYPASS GRAFT Right     H/O bypass graft (non-vein), Right Leg    CAROTID ENDARTERECTOMY      Neurological Surgery Cartoid Endartectomy    CATARACT EXTRACTION      CHOLECYSTECTOMY      questionable    CORONARY ARTERY BYPASS GRAFT      FOOT AMPUTATION Bilateral     H/O surgery foot amputation Metatarsal and toe;  Bilat    HERNIA REPAIR      Inguinal sliding    TONSILLECTOMY AND ADENOIDECTOMY      VENOUS THROMBECTOMY      H/O Arteriobenous surgery Thrombectomy of AV Fistula Percantaneous;  last assessed [id number]       Family History   Problem Relation Age of Onset    Breast cancer Mother     Cancer Mother     Heart disease Mother     Coronary artery disease Father     Aneurysm Sister         Cerebral Artery    Coronary artery disease Sister     Liver cancer Brother     No Known Problems Daughter     No Known Problems Maternal Aunt      I have reviewed and agree with the history as documented  E-Cigarette/Vaping    E-Cigarette Use Never User      E-Cigarette/Vaping Substances     Social History     Tobacco Use    Smoking status: Former Smoker     Types: Cigarettes    Smokeless tobacco: Never Used   Vaping Use    Vaping Use: Never used   Substance Use Topics    Alcohol use: Not Currently    Drug use: No       Review of Systems   Unable to perform ROS: Acuity of condition (cardiac arrest)       Physical Exam  Physical Exam  Vitals and nursing note reviewed  Constitutional:       Appearance: She is well-developed  She is ill-appearing  HENT:      Head: Normocephalic and atraumatic  Right Ear: External ear normal       Left Ear: External ear normal       Nose: Nose normal       Mouth/Throat:      Comments: ET tube in place  Eyes:      General: No scleral icterus  Conjunctiva/sclera: Conjunctivae normal       Comments: Pupils fixed at approximately 4 mm   Neck:      Comments: No masses noted  Cardiovascular:      Comments: Absent pulses; cool extremities  Pulmonary:      Comments: breath sounds clear to auscultation bilaterally(with bagging)  Abdominal:      General: There is no distension  Palpations: Abdomen is soft  Musculoskeletal:         General: No swelling or signs of injury  Skin:     General: Skin is warm and dry     Neurological:      Comments: No spontaneous movements or respirations         Vital Signs  ED Triage Vitals   Temp Pulse Respirations Blood Pressure SpO2   -- 01/23/22 2043 01/23/22 2051 01/23/22 2058 01/23/22 2058    102 (!) 44 (!) 50/28 (!) 39 %      Temp src Heart Rate Source Patient Position - Orthostatic VS BP Location FiO2 (%)   -- 01/23/22 2058 -- -- --    Monitor         Pain Score       -- Vitals:    01/23/22 2046 01/23/22 2050 01/23/22 2054 01/23/22 2058   BP:    (!) 50/28   Pulse: (!) 116 100 (!) 121 (!) 164         Visual Acuity      ED Medications  Medications   EPINEPHrine (FOR EMS ONLY) (ADRENALIN) injection 2 mg (has no administration in time range)   EPINEPHrine (ADRENALIN) injection (1 mg Intravenous Given 1/23/22 2037)   EPINEPHrine (ADRENALIN) injection (1 mg Intravenous Given 1/23/22 2051)   calcium chloride 1 g/10 mL injection (1 g Intravenous Given 1/23/22 2038)   sodium bicarbonate 50 mEq/50 mL injection (50 mEq Intravenous Given 1/23/22 2045)   sodium bicarbonate 50 mEq/50 mL injection (50 mEq Intravenous Given 1/23/22 2038)   calcium chloride 1 g/10 mL injection (1 g Intravenous Given 1/23/22 2043)   EPINEPHrine (ADRENALIN) injection (1 mg Intravenous Given 1/23/22 2055)       Diagnostic Studies  Results Reviewed     Procedure Component Value Units Date/Time    Fingerstick Glucose (POCT) [[id number]]  (Abnormal) Collected: 01/23/22 2125    Lab Status: Final result Updated: 01/23/22 2126     POC Glucose 232 mg/dl                  No orders to display              Procedures  Procedures         ED Course                                             MDM  Number of Diagnoses or Management Options  Cardiopulmonary arrest [facility])  Diagnosis management comments: Patient is a 72-year-old female seen in the emergency department with concern for apparent cardiac arrest   Patient was intubated in the field and treated with multiple rounds of epinephrine  CPR/ACLS was continued in the emergency department, with fleeting return of thready pulse noted, and then recurrent and persistent PEA  There appeared to be no chance of meaningful recovery following a total down time of at least approximately 60 minutes  Patient was pronounced dead at 9:01PM   Patient's daughter and  were notified of the patient's death via phone        Disposition  Final diagnoses:   Cardiopulmonary arrest ([address] ) Time reflects when diagnosis was documented in both MDM as applicable and the Disposition within this note     Time User Action Codes Description Comment    2022  9:04 PM Ken De La Paz Add [I46 9] Cardiopulmonary arrest [facility])       ED Disposition     ED Disposition Condition Date/Time Comment      Sun 2022  9:04 PM       Follow-up Information    None       Date, Time and Cause of Death    Preliminary Cause of Death: Cardiopulmonary arrest [facility])       Patient's Medications   Discharge Prescriptions    No medications on file       No discharge procedures on file      PDMP Review       Value Time User    PDMP Reviewed  Yes 2019  9:06 AM Olayinka Henry DO          ED Provider  Electronically Signed by           Maria Luisa Agudelo MD  22 [id number]

## 2022-01-24 NOTE — ED NOTES
As per ME pt is not a candidate for cornea harvest  Body released at this time     Eliezer Graham RN  01/23/22 2356

## 2023-05-26 NOTE — PROGRESS NOTES
Assessment/Plan:    No problem-specific Assessment & Plan notes found for this encounter  Diagnoses and all orders for this visit:    Controlled type 2 diabetes mellitus with chronic kidney disease on chronic dialysis, with long-term current use of insulin ([facility])  -     POCT hemoglobin A1c    Secondary hyperparathyroidism ([facility] [address] )    Cardiovascular arteriosclerosis    Essential hypertension    Age-related osteoporosis without current pathological fracture    End-stage renal disease on hemodialysis ([facility])    Anemia of chronic renal failure, unspecified CKD stage    Mixed hyperlipidemia    Vitamin D deficiency    Dementia without behavioral disturbance, unspecified dementia type  -     donepezil (ARICEPT) 5 mg tablet; Take 1 tablet (5 mg total) by mouth daily at bedtime    Other orders  -     torsemide (DEMADEX) 20 mg tablet; Take 20 mg by mouth  -     losartan (COZAAR) 25 mg tablet; Daily  -     isosorbide dinitrate (ISORDIL) 20 mg tablet; 2 times a day  -     ferrous sulfate 325 (65 Fe) mg tablet; 2 times a day  -     amLODIPine (NORVASC) 5 mg tablet; daily  -     acetaminophen (TYLENOL) 325 mg tablet; 2 tablets      A/P: In office HgA1c 6 6  PM BS are high, but pt and  want them around 200's as she does poor overnight with sugars any lower  Most likely has some dementia starting and felt to be multifactorial including SDAT, multi infarct, vascular, and from the dialysis  Will start Aricept  Vaccines are up to date  Continue current treatment otherwise and keep f/u for dialysis  RTC three months for routine  Subjective:      Patient ID: Kristy Goins is a 68 y o  female  WF RTC with her  for f/u dm, htn, etc  Doing ok, but  reports several months for worsening memory w/o any behaviors or safety issues  Pt unable to remember recent things, like days of the weeks and scheduled events  No FHx of SDAT  Doing well otherwise  Remains active as possible, but remains mainly chairbound   No falls  OP sugars good in the AM, but around low 200's in the evening  Attends dialysis TIW  Denies CVA like events  Denies angins, edema, SOB, palpitations, orthopnea, or PND  Due for labs  The following portions of the patient's history were reviewed and updated as appropriate:   She  has a past medical history of Abnormal weight loss; Anemia; Chronic kidney disease; Clostridium difficile colitis; Depression; Diabetes mellitus ([place] [address] ); Fracture, tibial plateau; GERD (gastroesophageal reflux disease); Hyperkalemia; Hypertension; Hypoglycemia; Hypokalemia; Hypothermia; Hypoxia; Metatarsalgia; Old myocardial infarction; Osteoporosis; Syncope; and Visual disturbance  She   Patient Active Problem List    Diagnosis Date Noted    Acquired absence of left great toe ([place] [address] ) 12/27/2018    End-stage renal disease on hemodialysis ([place] [address] ) 04/06/2016    Secondary hyperparathyroidism ([place] [address] ) 03/09/2016    Vitamin D deficiency 02/01/2016    Anemia of chronic kidney failure 04/08/2015    Leg fracture, right 81/68/0377    Diastolic dysfunction 24/45/2225    Age-related osteoporosis without current pathological fracture 04/09/2014    Type 2 diabetes mellitus with renal manifestations, controlled ([place] [address] ) 04/09/2014    Allergic rhinitis 03/26/2014    Arteriosclerosis of carotid artery 03/26/2014    Cardiovascular arteriosclerosis 03/26/2014    Cataract 03/26/2014    Congestive heart failure ([place] [address] ) 03/26/2014    Diabetes mellitus with foot ulcer ([place] [address] ) 03/26/2014    GERD without esophagitis 03/26/2014    Hyperlipidemia 03/26/2014    Hypertension 03/26/2014    Iron deficiency anemia 03/26/2014    Overactive bladder 03/26/2014    Peripheral arterial disease ([place] [address] ) 03/26/2014     She  has a past surgical history that includes Venous thrombectomy; Bypass Graft (Right); Coronary artery bypass graft; Cataract extraction; Cholecystectomy; Hernia repair; Carotid endarterectomy;  Foot Amputation (Bilateral); and Tonsillectomy and adenoidectomy  Her family history includes Aneurysm in her sister; Breast cancer in her mother; Cancer in her mother; Coronary artery disease in her father and sister; Heart disease in her mother; Liver cancer in her brother  She  reports that she has quit smoking  She has never used smokeless tobacco  She reports that she does not drink alcohol or use drugs  Current Outpatient Prescriptions   Medication Sig Dispense Refill    acetaminophen (TYLENOL) 325 mg tablet 2 tablets      amLODIPine (NORVASC) 5 mg tablet daily   aspirin 81 MG tablet Take 1 tablet by mouth daily      carvedilol (COREG) 12 5 mg tablet take 1 tablet by mouth twice a day 60 tablet 3    diphenoxylate-atropine (LOMOTIL) 2 5-0 025 mg per tablet Take 1 tablet by mouth 4 (four) times a day as needed for diarrhea 120 tablet 0    ergocalciferol (VITAMIN D2) 50,000 units Take 1 capsule (50,000 Units total) by mouth once a week 12 capsule 3    Ferric Citrate (AURYXIA) 1  MG(Fe) TABS Take by mouth      ferrous sulfate 325 (65 Fe) mg tablet 2 times a day   fluticasone (FLONASE) 50 mcg/act nasal spray instill 2 sprays into each nostril once daily 16 g 5    glucose blood (ONE TOUCH ULTRA TEST) test strip by In Vitro route 2 (two) times a day      isosorbide dinitrate (ISORDIL) 20 mg tablet 2 times a day   losartan (COZAAR) 25 mg tablet Daily      Misc   Devices [facility]) MISC by Does not apply route daily 1 each 0    Multiple Vitamins-Minerals (OCUVITE EXTRA) TABS Take 1 tablet by mouth daily      nateglinide (STARLIX) 120 mg tablet  TAKE 1 TABLET BY MOUTH DAILY AT BREAKFAST, THEN 1 TABLET AT LUNCH, AND 2 TABLETS AT SUPPER 120 tablet 5    pantoprazole (PROTONIX) 40 mg tablet take 1 tablet by mouth once daily 30 tablet 0    pravastatin (PRAVACHOL) 80 mg tablet take 1 tablet by mouth daily 90 tablet 3    donepezil (ARICEPT) 5 mg tablet Take 1 tablet (5 mg total) by mouth daily at bedtime 90 tablet 1    torsemide (DEMADEX) 20 mg tablet Take 20 mg by mouth       No current facility-administered medications for this visit  Current Outpatient Prescriptions on File Prior to Visit   Medication Sig    aspirin 81 MG tablet Take 1 tablet by mouth daily    carvedilol (COREG) 12 5 mg tablet take 1 tablet by mouth twice a day    diphenoxylate-atropine (LOMOTIL) 2 5-0 025 mg per tablet Take 1 tablet by mouth 4 (four) times a day as needed for diarrhea    ergocalciferol (VITAMIN D2) 50,000 units Take 1 capsule (50,000 Units total) by mouth once a week    Ferric Citrate (AURYXIA) 1  MG(Fe) TABS Take by mouth    fluticasone (FLONASE) 50 mcg/act nasal spray instill 2 sprays into each nostril once daily    glucose blood (ONE TOUCH ULTRA TEST) test strip by In Vitro route 2 (two) times a day    Misc  Devices [facility]) MISC by Does not apply route daily    Multiple Vitamins-Minerals (OCUVITE EXTRA) TABS Take 1 tablet by mouth daily    nateglinide (STARLIX) 120 mg tablet  TAKE 1 TABLET BY MOUTH DAILY AT BREAKFAST, THEN 1 TABLET AT LUNCH, AND 2 TABLETS AT SUPPER    pantoprazole (PROTONIX) 40 mg tablet take 1 tablet by mouth once daily    pravastatin (PRAVACHOL) 80 mg tablet take 1 tablet by mouth daily     No current facility-administered medications on file prior to visit  She is allergic to pollen extract       Review of Systems   Constitutional: Negative for activity change, chills, diaphoresis, fatigue and fever  HENT: Negative  Eyes: Negative for visual disturbance  Respiratory: Negative for cough, chest tightness, shortness of breath and wheezing  Cardiovascular: Negative for chest pain, palpitations and leg swelling  Gastrointestinal: Negative for abdominal pain, constipation, diarrhea, nausea and vomiting  Endocrine: Negative for cold intolerance and heat intolerance  Genitourinary: Negative for difficulty urinating, dysuria and frequency     Musculoskeletal: Positive for arthralgias and gait problem  Negative for myalgias  Neurological: Negative for dizziness, tremors, seizures, syncope, facial asymmetry, speech difficulty, weakness, light-headedness, numbness and headaches  Memory issues  Psychiatric/Behavioral: Negative for agitation, behavioral problems, confusion, dysphoric mood, hallucinations and sleep disturbance  The patient is not nervous/anxious  Objective:      /66   Pulse 80   Temp 98 4 °F (36 9 °C) (Tympanic)   Resp 18   Ht 5' 4" (1 626 m)   Wt 75 8 kg (167 lb)   BMI 28 67 kg/m²          Physical Exam   Constitutional: She is oriented to person, place, and time  She appears well-developed and well-nourished  No distress  HENT:   Head: Normocephalic and atraumatic  Mouth/Throat: Oropharynx is clear and moist    Eyes: Pupils are equal, round, and reactive to light  Conjunctivae and EOM are normal    Neck: Neck supple  No JVD present  Cardiovascular: Normal rate, regular rhythm and normal heart sounds  No murmur heard  Pulmonary/Chest: Effort normal and breath sounds normal  No respiratory distress  She has no wheezes  She has no rales  Abdominal: Soft  Bowel sounds are normal  She exhibits no distension  There is no tenderness  There is no rebound  Musculoskeletal: She exhibits no edema or tenderness  Deformity: right leg and knee changes  Neurological: She is alert and oriented to person, place, and time  She has normal reflexes  No cranial nerve deficit  Coordination normal    Psychiatric: She has a normal mood and affect  Her behavior is normal  Judgment and thought content normal    Nursing note and vitals reviewed  Cephalexin Pregnancy And Lactation Text: This medication is Pregnancy Category B and considered safe during pregnancy.  It is also excreted in breast milk but can be used safely for shorter doses.